# Patient Record
Sex: FEMALE | Race: BLACK OR AFRICAN AMERICAN | NOT HISPANIC OR LATINO | Employment: UNEMPLOYED | URBAN - METROPOLITAN AREA
[De-identification: names, ages, dates, MRNs, and addresses within clinical notes are randomized per-mention and may not be internally consistent; named-entity substitution may affect disease eponyms.]

---

## 2017-01-18 ENCOUNTER — PATIENT MESSAGE (OUTPATIENT)
Dept: OBSTETRICS AND GYNECOLOGY | Facility: CLINIC | Age: 47
End: 2017-01-18

## 2017-01-19 ENCOUNTER — PATIENT MESSAGE (OUTPATIENT)
Dept: OBSTETRICS AND GYNECOLOGY | Facility: CLINIC | Age: 47
End: 2017-01-19

## 2017-01-19 DIAGNOSIS — A60.00 GENITAL HERPES SIMPLEX, UNSPECIFIED SITE: Primary | ICD-10-CM

## 2017-01-19 RX ORDER — BORIC ACID
600 GRANULES (GRAM) MISCELLANEOUS NIGHTLY
Qty: 500 G | Refills: 1 | Status: SHIPPED | OUTPATIENT
Start: 2017-01-19 | End: 2017-02-02

## 2017-01-21 PROBLEM — A60.00 GENITAL HERPES SIMPLEX: Status: ACTIVE | Noted: 2017-01-21

## 2017-01-21 RX ORDER — VALACYCLOVIR HYDROCHLORIDE 500 MG/1
500 TABLET, FILM COATED ORAL 2 TIMES DAILY
Qty: 10 TABLET | Refills: 3 | Status: SHIPPED | OUTPATIENT
Start: 2017-01-21 | End: 2018-02-14

## 2017-04-14 ENCOUNTER — HOSPITAL ENCOUNTER (EMERGENCY)
Facility: OTHER | Age: 47
Discharge: HOME | End: 2017-04-14
Attending: EMERGENCY MEDICINE
Payer: MEDICAID

## 2017-04-14 VITALS
HEIGHT: 68 IN | HEART RATE: 87 BPM | TEMPERATURE: 98 F | OXYGEN SATURATION: 100 % | SYSTOLIC BLOOD PRESSURE: 118 MMHG | WEIGHT: 225 LBS | DIASTOLIC BLOOD PRESSURE: 85 MMHG | RESPIRATION RATE: 18 BRPM | BODY MASS INDEX: 34.1 KG/M2

## 2017-04-14 DIAGNOSIS — N39.0 URINARY TRACT INFECTION WITHOUT HEMATURIA, SITE UNSPECIFIED: Primary | ICD-10-CM

## 2017-04-14 LAB
BILIRUBIN, POC UA: NEGATIVE
BLOOD, POC UA: NEGATIVE
CLARITY, POC UA: CLEAR
COLOR, POC UA: YELLOW
GLUCOSE, POC UA: NEGATIVE
KETONES, POC UA: NEGATIVE
LEUKOCYTE EST, POC UA: ABNORMAL
NITRITE, POC UA: POSITIVE
PH UR STRIP: 7 [PH]
PROTEIN, POC UA: NEGATIVE
SPECIFIC GRAVITY, POC UA: 1.01
UROBILINOGEN, POC UA: 0.2 E.U./DL

## 2017-04-14 PROCEDURE — 63600175 PHARM REV CODE 636 W HCPCS: Performed by: EMERGENCY MEDICINE

## 2017-04-14 PROCEDURE — 81003 URINALYSIS AUTO W/O SCOPE: CPT

## 2017-04-14 PROCEDURE — 87088 URINE BACTERIA CULTURE: CPT

## 2017-04-14 PROCEDURE — 99284 EMERGENCY DEPT VISIT MOD MDM: CPT | Mod: 25

## 2017-04-14 PROCEDURE — 87086 URINE CULTURE/COLONY COUNT: CPT

## 2017-04-14 PROCEDURE — 96372 THER/PROPH/DIAG INJ SC/IM: CPT

## 2017-04-14 PROCEDURE — 87186 SC STD MICRODIL/AGAR DIL: CPT

## 2017-04-14 PROCEDURE — 87077 CULTURE AEROBIC IDENTIFY: CPT

## 2017-04-14 RX ORDER — IBUPROFEN 800 MG/1
800 TABLET ORAL EVERY 6 HOURS PRN
Qty: 20 TABLET | Refills: 0 | Status: SHIPPED | OUTPATIENT
Start: 2017-04-14 | End: 2019-02-11

## 2017-04-14 RX ORDER — NITROFURANTOIN (MACROCRYSTALS) 100 MG/1
100 CAPSULE ORAL EVERY 12 HOURS
Qty: 14 CAPSULE | Refills: 0 | Status: SHIPPED | OUTPATIENT
Start: 2017-04-14 | End: 2017-04-19 | Stop reason: SDUPTHER

## 2017-04-14 RX ORDER — PHENTERMINE HYDROCHLORIDE 37.5 MG/1
37.5 CAPSULE ORAL EVERY MORNING
COMMUNITY
End: 2017-04-19

## 2017-04-14 RX ORDER — CEFTRIAXONE 1 G/1
1 INJECTION, POWDER, FOR SOLUTION INTRAMUSCULAR; INTRAVENOUS
Status: COMPLETED | OUTPATIENT
Start: 2017-04-14 | End: 2017-04-14

## 2017-04-14 RX ORDER — KETOROLAC TROMETHAMINE 30 MG/ML
60 INJECTION, SOLUTION INTRAMUSCULAR; INTRAVENOUS
Status: COMPLETED | OUTPATIENT
Start: 2017-04-14 | End: 2017-04-14

## 2017-04-14 RX ORDER — HYDROCODONE BITARTRATE AND ACETAMINOPHEN 5; 325 MG/1; MG/1
1 TABLET ORAL EVERY 4 HOURS PRN
Qty: 10 TABLET | Refills: 0 | Status: SHIPPED | OUTPATIENT
Start: 2017-04-14 | End: 2017-04-24

## 2017-04-14 RX ADMIN — KETOROLAC TROMETHAMINE 60 MG: 30 INJECTION, SOLUTION INTRAMUSCULAR at 08:04

## 2017-04-14 RX ADMIN — CEFTRIAXONE SODIUM 1 G: 1 INJECTION, POWDER, FOR SOLUTION INTRAMUSCULAR; INTRAVENOUS at 08:04

## 2017-04-14 NOTE — ED NOTES
MD at the bedside for patient assessment and evaluation. Patient reports she has had pain in the right back and right flank x few days with no nausea or vomiting. Patient reports the pain does not radiate anywhere but reports she has pain to the RLQ intermittently with no fever. Patient reports she is most comfortable in a sitting positing and lying flat  Puts pressure on her back. After patient assessment . Patient offered by MD to have a CT for a possible DX of appendic or to wait and watch because she was not running a fever and did not have constant pain. patient denied having a CT scan because she was not running a fever and agreed to return if her symptoms worsened

## 2017-04-14 NOTE — ED NOTES
Urinary Tract Infection: Patient complains of frequency and pain in the right flank She has had symptoms for a few days. Patient also complains of back pain. Patient denies fever and vaginal discharge. Patient does have a history of recurrent UTI.  Patient does not have a history of pyelonephritis.

## 2017-04-14 NOTE — DISCHARGE INSTRUCTIONS
Abdominal Pain, Possible Appendicitis, Repeat Exam (Female)    Based on your visit today, the exact cause of your abdominal (stomach) pain is not certain. You have some of the early symptoms of appendicitis. Because these symptoms can be similar to other stomach problems, however, the diagnosis cannot be made at this time.  Waiting for more time to pass and repeating the exam is the best way to find out whether you have appendicitis. Within the next 12 to 24 hours, the cause of your stomach pain should become clear. During this time, you need to watch for any new symptoms or worsening of your condition. (See below.)  Symptoms  The most common symptom of appendicitis is pain in the abdomen. Since the appendix is in the lower right part of the abdomen, that is the most common place to have pain. Typically, the pain starts near the navel (belly button) and then moves lower and to the right over hours. The pain also tends to worsen over time. It may hurt especially when moving, straining, or coughing.  Other symptoms include:  · Loss of appetite  · Nausea, vomiting  · Low-grade fever  · Constipation or diarrhea  · Not passing gas  Home care  · Rest until your next exam. No lifting, straining, or strenuous activities.  · You may be told not to eat or drink anything before your next exam. Be sure to follow any instructions youre given. If you are allowed to eat:  ¨ Eat a diet low in fiber (called a low-residue diet). Foods allowed include refined breads, white rice, fruit and vegetable juices without pulp, and tender meats. These foods will pass more easily through the colon.  ¨ Avoid whole-grain foods, whole fruits and vegetables, meats, seeds and nuts, fried or fatty foods, dairy, and alcohol and spicy foods.   Follow-up care  Return for your next exam as directed.  When to seek medical advice  Call your healthcare provider or seek treatment right away if:  · You have a fever of 100.4°F (38°C) or higher, or as  directed by your provider.  · Your pain gets worse or moves to the lower right part of your abdomen.  · You have nausea or vomiting that worsens.  · You have diarrhea or constipation that worsens.  · You have blood in your vomit or stool (dark red or black color).  · Your abdomen becomes swollen.  · You become weak or dizzy.  · You think you might be pregnant or have unexpected vaginal bleeding.  Call 911  Call 911 right away if:  · You have trouble breathing.  · You become very confused or sleepy.  · You feel faint or lose consciousness.  · You have an usually fast heart rate.  Date Last Reviewed: 6/24/2015  © 7512-8969 [employer]. [address] [place] [address], [place] [address]. All rights reserved. This information is not intended as a substitute for professional medical care. Always follow your healthcare professional's instructions.

## 2017-04-14 NOTE — ED AVS SNAPSHOT
[facility] EMERGENCY DEPARTMENT  [address] [facility] [address]               Arpita Bradshaw   2017  8:20 AM   ED    Description:  Female : 1970   Department:  [facility] Emergency Department           Your Care was Coordinated By:     Provider Role From To    Samina Pulido MD Attending Provider 17 0839 --      Reason for Visit     Flank Pain           Diagnoses this Visit        Comments    Urinary tract infection without hematuria, site unspecified    -  Primary       ED Disposition     None           To Do List           Follow-up Information     Follow up with [facility] Emergency Department.    Specialty:  Emergency Medicine    Why:  If symptoms worsen    Contact information:    [address] [facility] [place] [address]  [phone]        Follow up with Primary Doctor No In 1 week.       These Medications        Disp Refills Start End    nitrofurantoin (MACRODANTIN) 100 MG capsule 14 capsule 0 2017    Take 1 capsule (100 mg total) by mouth every 12 (twelve) hours. - Oral    Pharmacy: [facility] [address] [place] [address] [facility] Ph #: [phone]       ibuprofen (ADVIL,MOTRIN) 800 MG tablet 20 tablet 0 2017     Take 1 tablet (800 mg total) by mouth every 6 (six) hours as needed for Pain. - Oral    Pharmacy: [facility] [address] [place] [address] [facility] Ph #: [phone]       hydrocodone-acetaminophen 5-325mg (NORCO) 5-325 mg per tablet 10 tablet 0 2017    Take 1 tablet by mouth every 4 (four) hours as needed for Pain. - Oral    Pharmacy: [facility] [address] [place], [place] [address] AT [facility] Ph #: [phone]         [facility] On Call     [facility] -  Assistance  Unless otherwise directed by your provider, please contact [facility], our nurse care line that is available for   assistance.     Registered nurses in the [facility] provide: appointment scheduling, clinical advisement, health education, and other advisory services.  Call: [phone] (toll free)               Medications           Message regarding Medications     Verify the changes and/or additions to your medication regime listed below are the same as discussed with your clinician today.  If any of these changes or additions are incorrect, please notify your healthcare provider.        START taking these NEW medications        Refills    nitrofurantoin (MACRODANTIN) 100 MG capsule 0    Sig: Take 1 capsule (100 mg total) by mouth every 12 (twelve) hours.    Class: Print    Route: Oral    ibuprofen (ADVIL,MOTRIN) 800 MG tablet 0    Sig: Take 1 tablet (800 mg total) by mouth every 6 (six) hours as needed for Pain.    Class: Print    Route: Oral    hydrocodone-acetaminophen 5-325mg (NORCO) 5-325 mg per tablet 0    Sig: Take 1 tablet by mouth every 4 (four) hours as needed for Pain.    Class: Print    Route: Oral      These medications were administered today        Dose Freq    cefTRIAXone injection 1 g 1 g ED 1 Time    Sig: Inject 1 g into the muscle ED 1 Time.    Class: Normal    Route: Intramuscular    ketorolac injection 60 mg 60 mg ED 1 Time    Sig: Inject 60 mg into the muscle ED 1 Time.    Class: Normal    Route: Intramuscular           Verify that the below list of medications is an accurate representation of the medications you are currently taking.  If none reported, the list may be blank. If incorrect, please contact your healthcare provider. Carry this list with you in case of emergency.           Current Medications     phentermine 37.5 MG capsule Take 37.5 mg by mouth every morning.    cefTRIAXone injection 1 g Inject 1 g into the muscle ED 1 Time.    cyclobenzaprine (FLEXERIL) 5 MG tablet TK 1 T PO HS PRF MUSCLE SPASMS    diphenhydrAMINE (BENADRYL) 25 mg capsule Take 25 mg by mouth every 4 (four)  "hours as needed for Itching.    gabapentin (NEURONTIN) 300 MG capsule TK 1 C PO  TID UTD    hydrocodone-acetaminophen 5-325mg (NORCO) 5-325 mg per tablet Take 1 tablet by mouth every 4 (four) hours as needed for Pain.    hydroxychloroquine (PLAQUENIL) 200 mg tablet     ibuprofen (ADVIL,MOTRIN) 800 MG tablet Take 1 tablet (800 mg total) by mouth every 6 (six) hours as needed for Pain.    ketorolac injection 60 mg Inject 60 mg into the muscle ED 1 Time.    methen-m.blue-s.phos-phsal-hyo (URIBEL) 118-10-40.8-36 mg Cap Take 1 tablet by mouth 4 (four) times daily as needed (Pelvic pain).    methen-m.blue-s.phos-phsal-hyo (URIBEL) 118-10-40.8-36 mg Cap Take 1 capsule by mouth every 6 to 8 hours as needed (Dysuria).    nitrofurantoin (MACRODANTIN) 100 MG capsule Take 1 capsule (100 mg total) by mouth every 12 (twelve) hours.    pentosan polysulfate (ELMIRON) 100 mg Cap Take 1 capsule (100 mg total) by mouth 3 (three) times daily.    phenazopyridine (PYRIDIUM) 200 MG tablet TK 1 T PO  TID FOR 2 DAYS    sulfamethoxazole-trimethoprim 800-160mg (BACTRIM DS) 800-160 mg Tab TK 1 T PO  BID    valacyclovir (VALTREX) 500 MG tablet Take 1 tablet (500 mg total) by mouth 2 (two) times daily.           Clinical Reference Information           Your Vitals Were     BP Pulse Temp Resp Height Weight    118/85 (BP Location: Left arm, Patient Position: Sitting) 87 97.8 °F (36.6 °C) (Temporal) 18 5' 8" (1.727 m) 102.1 kg (225 lb)    SpO2 BMI             100% 34.21 kg/m2         Allergies as of 4/14/2017     No Known Allergies      Immunizations Administered on Date of Encounter - 4/14/2017     None      ED Micro, Lab, POCT     Start Ordered       Status Ordering Provider    04/14/17 0840 04/14/17 0840  Urine culture  Once      In process     04/14/17 0829 04/14/17 0828  POCT URINALYSIS W/O SCOPE  Once      Completed     04/14/17 0829 04/14/17 0829  POCT URINALYSIS W/O SCOPE  Once      Final result       ED Imaging Orders     None    "     Discharge Instructions         Abdominal Pain, Possible Appendicitis, Repeat Exam (Female)    Based on your visit today, the exact cause of your abdominal (stomach) pain is not certain. You have some of the early symptoms of appendicitis. Because these symptoms can be similar to other stomach problems, however, the diagnosis cannot be made at this time.  Waiting for more time to pass and repeating the exam is the best way to find out whether you have appendicitis. Within the next 12 to 24 hours, the cause of your stomach pain should become clear. During this time, you need to watch for any new symptoms or worsening of your condition. (See below.)  Symptoms  The most common symptom of appendicitis is pain in the abdomen. Since the appendix is in the lower right part of the abdomen, that is the most common place to have pain. Typically, the pain starts near the navel (belly button) and then moves lower and to the right over hours. The pain also tends to worsen over time. It may hurt especially when moving, straining, or coughing.  Other symptoms include:  · Loss of appetite  · Nausea, vomiting  · Low-grade fever  · Constipation or diarrhea  · Not passing gas  Home care  · Rest until your next exam. No lifting, straining, or strenuous activities.  · You may be told not to eat or drink anything before your next exam. Be sure to follow any instructions youre given. If you are allowed to eat:  ¨ Eat a diet low in fiber (called a low-residue diet). Foods allowed include refined breads, white rice, fruit and vegetable juices without pulp, and tender meats. These foods will pass more easily through the colon.  ¨ Avoid whole-grain foods, whole fruits and vegetables, meats, seeds and nuts, fried or fatty foods, dairy, and alcohol and spicy foods.   Follow-up care  Return for your next exam as directed.  When to seek medical advice  Call your healthcare provider or seek treatment right away if:  · You have a fever of  100.4°F (38°C) or higher, or as directed by your provider.  · Your pain gets worse or moves to the lower right part of your abdomen.  · You have nausea or vomiting that worsens.  · You have diarrhea or constipation that worsens.  · You have blood in your vomit or stool (dark red or black color).  · Your abdomen becomes swollen.  · You become weak or dizzy.  · You think you might be pregnant or have unexpected vaginal bleeding.  Call 911  Call 911 right away if:  · You have trouble breathing.  · You become very confused or sleepy.  · You feel faint or lose consciousness.  · You have an usually fast heart rate.  Date Last Reviewed: 6/24/2015 © 2000-2016 [employer]. [address] [place] [address], [place] [address]. All rights reserved. This information is not intended as a substitute for professional medical care. Always follow your healthcare professional's instructions.           [facility] Emergency Department complies with applicable Federal civil rights laws and does not discriminate on the basis of race, color, national origin, age, disability, or sex.        Language Assistance Services     ATTENTION: Language assistance services are available, free of charge. Please call [phone].      ATENCIÓN: Si habla español, tiene a damico disposición servicios gratuitos de asistencia lingüística. Llame al [phone].     CHÚ Ý: N?u b?n nói Ti?ng Vi?t, có các d?ch v? h? tr? ngôn ng? mi?n phí dành cho b?n. G?i s? [phone].

## 2017-04-14 NOTE — ED PROVIDER NOTES
Encounter Date: 2017       History     Chief Complaint   Patient presents with    Flank Pain     Pt c/o of intermittent R side flank pain radiating to RLQ. Pt denies dysuria, vag discharge, n/v/d.      Review of patient's allergies indicates:  No Known Allergies  The history is provided by the patient.   46 -year-old complains of pain to her right flank area.  Patient has had sharp pain to her right flank area for the last 3 days.  Pain radiates to the right lower quadrant.  The pain is been constant.  It worsens when she moves.  She rates her pain as 8 out of 10.  She's been taken ibuprofen.  She's had a normal appetite.  No fever.  No nausea or vomiting.  Patient has had a kidney infection before and says that this feels similar.  No headache or dizziness.  Past Medical History:   Diagnosis Date    Fibromyalgia     Lupus      Past Surgical History:   Procedure Laterality Date    BREAST REDUCTION       SECTION      HYSTERECTOMY      [facility] for endometriosis     Family History   Problem Relation Age of Onset    Hypertension Mother     Cancer Mother      KIDNEY    Arthritis Mother      Social History   Substance Use Topics    Smoking status: Never Smoker    Smokeless tobacco: None    Alcohol use No     Review of Systems   Constitutional: Negative for appetite change and fever.   Respiratory: Negative for shortness of breath.    Cardiovascular: Negative for chest pain.   Gastrointestinal: Positive for abdominal pain.   Genitourinary: Positive for flank pain. Negative for decreased urine volume and dysuria.   Musculoskeletal: Positive for back pain.   Neurological: Negative for headaches.       Physical Exam   Initial Vitals   BP Pulse Resp Temp SpO2   17 0824 17 0824 17 0824 17 0824 17 0824   118/85 87 18 97.8 °F (36.6 °C) 100 %     Physical Exam    Nursing note and vitals reviewed.  Constitutional: She appears well-developed and well-nourished.   HENT:   Head:  "Normocephalic and atraumatic.   Eyes: Conjunctivae and EOM are normal. Pupils are equal, round, and reactive to light.   Neck: Normal range of motion. Neck supple.   Cardiovascular: Normal rate and regular rhythm.   Pulmonary/Chest: Breath sounds normal. No respiratory distress.   Abdominal: Soft. There is tenderness in the right lower quadrant. There is no rebound and no guarding.   Musculoskeletal: Normal range of motion.        Arms:  Neurological: She is alert and oriented to person, place, and time. She has normal strength.   Skin: Skin is warm and dry.   Psychiatric: She has a normal mood and affect.         ED Course   Procedures  Labs Reviewed   POCT URINALYSIS W/O SCOPE - Abnormal; Notable for the following:        Result Value    Glucose, UA Negative (*)     Bilirubin, UA Negative (*)     Ketones, UA Negative (*)     Blood, UA Negative (*)     Protein, UA Negative (*)     Nitrite, UA Positive (*)     All other components within normal limits   CULTURE, URINE   POCT URINALYSIS W/O SCOPE             Medical Decision Making:   Initial Assessment:   Patient is complaining of right flank pain radiates to her right lower quadrant.  Patient has had a UTI in the past and says this feels similar.  On exam she has very mild tenderness to her right lower quadrant and does have moderate tenderness to her right flank.  She has had a normal appetite.  No fever  ED Management:  Patient does have nitrite positive urine as well as moderate bacteria.  She will be treated with Rocephin and a  urine culture will be sent.  Patient was given specific return precautions since appendicitis has not been ruled out.  I did offer to do blood work and a CT at this time.  However, patient would prefer "watchful waiting" and will return if her symptoms worsen.                   ED Course     Clinical Impression:   The encounter diagnosis was Urinary tract infection without hematuria, site unspecified.          Samina Pulido, " MD  04/14/17 1053

## 2017-04-16 LAB — BACTERIA UR CULT: NORMAL

## 2017-04-19 ENCOUNTER — OFFICE VISIT (OUTPATIENT)
Dept: OBSTETRICS AND GYNECOLOGY | Facility: CLINIC | Age: 47
End: 2017-04-19
Attending: OBSTETRICS & GYNECOLOGY
Payer: MEDICAID

## 2017-04-19 VITALS
DIASTOLIC BLOOD PRESSURE: 72 MMHG | WEIGHT: 228.19 LBS | BODY MASS INDEX: 34.59 KG/M2 | SYSTOLIC BLOOD PRESSURE: 118 MMHG | HEIGHT: 68 IN

## 2017-04-19 DIAGNOSIS — N30.00 ACUTE CYSTITIS WITHOUT HEMATURIA: Primary | ICD-10-CM

## 2017-04-19 DIAGNOSIS — N76.0 VAGINITIS AND VULVOVAGINITIS: ICD-10-CM

## 2017-04-19 LAB
CANDIDA RRNA VAG QL PROBE: NEGATIVE
G VAGINALIS RRNA GENITAL QL PROBE: NEGATIVE
T VAGINALIS RRNA GENITAL QL PROBE: NEGATIVE

## 2017-04-19 PROCEDURE — 99213 OFFICE O/P EST LOW 20 MIN: CPT | Mod: S$PBB,,, | Performed by: OBSTETRICS & GYNECOLOGY

## 2017-04-19 PROCEDURE — 99999 PR PBB SHADOW E&M-EST. PATIENT-LVL III: CPT | Mod: PBBFAC,,, | Performed by: OBSTETRICS & GYNECOLOGY

## 2017-04-19 PROCEDURE — 99213 OFFICE O/P EST LOW 20 MIN: CPT | Mod: PBBFAC | Performed by: OBSTETRICS & GYNECOLOGY

## 2017-04-19 PROCEDURE — 87480 CANDIDA DNA DIR PROBE: CPT

## 2017-04-19 PROCEDURE — 87086 URINE CULTURE/COLONY COUNT: CPT

## 2017-04-19 RX ORDER — NITROFURANTOIN (MACROCRYSTALS) 100 MG/1
100 CAPSULE ORAL EVERY 12 HOURS
Qty: 14 CAPSULE | Refills: 0 | Status: SHIPPED | OUTPATIENT
Start: 2017-04-19 | End: 2017-04-26

## 2017-04-19 RX ORDER — METRONIDAZOLE 7.5 MG/G
1 GEL VAGINAL DAILY
Qty: 70 G | Refills: 0 | Status: SHIPPED | OUTPATIENT
Start: 2017-04-19 | End: 2017-04-24

## 2017-04-19 RX ORDER — PROMETHAZINE HYDROCHLORIDE 25 MG/1
25 TABLET ORAL EVERY 4 HOURS PRN
Qty: 20 TABLET | Refills: 1 | Status: SHIPPED | OUTPATIENT
Start: 2017-04-19 | End: 2018-07-18 | Stop reason: ALTCHOICE

## 2017-04-19 RX ORDER — BORIC ACID
600 GRANULES (GRAM) MISCELLANEOUS NIGHTLY
Qty: 100 G | Refills: 2 | Status: SHIPPED | OUTPATIENT
Start: 2017-04-19 | End: 2017-05-03

## 2017-04-19 NOTE — PROGRESS NOTES
HISTORY OF PRESENT ILLNESS:    Arpita Bradshaw is a 46 y.o. female, , No LMP recorded. Patient has had a hysterectomy.,  presents for ER follow-up.  Patient seen in ER few days ago for right flank and right lower quadrant pelvic pain.  Patient given rocephin im and oral macrobid.  Urine culture +e.coli sensitive to both abx.    Patient feels better but still with some pain and a nausea.  Also complains of vaginal discharge and odor which she thinks is BV.    Past Medical History:   Diagnosis Date    Fibromyalgia     Lupus        Past Surgical History:   Procedure Laterality Date    BREAST REDUCTION       SECTION      HYSTERECTOMY      [facility] for endometriosis       MEDICATIONS AND ALLERGIES:      Current Outpatient Prescriptions:     diphenhydrAMINE (BENADRYL) 25 mg capsule, Take 25 mg by mouth every 4 (four) hours as needed for Itching., Disp: , Rfl:     gabapentin (NEURONTIN) 300 MG capsule, TK 1 C PO  TID UTD, Disp: , Rfl: 1    hydrocodone-acetaminophen 5-325mg (NORCO) 5-325 mg per tablet, Take 1 tablet by mouth every 4 (four) hours as needed for Pain., Disp: 10 tablet, Rfl: 0    ibuprofen (ADVIL,MOTRIN) 800 MG tablet, Take 1 tablet (800 mg total) by mouth every 6 (six) hours as needed for Pain., Disp: 20 tablet, Rfl: 0    methen-m.blue-s.phos-phsal-hyo (URIBEL) 118-10-40.8-36 mg Cap, Take 1 tablet by mouth 4 (four) times daily as needed (Pelvic pain)., Disp: 20 capsule, Rfl: 12    methen-m.blue-s.phos-phsal-hyo (URIBEL) 118-10-40.8-36 mg Cap, Take 1 capsule by mouth every 6 to 8 hours as needed (Dysuria)., Disp: 20 capsule, Rfl: 3    nitrofurantoin (MACRODANTIN) 100 MG capsule, Take 1 capsule (100 mg total) by mouth every 12 (twelve) hours., Disp: 14 capsule, Rfl: 0    boric acid Gran, Place 600 mg vaginally every evening., Disp: 100 g, Rfl: 2    metronidazole (METROGEL VAGINAL) 0.75 % vaginal gel, Place 1 applicator vaginally once daily., Disp: 70 g, Rfl: 0    promethazine  "(PHENERGAN) 25 MG tablet, Take 1 tablet (25 mg total) by mouth every 4 (four) hours as needed for Nausea., Disp: 20 tablet, Rfl: 1    valacyclovir (VALTREX) 500 MG tablet, Take 1 tablet (500 mg total) by mouth 2 (two) times daily., Disp: 10 tablet, Rfl: 3    Review of patient's allergies indicates:  No Known Allergies    Family History   Problem Relation Age of Onset    Hypertension Mother     Cancer Mother      KIDNEY    Arthritis Mother        Social History     Social History    Marital status: Single     Spouse name: N/A    Number of children: N/A    Years of education: N/A     Occupational History    Not on file.     Social History Main Topics    Smoking status: Never Smoker    Smokeless tobacco: Not on file    Alcohol use No    Drug use: No    Sexual activity: Yes     Partners: Male     Birth control/ protection: Surgical     Other Topics Concern    Not on file     Social History Narrative       COMPREHENSIVE GYN HISTORY:  PAP History: Denies abnormal Paps.  Infection History: Denies STDs. Denies PID.  Benign History: Denies uterine fibroids. Denies ovarian cysts. Denies endometriosis. Denies other conditions.  Cancer History: Denies cervical cancer. Denies uterine cancer or hyperplasia. Denies ovarian cancer. Denies vulvar cancer or pre-cancer. Denies vaginal cancer or pre-cancer. Denies breast cancer. Denies colon cancer.    ROS:  GENERAL: No weight changes. No swelling. No fatigue. No fever.  CARDIOVASCULAR: No chest pain. No shortness of breath. No leg cramps.   NEUROLOGICAL: No headaches. No vision changes.  BREASTS: No pain. No lumps. No discharge.  ABDOMEN: No pain. No nausea. No vomiting. No diarrhea. No constipation.  REPRODUCTIVE: No abnormal bleeding.   VULVA: No pain. No lesions. No itching.  VAGINA: see above.  URINARY: see above    /72  Ht 5' 8" (1.727 m)  Wt 103.5 kg (228 lb 2.8 oz)  BMI 34.69 kg/m2    PE:  APPEARANCE: Well nourished, well developed, in no acute " distress.  ABDOMEN: Soft. No tenderness or masses. No hepatosplenomegaly. No hernias.  +Right CVA tenderness  BREASTS, FUNDOSCOPIC, RECTAL DEFERRED  PELVIC: External female genitalia without lesions.  Female hair distribution. Adequate perineal body, Normal urethral meatus. Vagina moist and well rugated without lesions or discharge.  No significant cystocele or rectocele present. Cervix pink without lesions, discharge or tenderness. Uterus is normal size, regular, mobile and nontender. Adnexa without masses or tenderness.  EXTREMITIES: No edema      DIAGNOSIS:  1. Acute cystitis without hematuria  Urine culture   2. Vaginitis and vulvovaginitis  metronidazole (METROGEL VAGINAL) 0.75 % vaginal gel    Vaginosis Screen by DNA Probe    CANCELED: POCT Vaginosis Screen by DNA Probe (Affirm)       COUNSELING:  Patient instructed to finish macrobid rx  Precautions given to follow-up if sx persist or worsen.

## 2017-04-19 NOTE — MR AVS SNAPSHOT
[facility] [address]  [address]  [address]  [facility] [address]  Phone: [phone]  Fax: [phone]                  Arpita Bradshaw   2017 11:00 AM   Office Visit    Description:  Female : 1970   Provider:  Venice Thompson MD   Department:  [facility] [address]           Reason for Visit     Follow-up                To Do List           Future Appointments        Provider Department Dept Phone    2017 11:00 AM Venice Thompson MD [facility] [address] [phone]      Goals (5 Years of Data)     None      [facility]     [facility] Care [facility] -  Assistance  Unless otherwise directed by your provider, please contact [facility], our nurse care line that is available for  assistance.     Registered nurses in the [facility] provide: appointment scheduling, clinical advisement, health education, and other advisory services.  Call: [phone] (toll free)               Medications           Message regarding Medications     Verify the changes and/or additions to your medication regime listed below are the same as discussed with your clinician today.  If any of these changes or additions are incorrect, please notify your healthcare provider.        STOP taking these medications     phentermine 37.5 MG capsule Take 37.5 mg by mouth every morning.    pentosan polysulfate (ELMIRON) 100 mg Cap Take 1 capsule (100 mg total) by mouth 3 (three) times daily.    phenazopyridine (PYRIDIUM) 200 MG tablet TK 1 T PO  TID FOR 2 DAYS    sulfamethoxazole-trimethoprim 800-160mg (BACTRIM DS) 800-160 mg Tab TK 1 T PO  BID    hydroxychloroquine (PLAQUENIL) 200 mg tablet     cyclobenzaprine (FLEXERIL) 5 MG tablet TK 1 T PO HS PRF MUSCLE SPASMS           Verify that the below list of medications is an accurate representation of the medications you are currently taking.  If none reported, the list may be blank. If incorrect, please contact your  "healthcare provider. Carry this list with you in case of emergency.           Current Medications     diphenhydrAMINE (BENADRYL) 25 mg capsule Take 25 mg by mouth every 4 (four) hours as needed for Itching.    gabapentin (NEURONTIN) 300 MG capsule TK 1 C PO  TID UTD    hydrocodone-acetaminophen 5-325mg (NORCO) 5-325 mg per tablet Take 1 tablet by mouth every 4 (four) hours as needed for Pain.    ibuprofen (ADVIL,MOTRIN) 800 MG tablet Take 1 tablet (800 mg total) by mouth every 6 (six) hours as needed for Pain.    methen-m.blue-s.phos-phsal-hyo (URIBEL) 118-10-40.8-36 mg Cap Take 1 tablet by mouth 4 (four) times daily as needed (Pelvic pain).    methen-m.blue-s.phos-phsal-hyo (URIBEL) 118-10-40.8-36 mg Cap Take 1 capsule by mouth every 6 to 8 hours as needed (Dysuria).    nitrofurantoin (MACRODANTIN) 100 MG capsule Take 1 capsule (100 mg total) by mouth every 12 (twelve) hours.    valacyclovir (VALTREX) 500 MG tablet Take 1 tablet (500 mg total) by mouth 2 (two) times daily.           Clinical Reference Information           Your Vitals Were     BP Height Weight BMI       118/72 5' 8" (1.727 m) 103.5 kg (228 lb 2.8 oz) 34.69 kg/m2       Blood Pressure          Most Recent Value    BP  118/72      Allergies as of 4/19/2017     No Known Allergies      Immunizations Administered on Date of Encounter - 4/19/2017     None      Language Assistance Services     ATTENTION: Language assistance services are available, free of charge. Please call [phone].      ATENCIÓN: Si habla laya, tiene a damico disposición servicios gratuitos de asistencia lingüística. Llame al [phone].     [facility] Ý: N?u b?n nói Ti?ng Vi?t, có các d?ch v? h? tr? ngôn ng? mi?n phí dành cho b?n. G?i s? [phone].         [facility] [address] complies with applicable Federal civil rights laws and does not discriminate on the basis of race, color, national origin, age, disability, or sex.        "

## 2017-04-20 LAB — BACTERIA UR CULT: NORMAL

## 2017-09-27 ENCOUNTER — TELEPHONE (OUTPATIENT)
Dept: UROLOGY | Facility: CLINIC | Age: 47
End: 2017-09-27

## 2017-09-27 NOTE — TELEPHONE ENCOUNTER
[employer] interstim urinary implant    Product number: 3058// Serial number: [id number] Implanted on: 10/16/2008  Implanted at: [facility]    Product number: 3093-28 //Serial number: [id number]  Implanted on: 10/16/2008  Implanted at : [facility]

## 2017-10-20 ENCOUNTER — TELEPHONE (OUTPATIENT)
Dept: UROLOGY | Facility: CLINIC | Age: 47
End: 2017-10-20

## 2017-10-20 ENCOUNTER — PATIENT MESSAGE (OUTPATIENT)
Dept: UROLOGY | Facility: CLINIC | Age: 47
End: 2017-10-20

## 2017-12-13 ENCOUNTER — PATIENT MESSAGE (OUTPATIENT)
Dept: UROLOGY | Facility: CLINIC | Age: 47
End: 2017-12-13

## 2018-01-31 ENCOUNTER — OFFICE VISIT (OUTPATIENT)
Dept: UROLOGY | Facility: CLINIC | Age: 48
End: 2018-01-31
Payer: MEDICAID

## 2018-01-31 VITALS — BODY MASS INDEX: 34.08 KG/M2 | WEIGHT: 224.88 LBS | HEIGHT: 68 IN

## 2018-01-31 DIAGNOSIS — N39.41 URGE INCONTINENCE: ICD-10-CM

## 2018-01-31 DIAGNOSIS — N31.9 NEUROGENIC BLADDER: Primary | ICD-10-CM

## 2018-01-31 DIAGNOSIS — N30.10 INTERSTITIAL CYSTITIS: ICD-10-CM

## 2018-01-31 DIAGNOSIS — R35.1 NOCTURIA: ICD-10-CM

## 2018-01-31 DIAGNOSIS — R35.0 URINARY FREQUENCY: ICD-10-CM

## 2018-01-31 PROCEDURE — 99999 PR PBB SHADOW E&M-EST. PATIENT-LVL III: CPT | Mod: PBBFAC,,, | Performed by: UROLOGY

## 2018-01-31 PROCEDURE — 3008F BODY MASS INDEX DOCD: CPT | Mod: ,,, | Performed by: UROLOGY

## 2018-01-31 PROCEDURE — 99213 OFFICE O/P EST LOW 20 MIN: CPT | Mod: PBBFAC,PO | Performed by: UROLOGY

## 2018-01-31 PROCEDURE — 99214 OFFICE O/P EST MOD 30 MIN: CPT | Mod: S$PBB,,, | Performed by: UROLOGY

## 2018-01-31 RX ORDER — PREGABALIN 150 MG/1
CAPSULE ORAL
Refills: 0 | COMMUNITY
Start: 2018-01-24 | End: 2018-02-14

## 2018-01-31 NOTE — PROGRESS NOTES
Subjective:       Patient ID: Arpita Bradshaw is a 47 y.o. female.    Chief Complaint: Follow-up    48 yo AAF with NGB and OAB. Treated with interstim for NGB with benefit of improving symptoms of IC. Not taking Elmirom.      Urinary Frequency    This is a chronic (Urge incontinence and frequency) problem. The current episode started more than 1 year ago. The problem occurs every urination. The problem has been waxing and waning. The pain is at a severity of 0/10. The patient is experiencing no pain. There has been no fever. She is sexually active. There is no history of pyelonephritis. Associated symptoms include frequency and urgency. Pertinent negatives include no behavior changes, chills, discharge, flank pain, hematuria, hesitancy, nausea, possible pregnancy, sweats, vomiting, weight loss, bubble bath use, constipation, rash or withholding. Treatments tried: Interstim. The treatment provided mild relief. Her past medical history is significant for a urological procedure. There is no history of catheterization, diabetes insipidus, diabetes mellitus, genitourinary reflux, hypertension, kidney stones, recurrent UTIs, a single kidney, STD or urinary stasis.     Review of Systems   Constitutional: Negative for activity change, appetite change, chills, fatigue, fever and weight loss.   HENT: Negative for congestion, ear pain, hearing loss, nosebleeds, sinus pressure, sore throat and trouble swallowing.    Eyes: Negative for pain and visual disturbance.   Respiratory: Negative for apnea, cough and shortness of breath.    Cardiovascular: Negative for chest pain and leg swelling.   Gastrointestinal: Negative for abdominal distention, abdominal pain, anal bleeding, blood in stool, constipation, diarrhea, nausea, rectal pain and vomiting.   Endocrine: Negative for cold intolerance, heat intolerance, polydipsia, polyphagia and polyuria.   Genitourinary: Positive for frequency and urgency. Negative for decreased urine  volume, difficulty urinating, dyspareunia, dysuria, enuresis, flank pain, genital sores, hematuria, hesitancy, menstrual problem, pelvic pain, vaginal bleeding, vaginal discharge and vaginal pain.   Musculoskeletal: Negative for arthralgias and back pain.   Skin: Negative for color change, pallor and rash.   Allergic/Immunologic: Negative for environmental allergies, food allergies and immunocompromised state.   Neurological: Negative for dizziness, speech difficulty, weakness and headaches.   Hematological: Negative for adenopathy. Does not bruise/bleed easily.   Psychiatric/Behavioral: Negative.        Objective:      Physical Exam   Nursing note and vitals reviewed.  Constitutional: She is oriented to person, place, and time. She appears well-developed and well-nourished.   HENT:   Head: Normocephalic.   Nose: Nose normal.   Mouth/Throat: Oropharynx is clear and moist.   Eyes: Conjunctivae and EOM are normal. Pupils are equal, round, and reactive to light.   Neck: Normal range of motion. Neck supple.   Cardiovascular: Normal rate, regular rhythm, normal heart sounds and intact distal pulses.    Pulmonary/Chest: Effort normal and breath sounds normal.   Abdominal: Soft. Bowel sounds are normal.   Genitourinary: Vagina normal.   Musculoskeletal: Normal range of motion.   Neurological: She is alert and oriented to person, place, and time. She has normal reflexes.   Skin: Skin is warm and dry.     Psychiatric: She has a normal mood and affect. Her behavior is normal. Judgment and thought content normal.       Assessment:       1. Neurogenic bladder    2. Urge incontinence    3. Interstitial cystitis    4. Nocturia    5. Urinary frequency        Plan:       Patient Instructions   Continue Interstim  Mibetriq trial

## 2018-01-31 NOTE — PATIENT INSTRUCTIONS
Continue Interstim  Mirbetriq trial  F/U 6 weeks  If no improvement consider Botox ( info given to pt)

## 2018-02-01 ENCOUNTER — TELEPHONE (OUTPATIENT)
Dept: UROLOGY | Facility: CLINIC | Age: 48
End: 2018-02-01

## 2018-02-01 NOTE — TELEPHONE ENCOUNTER
----- Message from Tammy Boogie sent at 2/1/2018 11:05 AM CST -----  Contact: self/[phone]  Patient called to state the prescription he prescribed on yesterday is not covered by her insurance so she needs an alternative.    Please call and advise.

## 2018-02-05 ENCOUNTER — TELEPHONE (OUTPATIENT)
Dept: UROLOGY | Facility: CLINIC | Age: 48
End: 2018-02-05

## 2018-02-09 ENCOUNTER — TELEPHONE (OUTPATIENT)
Dept: UROLOGY | Facility: CLINIC | Age: 48
End: 2018-02-09

## 2018-02-09 NOTE — TELEPHONE ENCOUNTER
----- Message from Mine Bailey sent at 2/9/2018 10:55 AM CST -----  Contact: self/ [phone]  Patient called in to schedule a appointment for a new procedure.    Please call and advise.

## 2018-02-14 DIAGNOSIS — N31.9 NEUROGENIC URINARY BLADDER DISORDER: ICD-10-CM

## 2018-02-14 DIAGNOSIS — N31.9 NEUROGENIC BLADDER: Primary | ICD-10-CM

## 2018-02-14 RX ORDER — SODIUM CHLORIDE 9 MG/ML
INJECTION, SOLUTION INTRAVENOUS CONTINUOUS
Status: CANCELLED | OUTPATIENT
Start: 2018-02-14

## 2018-02-15 ENCOUNTER — LAB VISIT (OUTPATIENT)
Dept: LAB | Facility: HOSPITAL | Age: 48
End: 2018-02-15
Attending: UROLOGY
Payer: MEDICAID

## 2018-02-15 ENCOUNTER — TELEPHONE (OUTPATIENT)
Dept: UROLOGY | Facility: CLINIC | Age: 48
End: 2018-02-15

## 2018-02-15 DIAGNOSIS — N39.0 URINARY TRACT INFECTION WITHOUT HEMATURIA, SITE UNSPECIFIED: ICD-10-CM

## 2018-02-15 DIAGNOSIS — N39.0 URINARY TRACT INFECTION WITHOUT HEMATURIA, SITE UNSPECIFIED: Primary | ICD-10-CM

## 2018-02-15 LAB
AMORPH CRY UR QL COMP ASSIST: ABNORMAL
BACTERIA #/AREA URNS AUTO: ABNORMAL /HPF
BILIRUB UR QL STRIP: NEGATIVE
CLARITY UR REFRACT.AUTO: ABNORMAL
COLOR UR AUTO: YELLOW
GLUCOSE UR QL STRIP: NEGATIVE
HGB UR QL STRIP: NEGATIVE
KETONES UR QL STRIP: NEGATIVE
LEUKOCYTE ESTERASE UR QL STRIP: ABNORMAL
MICROSCOPIC COMMENT: ABNORMAL
NITRITE UR QL STRIP: POSITIVE
PH UR STRIP: 5 [PH] (ref 5–8)
PROT UR QL STRIP: NEGATIVE
SP GR UR STRIP: >=1.03 (ref 1–1.03)
SQUAMOUS #/AREA URNS AUTO: 2 /HPF
URN SPEC COLLECT METH UR: ABNORMAL
UROBILINOGEN UR STRIP-ACNC: NEGATIVE EU/DL
WBC #/AREA URNS AUTO: 4 /HPF (ref 0–5)

## 2018-02-15 PROCEDURE — 87077 CULTURE AEROBIC IDENTIFY: CPT

## 2018-02-15 PROCEDURE — 87186 SC STD MICRODIL/AGAR DIL: CPT

## 2018-02-15 PROCEDURE — 81001 URINALYSIS AUTO W/SCOPE: CPT

## 2018-02-15 PROCEDURE — 87086 URINE CULTURE/COLONY COUNT: CPT

## 2018-02-15 PROCEDURE — 87088 URINE BACTERIA CULTURE: CPT

## 2018-02-15 NOTE — TELEPHONE ENCOUNTER
She denise thinks she might have a uti, she is not sure. Will drop off a urine culture today at the lab

## 2018-02-15 NOTE — TELEPHONE ENCOUNTER
----- Message from Tammy Boogie sent at 2/15/2018  9:50 AM CST -----  Contact: self/[phone]  Patient called to speak with the nurse in regard to the procedure she is having on Monday.    Please call and advise.

## 2018-02-19 ENCOUNTER — TELEPHONE (OUTPATIENT)
Dept: UROLOGY | Facility: CLINIC | Age: 48
End: 2018-02-19

## 2018-02-19 PROBLEM — N31.9 NEUROGENIC URINARY BLADDER DISORDER: Status: ACTIVE | Noted: 2018-02-19

## 2018-02-19 LAB — BACTERIA UR CULT: NORMAL

## 2018-02-19 NOTE — TELEPHONE ENCOUNTER
----- Message from Sarah Beth Robertson sent at 2/19/2018  3:35 PM CST -----  Contact: [phone]/self  Patient is requesting a call back regarding pain medication. Please call and advise.

## 2018-02-26 ENCOUNTER — TELEPHONE (OUTPATIENT)
Dept: UROLOGY | Facility: CLINIC | Age: 48
End: 2018-02-26

## 2018-02-26 NOTE — TELEPHONE ENCOUNTER
----- Message from Charlene Chin sent at 2/26/2018  1:15 PM CST -----  Contact: [phone]/ self   Pt its requesting a prescription for a yeast infection . Pt states she has a yeast infection due to the antibiotics she is taking. Please advise

## 2018-03-28 ENCOUNTER — OFFICE VISIT (OUTPATIENT)
Dept: UROLOGY | Facility: CLINIC | Age: 48
End: 2018-03-28
Payer: MEDICAID

## 2018-03-28 VITALS — RESPIRATION RATE: 17 BRPM | HEIGHT: 68 IN | BODY MASS INDEX: 34.4 KG/M2 | WEIGHT: 227 LBS | OXYGEN SATURATION: 98 %

## 2018-03-28 DIAGNOSIS — R35.0 URINARY FREQUENCY: ICD-10-CM

## 2018-03-28 DIAGNOSIS — R35.1 NOCTURIA: ICD-10-CM

## 2018-03-28 DIAGNOSIS — N39.41 URGE INCONTINENCE: ICD-10-CM

## 2018-03-28 DIAGNOSIS — N31.9 NEUROGENIC BLADDER: Primary | ICD-10-CM

## 2018-03-28 PROCEDURE — 99999 PR PBB SHADOW E&M-EST. PATIENT-LVL IV: CPT | Mod: PBBFAC,,, | Performed by: UROLOGY

## 2018-03-28 PROCEDURE — 87086 URINE CULTURE/COLONY COUNT: CPT

## 2018-03-28 PROCEDURE — 99214 OFFICE O/P EST MOD 30 MIN: CPT | Mod: PBBFAC,PO | Performed by: UROLOGY

## 2018-03-28 PROCEDURE — 87088 URINE BACTERIA CULTURE: CPT

## 2018-03-28 PROCEDURE — 87077 CULTURE AEROBIC IDENTIFY: CPT

## 2018-03-28 PROCEDURE — 87186 SC STD MICRODIL/AGAR DIL: CPT

## 2018-03-28 PROCEDURE — 99215 OFFICE O/P EST HI 40 MIN: CPT | Mod: S$PBB,,, | Performed by: UROLOGY

## 2018-03-28 PROCEDURE — 81003 URINALYSIS AUTO W/O SCOPE: CPT

## 2018-03-28 RX ORDER — PREGABALIN 75 MG/1
75 CAPSULE ORAL 2 TIMES DAILY
Refills: 0 | COMMUNITY
Start: 2018-01-23 | End: 2018-07-17

## 2018-03-28 RX ORDER — SODIUM CHLORIDE 9 MG/ML
INJECTION, SOLUTION INTRAVENOUS CONTINUOUS
Status: CANCELLED | OUTPATIENT
Start: 2018-03-28

## 2018-03-28 NOTE — PROGRESS NOTES
Subjective:       Patient ID: Arpita Bradshaw is a 47 y.o. female.    Chief Complaint: Follow-up    Pt is 46 yo AAF with NGB. Interstim not working well and difficult to titrate power. Recent UTI resolved with ABX. And Diflucan. Here to discuss and schedule Botox injection.      Urinary Frequency    This is a chronic (NGB) problem. The current episode started more than 1 year ago. The problem occurs every urination. The problem has been unchanged. The quality of the pain is described as aching. The pain is at a severity of 0/10. The patient is experiencing no pain. There has been no fever. She is sexually active. There is no history of pyelonephritis. Associated symptoms include frequency and urgency. Pertinent negatives include no behavior changes, chills, discharge, flank pain, hematuria, hesitancy, nausea, possible pregnancy, sweats, vomiting, weight loss, bubble bath use, constipation, rash or withholding. Associated symptoms comments: Nocturia. She has tried antibiotics (OAB meds and Interstim) for the symptoms. The treatment provided no relief. Her past medical history is significant for a urological procedure. There is no history of catheterization, diabetes insipidus, diabetes mellitus, genitourinary reflux, hypertension, kidney stones, recurrent UTIs, a single kidney, STD or urinary stasis.     Review of Systems   Constitutional: Negative for activity change, appetite change, chills, fatigue, fever and weight loss.   HENT: Negative for congestion, ear pain, hearing loss, nosebleeds, sinus pressure, sore throat and trouble swallowing.    Eyes: Negative for pain and visual disturbance.   Respiratory: Negative for apnea, cough and shortness of breath.    Cardiovascular: Negative for chest pain and leg swelling.   Gastrointestinal: Negative for abdominal distention, abdominal pain, anal bleeding, blood in stool, constipation, diarrhea, nausea, rectal pain and vomiting.   Endocrine: Negative for cold  intolerance, heat intolerance, polydipsia, polyphagia and polyuria.   Genitourinary: Positive for frequency and urgency. Negative for decreased urine volume, difficulty urinating, dyspareunia, dysuria, enuresis, flank pain, genital sores, hematuria, hesitancy, menstrual problem, pelvic pain, vaginal bleeding, vaginal discharge and vaginal pain.   Musculoskeletal: Negative for arthralgias and back pain.   Skin: Negative for color change, pallor and rash.   Allergic/Immunologic: Negative for environmental allergies, food allergies and immunocompromised state.   Neurological: Negative for dizziness, speech difficulty, weakness and headaches.   Hematological: Negative for adenopathy. Does not bruise/bleed easily.   Psychiatric/Behavioral: Negative.        Objective:      Physical Exam   Nursing note and vitals reviewed.  Constitutional: She is oriented to person, place, and time. She appears well-developed and well-nourished.   HENT:   Head: Normocephalic.   Nose: Nose normal.   Mouth/Throat: Oropharynx is clear and moist.   Eyes: Conjunctivae and EOM are normal. Pupils are equal, round, and reactive to light.   Neck: Normal range of motion. Neck supple.   Cardiovascular: Normal rate, regular rhythm, normal heart sounds and intact distal pulses.    Pulmonary/Chest: Effort normal and breath sounds normal.   Abdominal: Soft. Bowel sounds are normal.   Genitourinary: Vagina normal.   Genitourinary Comments: Bladder NT   Musculoskeletal: Normal range of motion.   Neurological: She is alert and oriented to person, place, and time. She has normal reflexes.   Skin: Skin is warm and dry.     Psychiatric: She has a normal mood and affect. Her behavior is normal. Judgment and thought content normal.       Assessment:       1. Neurogenic bladder    2. Urge incontinence    3. Urinary frequency    4. Nocturia        Plan:       Patient Instructions   Check U/A and Urine C+S  Schedule bladder botox on 4/5/18 at [facility].

## 2018-03-28 NOTE — PATIENT INSTRUCTIONS
Check U/A and Urine C+S  Schedule bladder botox on 4/5/18 at [facility].

## 2018-03-29 ENCOUNTER — LAB VISIT (OUTPATIENT)
Dept: LAB | Facility: HOSPITAL | Age: 48
End: 2018-03-29
Attending: UROLOGY
Payer: MEDICAID

## 2018-03-29 DIAGNOSIS — N31.9 NEUROGENIC BLADDER: ICD-10-CM

## 2018-03-29 LAB

## 2018-03-31 LAB
BACTERIA UR CULT: NORMAL
BACTERIA UR CULT: NORMAL

## 2018-04-02 PROBLEM — N39.0 URINARY TRACT INFECTION: Status: ACTIVE | Noted: 2018-04-02

## 2018-04-05 DIAGNOSIS — N31.9 NEUROGENIC BLADDER: Primary | ICD-10-CM

## 2018-04-05 RX ORDER — LIDOCAINE HYDROCHLORIDE 20 MG/ML
JELLY TOPICAL ONCE
Status: CANCELLED | OUTPATIENT
Start: 2018-04-05 | End: 2018-04-05

## 2018-04-05 RX ORDER — SODIUM CHLORIDE 9 MG/ML
INJECTION, SOLUTION INTRAVENOUS CONTINUOUS
Status: CANCELLED | OUTPATIENT
Start: 2018-04-05

## 2018-04-06 ENCOUNTER — TELEPHONE (OUTPATIENT)
Dept: UROLOGY | Facility: CLINIC | Age: 48
End: 2018-04-06

## 2018-04-06 DIAGNOSIS — N39.0 URINARY TRACT INFECTION WITHOUT HEMATURIA, SITE UNSPECIFIED: Primary | ICD-10-CM

## 2018-04-06 NOTE — TELEPHONE ENCOUNTER
----- Message from Sarah Beth Robertson sent at 4/6/2018  9:59 AM CDT -----  Patient is requesting a call back regarding scheduled surgery. Please advise.

## 2018-07-17 ENCOUNTER — HOSPITAL ENCOUNTER (EMERGENCY)
Facility: HOSPITAL | Age: 48
Discharge: HOME | End: 2018-07-17
Attending: EMERGENCY MEDICINE
Payer: MEDICAID

## 2018-07-17 VITALS
TEMPERATURE: 98 F | RESPIRATION RATE: 18 BRPM | HEIGHT: 68 IN | WEIGHT: 256 LBS | DIASTOLIC BLOOD PRESSURE: 94 MMHG | HEART RATE: 60 BPM | BODY MASS INDEX: 38.8 KG/M2 | SYSTOLIC BLOOD PRESSURE: 173 MMHG | OXYGEN SATURATION: 98 %

## 2018-07-17 DIAGNOSIS — R11.2 NON-INTRACTABLE VOMITING WITH NAUSEA, UNSPECIFIED VOMITING TYPE: ICD-10-CM

## 2018-07-17 DIAGNOSIS — R10.31 RIGHT LOWER QUADRANT ABDOMINAL PAIN: Primary | ICD-10-CM

## 2018-07-17 LAB
ALBUMIN SERPL BCP-MCNC: 4.1 G/DL
ALP SERPL-CCNC: 62 U/L
ALT SERPL W/O P-5'-P-CCNC: 9 U/L
ANION GAP SERPL CALC-SCNC: 9 MMOL/L
AST SERPL-CCNC: 10 U/L
B-HCG UR QL: NEGATIVE
BACTERIA #/AREA URNS HPF: ABNORMAL /HPF
BASOPHILS # BLD AUTO: 0 K/UL
BASOPHILS NFR BLD: 0 %
BILIRUB SERPL-MCNC: 0.3 MG/DL
BILIRUB UR QL STRIP: NEGATIVE
BUN SERPL-MCNC: 16 MG/DL
CALCIUM SERPL-MCNC: 9.6 MG/DL
CHLORIDE SERPL-SCNC: 110 MMOL/L
CLARITY UR: CLEAR
CO2 SERPL-SCNC: 22 MMOL/L
COLOR UR: YELLOW
CREAT SERPL-MCNC: 1 MG/DL
CRP SERPL-MCNC: 2.6 MG/L
CTP QC/QA: YES
DIFFERENTIAL METHOD: ABNORMAL
EOSINOPHIL # BLD AUTO: 0 K/UL
EOSINOPHIL NFR BLD: 0.7 %
ERYTHROCYTE [DISTWIDTH] IN BLOOD BY AUTOMATED COUNT: 12.7 %
ERYTHROCYTE [SEDIMENTATION RATE] IN BLOOD BY WESTERGREN METHOD: 37 MM/HR
EST. GFR  (AFRICAN AMERICAN): >60 ML/MIN/1.73 M^2
EST. GFR  (NON AFRICAN AMERICAN): >60 ML/MIN/1.73 M^2
GLUCOSE SERPL-MCNC: 85 MG/DL
GLUCOSE UR QL STRIP: NEGATIVE
HCT VFR BLD AUTO: 36 %
HGB BLD-MCNC: 11.8 G/DL
HGB UR QL STRIP: NEGATIVE
KETONES UR QL STRIP: NEGATIVE
LEUKOCYTE ESTERASE UR QL STRIP: ABNORMAL
LIPASE SERPL-CCNC: 22 U/L
LYMPHOCYTES # BLD AUTO: 1 K/UL
LYMPHOCYTES NFR BLD: 31.9 %
MCH RBC QN AUTO: 28.7 PG
MCHC RBC AUTO-ENTMCNC: 32.8 G/DL
MCV RBC AUTO: 88 FL
MICROSCOPIC COMMENT: ABNORMAL
MONOCYTES # BLD AUTO: 0.2 K/UL
MONOCYTES NFR BLD: 7.7 %
NEUTROPHILS # BLD AUTO: 1.8 K/UL
NEUTROPHILS NFR BLD: 59.7 %
NITRITE UR QL STRIP: NEGATIVE
NON-SQ EPI CELLS #/AREA URNS HPF: 1 /HPF
PH UR STRIP: 6 [PH] (ref 5–8)
PLATELET # BLD AUTO: 187 K/UL
PMV BLD AUTO: 10.4 FL
POTASSIUM SERPL-SCNC: 3.6 MMOL/L
PROT SERPL-MCNC: 8.5 G/DL
PROT UR QL STRIP: NEGATIVE
RBC # BLD AUTO: 4.11 M/UL
SODIUM SERPL-SCNC: 141 MMOL/L
SP GR UR STRIP: 1.02 (ref 1–1.03)
SQUAMOUS #/AREA URNS HPF: 4 /HPF
URN SPEC COLLECT METH UR: ABNORMAL
UROBILINOGEN UR STRIP-ACNC: NEGATIVE EU/DL
WBC # BLD AUTO: 2.98 K/UL
WBC #/AREA URNS HPF: 2 /HPF (ref 0–5)

## 2018-07-17 PROCEDURE — 25000003 PHARM REV CODE 250: Performed by: NURSE PRACTITIONER

## 2018-07-17 PROCEDURE — 99284 EMERGENCY DEPT VISIT MOD MDM: CPT | Mod: 25

## 2018-07-17 PROCEDURE — 85652 RBC SED RATE AUTOMATED: CPT

## 2018-07-17 PROCEDURE — 80053 COMPREHEN METABOLIC PANEL: CPT

## 2018-07-17 PROCEDURE — 96374 THER/PROPH/DIAG INJ IV PUSH: CPT

## 2018-07-17 PROCEDURE — 81025 URINE PREGNANCY TEST: CPT | Performed by: NURSE PRACTITIONER

## 2018-07-17 PROCEDURE — 85025 COMPLETE CBC W/AUTO DIFF WBC: CPT

## 2018-07-17 PROCEDURE — 63600175 PHARM REV CODE 636 W HCPCS: Performed by: NURSE PRACTITIONER

## 2018-07-17 PROCEDURE — 86140 C-REACTIVE PROTEIN: CPT

## 2018-07-17 PROCEDURE — 25500020 PHARM REV CODE 255: Performed by: EMERGENCY MEDICINE

## 2018-07-17 PROCEDURE — 81000 URINALYSIS NONAUTO W/SCOPE: CPT

## 2018-07-17 PROCEDURE — 83690 ASSAY OF LIPASE: CPT

## 2018-07-17 RX ORDER — KETOROLAC TROMETHAMINE 30 MG/ML
15 INJECTION, SOLUTION INTRAMUSCULAR; INTRAVENOUS
Status: COMPLETED | OUTPATIENT
Start: 2018-07-17 | End: 2018-07-17

## 2018-07-17 RX ORDER — ONDANSETRON 4 MG/1
4 TABLET, ORALLY DISINTEGRATING ORAL EVERY 6 HOURS PRN
Qty: 20 TABLET | Refills: 0 | Status: SHIPPED | OUTPATIENT
Start: 2018-07-17 | End: 2018-07-18 | Stop reason: ALTCHOICE

## 2018-07-17 RX ORDER — TIZANIDINE 4 MG/1
4 TABLET ORAL EVERY 8 HOURS PRN
Qty: 20 TABLET | Refills: 0 | Status: SHIPPED | OUTPATIENT
Start: 2018-07-17 | End: 2019-04-02

## 2018-07-17 RX ADMIN — IOHEXOL 100 ML: 350 INJECTION, SOLUTION INTRAVENOUS at 07:07

## 2018-07-17 RX ADMIN — SODIUM CHLORIDE 1000 ML: 0.9 INJECTION, SOLUTION INTRAVENOUS at 06:07

## 2018-07-17 RX ADMIN — KETOROLAC TROMETHAMINE 15 MG: 30 INJECTION, SOLUTION INTRAMUSCULAR at 06:07

## 2018-07-17 NOTE — ED PROVIDER NOTES
"Encounter Date: 2018  SORT: This is a 48 y.o. female who presents for emergent consideration of RLQ abdominal pain. Patient reports being seen at urgent care and told to come to this ED for possible appendicitis.  Initial exam notable for RLQ TTP. Patient will be moved to a room when one is available. Orders placed.  FABIENNE Rockwell PA-C 2018 1712  SCRIBE #1 NOTE: I, Raine Blank, criss scribing for, and in the presence of,  Jose Carlos Davidson NP. I have scribed the following portions of the note - Other sections scribed: HPI, ROS.       History     Chief Complaint   Patient presents with    Abdominal Pain     States RLQ pain and vomited once yesterday.  Symptoms started yesterday     CC: Abdominal Pain    HPI: 49 y/o female with PMHx of Fibromyalgia; IC (interstitial cystitis); Lupus; STD (sexually transmitted disease); Urinary tract infection; and Vaginal infection presents to the ED c/o acute onset RLQ abdominal pain and emesis (x1 ep yesterday) that both began yesterday. Pt reports pain as severe (9/10) and constant with generalized "aching and stiffness." Pt was seen at  and told to come to this ED for possible appendicitis. Pt had diarrhea "a few days ago." Pt states that the pain feels similar to fibromyalgia and lupus flare up, but she is not able to differentiate between the two. Pt is compliant with all meds. Pt states that she is suppose to see rheumatologist next month. Pt denies current nausea or fever. No other symptoms reported.      The history is provided by the patient. No  was used.     Review of patient's allergies indicates:  No Known Allergies  Past Medical History:   Diagnosis Date    Fibromyalgia     IC (interstitial cystitis)     Lupus     STD (sexually transmitted disease)     Urinary tract infection     Vaginal infection      Past Surgical History:   Procedure Laterality Date    BREAST REDUCTION       SECTION      hemmorroidectomy      " "HYSTERECTOMY  2008    WILLIAM for endometriosis    neurostimulator for bladder       Family History   Problem Relation Age of Onset    Hypertension Mother     Cancer Mother         KIDNEY    Arthritis Mother      Social History   Substance Use Topics    Smoking status: Never Smoker    Smokeless tobacco: Never Used    Alcohol use No     Review of Systems   Constitutional: Negative for chills and fever.   HENT: Negative for congestion, ear pain, rhinorrhea and sore throat.    Eyes: Negative for pain and visual disturbance.   Respiratory: Negative for cough and shortness of breath.    Cardiovascular: Negative for chest pain.   Gastrointestinal: Positive for abdominal pain (RLQ) and vomiting (x1 ep yesterday). Negative for diarrhea and nausea.   Genitourinary: Negative for dysuria.   Musculoskeletal: Negative for back pain and neck pain.        (+) generalized "aching and stiffness"   Skin: Negative for rash.   Neurological: Negative for headaches.       Physical Exam     Initial Vitals [07/17/18 1710]   BP Pulse Resp Temp SpO2   (!) 167/78 75 16 98.2 °F (36.8 °C) 100 %      MAP       --         Physical Exam    Nursing note and vitals reviewed.  Constitutional: She appears well-developed and well-nourished. She is not diaphoretic. No distress.   HENT:   Head: Normocephalic and atraumatic.   Right Ear: External ear normal.   Left Ear: External ear normal.   Nose: Nose normal.   Eyes: Conjunctivae and EOM are normal. Pupils are equal, round, and reactive to light. Right eye exhibits no discharge. Left eye exhibits no discharge. No scleral icterus.   Neck: Normal range of motion. Neck supple. No thyromegaly present. No tracheal deviation present. No JVD present.   Cardiovascular: Normal rate, regular rhythm, normal heart sounds and intact distal pulses. Exam reveals no gallop and no friction rub.    No murmur heard.  Pulmonary/Chest: Breath sounds normal. No stridor. No respiratory distress. She has no wheezes. She has " no rhonchi. She has no rales.   Abdominal: Soft. Bowel sounds are normal. She exhibits no distension and no mass. There is tenderness in the right lower quadrant and periumbilical area. There is no rigidity, no rebound, no guarding, no CVA tenderness, no tenderness at McBurney's point and negative Doran's sign.   Right lower quadrant and periumbilical tenderness to palpation without rigidity or guarding.  Abdomen is soft.  No peritoneal signs.   Musculoskeletal: Normal range of motion. She exhibits no edema or tenderness.   Lymphadenopathy:     She has no cervical adenopathy.   Neurological: She is alert and oriented to person, place, and time. She has normal strength and normal reflexes. She displays normal reflexes. No cranial nerve deficit or sensory deficit.   Skin: Skin is warm and dry. No rash and no abscess noted. No erythema. No pallor.   Psychiatric: She has a normal mood and affect. Her behavior is normal. Judgment and thought content normal.         ED Course   Procedures  Labs Reviewed   CBC W/ AUTO DIFFERENTIAL - Abnormal; Notable for the following:        Result Value    WBC 2.98 (*)     Hemoglobin 11.8 (*)     Hematocrit 36.0 (*)     Mono # 0.2 (*)     All other components within normal limits   COMPREHENSIVE METABOLIC PANEL - Abnormal; Notable for the following:     CO2 22 (*)     Total Protein 8.5 (*)     ALT 9 (*)     All other components within normal limits   URINALYSIS, REFLEX TO URINE CULTURE - Abnormal; Notable for the following:     Leukocytes, UA Trace (*)     All other components within normal limits    Narrative:     Preferred Collection Type->Urine, Clean Catch   SEDIMENTATION RATE - Abnormal; Notable for the following:     Sed Rate 37 (*)     All other components within normal limits   URINALYSIS MICROSCOPIC - Abnormal; Notable for the following:     Non-Squam Epith 1 (*)     All other components within normal limits    Narrative:     Preferred Collection Type->Urine, Clean Catch    LIPASE   C-REACTIVE PROTEIN   POCT URINE PREGNANCY          Imaging Results          CT Abdomen Pelvis With Contrast (Final result)  Result time 07/17/18 20:37:24    Final result by Mariangel Jarrell MD (07/17/18 20:37:24)                 Impression:      1. No acute intra-abdominal abnormalities identified.  2. Hysterectomy.      Electronically signed by: Mariangel Jarrell MD  Date:    07/17/2018  Time:    20:37             Narrative:    EXAMINATION:  CT ABDOMEN PELVIS WITH CONTRAST    CLINICAL HISTORY:  Abdominal pain, unspecified;    TECHNIQUE:  Low dose axial images, sagittal and coronal reformations were obtained from the lung bases to the pubic symphysis following the IV administration of 100 mL of Omnipaque 350 .  Oral contrast was not given.    COMPARISON:  None.    FINDINGS:  The visualized portion of the heart is unremarkable.  The lung bases are clear.    No significant hepatic abnormalities are identified.  There is no intra-or extrahepatic biliary ductal dilatation.  Gallbladder is contracted.  The stomach, pancreas, spleen, and adrenal glands are unremarkable.    Kidneys are functioning.  No evidence of hydronephrosis.  No definite abnormalities seen along the ureteral courses.  Urinary bladder is unremarkable.  Uterus has been removed.    Appendix is visualized and is unremarkable.  The visualized loops of small and large bowel show no evidence of obstruction or inflammation.  No free air or free fluid.    Aorta tapers normally.    No acute osseous abnormality identified. Subcutaneous soft tissue structures are unremarkable.  Sacral/spinal stimulator device is visualized within the subcutaneous tissues of the left gluteal region.                                 Medical Decision Making:   Differential Diagnosis:   Includes appendicitis, diverticulitis, colitis, pancreatitis, cholecystitis, others  Clinical Tests:   Lab Tests: Ordered and Reviewed  Radiological Study: Ordered and Reviewed  ED  Management:  48-year-old female presenting for evaluation of right lower quadrant and periumbilical abdominal pain.  Patient is sent by Urgent Care to rule out appendicitis.  She is afebrile, well-appearing, in no distress. Vitals are stable. Right lower quadrant and periumbilical tenderness to palpation without rigidity or guarding. No peritoneal signs.  Abdomen is soft.  Labs are unremarkable except for ESR which is elevated at 37.  CT of the abdomen pelvis is negative for acute abnormalities.  Doubt emergent pathology given the above.  Uncertain etiology of patient's symptoms. Treated with IV Toradol and a normal saline bolus.  Patient states that she feels better prior to discharge. Prescribed Zofran and Zanaflex at discharge. Advised patient to follow up with her PCP for further evaluation management.  ED return precautions given.  Patient expressed understanding of diagnosis, discharge instructions, return precautions.  Other:   I have discussed this case with another health care provider.       <> Summary of the Discussion: Case discussed with my attending physician who agreed with the assessment and plan.            Scribe Attestation:   Scribe #1: I performed the above scribed service and the documentation accurately describes the services I performed. I attest to the accuracy of the note.    Attending Attestation:           Physician Attestation for Scribe:  Physician Attestation Statement for Scribe #1: I, Jose Carlos Davidson NP, reviewed documentation, as scribed by Raine Blank in my presence, and it is both accurate and complete.                    Clinical Impression:   The primary encounter diagnosis was Right lower quadrant abdominal pain. A diagnosis of Non-intractable vomiting with nausea, unspecified vomiting type was also pertinent to this visit.    This is the attending physician Dr. Lockhart  Patient comes here with 1-2 days of progressively worsening right lower quadrant pain positive nausea and  no diarrhea no fevers or described she did has had a partial hysterectomy in the past    Physical exam shows significant right lower quadrant tenderness    We will get a CT scan and check labs and rule out any intra-abdominal etiologies such as appendicitis  Disposition:   Disposition: Discharged  Condition: Stable                        Jose Carlos Davidson NP  07/17/18 7703

## 2018-07-17 NOTE — ED TRIAGE NOTES
Patient reports abdominal pain that began on yesterday.  Patient also reports vomiting x 1 episode and nausea.  Patient has a hx of lupus and fibromyalgia and reports having a flare-up at this time.

## 2018-07-18 ENCOUNTER — TELEPHONE (OUTPATIENT)
Dept: OBSTETRICS AND GYNECOLOGY | Facility: CLINIC | Age: 48
End: 2018-07-18

## 2018-07-18 ENCOUNTER — OFFICE VISIT (OUTPATIENT)
Dept: UROLOGY | Facility: CLINIC | Age: 48
End: 2018-07-18
Payer: MEDICAID

## 2018-07-18 VITALS
BODY MASS INDEX: 38.8 KG/M2 | HEIGHT: 68 IN | DIASTOLIC BLOOD PRESSURE: 80 MMHG | SYSTOLIC BLOOD PRESSURE: 134 MMHG | WEIGHT: 256 LBS | HEART RATE: 76 BPM

## 2018-07-18 DIAGNOSIS — R35.0 URINARY FREQUENCY: ICD-10-CM

## 2018-07-18 DIAGNOSIS — N39.41 URGE INCONTINENCE: ICD-10-CM

## 2018-07-18 DIAGNOSIS — R35.1 NOCTURIA: ICD-10-CM

## 2018-07-18 DIAGNOSIS — N39.3 SUI (STRESS URINARY INCONTINENCE, FEMALE): ICD-10-CM

## 2018-07-18 DIAGNOSIS — N31.9 NEUROGENIC BLADDER: ICD-10-CM

## 2018-07-18 DIAGNOSIS — R10.31 RLQ ABDOMINAL PAIN: Primary | ICD-10-CM

## 2018-07-18 DIAGNOSIS — R30.0 DYSURIA: ICD-10-CM

## 2018-07-18 DIAGNOSIS — N30.11 INTERSTITIAL CYSTITIS (CHRONIC) WITH HEMATURIA: ICD-10-CM

## 2018-07-18 LAB
BILIRUB SERPL-MCNC: NORMAL MG/DL
BLOOD URINE, POC: NORMAL
COLOR, POC UA: YELLOW
GLUCOSE UR QL STRIP: NORMAL
KETONES UR QL STRIP: NORMAL
LEUKOCYTE ESTERASE URINE, POC: NORMAL
NITRITE, POC UA: NORMAL
PH, POC UA: 5.5
PROTEIN, POC: NORMAL
SPECIFIC GRAVITY, POC UA: 1030
UROBILINOGEN, POC UA: NORMAL

## 2018-07-18 PROCEDURE — 87086 URINE CULTURE/COLONY COUNT: CPT

## 2018-07-18 PROCEDURE — 99213 OFFICE O/P EST LOW 20 MIN: CPT | Mod: S$PBB,,, | Performed by: NURSE PRACTITIONER

## 2018-07-18 PROCEDURE — 99999 PR PBB SHADOW E&M-EST. PATIENT-LVL IV: CPT | Mod: PBBFAC,,, | Performed by: NURSE PRACTITIONER

## 2018-07-18 PROCEDURE — 99214 OFFICE O/P EST MOD 30 MIN: CPT | Mod: PBBFAC,PO | Performed by: NURSE PRACTITIONER

## 2018-07-18 PROCEDURE — 81002 URINALYSIS NONAUTO W/O SCOPE: CPT | Mod: PBBFAC,PO | Performed by: NURSE PRACTITIONER

## 2018-07-18 NOTE — PATIENT INSTRUCTIONS
Urine dipstick  Urine culture  Discuss bladder botox with Dr. Lua.   Follow-up pending urine culture result.

## 2018-07-18 NOTE — DISCHARGE INSTRUCTIONS
Take nausea medication and muscle relaxer as needed only as prescribed.    Follow-up with her primary physician or the one listed on your paperwork for further evaluation management.    Return to the emergency department for any new or worsening symptoms or as needed.

## 2018-07-18 NOTE — PROGRESS NOTES
Subjective:       Patient ID: Arpita Bradshaw is a 48 y.o. female.    Chief Complaint: Abdominal Pain; Dysuria; and Other (urinary incontinence)    Patient is new to me. She is a 47 yo AAF who is here today with c/o RLQ abdominal pain, dysuria, and urinary incontinence. Patient has a history of IC, neurogenic bladder, urge incontinence, nocturia, and recurrent UTI. Patient went to ED on yesterday for RLQ pain. CT was performed at time of visit and no acute findings were noted. She states her pain has greatly improve, but she still has some discomfort. She also currently reports a fibromyalgia flare-up at this time.       Abdominal Pain   This is a new problem. Episode onset: 3 days ago. The onset quality is gradual. The problem occurs constantly. The problem has been gradually improving. The pain is located in the RLQ. The pain is at a severity of 7/10. The pain is moderate. The quality of the pain is aching. The abdominal pain does not radiate. Associated symptoms include dysuria, frequency and myalgias. Pertinent negatives include no belching, constipation, diarrhea, fever, flatus, headaches, hematochezia, hematuria, melena, nausea, vomiting or weight loss. The pain is aggravated by movement (walking). The pain is relieved by nothing. Treatments tried: ibuprofen 800 mg and 1 bactrim tab. The treatment provided no relief. Prior diagnostic workup includes CT scan. Her past medical history is significant for GERD. There is no history of abdominal surgery, colon cancer, Crohn's disease, gallstones, irritable bowel syndrome, pancreatitis, PUD or ulcerative colitis. Patient's medical history includes UTI. Patient's medical history does not include kidney stones.   Other   This is a chronic (Urge incontinence) problem. Episode onset: approximately 11 years ago. The problem occurs intermittently. The problem has been gradually improving. Associated symptoms include abdominal pain, myalgias and urinary symptoms.  "Pertinent negatives include no change in bowel habit, chest pain, chills, congestion, coughing, diaphoresis, fatigue, fever, headaches, nausea, neck pain, numbness, rash, sore throat, swollen glands, vertigo, visual change, vomiting or weakness. The symptoms are aggravated by sneezing and coughing. She has tried nothing for the symptoms.   Patient currently wears a pad for urinary leakage accidents. She has urinary leakage with coughing, laughing, and sneezing at times. She also has leakage with urgency and not being able to make it to the restroom. She does not have to change her pad throughout the day because the leakage is scant. However, she thinks her urinary leakage is a major concern and describes it as being "gross and uncomfortable". She would like an alternative treatment for it because oral medications have not worked in the past.     Review of Systems   Constitutional: Negative for chills, diaphoresis, fatigue, fever and weight loss.   HENT: Negative for congestion and sore throat.    Respiratory: Negative for cough.    Cardiovascular: Negative for chest pain.   Gastrointestinal: Positive for abdominal pain. Negative for change in bowel habit, constipation, diarrhea, flatus, hematochezia, melena, nausea and vomiting.   Genitourinary: Positive for dysuria and frequency. Negative for decreased urine volume, difficulty urinating, flank pain, hematuria, pelvic pain, urgency, vaginal bleeding, vaginal discharge and vaginal pain.        Nocturia x4-5   Musculoskeletal: Positive for myalgias. Negative for neck pain.   Skin: Negative for rash.   Neurological: Negative for vertigo, weakness, numbness and headaches.       Objective:      Physical Exam   Constitutional: She is oriented to person, place, and time. She appears well-developed and well-nourished.   HENT:   Head: Normocephalic and atraumatic.   Eyes: EOM are normal. Pupils are equal, round, and reactive to light.   Neck: Normal range of motion. "   Cardiovascular: Normal rate.    Pulmonary/Chest: Effort normal. No respiratory distress.   Abdominal: Soft. There is tenderness.   Musculoskeletal: Normal range of motion.   Neurological: She is alert and oriented to person, place, and time. Coordination normal.   Skin: Skin is warm and dry.   Psychiatric: She has a normal mood and affect. Her behavior is normal. Judgment and thought content normal.   Nursing note and vitals reviewed.      Assessment:       1. RLQ abdominal pain    2. Dysuria    3. Urge incontinence    4. SHEYLA (stress urinary incontinence, female)    5. Urinary frequency    6. Nocturia    7. Interstitial cystitis (chronic) with hematuria    8. Neurogenic bladder        Plan:       Arpita was seen today for abdominal pain, dysuria and other.    Diagnoses and all orders for this visit:    RLQ abdominal pain  -     POCT URINE DIPSTICK WITHOUT MICROSCOPE  -     Urine culture; Future    Dysuria  -     POCT URINE DIPSTICK WITHOUT MICROSCOPE  -     Urine culture; Future    Urge incontinence    SHEYLA (stress urinary incontinence, female)    Urinary frequency    Nocturia    Interstitial cystitis (chronic) with hematuria    Neurogenic bladder    Other orders  1. Discuss bladder botox with Dr. Lua.   2. Patient declined GI referral at this time for RLQ.     Follow-up pending urine culture result.    Francie Cazares NP

## 2018-07-18 NOTE — TELEPHONE ENCOUNTER
Spoke to patient and she states she is having pain in her right side, she said its a constant pain, she went to the ER and they told her upon test they didn't find any issues,patient was supposed to follow back up with uro, she will make an appointment with urogoly and her primary care.

## 2018-07-19 DIAGNOSIS — N39.41 URGE URINARY INCONTINENCE: ICD-10-CM

## 2018-07-19 DIAGNOSIS — N31.9 NEUROGENIC BLADDER: Primary | ICD-10-CM

## 2018-07-19 RX ORDER — SODIUM CHLORIDE 9 MG/ML
INJECTION, SOLUTION INTRAVENOUS CONTINUOUS
Status: CANCELLED | OUTPATIENT
Start: 2018-07-19

## 2018-07-19 RX ORDER — LIDOCAINE HYDROCHLORIDE 20 MG/ML
JELLY TOPICAL ONCE
Status: CANCELLED | OUTPATIENT
Start: 2018-07-19 | End: 2018-07-19

## 2018-07-20 ENCOUNTER — TELEPHONE (OUTPATIENT)
Dept: UROLOGY | Facility: CLINIC | Age: 48
End: 2018-07-20

## 2018-07-20 LAB
BACTERIA UR CULT: NORMAL
BACTERIA UR CULT: NORMAL

## 2018-07-20 RX ORDER — AMOXICILLIN AND CLAVULANATE POTASSIUM 875; 125 MG/1; MG/1
1 TABLET, FILM COATED ORAL
COMMUNITY
End: 2019-02-11

## 2018-07-20 NOTE — TELEPHONE ENCOUNTER
Spoke with  will have her procedure on Monday. He started her on Augmentin pending her urine culture.She will stay on this medication until her procedure

## 2018-07-27 ENCOUNTER — OFFICE VISIT (OUTPATIENT)
Dept: UROLOGY | Facility: CLINIC | Age: 48
End: 2018-07-27
Payer: MEDICAID

## 2018-07-27 ENCOUNTER — TELEPHONE (OUTPATIENT)
Dept: UROLOGY | Facility: CLINIC | Age: 48
End: 2018-07-27

## 2018-07-27 VITALS — WEIGHT: 224 LBS | HEIGHT: 68 IN | BODY MASS INDEX: 33.95 KG/M2

## 2018-07-27 DIAGNOSIS — R10.2 PELVIC PAIN IN FEMALE: Primary | ICD-10-CM

## 2018-07-27 DIAGNOSIS — R30.0 DYSURIA: ICD-10-CM

## 2018-07-27 DIAGNOSIS — R35.1 NOCTURIA: ICD-10-CM

## 2018-07-27 DIAGNOSIS — R10.31 RLQ ABDOMINAL PAIN: ICD-10-CM

## 2018-07-27 LAB
BILIRUB UR QL STRIP: NEGATIVE
CLARITY UR REFRACT.AUTO: CLEAR
COLOR UR AUTO: YELLOW
GLUCOSE UR QL STRIP: NEGATIVE
HGB UR QL STRIP: NEGATIVE
KETONES UR QL STRIP: NEGATIVE
LEUKOCYTE ESTERASE UR QL STRIP: NEGATIVE
NITRITE UR QL STRIP: NEGATIVE
PH UR STRIP: 5 [PH] (ref 5–8)
PROT UR QL STRIP: NEGATIVE
SP GR UR STRIP: 1.02 (ref 1–1.03)
URN SPEC COLLECT METH UR: NORMAL
UROBILINOGEN UR STRIP-ACNC: NEGATIVE EU/DL

## 2018-07-27 PROCEDURE — 99213 OFFICE O/P EST LOW 20 MIN: CPT | Mod: PBBFAC,PO | Performed by: UROLOGY

## 2018-07-27 PROCEDURE — 81003 URINALYSIS AUTO W/O SCOPE: CPT

## 2018-07-27 PROCEDURE — 99024 POSTOP FOLLOW-UP VISIT: CPT | Mod: ,,, | Performed by: UROLOGY

## 2018-07-27 PROCEDURE — 87086 URINE CULTURE/COLONY COUNT: CPT

## 2018-07-27 PROCEDURE — 96372 THER/PROPH/DIAG INJ SC/IM: CPT | Mod: PBBFAC,PO | Performed by: UROLOGY

## 2018-07-27 PROCEDURE — 99999 PR PBB SHADOW E&M-EST. PATIENT-LVL III: CPT | Mod: PBBFAC,,, | Performed by: UROLOGY

## 2018-07-27 RX ORDER — CIPROFLOXACIN 500 MG/1
500 TABLET ORAL EVERY 12 HOURS
Qty: 20 TABLET | Refills: 0 | Status: SHIPPED | OUTPATIENT
Start: 2018-07-27 | End: 2018-08-06

## 2018-07-27 RX ORDER — CEFTRIAXONE 1 G/1
1 INJECTION, POWDER, FOR SOLUTION INTRAMUSCULAR; INTRAVENOUS
Status: COMPLETED | OUTPATIENT
Start: 2018-07-27 | End: 2018-07-27

## 2018-07-27 RX ORDER — MEPERIDINE HYDROCHLORIDE 50 MG/1
50 TABLET ORAL EVERY 6 HOURS PRN
Qty: 28 TABLET | Refills: 0 | Status: SHIPPED | OUTPATIENT
Start: 2018-07-27 | End: 2019-02-11

## 2018-07-27 RX ADMIN — CEFTRIAXONE SODIUM 1 G: 500 INJECTION, POWDER, FOR SOLUTION INTRAMUSCULAR; INTRAVENOUS at 02:07

## 2018-07-27 NOTE — TELEPHONE ENCOUNTER
Called patient she states that she is in pain when she urinates Dr Lua asked that the patient come in for a visit.

## 2018-07-27 NOTE — PROGRESS NOTES
"Arpita Bradshaw is a 48 y.o. female patient.   1. Pelvic pain in female    2. RLQ abdominal pain    3. Dysuria    4. Nocturia      Past Medical History:   Diagnosis Date    Fibromyalgia     IC (interstitial cystitis)     Lupus     STD (sexually transmitted disease)     Urinary tract infection     Vaginal infection      Past Surgical History Pertinent Negatives:   Procedure Date Noted    CYSTOSCOPY 01/31/2018    LITHOTRIPSY 01/31/2018     Scheduled Meds:   cefTRIAXone  1 g Intramuscular 1 time in Clinic/HOD     Continuous Infusions:  PRN Meds:    Review of patient's allergies indicates:   Allergen Reactions    Bactrim [sulfamethoxazole-trimethoprim] Rash     There are no hospital problems to display for this patient.    Height 5' 8" (1.727 m), weight 101.6 kg (224 lb).    Subjective:   Diet: Adequate intake.  Patient reports no nausea or vomiting.    Activity level: Returning to normal.    Pain control: Partially controlled.      Objective:  Vital signs (most recent): Height 5' 8" (1.727 m), weight 101.6 kg (224 lb).  General appearance: Comfortable, well-appearing, in no acute distress and not in pain.    Lungs:  Normal respiratory rate and normal effort.  Breath sounds normal.    Heart: Normal rate.    Chest: Symmetric chest wall expansion.    Abdomen: Abdomen is soft.    Bowel sounds:  Bowel sounds are normal.    Tenderness: There is no abdominal tenderness tenderness.    Extremities: There is normal range of motion.    Neurological: The patient is alert and oriented to person, place and time.  Normal strength.       Assessment:   Post-op: 4 days.    Condition: In stable condition.     Plan:  Encourage ambulation.  Regular diet.  X-rays as ordered.  Start oral analgesics and start antibiotics.      Patient Instructions   Check Pelvic U/S  U C+S  Rocephin injection  Change Augmentin to Cipro  F/U 3 weeks  Change percocet to Demerol    Harris Lua MD  7/27/2018  "

## 2018-07-27 NOTE — TELEPHONE ENCOUNTER
----- Message from Kyler Martin sent at 7/27/2018 10:30 AM CDT -----  Contact: [phone]/self  Pt requesting to speak with concerning symptoms she's having after surgery (she did not care to elaborate)    Please call and advise

## 2018-07-27 NOTE — PATIENT INSTRUCTIONS
Check Pelvic U/S  U C+S  Rocephin injection  Change Augmentin to Cipro  F/U 3 weeks  Change percocet to Demerol

## 2018-07-29 LAB — BACTERIA UR CULT: NO GROWTH

## 2018-07-31 ENCOUNTER — TELEPHONE (OUTPATIENT)
Dept: UROLOGY | Facility: CLINIC | Age: 48
End: 2018-07-31

## 2018-07-31 RX ORDER — FLUCONAZOLE 200 MG/1
200 TABLET ORAL DAILY
Qty: 5 TABLET | Refills: 0 | Status: SHIPPED | OUTPATIENT
Start: 2018-07-31 | End: 2018-08-05

## 2018-07-31 NOTE — TELEPHONE ENCOUNTER
----- Message from Charlene Chin sent at 7/31/2018 10:29 AM CDT -----  Contact: [phone]/ self   Pt requesting to speak with you in regarding problems she is having after procedure  . Please advise

## 2018-08-02 ENCOUNTER — TELEPHONE (OUTPATIENT)
Dept: UROLOGY | Facility: CLINIC | Age: 48
End: 2018-08-02

## 2018-08-02 NOTE — TELEPHONE ENCOUNTER
I called her insurance for a PA on her [employer] , disp #40 with directions of one every 6 hours as needed. They stated she has no benfits as of 7/31/18. She states it is under review

## 2018-08-14 ENCOUNTER — OFFICE VISIT (OUTPATIENT)
Dept: UROLOGY | Facility: CLINIC | Age: 48
End: 2018-08-14
Payer: MEDICAID

## 2018-08-14 VITALS
HEART RATE: 78 BPM | HEIGHT: 68 IN | WEIGHT: 224 LBS | OXYGEN SATURATION: 98 % | BODY MASS INDEX: 33.95 KG/M2 | SYSTOLIC BLOOD PRESSURE: 133 MMHG | DIASTOLIC BLOOD PRESSURE: 83 MMHG | RESPIRATION RATE: 18 BRPM

## 2018-08-14 DIAGNOSIS — N39.3 SUI (STRESS URINARY INCONTINENCE, FEMALE): ICD-10-CM

## 2018-08-14 DIAGNOSIS — N30.10 INTERSTITIAL CYSTITIS: ICD-10-CM

## 2018-08-14 DIAGNOSIS — Z98.890 POSTOPERATIVE STATE: Primary | ICD-10-CM

## 2018-08-14 DIAGNOSIS — R35.1 NOCTURIA: ICD-10-CM

## 2018-08-14 DIAGNOSIS — K59.00 CONSTIPATION, UNSPECIFIED CONSTIPATION TYPE: ICD-10-CM

## 2018-08-14 DIAGNOSIS — R35.0 URINARY FREQUENCY: ICD-10-CM

## 2018-08-14 DIAGNOSIS — N39.41 URGE INCONTINENCE: ICD-10-CM

## 2018-08-14 PROCEDURE — 99999 PR PBB SHADOW E&M-EST. PATIENT-LVL V: CPT | Mod: PBBFAC,,, | Performed by: NURSE PRACTITIONER

## 2018-08-14 PROCEDURE — 99215 OFFICE O/P EST HI 40 MIN: CPT | Mod: PBBFAC,PO | Performed by: NURSE PRACTITIONER

## 2018-08-14 PROCEDURE — 99213 OFFICE O/P EST LOW 20 MIN: CPT | Mod: S$PBB,,, | Performed by: NURSE PRACTITIONER

## 2018-08-14 PROCEDURE — 87086 URINE CULTURE/COLONY COUNT: CPT

## 2018-08-14 NOTE — PROGRESS NOTES
Subjective:       Patient ID: Arpita Bradshaw is a 48 y.o. female.    Chief Complaint: Post-op Evaluation (3 week post op -- pain)    Patient is here today for a post-operative F/U. She is S/P cystoscopy with bladder botox on 7/23/2018 with Dr. Lua. Patient has a history of neurogenic bladder, urinary frequency, and urge urinary incontinence. Patient reports she stopped taking her antibiotic without Dr. Lua's approval. Denies urinary frequency and urgency. She also denies urinary incontinence. Her only complaint today is right groin pain and suprapubic pressure. Patient has bilateral flank pain, but she also reports a fibromyalgia flare up at this time. She has an appointment with Rheumatology today.      Other   Chronicity: S/P cystoscopy with bladder botox. Episode onset: 7/23/2018. Associated symptoms include abdominal pain (suprapubic pressure), fatigue, headaches, myalgias and weakness. Pertinent negatives include no anorexia, change in bowel habit, chills, fever, nausea, swollen glands, urinary symptoms or vomiting. Associated symptoms comments: Right groin pain. Nothing aggravates the symptoms. She has tried nothing for the symptoms.     Review of Systems   Constitutional: Positive for fatigue. Negative for appetite change, chills and fever.   Gastrointestinal: Positive for abdominal pain (suprapubic pressure) and constipation. Negative for anorexia, change in bowel habit, diarrhea, nausea and vomiting.   Genitourinary: Positive for difficulty urinating (straining) and flank pain (bilateral). Negative for decreased urine volume, dysuria, frequency, hematuria, menstrual problem, pelvic pain, urgency, vaginal bleeding, vaginal discharge and vaginal pain.        Nocturia x1   Musculoskeletal: Positive for back pain (lower back) and myalgias.   Neurological: Positive for weakness and headaches. Negative for dizziness.   Psychiatric/Behavioral: Negative.        Objective:      Physical Exam    Constitutional: She is oriented to person, place, and time. She appears well-developed and well-nourished.   HENT:   Head: Normocephalic and atraumatic.   Eyes: EOM are normal. Pupils are equal, round, and reactive to light.   Neck: Normal range of motion.   Cardiovascular: Normal rate.   Pulmonary/Chest: Effort normal. No respiratory distress.   Abdominal: Soft. There is no tenderness.   Musculoskeletal: Normal range of motion. She exhibits no edema.        Back:    Bilateral CVA tenderness and low back pain.     Neurological: She is alert and oriented to person, place, and time. Coordination normal.   Skin: Skin is warm and dry.   Psychiatric: She has a normal mood and affect. Her behavior is normal. Judgment and thought content normal.   Nursing note and vitals reviewed.      Assessment:       1. Postoperative state    2. Interstitial cystitis    3. Nocturia    4. Urinary frequency    5. Urge incontinence    6. SHEYLA (stress urinary incontinence, female)    7. Constipation, unspecified constipation type        Plan:       Arpita was seen today for post-op evaluation.    Diagnoses and all orders for this visit:    Postoperative state  -     POCT URINE DIPSTICK WITHOUT MICROSCOPE  -     Urine culture; Future    Interstitial cystitis  -     POCT URINE DIPSTICK WITHOUT MICROSCOPE  -     Urine culture; Future    Nocturia    Urinary frequency    Urge incontinence    SHEYLA (stress urinary incontinence, female)    Constipation, unspecified constipation type    Other orders  Schedule pelvic U/S previously ordered by Dr. Lua.   Take OTC magnesium citrate, then Miralax for constipation.     Follow-up pending imaging and lab results.     Francie Cazares NP

## 2018-08-14 NOTE — PATIENT INSTRUCTIONS
Urine dipstick  Urine culture  Schedule pelvic U/S  Take OTC magnesium citrate, then Miralax for constipation.   Follow-up pending imaging and lab results.

## 2018-08-15 LAB
BACTERIA UR CULT: NORMAL
BACTERIA UR CULT: NORMAL

## 2018-08-16 ENCOUNTER — TELEPHONE (OUTPATIENT)
Dept: UROLOGY | Facility: CLINIC | Age: 48
End: 2018-08-16

## 2018-08-16 NOTE — TELEPHONE ENCOUNTER
----- Message from Francie Cazares NP sent at 8/16/2018  8:28 AM CDT -----  Patient does not have a UTI. Please inform patient.

## 2018-08-28 ENCOUNTER — TELEPHONE (OUTPATIENT)
Dept: UROLOGY | Facility: CLINIC | Age: 48
End: 2018-08-28

## 2018-08-28 NOTE — TELEPHONE ENCOUNTER
----- Message from Idalmis Maldonado sent at 8/28/2018  8:58 AM CDT -----  Contact: Self [phone]  Patient would like to speak with you about rescheduling her ultrasound. Please advise

## 2018-09-04 ENCOUNTER — TELEPHONE (OUTPATIENT)
Dept: UROLOGY | Facility: CLINIC | Age: 48
End: 2018-09-04

## 2018-09-04 NOTE — TELEPHONE ENCOUNTER
----- Message from Mary Liu sent at 9/4/2018  9:13 AM CDT -----  Contact: self/[phone]  She has bladder spasms and would like to be seen today.

## 2018-09-05 ENCOUNTER — OFFICE VISIT (OUTPATIENT)
Dept: UROLOGY | Facility: CLINIC | Age: 48
End: 2018-09-05
Payer: MEDICAID

## 2018-09-05 VITALS
DIASTOLIC BLOOD PRESSURE: 82 MMHG | RESPIRATION RATE: 19 BRPM | WEIGHT: 224 LBS | HEIGHT: 68 IN | HEART RATE: 67 BPM | OXYGEN SATURATION: 99 % | BODY MASS INDEX: 33.95 KG/M2 | SYSTOLIC BLOOD PRESSURE: 132 MMHG

## 2018-09-05 DIAGNOSIS — R30.0 DYSURIA: ICD-10-CM

## 2018-09-05 DIAGNOSIS — N39.3 SUI (STRESS URINARY INCONTINENCE, FEMALE): ICD-10-CM

## 2018-09-05 DIAGNOSIS — N30.10 INTERSTITIAL CYSTITIS: ICD-10-CM

## 2018-09-05 DIAGNOSIS — N31.9 NEUROGENIC BLADDER: ICD-10-CM

## 2018-09-05 DIAGNOSIS — R35.0 URINARY FREQUENCY: ICD-10-CM

## 2018-09-05 DIAGNOSIS — R35.1 NOCTURIA: ICD-10-CM

## 2018-09-05 DIAGNOSIS — N30.00 ACUTE CYSTITIS WITHOUT HEMATURIA: Primary | ICD-10-CM

## 2018-09-05 DIAGNOSIS — N39.41 URGE INCONTINENCE: ICD-10-CM

## 2018-09-05 LAB
BILIRUB SERPL-MCNC: NORMAL MG/DL
BLOOD URINE, POC: NORMAL
COLOR, POC UA: YELLOW
GLUCOSE UR QL STRIP: NORMAL
KETONES UR QL STRIP: NORMAL
LEUKOCYTE ESTERASE URINE, POC: NORMAL
NITRITE, POC UA: NORMAL
PH, POC UA: 6
PROTEIN, POC: NORMAL
SPECIFIC GRAVITY, POC UA: 1.01
UROBILINOGEN, POC UA: 0.2

## 2018-09-05 PROCEDURE — 81002 URINALYSIS NONAUTO W/O SCOPE: CPT | Mod: PBBFAC,PO | Performed by: NURSE PRACTITIONER

## 2018-09-05 PROCEDURE — 87186 SC STD MICRODIL/AGAR DIL: CPT

## 2018-09-05 PROCEDURE — 87088 URINE BACTERIA CULTURE: CPT

## 2018-09-05 PROCEDURE — 99999 PR PBB SHADOW E&M-EST. PATIENT-LVL V: CPT | Mod: PBBFAC,,, | Performed by: NURSE PRACTITIONER

## 2018-09-05 PROCEDURE — 99214 OFFICE O/P EST MOD 30 MIN: CPT | Mod: S$PBB,,, | Performed by: NURSE PRACTITIONER

## 2018-09-05 PROCEDURE — 87086 URINE CULTURE/COLONY COUNT: CPT

## 2018-09-05 PROCEDURE — 87077 CULTURE AEROBIC IDENTIFY: CPT

## 2018-09-05 PROCEDURE — 99215 OFFICE O/P EST HI 40 MIN: CPT | Mod: PBBFAC,PO | Performed by: NURSE PRACTITIONER

## 2018-09-05 RX ORDER — NITROFURANTOIN 25; 75 MG/1; MG/1
100 CAPSULE ORAL 2 TIMES DAILY
Qty: 20 CAPSULE | Refills: 0 | Status: SHIPPED | OUTPATIENT
Start: 2018-09-05 | End: 2018-09-15

## 2018-09-05 RX ORDER — PREDNISONE 5 MG/1
TABLET ORAL
Refills: 0 | COMMUNITY
Start: 2018-09-04 | End: 2018-11-02

## 2018-09-05 RX ORDER — LORATADINE 10 MG/1
10 TABLET ORAL DAILY
Refills: 0 | COMMUNITY
Start: 2018-08-23 | End: 2019-02-11

## 2018-09-05 NOTE — PROGRESS NOTES
"Subjective:       Patient ID: Arpita Bradshaw is a 48 y.o. female.    Chief Complaint: Other (bladder spasms / per patient the botox treatment stopped working) and Other (has urine test done yesterday at [facility])    Patient is here today for "bladder spasms". She also reports urinary frequency, nocturia x5-7, stress urinary incontinence and urge incontinence. She reports her urinary incontinence started back approximately 1 week ago. She wear pads for leakage and changes twice daily. Pads are moderately saturated. Patient also reports a concern for recurrent UTIs. According to her urine cultures, she has had 2 positive cultures this year. Patient also has c/o bilateral flank pain, but not sure if it's related to her fibromyalgia. She was started on prednisone 40 mg daily on yesterday by her Rheumatologist. She also c/o right groin/pelvic pain and is scheduled for a U/S of pelvis on 9/13/2018 due to insurance purposes. Patient was recently seen at Urgent Care 1 week ago and was started on Augmentin for a UTI. She has completed course of antibiotic, but symptoms still persist.    Dysuria    This is a recurrent problem. The current episode started in the past 7 days. The problem occurs intermittently. The problem has been gradually worsening. The quality of the pain is described as burning. The pain is at a severity of 5/10. The pain is moderate. There has been no fever. She is not sexually active. There is no history of pyelonephritis. Associated symptoms include flank pain (bilateral), frequency, urgency and constipation. Pertinent negatives include no behavior changes, chills, discharge, hematuria, hesitancy, nausea, possible pregnancy, sweats, vomiting, weight loss, bubble bath use, rash or withholding. She has tried antibiotics for the symptoms. The treatment provided mild relief. Her past medical history is significant for hypertension, recurrent UTIs and a urological procedure. There is no history " of diabetes mellitus or a single kidney.     Review of Systems   Constitutional: Negative for chills, fatigue, fever and weight loss.   Gastrointestinal: Positive for constipation. Negative for abdominal pain, diarrhea, nausea and vomiting.   Genitourinary: Positive for dysuria, flank pain (bilateral), frequency and urgency. Negative for difficulty urinating, hematuria and hesitancy.   Skin: Negative.  Negative for rash.   Neurological: Negative for dizziness and headaches.   Psychiatric/Behavioral: Negative.        Objective:      Physical Exam   Constitutional: She is oriented to person, place, and time. She appears well-developed and well-nourished. No distress.   HENT:   Head: Normocephalic and atraumatic.   Eyes: EOM are normal. Pupils are equal, round, and reactive to light.   Neck: Normal range of motion.   Cardiovascular: Normal rate.   Pulmonary/Chest: Effort normal. No respiratory distress.   Abdominal: Soft. There is no tenderness.   Musculoskeletal: Normal range of motion. She exhibits no edema.   Bilateral CVA tenderness   Neurological: She is alert and oriented to person, place, and time. Coordination normal.   Skin: Skin is warm and dry.   Psychiatric: She has a normal mood and affect. Her behavior is normal. Judgment and thought content normal.   Nursing note and vitals reviewed.      Assessment:       1. Acute cystitis without hematuria    2. Dysuria    3. Urinary frequency    4. Nocturia    5. Urge incontinence    6. SHEYLA (stress urinary incontinence, female)    7. Interstitial cystitis    8. Neurogenic bladder        Plan:       Arpita was seen today for other and other.    Diagnoses and all orders for this visit:    Acute cystitis without hematuria  -     POCT URINE DIPSTICK WITHOUT MICROSCOPE  -     Urine culture  -     nitrofurantoin, macrocrystal-monohydrate, (MACROBID) 100 MG capsule; Take 1 capsule (100 mg total) by mouth 2 (two) times daily. for 10 days    Dysuria  -     POCT URINE DIPSTICK  WITHOUT MICROSCOPE  -     Urine culture    Urinary frequency    Nocturia    Urge incontinence    SHEYLA (stress urinary incontinence, female)    Interstitial cystitis    Neurogenic bladder    Follow-up in 10 days for repeat urine culture.    Francie Cazares, NP

## 2018-09-05 NOTE — PATIENT INSTRUCTIONS
Urine dipstick  Urine culture  Start macrobid twice daily by mouth for UTI  Follow-up in 10 days for repeat urine culture.

## 2018-09-08 LAB — BACTERIA UR CULT: NORMAL

## 2018-09-13 ENCOUNTER — HOSPITAL ENCOUNTER (OUTPATIENT)
Dept: RADIOLOGY | Facility: HOSPITAL | Age: 48
Discharge: HOME | End: 2018-09-13
Attending: UROLOGY
Payer: MEDICAID

## 2018-09-13 DIAGNOSIS — R30.0 DYSURIA: ICD-10-CM

## 2018-09-13 DIAGNOSIS — R35.1 NOCTURIA: ICD-10-CM

## 2018-09-13 DIAGNOSIS — R10.2 PELVIC PAIN IN FEMALE: ICD-10-CM

## 2018-09-13 DIAGNOSIS — R10.31 RLQ ABDOMINAL PAIN: ICD-10-CM

## 2018-09-13 PROCEDURE — 76856 US EXAM PELVIC COMPLETE: CPT | Mod: TC

## 2018-09-13 PROCEDURE — 76856 US EXAM PELVIC COMPLETE: CPT | Mod: 26,,, | Performed by: RADIOLOGY

## 2018-09-13 PROCEDURE — 76830 TRANSVAGINAL US NON-OB: CPT | Mod: 26,,, | Performed by: RADIOLOGY

## 2018-09-19 ENCOUNTER — OFFICE VISIT (OUTPATIENT)
Dept: UROLOGY | Facility: CLINIC | Age: 48
End: 2018-09-19
Payer: MEDICAID

## 2018-09-19 VITALS
HEIGHT: 68 IN | SYSTOLIC BLOOD PRESSURE: 129 MMHG | WEIGHT: 224 LBS | BODY MASS INDEX: 33.95 KG/M2 | RESPIRATION RATE: 18 BRPM | DIASTOLIC BLOOD PRESSURE: 79 MMHG | HEART RATE: 78 BPM | OXYGEN SATURATION: 98 %

## 2018-09-19 DIAGNOSIS — R10.2 SUPRAPUBIC ABDOMINAL PAIN: ICD-10-CM

## 2018-09-19 DIAGNOSIS — R10.9 BILATERAL FLANK PAIN: ICD-10-CM

## 2018-09-19 DIAGNOSIS — R33.9 INCOMPLETE BLADDER EMPTYING: Primary | ICD-10-CM

## 2018-09-19 LAB
BILIRUB SERPL-MCNC: NORMAL MG/DL
BLOOD URINE, POC: NORMAL
COLOR, POC UA: YELLOW
GLUCOSE UR QL STRIP: NORMAL
KETONES UR QL STRIP: NORMAL
LEUKOCYTE ESTERASE URINE, POC: NORMAL
NITRITE, POC UA: NORMAL
PH, POC UA: 7
PROTEIN, POC: NORMAL
SPECIFIC GRAVITY, POC UA: 1.01
UROBILINOGEN, POC UA: 0.2

## 2018-09-19 PROCEDURE — 99215 OFFICE O/P EST HI 40 MIN: CPT | Mod: PBBFAC,PO | Performed by: NURSE PRACTITIONER

## 2018-09-19 PROCEDURE — 99214 OFFICE O/P EST MOD 30 MIN: CPT | Mod: S$PBB,,, | Performed by: NURSE PRACTITIONER

## 2018-09-19 PROCEDURE — 81002 URINALYSIS NONAUTO W/O SCOPE: CPT | Mod: PBBFAC,PO | Performed by: NURSE PRACTITIONER

## 2018-09-19 PROCEDURE — 87086 URINE CULTURE/COLONY COUNT: CPT

## 2018-09-19 PROCEDURE — 99999 PR PBB SHADOW E&M-EST. PATIENT-LVL V: CPT | Mod: PBBFAC,,, | Performed by: NURSE PRACTITIONER

## 2018-09-19 RX ORDER — NAPROXEN 500 MG/1
500 TABLET ORAL 2 TIMES DAILY
Qty: 28 TABLET | Refills: 1 | Status: SHIPPED | OUTPATIENT
Start: 2018-09-19 | End: 2018-10-03

## 2018-09-19 RX ORDER — TAMSULOSIN HYDROCHLORIDE 0.4 MG/1
0.4 CAPSULE ORAL DAILY
Qty: 30 CAPSULE | Refills: 0 | Status: SHIPPED | OUTPATIENT
Start: 2018-09-19 | End: 2018-10-03 | Stop reason: SDUPTHER

## 2018-09-19 RX ORDER — SOLIFENACIN SUCCINATE 5 MG/1
5 TABLET, FILM COATED ORAL DAILY
Qty: 30 TABLET | Refills: 1 | Status: CANCELLED | OUTPATIENT
Start: 2018-09-19 | End: 2018-11-18

## 2018-09-19 NOTE — PROGRESS NOTES
Subjective:       Patient ID: Arpita Bradshaw is a 48 y.o. female.    Chief Complaint: Hematuria and Urinary Tract Infection    Abdominal Pain   This is a chronic problem. The current episode started more than 1 month ago (approximately 3 months). The problem occurs constantly. The pain is located in the suprapubic region and RLQ. The pain is at a severity of 8/10. The quality of the pain is sharp. The abdominal pain radiates to the right flank. Associated symptoms include constipation, frequency, myalgias and nausea. Pertinent negatives include no belching, diarrhea, dysuria, fever, flatus, headaches, hematuria, melena, vomiting or weight loss. The pain is aggravated by certain positions and palpation. Relieved by: heating pad. Prior diagnostic workup includes CT scan and ultrasound. Her past medical history is significant for abdominal surgery (hysterectomy) and GERD. There is no history of gallstones, irritable bowel syndrome or pancreatitis. Patient's medical history includes UTI. Patient's medical history does not include kidney stones.   Flank Pain   This is a chronic problem. Episode onset: approximately 3 months ago. The problem occurs constantly. The problem is unchanged. The pain is present in the costovertebral angle (bilateral). The quality of the pain is described as stabbing. The pain is at a severity of 9/10. The pain is severe. The symptoms are aggravated by position. Associated symptoms include abdominal pain, leg pain (right leg), numbness (right leg), pelvic pain, tingling (right leg) and weakness. Pertinent negatives include no bladder incontinence, bowel incontinence, chest pain, dysuria, fever, headaches, perianal numbness or weight loss. Risk factors include obesity and sedentary lifestyle. She has tried NSAIDs and heat for the symptoms. The treatment provided mild relief.     Review of Systems   Constitutional: Negative for appetite change, chills, fatigue, fever and weight loss.    Cardiovascular: Negative for chest pain, palpitations and leg swelling.   Gastrointestinal: Positive for abdominal pain, constipation and nausea. Negative for bowel incontinence, diarrhea, flatus, melena and vomiting.   Genitourinary: Positive for flank pain, frequency and pelvic pain. Negative for bladder incontinence, decreased urine volume, difficulty urinating, dysuria, hematuria, urgency, vaginal bleeding, vaginal discharge and vaginal pain.        Straining with urination   Musculoskeletal: Positive for myalgias.   Neurological: Positive for tingling (right leg), weakness and numbness (right leg). Negative for dizziness and headaches.   Psychiatric/Behavioral: Negative.        Objective:      Physical Exam   Constitutional: She is oriented to person, place, and time. She appears well-developed and well-nourished.   HENT:   Head: Normocephalic and atraumatic.   Eyes: EOM are normal. Pupils are equal, round, and reactive to light.   Neck: Normal range of motion. Neck supple.   Cardiovascular: Normal rate.   Pulmonary/Chest: Effort normal. No respiratory distress.   Abdominal: Soft. There is tenderness (suprapubic).   Genitourinary:   Genitourinary Comments: PVR = 111 mL   Musculoskeletal: Normal range of motion. She exhibits no edema.   Bilateral CVAT   Lymphadenopathy:     She has no cervical adenopathy.   Neurological: She is alert and oriented to person, place, and time. Coordination normal.   Skin: Skin is warm and dry.   Psychiatric: She has a normal mood and affect. Her behavior is normal. Judgment and thought content normal.   Nursing note and vitals reviewed.      Assessment:       1. Incomplete bladder emptying    2. Suprapubic abdominal pain    3. Bilateral flank pain        Plan:       Arpita was seen today for hematuria and urinary tract infection.    Diagnoses and all orders for this visit:    Incomplete bladder emptying  -     tamsulosin (FLOMAX) 0.4 mg Cap; Take 1 capsule (0.4 mg total) by  mouth once daily.    Suprapubic abdominal pain    Bilateral flank pain  -     Urine culture  -     POCT URINE DIPSTICK WITHOUT MICROSCOPE  -     US Retroperitoneal Complete (Kidney and; Future  -     naproxen (EC NAPROSYN) 500 MG EC tablet; Take 1 tablet (500 mg total) by mouth 2 (two) times daily. for 14 days    Other orders  1. PVR bladder scan    2. Take miralax for constipation    Case discussed with Dr. Lua.     Follow-up in 2 weeks.    Francie Cazares NP

## 2018-09-19 NOTE — PATIENT INSTRUCTIONS
1. PVR bladder scan  2. Urine dipstick  3. Urine culture (repeat post-antibiotic therapy)  4. US retroperitoneal complete; fasting required  5. Start tamsulosin 0.4 mg daily  6. Take naproxen 500 mg twice daily for pain    7. Take miralax for constipation  8. Discuss case with Dr. Lua  9. Follow-up in 2 weeks

## 2018-09-21 ENCOUNTER — TELEPHONE (OUTPATIENT)
Dept: UROLOGY | Facility: CLINIC | Age: 48
End: 2018-09-21

## 2018-09-21 LAB
BACTERIA UR CULT: NORMAL
BACTERIA UR CULT: NORMAL

## 2018-09-24 ENCOUNTER — PATIENT MESSAGE (OUTPATIENT)
Dept: OBSTETRICS AND GYNECOLOGY | Facility: CLINIC | Age: 48
End: 2018-09-24

## 2018-09-24 ENCOUNTER — OFFICE VISIT (OUTPATIENT)
Dept: OBSTETRICS AND GYNECOLOGY | Facility: CLINIC | Age: 48
End: 2018-09-24
Attending: OBSTETRICS & GYNECOLOGY
Payer: MEDICAID

## 2018-09-24 ENCOUNTER — TELEPHONE (OUTPATIENT)
Dept: OBSTETRICS AND GYNECOLOGY | Facility: CLINIC | Age: 48
End: 2018-09-24

## 2018-09-24 ENCOUNTER — HOSPITAL ENCOUNTER (OUTPATIENT)
Dept: RADIOLOGY | Facility: OTHER | Age: 48
Discharge: HOME | End: 2018-09-24
Attending: OBSTETRICS & GYNECOLOGY
Payer: MEDICAID

## 2018-09-24 ENCOUNTER — HOSPITAL ENCOUNTER (OUTPATIENT)
Dept: RADIOLOGY | Facility: OTHER | Age: 48
Discharge: HOME | End: 2018-09-24
Attending: NURSE PRACTITIONER
Payer: MEDICAID

## 2018-09-24 VITALS
BODY MASS INDEX: 35.09 KG/M2 | SYSTOLIC BLOOD PRESSURE: 118 MMHG | DIASTOLIC BLOOD PRESSURE: 72 MMHG | WEIGHT: 231.5 LBS | HEIGHT: 68 IN

## 2018-09-24 DIAGNOSIS — R10.9 BILATERAL FLANK PAIN: ICD-10-CM

## 2018-09-24 DIAGNOSIS — R30.0 DYSURIA: ICD-10-CM

## 2018-09-24 DIAGNOSIS — N83.201 RIGHT OVARIAN CYST: ICD-10-CM

## 2018-09-24 DIAGNOSIS — N83.201 RIGHT OVARIAN CYST: Primary | ICD-10-CM

## 2018-09-24 DIAGNOSIS — A60.00 GENITAL HERPES SIMPLEX, UNSPECIFIED SITE: ICD-10-CM

## 2018-09-24 PROCEDURE — 76830 TRANSVAGINAL US NON-OB: CPT | Mod: TC

## 2018-09-24 PROCEDURE — 99213 OFFICE O/P EST LOW 20 MIN: CPT | Mod: S$PBB,,, | Performed by: OBSTETRICS & GYNECOLOGY

## 2018-09-24 PROCEDURE — 87086 URINE CULTURE/COLONY COUNT: CPT

## 2018-09-24 PROCEDURE — 99213 OFFICE O/P EST LOW 20 MIN: CPT | Mod: PBBFAC,25 | Performed by: OBSTETRICS & GYNECOLOGY

## 2018-09-24 PROCEDURE — 76770 US EXAM ABDO BACK WALL COMP: CPT | Mod: 26,,, | Performed by: RADIOLOGY

## 2018-09-24 PROCEDURE — 76856 US EXAM PELVIC COMPLETE: CPT | Mod: TC

## 2018-09-24 PROCEDURE — 76770 US EXAM ABDO BACK WALL COMP: CPT | Mod: TC

## 2018-09-24 PROCEDURE — 76830 TRANSVAGINAL US NON-OB: CPT | Mod: 26,,, | Performed by: RADIOLOGY

## 2018-09-24 PROCEDURE — 99999 PR PBB SHADOW E&M-EST. PATIENT-LVL III: CPT | Mod: PBBFAC,,, | Performed by: OBSTETRICS & GYNECOLOGY

## 2018-09-24 PROCEDURE — 76856 US EXAM PELVIC COMPLETE: CPT | Mod: 26,,, | Performed by: RADIOLOGY

## 2018-09-24 RX ORDER — VALACYCLOVIR HYDROCHLORIDE 500 MG/1
500 TABLET, FILM COATED ORAL 2 TIMES DAILY
Qty: 10 TABLET | Refills: 3 | Status: SHIPPED | OUTPATIENT
Start: 2018-09-24 | End: 2018-11-02

## 2018-09-24 NOTE — TELEPHONE ENCOUNTER
----- Message from Jo Ann Rubi sent at 9/24/2018 11:01 AM CDT -----  Contact: pt            Name of Who is Calling: pt      What is the request in detail: pt is requesting to speak to nurse regarding instructions after her US. Please call pt      Can the clinic reply by [facility]: no      What Number to Call Back if not in [facility]: [phone]

## 2018-09-24 NOTE — PROGRESS NOTES
HISTORY OF PRESENT ILLNESS:    Arpita Bradshaw is a 48 y.o. female, , No LMP recorded. Patient has had a hysterectomy.,  presents for a problem visit, complaining of right lower quadrant and right low back pain for 4 months.     Pelvic ultrasound 2.8 cm simple right ovarian cyst.    Also complains of HSV outbreak.  Ran out of valtrex.  Some pain when urine touches ulcer.  Just finished abx for UTI - feels like that is better.  Renal u/s today negative.    Past Medical History:   Diagnosis Date    Fibromyalgia     IC (interstitial cystitis)     Lupus     STD (sexually transmitted disease)     Urinary tract infection     Vaginal infection        Past Surgical History:   Procedure Laterality Date    BREAST REDUCTION       SECTION      CYSTOSCOPY N/A 2018    Procedure: CYSTOSCOPY;  Surgeon: Harris Lua MD;  Location: [facility] OR;  Service: Urology;  Laterality: N/A;  200 of botox    CYSTOSCOPY N/A 2018    Performed by Harris Lua MD at [facility] OR    CYSTOSCOPY WITH HYDRODISTENSION N/A 2016    Performed by Harris Lua MD at [facility] OR    hemmorroidectomy      HYSTERECTOMY      [facility] for endometriosis    INJECTION OF BOTULINUM TOXIN TYPE A N/A 2018    Procedure: INJECTION, BOTULINUM TOXIN, TYPE A;  Surgeon: Harris Lua MD;  Location: [facility] OR;  Service: Urology;  Laterality: N/A;    INJECTION, BOTULINUM TOXIN, TYPE A N/A 2018    Performed by Harris Lua MD at [facility] OR    neurostimulator for bladder         MEDICATIONS AND ALLERGIES:      Current Outpatient Medications:     gabapentin (NEURONTIN) 300 MG capsule, TK 1 C PO  TID UTD, Disp: , Rfl: 1    hydroxychloroquine (PLAQUENIL) 200 mg tablet, Take 200 mg by mouth 2 (two) times daily., Disp: , Rfl:     ibuprofen (ADVIL,MOTRIN) 800 MG tablet, Take 1 tablet (800 mg total) by mouth every 6 (six) hours as needed for Pain., Disp: 20 tablet, Rfl: 0    loratadine (CLARITIN) 10 mg tablet, Take 10 mg  by mouth once daily., Disp: , Rfl: 0    naproxen (EC NAPROSYN) 500 MG EC tablet, Take 1 tablet (500 mg total) by mouth 2 (two) times daily. for 14 days, Disp: 28 tablet, Rfl: 1    predniSONE (DELTASONE) 5 MG tablet, , Disp: , Rfl: 0    tamsulosin (FLOMAX) 0.4 mg Cap, Take 1 capsule (0.4 mg total) by mouth once daily., Disp: 30 capsule, Rfl: 0    tiZANidine (ZANAFLEX) 4 MG tablet, Take 1 tablet (4 mg total) by mouth every 8 (eight) hours as needed (Muscle Spasms)., Disp: 20 tablet, Rfl: 0    amoxicillin-clavulanate 875-125mg (AUGMENTIN) 875-125 mg per tablet, Take 1 tablet by mouth every 12 (twelve) hours., Disp: , Rfl:     diphenhydrAMINE (BENADRYL) 25 mg capsule, Take 25 mg by mouth every 4 (four) hours as needed for Itching., Disp: , Rfl:     meperidine (DEMEROL) 50 mg tablet, Take 1 tablet (50 mg total) by mouth every 6 (six) hours as needed for Pain., Disp: 28 tablet, Rfl: 0    valACYclovir (VALTREX) 500 MG tablet, Take 1 tablet (500 mg total) by mouth 2 (two) times daily. for 5 days, Disp: 10 tablet, Rfl: 3    Review of patient's allergies indicates:   Allergen Reactions    Bactrim [sulfamethoxazole-trimethoprim] Rash       Family History   Problem Relation Age of Onset    Hypertension Mother     Cancer Mother         KIDNEY    Arthritis Mother     Kidney disease Neg Hx        Social History     Socioeconomic History    Marital status: Single     Spouse name: Not on file    Number of children: Not on file    Years of education: Not on file    Highest education level: Not on file   Social Needs    Financial resource strain: Not on file    Food insecurity - worry: Not on file    Food insecurity - inability: Not on file    Transportation needs - medical: Not on file    Transportation needs - non-medical: Not on file   Occupational History    Not on file   Tobacco Use    Smoking status: Never Smoker    Smokeless tobacco: Never Used   Substance and Sexual Activity    Alcohol use: No     "Drug use: No    Sexual activity: Not Currently     Partners: Male     Birth control/protection: Surgical   Other Topics Concern    Not on file   Social History Narrative    Not on file       ROS:  GENERAL: No weight changes. No swelling. No fatigue. No fever.  CARDIOVASCULAR: No chest pain. No shortness of breath. No leg cramps.   NEUROLOGICAL: No headaches. No vision changes.  BREASTS: No pain. No lumps. No discharge.  ABDOMEN: No pain. No nausea. No vomiting. No diarrhea. No constipation.  REPRODUCTIVE: No abnormal bleeding.   VULVA:  See above.  VAGINA: No relaxation. No itching. No odor. No discharge. No lesions.  URINARY: No incontinence. No nocturia. No frequency. No dysuria.    /72   Ht 5' 8" (1.727 m)   Wt 105 kg (231 lb 7.7 oz)   BMI 35.20 kg/m²     PE:  APPEARANCE: Well nourished, well developed, in no acute distress.  ABDOMEN: Soft. No tenderness or masses. No hepatosplenomegaly. No hernias.  BREASTS, FUNDOSCOPIC, RECTAL DEFERRED  PELVIC: External female genitalia with single HSV ulcer right perineum - no drainage or surrounding erythema.  Female hair distribution. Adequate perineal body, Normal urethral meatus. Vagina moist and well rugated without lesions or discharge.  No significant cystocele or rectocele present. Cervix pink without lesions, discharge or tenderness. Uterus is normal size, regular, mobile and nontender. Adnexa without masses or tenderness.  EXTREMITIES: No edema      DIAGNOSIS & PLAN  1. Right ovarian cyst  US Pelvis Complete Non OB   2. Dysuria  Urine culture   3. Genital herpes simplex, unspecified site  valACYclovir (VALTREX) 500 MG tablet         "

## 2018-09-25 LAB
BACTERIA UR CULT: NORMAL
BACTERIA UR CULT: NORMAL

## 2018-10-03 ENCOUNTER — OFFICE VISIT (OUTPATIENT)
Dept: UROLOGY | Facility: CLINIC | Age: 48
End: 2018-10-03
Payer: MEDICAID

## 2018-10-03 ENCOUNTER — TELEPHONE (OUTPATIENT)
Dept: UROLOGY | Facility: CLINIC | Age: 48
End: 2018-10-03

## 2018-10-03 VITALS
DIASTOLIC BLOOD PRESSURE: 85 MMHG | BODY MASS INDEX: 35.25 KG/M2 | HEART RATE: 65 BPM | WEIGHT: 232.56 LBS | SYSTOLIC BLOOD PRESSURE: 143 MMHG | HEIGHT: 68 IN

## 2018-10-03 DIAGNOSIS — R10.31 RLQ ABDOMINAL PAIN: ICD-10-CM

## 2018-10-03 DIAGNOSIS — N30.10 INTERSTITIAL CYSTITIS: ICD-10-CM

## 2018-10-03 DIAGNOSIS — N39.3 SUI (STRESS URINARY INCONTINENCE, FEMALE): ICD-10-CM

## 2018-10-03 DIAGNOSIS — R39.16 STRAINING TO VOID: ICD-10-CM

## 2018-10-03 DIAGNOSIS — R39.11 URINARY HESITANCY: ICD-10-CM

## 2018-10-03 DIAGNOSIS — N31.9 NEUROGENIC BLADDER: Primary | ICD-10-CM

## 2018-10-03 DIAGNOSIS — K59.00 CONSTIPATION, UNSPECIFIED CONSTIPATION TYPE: ICD-10-CM

## 2018-10-03 PROBLEM — R33.9 INCOMPLETE BLADDER EMPTYING: Status: ACTIVE | Noted: 2018-10-03

## 2018-10-03 LAB
BILIRUB SERPL-MCNC: NORMAL MG/DL
BLOOD URINE, POC: NORMAL
COLOR, POC UA: YELLOW
GLUCOSE UR QL STRIP: NORMAL
KETONES UR QL STRIP: NORMAL
LEUKOCYTE ESTERASE URINE, POC: NORMAL
NITRITE, POC UA: NORMAL
PH, POC UA: 6
PROTEIN, POC: NORMAL
SPECIFIC GRAVITY, POC UA: 1.02
UROBILINOGEN, POC UA: 0.2

## 2018-10-03 PROCEDURE — 87077 CULTURE AEROBIC IDENTIFY: CPT | Mod: 59

## 2018-10-03 PROCEDURE — 99215 OFFICE O/P EST HI 40 MIN: CPT | Mod: PBBFAC,PO | Performed by: NURSE PRACTITIONER

## 2018-10-03 PROCEDURE — 87186 SC STD MICRODIL/AGAR DIL: CPT

## 2018-10-03 PROCEDURE — 87086 URINE CULTURE/COLONY COUNT: CPT

## 2018-10-03 PROCEDURE — 81002 URINALYSIS NONAUTO W/O SCOPE: CPT | Mod: PBBFAC,PO | Performed by: NURSE PRACTITIONER

## 2018-10-03 PROCEDURE — 99214 OFFICE O/P EST MOD 30 MIN: CPT | Mod: S$PBB,,, | Performed by: NURSE PRACTITIONER

## 2018-10-03 PROCEDURE — 99999 PR PBB SHADOW E&M-EST. PATIENT-LVL V: CPT | Mod: PBBFAC,,, | Performed by: NURSE PRACTITIONER

## 2018-10-03 PROCEDURE — 87088 URINE BACTERIA CULTURE: CPT

## 2018-10-03 RX ORDER — TAMSULOSIN HYDROCHLORIDE 0.4 MG/1
0.4 CAPSULE ORAL DAILY
Qty: 30 CAPSULE | Refills: 1 | Status: SHIPPED | OUTPATIENT
Start: 2018-10-03 | End: 2019-02-11

## 2018-10-03 NOTE — PATIENT INSTRUCTIONS
1. PVR  2. Urine dipstick  3. Urine culture  4. Start elmiron  5. Continue tamsulosin  6. Amb ref Gastroenterology   7. Follow-up in 6 weeks

## 2018-10-03 NOTE — TELEPHONE ENCOUNTER
----- Message from Shanique Boogie sent at 10/3/2018  3:46 PM CDT -----  Contact: [phone]/ self   Patient requesting to speak with you regarding the following Rx being denied by pharmacy.     1. tamsulosin (FLOMAX) 0.4 mg Cap

## 2018-10-03 NOTE — PROGRESS NOTES
Subjective:       Patient ID: Arpita Bradshaw is a 48 y.o. female.    Chief Complaint: Other (2 week f/u)    Patient is here for her 2 weeks follow-up exam. She still report RLQ abdominal pain and constipation. She also reports right urinary hesitancy, straining with urination, and nocturia x4-5. She has c/o right flank and low back pain. She's scheduled to see Neurology next week for low back pain and numbness to right leg and foot. U/S of kidneys and bladder results discussed with patient. She has a history of IC, neurogenic bladder, and urinary stress incontinence. She took Elmiron in the past for her IC, but reports not taking it as directed, so she wasn't sure if the medication was affective or not. She would like to try Elmiron again.       Review of Systems   Constitutional: Negative for appetite change, chills, fatigue and fever.   Gastrointestinal: Positive for abdominal pain (RLQ) and constipation. Negative for blood in stool, diarrhea, nausea and vomiting.   Genitourinary: Positive for flank pain (right). Negative for decreased urine volume, difficulty urinating, dysuria, frequency, hematuria, pelvic pain, urgency, vaginal bleeding, vaginal discharge and vaginal pain.        Hesitancy and straining with urination.  Nocturia x4-5   Musculoskeletal: Positive for back pain (right lower back).   Neurological: Positive for dizziness, numbness (right leg and foot) and headaches. Negative for speech difficulty and weakness.   Psychiatric/Behavioral: Negative.        Objective:      Physical Exam   Constitutional: She is oriented to person, place, and time. She appears well-developed and well-nourished.   HENT:   Head: Normocephalic and atraumatic.   Eyes: EOM are normal. Pupils are equal, round, and reactive to light.   Neck: Normal range of motion.   Cardiovascular: Normal rate.   Pulmonary/Chest: Effort normal. No respiratory distress.   Abdominal: Soft. She exhibits no distension. There is tenderness (RLQ,  suprapubic, and LLQ). There is no guarding.   Genitourinary:   Genitourinary Comments: PVR=0 mL   Musculoskeletal: Normal range of motion. She exhibits no edema.   Right CVAT   Neurological: She is alert and oriented to person, place, and time. Coordination normal.   Skin: Skin is warm and dry.   Psychiatric: She has a normal mood and affect. Her behavior is normal. Judgment and thought content normal.   Nursing note and vitals reviewed.      Assessment:       1. Neurogenic bladder    2. Interstitial cystitis    3. SHEYLA (stress urinary incontinence, female)    4. Straining to void    5. Urinary hesitancy    6. Constipation, unspecified constipation type    7. RLQ abdominal pain        Plan:       Arpita was seen today for other.    Diagnoses and all orders for this visit:    Neurogenic bladder    Interstitial cystitis  -     pentosan polysulfate (ELMIRON) 100 mg Cap; Take 1 capsule (100 mg total) by mouth 3 (three) times daily.    SHEYLA (stress urinary incontinence, female)  -     POCT URINE DIPSTICK WITHOUT MICROSCOPE  -     Urine culture    Incomplete bladder emptying  -     POCT URINE DIPSTICK WITHOUT MICROSCOPE  -     Urine culture  -     tamsulosin (FLOMAX) 0.4 mg Cap; Take 1 capsule (0.4 mg total) by mouth once daily.    Constipation, unspecified constipation type  -     Ambulatory Referral to Gastroenterology    RLQ abdominal pain  -     Ambulatory Referral to Gastroenterology    Other orders  1. PVR  2. Continue tamsulosin  3. Amb ref Gastroenterology     Follow-up in 6 weeks    Francie Cazares NP

## 2018-10-06 LAB
BACTERIA UR CULT: NORMAL
BACTERIA UR CULT: NORMAL

## 2018-10-08 DIAGNOSIS — N39.0 URINARY TRACT INFECTION WITHOUT HEMATURIA, SITE UNSPECIFIED: Primary | ICD-10-CM

## 2018-10-08 RX ORDER — NITROFURANTOIN 25; 75 MG/1; MG/1
100 CAPSULE ORAL 2 TIMES DAILY
Qty: 28 CAPSULE | Refills: 0 | Status: SHIPPED | OUTPATIENT
Start: 2018-10-08 | End: 2018-10-22

## 2018-10-18 ENCOUNTER — OFFICE VISIT (OUTPATIENT)
Dept: GASTROENTEROLOGY | Facility: CLINIC | Age: 48
End: 2018-10-18
Payer: MEDICAID

## 2018-10-18 VITALS
HEIGHT: 68 IN | SYSTOLIC BLOOD PRESSURE: 120 MMHG | DIASTOLIC BLOOD PRESSURE: 82 MMHG | BODY MASS INDEX: 35.16 KG/M2 | WEIGHT: 232 LBS | HEART RATE: 80 BPM

## 2018-10-18 DIAGNOSIS — R10.31 RLQ ABDOMINAL PAIN: ICD-10-CM

## 2018-10-18 DIAGNOSIS — K59.09 OTHER CONSTIPATION: Primary | ICD-10-CM

## 2018-10-18 PROCEDURE — 99204 OFFICE O/P NEW MOD 45 MIN: CPT | Mod: S$PBB,,, | Performed by: INTERNAL MEDICINE

## 2018-10-18 PROCEDURE — 99999 PR PBB SHADOW E&M-EST. PATIENT-LVL III: CPT | Mod: PBBFAC,,, | Performed by: INTERNAL MEDICINE

## 2018-10-18 PROCEDURE — 99213 OFFICE O/P EST LOW 20 MIN: CPT | Mod: PBBFAC,PN | Performed by: INTERNAL MEDICINE

## 2018-10-18 RX ORDER — POLYETHYLENE GLYCOL 3350, SODIUM SULFATE ANHYDROUS, SODIUM BICARBONATE, SODIUM CHLORIDE, POTASSIUM CHLORIDE 236; 22.74; 6.74; 5.86; 2.97 G/4L; G/4L; G/4L; G/4L; G/4L
POWDER, FOR SOLUTION ORAL
Qty: 1 BOTTLE | Refills: 0 | Status: ON HOLD | OUTPATIENT
Start: 2018-10-18 | End: 2018-11-06 | Stop reason: HOSPADM

## 2018-10-18 RX ORDER — POLYETHYLENE GLYCOL 3350 17 G/17G
17 POWDER, FOR SOLUTION ORAL DAILY
Qty: 1 BOTTLE | Refills: 11 | Status: SHIPPED | OUTPATIENT
Start: 2018-10-18 | End: 2019-02-11

## 2018-10-18 NOTE — PATIENT INSTRUCTIONS
GOLYTELY Instructions    You are scheduled for a colonoscopy with Dr. Kamara on Tuesday, November 6 at [facility] located at [address] in [place].  Enter through the South Entrance.  Check-in at Same Day Surgery.      You will receive a call 2-3 days before your colonoscopy to tell you the time to arrive.  If you have not received a call by the day before your procedure, call the Endoscopy Lab at [phone].    To ensure that your test is accurate and complete, you MUST follow these instructions listed below.  If you have any questions, please call our office at [phone].  Plan on being at the hospital for your procedure for 3-4 hours.    1.  Follow a CLEAR LIQUID DIET for the entire day before your scheduled colonoscopy.  This means no solid food the entire day starting when you wake.  You may have as much of the clear liquids as you want throughout the day.   CLEAR LIQUID DIET:   - Avoid Red, Orange, Purple, and/or Blue food coloring   - NO DAIRY   - You can have:  Coffee with sugar (no creamer), tea, water, soda, apple or white grape juice, chicken or beef broth/bouillon (no meat, noodles, or veggies), green/yellow popsicles, green/yellow Jell-O, lemonade.    2.  MIX GOLYTELY/COLYTE/NULYTELY (all names for same product) WITH ONE (1) GALLON OF WATER.  YOU MAY ADD A FLAVOR PACKET OR YELLOW/GREEN POWDER DRINK MIX TO THIS.  PUT IN REFRIGERATOR.  This is easier to drink if this solution is cold, so you can mix the solution one day ahead of time and place in the refrigerator prior to drinking.  You have to drink the solution within 24-36 hours of mixing it.  Do NOT put this solution over ice.  It IS ok to drink with a straw.    3. AT 5 PM THE DAY BEFORE YOUR COLONOSCOPY, DRINK ONE (1) 8 OUNCE GLASS OF MIXTURE EVERY 10 MINUTES UNTIL HALF OF THE GALLON IS CONSUMED.  Keep this mixture cold and in refrigerator as much as you can while drinking it.  Place the remaining half of mixture in  the refrigerator when you finish the first half.    4.  The endoscopy department will call you 2 days before your colonoscopy to tell you the exact time to arrive, AND to tell you the exact time to drink the 2nd portion of your prep (which will be FIVE HOURS BEFORE YOUR ARRIVAL TIME).  At this time given to you, DRINK ONE (1) 8 OUNCE GLASS OF MIXTURE EVERY 10 MINUTES UNTIL THE OTHER HALF IS CONSUMED. Keep the mixture cold while you are drinking it. Once this is complete, you may not have ANYTHING else by mouth!      5.  You must have someone with you to DRIVE YOU HOME since you will be receiving IV sedation for the colonoscopy.    6.  It is ok to take your heart, blood pressure, and seizure medications in the morning of your test with a SIP of water.  Hold other medications until after your procedure.  Do NOT have anything else to eat or drink the morning of your colonoscopy.  It is ok to brush your teeth.    7.  If you are on blood thinners THAT YOU HAVE BEEN INSTRUCTED TO HOLD BY YOUR DOCTOR FOR THIS PROCEDURE, then do NOT take this the morning of your colonoscopy.  Do NOT stop these medications on your own, they must be approved to be held by your doctor.  Your colonoscopy can NOT be done if you are on these medications.  Examples of blood thinners include: Coumadin, Aggrenox, Plavix, Pradaxa, Reapro, Pletal, Xarelto, Ticagrelor, Brilinta, Eliquis, and high dose aspirin (325 mg).  You do not have to stop baby aspirin 81 mg.    8.  IF YOU ARE DIABETIC:  NO INSULIN OR ORAL MEDICATIONS THE MORNING OF THE COLONOSCOPY.  TAKE ONLY HALF THE DOSE OF YOUR INSULIN THE DAY BEFORE THE COLONOSCOPY.  DO NOT TAKE ANY ORAL DIABETIC MEDICATIONS THE DAY BEFORE THE COLONOSCOPY.  IF YOU ARE AN INSULIN DEPENDENT DIABETIC WITH UNSTABLE BLOOD SUGARDS, NOTIFY YOUR PRIMARY CARE PHYSICIAN FOR INSTRUCTIONS.

## 2018-10-18 NOTE — LETTER
October 18, 2018      Francie Cazares, NP  [address]  [address]  [facility] [address]           [facility]  [address], [address]   [facility] [address]  Phone: [phone]  Fax: [phone]          Patient: Arpita Bradshaw   MR Number: [id number]   YOB: 1970   Date of Visit: 10/18/2018       Dear Francie Cazares:    Thank you for referring Arpita Bradshaw to me for evaluation. Attached you will find relevant portions of my assessment and plan of care.    If you have questions, please do not hesitate to call me. I look forward to following Arpita Bradshaw along with you.    Sincerely,    Catrachita Kamara MD    Enclosure  CC:  No Recipients    If you would like to receive this communication electronically, please contact [email or web address] or [phone] to request more information on [employer] Link access.    For providers and/or their staff who would like to refer a patient to [facility], please contact us through our one-stop-shop provider referral line, [facility], at [phone].    If you feel you have received this communication in error or would no longer like to receive these types of communications, please e-mail [email or web address]

## 2018-10-18 NOTE — PROGRESS NOTES
"Subjective:       Patient ID: Arpita Bradshaw is a 48 y.o. female.    Chief Complaint: Abdominal Pain and Constipation    49 yo F complains of right sided abdominal pain and constipation.  She states that she has always had mild constipation having 2-3 BM per week.  Over the past 6 months this has gotten much worse, having 1 BM per week.  If she does not take a laxative, she has small rock-like stools with incomplete evacuation.  She complains of RLQ pain and right flank pain.  Both of these are worse when she has a BM, but then the pain improves.  She has tried Miralax before, but was using prn not daily and did not like the taste or consistency.  She has had a few episodes of mild rectal bleeding associated with hemorrhoids.  She denies FH of colon cancer and she has never had a colonoscopy.      Review of Systems   Constitutional: Negative for appetite change and unexpected weight change.   Eyes: Negative for photophobia and visual disturbance.   Respiratory: Negative for chest tightness, shortness of breath and wheezing.    Cardiovascular: Negative for chest pain, palpitations and leg swelling.   Genitourinary: Negative for dysuria, flank pain and hematuria.   Musculoskeletal: Negative for joint swelling and myalgias.   Skin: Negative for color change and rash.   Neurological: Negative for dizziness and speech difficulty.   Psychiatric/Behavioral: Negative for confusion and hallucinations.       Objective:       /82 (BP Location: Right arm, Patient Position: Sitting)   Pulse 80   Ht 5' 8" (1.727 m)   Wt 105.2 kg (232 lb)   BMI 35.28 kg/m²     Physical Exam   Constitutional: She is oriented to person, place, and time. She appears well-developed and well-nourished.   Eyes: EOM are normal. Pupils are equal, round, and reactive to light.   Neck: Normal range of motion. Neck supple.   Cardiovascular: Normal rate and regular rhythm.   Pulmonary/Chest: Effort normal and breath sounds normal.   Abdominal: " Soft. Bowel sounds are normal. She exhibits no distension and no mass. There is tenderness. There is no rebound and no guarding.   Musculoskeletal: Normal range of motion. She exhibits no edema.   Neurological: She is alert and oriented to person, place, and time.   Psychiatric: She has a normal mood and affect. Her behavior is normal. Judgment and thought content normal.       Lab Results   Component Value Date    WBC 3.09 (L) 07/23/2018    HGB 12.1 07/23/2018    HCT 36.3 (L) 07/23/2018    MCV 88 07/23/2018     07/23/2018     CMP  Sodium   Date Value Ref Range Status   07/23/2018 140 136 - 145 mmol/L Final     Potassium   Date Value Ref Range Status   07/23/2018 4.4 3.5 - 5.1 mmol/L Final     Chloride   Date Value Ref Range Status   07/23/2018 109 95 - 110 mmol/L Final     CO2   Date Value Ref Range Status   07/23/2018 24 23 - 29 mmol/L Final     Glucose   Date Value Ref Range Status   07/23/2018 92 70 - 110 mg/dL Final     BUN, Bld   Date Value Ref Range Status   07/23/2018 15 7 - 17 mg/dL Final     Creatinine   Date Value Ref Range Status   07/23/2018 0.76 0.50 - 1.40 mg/dL Final     Calcium   Date Value Ref Range Status   07/23/2018 9.1 8.7 - 10.5 mg/dL Final     Total Protein   Date Value Ref Range Status   07/17/2018 8.5 (H) 6.0 - 8.4 g/dL Final     Albumin   Date Value Ref Range Status   07/17/2018 4.1 3.5 - 5.2 g/dL Final     Total Bilirubin   Date Value Ref Range Status   07/17/2018 0.3 0.1 - 1.0 mg/dL Final     Comment:     For infants and newborns, interpretation of results should be based  on gestational age, weight and in agreement with clinical  observations.  Premature Infant recommended reference ranges:  Up to 24 hours.............<8.0 mg/dL  Up to 48 hours............<12.0 mg/dL  3-5 days..................<15.0 mg/dL  6-29 days.................<15.0 mg/dL       Alkaline Phosphatase   Date Value Ref Range Status   07/17/2018 62 55 - 135 U/L Final     AST   Date Value Ref Range Status    07/17/2018 10 10 - 40 U/L Final     ALT   Date Value Ref Range Status   07/17/2018 9 (L) 10 - 44 U/L Final     Anion Gap   Date Value Ref Range Status   07/23/2018 7 (L) 8 - 16 mmol/L Final     eGFR if    Date Value Ref Range Status   07/23/2018 >60.0 >60 mL/min/1.73 m^2 Final     eGFR if non    Date Value Ref Range Status   07/23/2018 >60.0 >60 mL/min/1.73 m^2 Final     Comment:     Calculation used to obtain the estimated glomerular filtration  rate (eGFR) is the CKD-EPI equation.          Assessment:       1. Other constipation    2. RLQ abdominal pain        Plan:       Other constipation  -     polyethylene glycol (GLYCOLAX) 17 gram/dose powder; Take 17 g by mouth once daily.  Dispense: 1 Bottle; Refill: 11.  I discussed using other agents, but they have a higher risk of diarrhea, which the pt would like to avoid.  She will mix the Miralax with apple juice and see if she can tolerate it.  -     Case request GI: COLONOSCOPY  -     polyethylene glycol (GOLYTELY,NULYTELY) 236-22.74-6.74 -5.86 gram suspension; Use as directed  Dispense: 1 Bottle; Refill: 0  -     X-Ray Abdomen Flat And Erect; Future; Expected date: 10/18/2018, being done to quantify stool present.    RLQ abdominal pain        -     I am suspicious that the pain is related to the constipation.  If xray does not show large amount of stool and/or if pt does not feel better after a clean out, will consider CT scan.

## 2018-11-06 PROBLEM — K59.00 CONSTIPATION: Status: ACTIVE | Noted: 2018-11-06

## 2019-01-31 ENCOUNTER — TELEPHONE (OUTPATIENT)
Dept: GASTROENTEROLOGY | Facility: CLINIC | Age: 49
End: 2019-01-31

## 2019-01-31 NOTE — TELEPHONE ENCOUNTER
Patient called stating that she was having rectal bleeding with blood clots, she was encouraged that she should go to the ER. Patient verbalize understanding.

## 2019-01-31 NOTE — TELEPHONE ENCOUNTER
----- Message from Alia Branch sent at 1/31/2019  2:12 PM CST -----  Contact: [phone]/self  Patient would like to be seen today for rectal bleeding with blood clots. Please advise.

## 2019-02-01 ENCOUNTER — HOSPITAL ENCOUNTER (EMERGENCY)
Facility: HOSPITAL | Age: 49
Discharge: HOME | End: 2019-02-01
Attending: EMERGENCY MEDICINE
Payer: MEDICAID

## 2019-02-01 VITALS
HEIGHT: 68 IN | HEART RATE: 77 BPM | RESPIRATION RATE: 16 BRPM | WEIGHT: 235 LBS | SYSTOLIC BLOOD PRESSURE: 136 MMHG | TEMPERATURE: 98 F | BODY MASS INDEX: 35.61 KG/M2 | OXYGEN SATURATION: 100 % | DIASTOLIC BLOOD PRESSURE: 77 MMHG

## 2019-02-01 DIAGNOSIS — K64.5 THROMBOSED EXTERNAL HEMORRHOID: Primary | ICD-10-CM

## 2019-02-01 DIAGNOSIS — M46.1 SACROILIITIS: ICD-10-CM

## 2019-02-01 PROCEDURE — 25000003 PHARM REV CODE 250: Performed by: EMERGENCY MEDICINE

## 2019-02-01 PROCEDURE — 99284 EMERGENCY DEPT VISIT MOD MDM: CPT

## 2019-02-01 RX ORDER — CYCLOBENZAPRINE HCL 10 MG
10 TABLET ORAL 3 TIMES DAILY PRN
Qty: 15 TABLET | Refills: 0 | Status: SHIPPED | OUTPATIENT
Start: 2019-02-01 | End: 2019-02-06

## 2019-02-01 RX ORDER — CYCLOBENZAPRINE HCL 10 MG
10 TABLET ORAL 3 TIMES DAILY PRN
Qty: 15 TABLET | Refills: 0 | Status: SHIPPED | OUTPATIENT
Start: 2019-02-01 | End: 2019-02-01 | Stop reason: SDUPTHER

## 2019-02-01 RX ORDER — MELOXICAM 7.5 MG/1
7.5 TABLET ORAL DAILY
Qty: 30 TABLET | Refills: 1 | Status: SHIPPED | OUTPATIENT
Start: 2019-02-01 | End: 2019-04-02

## 2019-02-01 RX ORDER — HYDROCORTISONE ACETATE PRAMOXINE HCL 2.5; 1 G/100G; G/100G
CREAM TOPICAL 3 TIMES DAILY
Qty: 28 G | Refills: 1 | Status: SHIPPED | OUTPATIENT
Start: 2019-02-01 | End: 2019-06-03

## 2019-02-01 RX ADMIN — HYDROCORTISONE ACETATE AND PRAMOXINE HYDROCHLORIDE: 10; 10 CREAM TOPICAL at 02:02

## 2019-02-01 NOTE — DISCHARGE INSTRUCTIONS
It is important that you do the following back for strengthening exercises every day: Assume each position and hold for 8 seconds relax and then repeat in 3-4 times.  #1 the plank      #2 the bird-dog    #3 wall squats.

## 2019-02-01 NOTE — ED PROVIDER NOTES
Encounter Date: 2/1/2019    SCRIBE #1 NOTE: I, Raine Blank, am scribing for, and in the presence of,  Staci Lopez MD. I have scribed the following portions of the note - Other sections scribed: HPI, ROS, PE.       History     Chief Complaint   Patient presents with    Back Pain     Pt reports back pain with hx of constipation. Pt states yesterday she had a BM and passed a bright red blood clot with continued off and on bright red blood.     Rectal Bleeding     Today pt states the bleeding has been constant.     CC: Back Pain; Rectal Bleeding    HPI: This is a 47 y/o female with PMHx of Fibromyalgia, IC, and Lupus who presents to the ED for emergent evaluation of acute onset left sided lower back pain that began x1 week ago. Pt rates pain as severe (8/10). Pt reports that she has a hx of constipation. Pt has been taking Linzess. Pt reports that she had a normal BM yesterday. Pt reports that afterwards, she went to urinate and noticed a bright red blood clot on her pad. Pt reports that the rectal bleeding has been constant today with some associated pain, which prompted her to come to the ED. Pt reports that she has hx of hemorrhoids. She went to internal medicine physician x3 months ago, who did a colonoscopy with no major findings except a minor internal hemorrhoid that she was told not to be concerned about. Pt notes some abdominal cramping and soreness. Pt also c/o cough x1 month hx. She has been taking Tessalon Perles and Zpak from  with no relief. Pt has been compliant with her Plaquenil and Gabapentin. Denies fever, chills, weight loss/gain, leg swelling, or further symptoms.       The history is provided by the patient. No  was used.     Review of patient's allergies indicates:   Allergen Reactions    Bactrim [sulfamethoxazole-trimethoprim] Rash     Past Medical History:   Diagnosis Date    Fibromyalgia     IC (interstitial cystitis)     Lupus     STD (sexually  transmitted disease)     Urinary tract infection     Vaginal infection      Past Surgical History:   Procedure Laterality Date    BREAST REDUCTION       SECTION      COLONOSCOPY N/A 2018    [facility]    CYSTOSCOPY N/A 2018    [facility]    CYSTOSCOPY WITH HYDRODISTENSION N/A 2016    [facility]    hemmorroidectomy      HYSTERECTOMY      [facility] for endometriosis    INJECTION, BOTULINUM TOXIN, TYPE A N/A 2018    [facility]    neurostimulator for bladder       Family History   Problem Relation Age of Onset    Hypertension Mother     Cancer Mother         KIDNEY    Arthritis Mother     Kidney disease Neg Hx      Social History     Tobacco Use    Smoking status: Never Smoker    Smokeless tobacco: Never Used   Substance Use Topics    Alcohol use: No    Drug use: No     Review of Systems   Constitutional: Negative for chills, fever and unexpected weight change.   HENT: Negative for congestion, ear pain, rhinorrhea and sore throat.    Eyes: Negative for pain and visual disturbance.   Respiratory: Positive for cough (x1 month hx). Negative for shortness of breath.    Cardiovascular: Negative for chest pain and leg swelling.   Gastrointestinal: Positive for abdominal pain (cramping), anal bleeding (with associated pain) and constipation (chronic). Negative for diarrhea, nausea and vomiting.   Genitourinary: Negative for dysuria.   Musculoskeletal: Positive for back pain (lower left side). Negative for neck pain.   Skin: Negative for rash.   Neurological: Negative for headaches.   All other systems reviewed and are negative.      Physical Exam     Initial Vitals [19 1349]   BP Pulse Resp Temp SpO2   (!) 167/90 63 18 98.4 °F (36.9 °C) 98 %      MAP       --         Physical Exam    Nursing note and vitals reviewed.  Constitutional: She  appears well-developed and well-nourished.  Non-toxic appearance. She does not appear ill.   HENT:   Head: Normocephalic and atraumatic.   Eyes: EOM are normal.   Neck: Neck supple.   Cardiovascular: Normal rate and regular rhythm.   Pulmonary/Chest: Effort normal and breath sounds normal. No respiratory distress.   Abdominal: Soft. Normal appearance and bowel sounds are normal. She exhibits no distension. There is no tenderness.   Genitourinary: Rectal exam shows external hemorrhoid (Patient has 2. The one that is more caudad is thrombosed.).   Musculoskeletal: Normal range of motion.   Patient has tenderness to the left iliac crest.   Neurological: She is alert.   Skin: Skin is warm and dry.   Psychiatric: She has a normal mood and affect.         ED Course   Procedures  Labs Reviewed - No data to display       Imaging Results    None                     Scribe Attestation:   Scribe #1: I performed the above scribed service and the documentation accurately describes the services I performed. I attest to the accuracy of the note.    Attending Attestation:           Physician Attestation for Scribe:  Physician Attestation Statement for Scribe #1: I, Staci Lopez MD, reviewed documentation, as scribed by Raine Blank in my presence, and it is both accurate and complete.                    Clinical Impression:   The primary encounter diagnosis was Thrombosed external hemorrhoid. A diagnosis of Sacroiliitis was also pertinent to this visit.                              Staci Lopez MD  02/04/19 9214

## 2019-02-05 ENCOUNTER — OFFICE VISIT (OUTPATIENT)
Dept: GASTROENTEROLOGY | Facility: CLINIC | Age: 49
End: 2019-02-05
Payer: MEDICAID

## 2019-02-05 VITALS
BODY MASS INDEX: 34.56 KG/M2 | HEART RATE: 80 BPM | WEIGHT: 228 LBS | HEIGHT: 68 IN | SYSTOLIC BLOOD PRESSURE: 120 MMHG | DIASTOLIC BLOOD PRESSURE: 72 MMHG

## 2019-02-05 DIAGNOSIS — R31.9 URINARY TRACT INFECTION WITH HEMATURIA, SITE UNSPECIFIED: Primary | ICD-10-CM

## 2019-02-05 DIAGNOSIS — K62.5 RECTAL BLEEDING: Primary | ICD-10-CM

## 2019-02-05 DIAGNOSIS — K59.04 CHRONIC IDIOPATHIC CONSTIPATION: ICD-10-CM

## 2019-02-05 DIAGNOSIS — K64.8 OTHER HEMORRHOIDS: ICD-10-CM

## 2019-02-05 DIAGNOSIS — N30.10 INTERSTITIAL CYSTITIS: ICD-10-CM

## 2019-02-05 DIAGNOSIS — N39.0 URINARY TRACT INFECTION WITH HEMATURIA, SITE UNSPECIFIED: Primary | ICD-10-CM

## 2019-02-05 PROBLEM — K59.00 CONSTIPATION: Status: RESOLVED | Noted: 2018-11-06 | Resolved: 2019-02-05

## 2019-02-05 PROCEDURE — 99213 PR OFFICE/OUTPT VISIT, EST, LEVL III, 20-29 MIN: ICD-10-PCS | Mod: S$PBB,,, | Performed by: INTERNAL MEDICINE

## 2019-02-05 PROCEDURE — 99999 PR PBB SHADOW E&M-EST. PATIENT-LVL III: CPT | Mod: PBBFAC,,, | Performed by: INTERNAL MEDICINE

## 2019-02-05 PROCEDURE — 99999 PR PBB SHADOW E&M-EST. PATIENT-LVL III: ICD-10-PCS | Mod: PBBFAC,,, | Performed by: INTERNAL MEDICINE

## 2019-02-05 PROCEDURE — 99213 OFFICE O/P EST LOW 20 MIN: CPT | Mod: S$PBB,,, | Performed by: INTERNAL MEDICINE

## 2019-02-05 PROCEDURE — 99213 OFFICE O/P EST LOW 20 MIN: CPT | Mod: PBBFAC,PO | Performed by: INTERNAL MEDICINE

## 2019-02-05 NOTE — PROGRESS NOTES
"Subjective:       Patient ID: Arpita Bradshaw is a 48 y.o. female.    Chief Complaint: Follow-up    47 yo F here for f/u constipation.  She is taking the Linzess 72 mcg daily and is having 1 BM a day that is soft and without urgency or diarrhea.  Despite this she had sudden onset of rectal bleeding that scared her so she went to the ED.  She was told she had external hemorrhoids and was given a topical agent, but she states that the ED physician told her she might need "banding."  She has not had change in bowel habits with this bleeding and otherwise feels good.  She has h/o external hemorrhoidal surgery in the remote past.  She was also complaining of left flank pain in ED; she has h/o urinary tract issues, has not had UA recently.  She denies dysuria or frequency.      Review of Systems   Constitutional: Negative for appetite change.   Respiratory: Negative for chest tightness, shortness of breath and wheezing.    Cardiovascular: Negative for chest pain, palpitations and leg swelling.       Objective:       /72 (BP Location: Right arm, Patient Position: Sitting)   Pulse 80   Ht 5' 8" (1.727 m)   Wt 103.4 kg (228 lb)   BMI 34.67 kg/m²     Physical Exam   Constitutional: She is oriented to person, place, and time. She appears well-developed and well-nourished.   Abdominal: Soft. Bowel sounds are normal.   Neurological: She is alert and oriented to person, place, and time.       Lab Results   Component Value Date    WBC 3.09 (L) 07/23/2018    HGB 12.1 07/23/2018    HCT 36.3 (L) 07/23/2018    MCV 88 07/23/2018     07/23/2018       Assessment:       1. Rectal bleeding    2. Other hemorrhoids    3. Chronic idiopathic constipation    4. Interstitial cystitis        Plan:       Rectal bleeding; Other hemorrhoids  -     Ambulatory consult to Colorectal Surgery  -     Sitz baths, cont topical agent, "potty wipes", non-aggressive cleansing all discussed    Chronic idiopathic constipation        -     " Cont daily Linzess 72 mcg daily    Interstitial cystitis  -     Urinalysis; Future; Expected date: 02/05/2019

## 2019-02-06 ENCOUNTER — TELEPHONE (OUTPATIENT)
Dept: UROLOGY | Facility: CLINIC | Age: 49
End: 2019-02-06

## 2019-02-06 ENCOUNTER — LAB VISIT (OUTPATIENT)
Dept: LAB | Facility: HOSPITAL | Age: 49
End: 2019-02-06
Attending: UROLOGY
Payer: MEDICAID

## 2019-02-06 ENCOUNTER — PATIENT MESSAGE (OUTPATIENT)
Dept: UROLOGY | Facility: CLINIC | Age: 49
End: 2019-02-06

## 2019-02-06 DIAGNOSIS — R31.9 URINARY TRACT INFECTION WITH HEMATURIA, SITE UNSPECIFIED: ICD-10-CM

## 2019-02-06 DIAGNOSIS — N39.0 URINARY TRACT INFECTION WITH HEMATURIA, SITE UNSPECIFIED: ICD-10-CM

## 2019-02-06 PROCEDURE — 87086 URINE CULTURE/COLONY COUNT: CPT

## 2019-02-06 NOTE — TELEPHONE ENCOUNTER
Patient notified to have a urine culture done at the lab, dr lau only ordered a urinalysis and dr núñez needs a culture.  Patient stated she will go today to the lab

## 2019-02-06 NOTE — TELEPHONE ENCOUNTER
----- Message from Sarah Beth Leavitt sent at 2/6/2019  2:26 PM CST -----  Contact: [phone]/self  Patient is requesting a call back regarding test results. Thanks

## 2019-02-08 ENCOUNTER — TELEPHONE (OUTPATIENT)
Dept: UROLOGY | Facility: CLINIC | Age: 49
End: 2019-02-08

## 2019-02-08 LAB — BACTERIA UR CULT: NORMAL

## 2019-02-08 NOTE — TELEPHONE ENCOUNTER
----- Message from Alia Branch sent at 2/8/2019 10:07 AM CST -----  Contact: [phone]/self  Patient requesting to speak with you regarding her urinalysis results. Please advise.

## 2019-02-11 ENCOUNTER — OFFICE VISIT (OUTPATIENT)
Dept: OBSTETRICS AND GYNECOLOGY | Facility: CLINIC | Age: 49
End: 2019-02-11
Attending: OBSTETRICS & GYNECOLOGY
Payer: MEDICAID

## 2019-02-11 VITALS
BODY MASS INDEX: 35.28 KG/M2 | WEIGHT: 232.81 LBS | DIASTOLIC BLOOD PRESSURE: 84 MMHG | SYSTOLIC BLOOD PRESSURE: 124 MMHG | HEIGHT: 68 IN

## 2019-02-11 DIAGNOSIS — N76.0 VAGINITIS AND VULVOVAGINITIS: Primary | ICD-10-CM

## 2019-02-11 DIAGNOSIS — M54.9 BACK PAIN, UNSPECIFIED BACK LOCATION, UNSPECIFIED BACK PAIN LATERALITY, UNSPECIFIED CHRONICITY: ICD-10-CM

## 2019-02-11 DIAGNOSIS — A60.00 GENITAL HERPES SIMPLEX, UNSPECIFIED SITE: ICD-10-CM

## 2019-02-11 LAB
CANDIDA RRNA VAG QL PROBE: NEGATIVE
G VAGINALIS RRNA GENITAL QL PROBE: POSITIVE
T VAGINALIS RRNA GENITAL QL PROBE: NEGATIVE

## 2019-02-11 PROCEDURE — 99213 PR OFFICE/OUTPT VISIT, EST, LEVL III, 20-29 MIN: ICD-10-PCS | Mod: S$PBB,,, | Performed by: OBSTETRICS & GYNECOLOGY

## 2019-02-11 PROCEDURE — 99213 OFFICE O/P EST LOW 20 MIN: CPT | Mod: PBBFAC | Performed by: OBSTETRICS & GYNECOLOGY

## 2019-02-11 PROCEDURE — 99213 OFFICE O/P EST LOW 20 MIN: CPT | Mod: S$PBB,,, | Performed by: OBSTETRICS & GYNECOLOGY

## 2019-02-11 PROCEDURE — 87077 CULTURE AEROBIC IDENTIFY: CPT

## 2019-02-11 PROCEDURE — 87186 SC STD MICRODIL/AGAR DIL: CPT

## 2019-02-11 PROCEDURE — 87086 URINE CULTURE/COLONY COUNT: CPT

## 2019-02-11 PROCEDURE — 99999 PR PBB SHADOW E&M-EST. PATIENT-LVL III: CPT | Mod: PBBFAC,,, | Performed by: OBSTETRICS & GYNECOLOGY

## 2019-02-11 PROCEDURE — 87088 URINE BACTERIA CULTURE: CPT

## 2019-02-11 PROCEDURE — 99999 PR PBB SHADOW E&M-EST. PATIENT-LVL III: ICD-10-PCS | Mod: PBBFAC,,, | Performed by: OBSTETRICS & GYNECOLOGY

## 2019-02-11 PROCEDURE — 87480 CANDIDA DNA DIR PROBE: CPT

## 2019-02-11 RX ORDER — TRAMADOL HYDROCHLORIDE 50 MG/1
TABLET ORAL
Refills: 0 | COMMUNITY
Start: 2018-11-07 | End: 2019-02-19

## 2019-02-11 RX ORDER — VALACYCLOVIR HYDROCHLORIDE 500 MG/1
500 TABLET, FILM COATED ORAL DAILY
Qty: 90 TABLET | Refills: 2 | Status: SHIPPED | OUTPATIENT
Start: 2019-02-11 | End: 2019-02-19

## 2019-02-11 NOTE — PROGRESS NOTES
HISTORY OF PRESENT ILLNESS:    Arpita Bradshaw is a 48 y.o. female, , No LMP recorded. Patient has had a hysterectomy.,  presents for a problem visit, complaining of vaginal discharge and itching.  Has been on antibiotics - took diflucan about 2 weeks ago.  Slightly better after that.      Also thinks she has a UTI but urine culture negative 19.  States her urine is cloudy and has an odor.  Also has left back pain.     Has history of genital hsv.  Requests daily valtrex.    Past Medical History:   Diagnosis Date    Fibromyalgia     IC (interstitial cystitis)     Lupus     STD (sexually transmitted disease)     Urinary tract infection     Vaginal infection        Past Surgical History:   Procedure Laterality Date    BREAST REDUCTION       SECTION      COLONOSCOPY N/A 2018    [facility]    CYSTOSCOPY N/A 2018    [facility]    CYSTOSCOPY WITH HYDRODISTENSION N/A 2016    [facility]    hemmorroidectomy      HYSTERECTOMY      [facility] for [facility]osis    INJECTION, BOTULINUM TOXIN, TYPE A N/A 2018    [facility]    neurostimulator for bladder         MEDICATIONS AND ALLERGIES:      Current Outpatient Medications:     hydrocortisone-pramoxine (ANALPRAM-HC) 2.5-1 % Crea, Place rectally 3 (three) times daily., Disp: 28 g, Rfl: 1    linaclotide (LINZESS) 72 mcg Cap, Take 1 capsule (72 mcg total) by mouth once daily., Disp: 90 capsule, Rfl: 3    meloxicam (MOBIC) 7.5 MG tablet, Take 1 tablet (7.5 mg total) by mouth once daily., Disp: 30 tablet, Rfl: 1    diphenhydrAMINE (BENADRYL) 25 mg capsule, Take 25 mg by mouth every 4 (four) hours as needed for Itching., Disp: , Rfl:     gabapentin (NEURONTIN) 300 MG capsule, TK 1 C PO  TID UTD, Disp: , Rfl: 1    hydroxychloroquine (PLAQUENIL) 200 mg tablet, Take 200 mg by mouth 2 (two) times  daily., Disp: , Rfl:     tiZANidine (ZANAFLEX) 4 MG tablet, Take 1 tablet (4 mg total) by mouth every 8 (eight) hours as needed (Muscle Spasms)., Disp: 20 tablet, Rfl: 0    traMADol (ULTRAM) 50 mg tablet, TK 1 T PO ONCE Q 6 H PRN FOR SEVERE PAIN, Disp: , Rfl: 0    valACYclovir (VALTREX) 500 MG tablet, Take 1 tablet (500 mg total) by mouth once daily., Disp: 90 tablet, Rfl: 2    Review of patient's allergies indicates:   Allergen Reactions    Bactrim [sulfamethoxazole-trimethoprim] Rash       Family History   Problem Relation Age of Onset    Hypertension Mother     Cancer Mother         KIDNEY    Arthritis Mother     Kidney disease Neg Hx        Social History     Socioeconomic History    Marital status: Single     Spouse name: Not on file    Number of children: Not on file    Years of education: Not on file    Highest education level: Not on file   Social Needs    Financial resource strain: Not on file    Food insecurity - worry: Not on file    Food insecurity - inability: Not on file    Transportation needs - medical: Not on file    Transportation needs - non-medical: Not on file   Occupational History    Not on file   Tobacco Use    Smoking status: Never Smoker    Smokeless tobacco: Never Used   Substance and Sexual Activity    Alcohol use: No    Drug use: No    Sexual activity: Yes     Partners: Male     Birth control/protection: Surgical, Condom   Other Topics Concern    Not on file   Social History Narrative    Not on file       ROS:  GENERAL: No weight changes. No swelling. No fatigue. No fever.  CARDIOVASCULAR: No chest pain. No shortness of breath. No leg cramps.   NEUROLOGICAL: No headaches. No vision changes.  BREASTS: No pain. No lumps. No discharge.  ABDOMEN: No pain. No nausea. No vomiting. No diarrhea. No constipation.  REPRODUCTIVE: No abnormal bleeding.   VULVA: No pain. No lesions. No itching.  VAGINA: No relaxation. No itching. No odor. No discharge. No lesions.  URINARY:  "No incontinence. No nocturia. No frequency. No dysuria.    /84   Ht 5' 8" (1.727 m)   Wt 105.6 kg (232 lb 12.9 oz)   BMI 35.40 kg/m²     PE:  APPEARANCE: Well nourished, well developed, in no acute distress.  ABDOMEN: Soft. No tenderness or masses. No hepatosplenomegaly. No hernias.  BREASTS, FUNDOSCOPIC, RECTAL DEFERRED  PELVIC: External female genitalia without lesions.  Female hair distribution. Adequate perineal body, Normal urethral meatus. Vagina moist and well rugated without lesions or discharge.  No significant cystocele or rectocele present. Cervix pink without lesions, discharge or tenderness. Uterus is normal size, regular, mobile and nontender. Adnexa without masses or tenderness.  EXTREMITIES: No edema      DIAGNOSIS & PLAN  1. Vaginitis and vulvovaginitis  Vaginosis Screen by DNA Probe   2. Back pain, unspecified back location, unspecified back pain laterality, unspecified chronicity  POCT URINE DIPSTICK WITHOUT MICROSCOPE    Urine culture   3. Genital herpes simplex, unspecified site           "

## 2019-02-12 ENCOUNTER — PATIENT MESSAGE (OUTPATIENT)
Dept: OBSTETRICS AND GYNECOLOGY | Facility: CLINIC | Age: 49
End: 2019-02-12

## 2019-02-12 RX ORDER — METRONIDAZOLE 500 MG/1
500 TABLET ORAL 2 TIMES DAILY
Qty: 14 TABLET | Refills: 0 | Status: SHIPPED | OUTPATIENT
Start: 2019-02-12 | End: 2019-02-17

## 2019-02-14 ENCOUNTER — PATIENT MESSAGE (OUTPATIENT)
Dept: OBSTETRICS AND GYNECOLOGY | Facility: CLINIC | Age: 49
End: 2019-02-14

## 2019-02-14 LAB — BACTERIA UR CULT: NORMAL

## 2019-02-14 RX ORDER — CIPROFLOXACIN 500 MG/1
500 TABLET ORAL 2 TIMES DAILY
Qty: 14 TABLET | Refills: 0 | Status: SHIPPED | OUTPATIENT
Start: 2019-02-14 | End: 2019-02-21

## 2019-02-19 ENCOUNTER — OFFICE VISIT (OUTPATIENT)
Dept: SURGERY | Facility: CLINIC | Age: 49
End: 2019-02-19
Payer: MEDICAID

## 2019-02-19 VITALS
SYSTOLIC BLOOD PRESSURE: 145 MMHG | HEIGHT: 68 IN | BODY MASS INDEX: 34.85 KG/M2 | WEIGHT: 229.94 LBS | HEART RATE: 73 BPM | DIASTOLIC BLOOD PRESSURE: 91 MMHG

## 2019-02-19 DIAGNOSIS — K64.8 BLEEDING INTERNAL HEMORRHOIDS: Primary | ICD-10-CM

## 2019-02-19 PROCEDURE — 99213 OFFICE O/P EST LOW 20 MIN: CPT | Mod: PBBFAC,25 | Performed by: NURSE PRACTITIONER

## 2019-02-19 PROCEDURE — 99204 OFFICE O/P NEW MOD 45 MIN: CPT | Mod: S$PBB,25,, | Performed by: NURSE PRACTITIONER

## 2019-02-19 PROCEDURE — 46221 PR HEMORRHOIDECTOMY INTERNAL RUBBER BAND LIGATIONS: ICD-10-PCS | Mod: S$PBB,,, | Performed by: NURSE PRACTITIONER

## 2019-02-19 PROCEDURE — 99204 PR OFFICE/OUTPT VISIT, NEW, LEVL IV, 45-59 MIN: ICD-10-PCS | Mod: S$PBB,25,, | Performed by: NURSE PRACTITIONER

## 2019-02-19 PROCEDURE — 46221 LIGATION OF HEMORRHOID(S): CPT | Mod: PBBFAC | Performed by: NURSE PRACTITIONER

## 2019-02-19 PROCEDURE — 46221 LIGATION OF HEMORRHOID(S): CPT | Mod: S$PBB,,, | Performed by: NURSE PRACTITIONER

## 2019-02-19 PROCEDURE — 99999 PR PBB SHADOW E&M-EST. PATIENT-LVL III: CPT | Mod: PBBFAC,,, | Performed by: NURSE PRACTITIONER

## 2019-02-19 PROCEDURE — 99999 PR PBB SHADOW E&M-EST. PATIENT-LVL III: ICD-10-PCS | Mod: PBBFAC,,, | Performed by: NURSE PRACTITIONER

## 2019-02-19 NOTE — LETTER
February 20, 2019      Catrachita Kamara MD  [address]  [address]  [place] [address]           [facility]  [address]  [facility] [address]  Phone: [phone]          Patient: Arpita Bradshaw   MR Number: [id number]   YOB: 1970   Date of Visit: 2/19/2019       Dear Dr. Catrachita Kamara:    Thank you for referring Arpita Bradhsaw to me for evaluation. Attached you will find relevant portions of my assessment and plan of care.    If you have questions, please do not hesitate to call me. I look forward to following Arpita Bradshaw along with you.    Sincerely,    Denisa Tompkins, JERRY    Enclosure  CC:  No Recipients    If you would like to receive this communication electronically, please contact [email or web address] or [phone] to request more information on [email or web address] Link access.    For providers and/or their staff who would like to refer a patient to [facility], please contact us through our one-stop-shop provider referral line, [email or web address], at [phone].    If you feel you have received this communication in error or would no longer like to receive these types of communications, please e-mail [email or web address]

## 2019-02-19 NOTE — PROGRESS NOTES
"Subjective:       Patient ID: Arpita Bradshaw is a 48 y.o. female.    Chief Complaint: No chief complaint on file.    HPI   48 F who presents to clinic for rectal bleeding with bowel movements. She also feels occassionally prolapse of tissue with bowel movements that can be uncomfortable, resolves spontaneously.     She is taking the Linzess 72 mcg daily and is having 1 BM a day that is soft and without urgency or diarrhea.  Despite this she had sudden onset of rectal bleeding that scared her so she went to the ED.  She was told she had external hemorrhoids and was given a topical agent, but she states that the ED physician told her she might need "banding."  She has not had change in bowel habits with this bleeding and otherwise feels good.  She has h/o external hemorrhoidal surgery in the remote past.       Hemorrhoidectomy several years ago at [facility]  Colonoscopy  11/2018  The perianal and digital rectal examinations were normal.       The colon (entire examined portion) appeared normal.       Internal hemorrhoids were found during retroflexion. The hemorrhoids        were medium-sized and Grade I (internal hemorrhoids that do not        prolapse).    Review of Systems   Constitutional: Negative for fatigue, fever and unexpected weight change.   Respiratory: Negative for shortness of breath.    Cardiovascular: Negative for chest pain.   Gastrointestinal: Positive for anal bleeding and blood in stool. Negative for abdominal distention, abdominal pain, constipation, diarrhea, nausea, rectal pain and vomiting.       Objective:      Physical Exam   Constitutional: She is oriented to person, place, and time. She appears well-developed and well-nourished. No distress.   Eyes: Conjunctivae and EOM are normal.   Pulmonary/Chest: Effort normal. No respiratory distress.   Abdominal: Soft. She exhibits no distension. There is no tenderness.   Genitourinary:   Genitourinary Comments: Normal perianal skin. " eversion of anus revealed no abnormality or fissure, RAMA revealed no masses, blood or stool in vault, normal sphincter tone, anoscopy revealed grade I internal hemorrhoids with no bleeding or stigmata of same, left lateral with stigmata of prolapse      Musculoskeletal: Normal range of motion.   Neurological: She is alert and oriented to person, place, and time.   Skin: Skin is warm and dry.   Psychiatric: She has a normal mood and affect. Her behavior is normal.       Assessment:       1. Bleeding internal hemorrhoids        Plan:         rtc 6 weeks   Hemorrhoid Rubber Band Ligation Procedure Note     Verbal consent obtained for anoscopy and rubber band ligation     Indications: The patient had a history of bleeding and prolapsing internal hemorrhoids.     Pre-operative Diagnosis: Internal hemorrhoids with bleeding and prolapse      Post-operative Diagnosis: Internal hemorrhoids with bleeding and prolapse     Surgeon:  Saida          Findings at anoscopy:   Left lateral internal hemorrhoid: banded   Procedure Details   After discussion and acceptance of all risks, benefits and alternatives digital rectal exam was performed. I then placed a lubricated anoscope into the anal canal. Suction band ligation was then performed of the internal hemorrhoids in the left    Complications:  None; patient tolerated the procedure well.      Disposition: Discharge from office to home      Condition: stable

## 2019-03-11 ENCOUNTER — PATIENT MESSAGE (OUTPATIENT)
Dept: OBSTETRICS AND GYNECOLOGY | Facility: CLINIC | Age: 49
End: 2019-03-11

## 2019-03-12 RX ORDER — METRONIDAZOLE 500 MG/1
500 TABLET ORAL 2 TIMES DAILY
Qty: 14 TABLET | Refills: 0 | Status: SHIPPED | OUTPATIENT
Start: 2019-03-12 | End: 2019-03-17

## 2019-04-02 ENCOUNTER — OFFICE VISIT (OUTPATIENT)
Dept: SURGERY | Facility: CLINIC | Age: 49
End: 2019-04-02
Payer: MEDICAID

## 2019-04-02 VITALS
BODY MASS INDEX: 34.95 KG/M2 | SYSTOLIC BLOOD PRESSURE: 145 MMHG | DIASTOLIC BLOOD PRESSURE: 89 MMHG | HEIGHT: 68 IN | HEART RATE: 62 BPM | WEIGHT: 230.63 LBS

## 2019-04-02 DIAGNOSIS — K64.0 GRADE I HEMORRHOIDS: Primary | ICD-10-CM

## 2019-04-02 PROBLEM — K64.8 OTHER HEMORRHOIDS: Status: RESOLVED | Noted: 2019-02-05 | Resolved: 2019-04-02

## 2019-04-02 PROBLEM — K62.5 RECTAL BLEEDING: Status: RESOLVED | Noted: 2019-02-05 | Resolved: 2019-04-02

## 2019-04-02 PROCEDURE — 99213 PR OFFICE/OUTPT VISIT, EST, LEVL III, 20-29 MIN: ICD-10-PCS | Mod: S$PBB,,, | Performed by: NURSE PRACTITIONER

## 2019-04-02 PROCEDURE — 99213 OFFICE O/P EST LOW 20 MIN: CPT | Mod: S$PBB,,, | Performed by: NURSE PRACTITIONER

## 2019-04-02 PROCEDURE — 99999 PR PBB SHADOW E&M-EST. PATIENT-LVL III: ICD-10-PCS | Mod: PBBFAC,,, | Performed by: NURSE PRACTITIONER

## 2019-04-02 PROCEDURE — 99999 PR PBB SHADOW E&M-EST. PATIENT-LVL III: CPT | Mod: PBBFAC,,, | Performed by: NURSE PRACTITIONER

## 2019-04-02 PROCEDURE — 99213 OFFICE O/P EST LOW 20 MIN: CPT | Mod: PBBFAC | Performed by: NURSE PRACTITIONER

## 2019-04-02 RX ORDER — FOLIC ACID 1 MG/1
TABLET ORAL
Refills: 3 | COMMUNITY
Start: 2019-03-06 | End: 2019-12-02

## 2019-04-02 RX ORDER — HYDROCORTISONE 25 MG/G
CREAM TOPICAL 2 TIMES DAILY
Qty: 20 G | Refills: 0 | Status: SHIPPED | OUTPATIENT
Start: 2019-04-02 | End: 2019-12-02 | Stop reason: ALTCHOICE

## 2019-04-02 RX ORDER — METHOTREXATE 2.5 MG/1
TABLET ORAL
Refills: 3 | COMMUNITY
Start: 2019-03-06 | End: 2019-12-02

## 2019-04-02 NOTE — PROGRESS NOTES
"Subjective:       Patient ID: Arpita Bradshaw is a 48 y.o. female.    Chief Complaint: No chief complaint on file.    HPI   48 F who presents to clinic for rectal bleeding with bowel movements. She also feels occassionally prolapse of tissue with bowel movements that can be uncomfortable, resolves spontaneously.     She is taking the Linzess 72 mcg daily and is having 1 BM a day that is soft and without urgency or diarrhea.  Despite this she had sudden onset of rectal bleeding that scared her so she went to the ED.  She was told she had external hemorrhoids and was given a topical agent, but she states that the ED physician told her she might need "banding."  She has not had change in bowel habits with this bleeding and otherwise feels good.  She has h/o external hemorrhoidal surgery in the remote past.       Hemorrhoidectomy several years ago at [facility]  Colonoscopy  11/2018  The perianal and digital rectal examinations were normal.       The colon (entire examined portion) appeared normal.       Internal hemorrhoids were found during retroflexion. The hemorrhoids        were medium-sized and Grade I (internal hemorrhoids that do not        prolapse).      Interval HX 4/2/2019  S/p RBL. Bleeding resolved. Still having occasional prolapse. Pleased with results.     Review of Systems   Constitutional: Negative for fatigue, fever and unexpected weight change.   Respiratory: Negative for shortness of breath.    Cardiovascular: Negative for chest pain.   Gastrointestinal: Negative for abdominal distention, abdominal pain, anal bleeding, blood in stool, constipation, diarrhea, nausea, rectal pain and vomiting.       Objective:      Physical Exam   Constitutional: She is oriented to person, place, and time. She appears well-developed and well-nourished. No distress.   Eyes: Conjunctivae and EOM are normal.   Pulmonary/Chest: Effort normal. No respiratory distress.   Abdominal: Soft. She exhibits no " distension. There is no tenderness.   Genitourinary:   Genitourinary Comments:      Musculoskeletal: Normal range of motion.   Neurological: She is alert and oriented to person, place, and time.   Skin: Skin is warm and dry.   Psychiatric: She has a normal mood and affect. Her behavior is normal.       Assessment:       1. Grade I hemorrhoids        Plan:       continue high fiber diet and linzess  rtc as needed

## 2019-04-04 ENCOUNTER — PATIENT MESSAGE (OUTPATIENT)
Dept: OBSTETRICS AND GYNECOLOGY | Facility: CLINIC | Age: 49
End: 2019-04-04

## 2019-05-13 ENCOUNTER — TELEPHONE (OUTPATIENT)
Dept: UROLOGY | Facility: CLINIC | Age: 49
End: 2019-05-13

## 2019-05-13 NOTE — TELEPHONE ENCOUNTER
----- Message from Alia Branch sent at 5/13/2019  9:46 AM CDT -----  Contact: [phone]/SELF  Patient requesting to speak with you regarding removing a neuro stimulator. Please advise.

## 2019-05-20 DIAGNOSIS — M79.7 FIBROMYALGIA: Primary | ICD-10-CM

## 2019-05-20 DIAGNOSIS — M54.50 ACUTE LOW BACK PAIN: ICD-10-CM

## 2019-05-22 ENCOUNTER — PATIENT MESSAGE (OUTPATIENT)
Dept: OBSTETRICS AND GYNECOLOGY | Facility: CLINIC | Age: 49
End: 2019-05-22

## 2019-05-31 ENCOUNTER — PATIENT MESSAGE (OUTPATIENT)
Dept: OBSTETRICS AND GYNECOLOGY | Facility: CLINIC | Age: 49
End: 2019-05-31

## 2019-05-31 ENCOUNTER — OFFICE VISIT (OUTPATIENT)
Dept: UROLOGY | Facility: CLINIC | Age: 49
End: 2019-05-31
Payer: MEDICAID

## 2019-05-31 VITALS — HEIGHT: 68 IN | BODY MASS INDEX: 34.86 KG/M2 | WEIGHT: 230 LBS

## 2019-05-31 DIAGNOSIS — N39.41 URGE INCONTINENCE: ICD-10-CM

## 2019-05-31 DIAGNOSIS — N39.0 URINARY TRACT INFECTION WITHOUT HEMATURIA, SITE UNSPECIFIED: Primary | ICD-10-CM

## 2019-05-31 DIAGNOSIS — R35.0 URINARY FREQUENCY: ICD-10-CM

## 2019-05-31 DIAGNOSIS — R30.0 DYSURIA: ICD-10-CM

## 2019-05-31 PROCEDURE — 87186 SC STD MICRODIL/AGAR DIL: CPT

## 2019-05-31 PROCEDURE — 87088 URINE BACTERIA CULTURE: CPT

## 2019-05-31 PROCEDURE — 87077 CULTURE AEROBIC IDENTIFY: CPT

## 2019-05-31 PROCEDURE — 99999 PR PBB SHADOW E&M-EST. PATIENT-LVL III: ICD-10-PCS | Mod: PBBFAC,,, | Performed by: UROLOGY

## 2019-05-31 PROCEDURE — 99215 OFFICE O/P EST HI 40 MIN: CPT | Mod: S$PBB,,, | Performed by: UROLOGY

## 2019-05-31 PROCEDURE — 87086 URINE CULTURE/COLONY COUNT: CPT

## 2019-05-31 PROCEDURE — 99215 PR OFFICE/OUTPT VISIT, EST, LEVL V, 40-54 MIN: ICD-10-PCS | Mod: S$PBB,,, | Performed by: UROLOGY

## 2019-05-31 PROCEDURE — 99213 OFFICE O/P EST LOW 20 MIN: CPT | Mod: PBBFAC,PO | Performed by: UROLOGY

## 2019-05-31 PROCEDURE — 99999 PR PBB SHADOW E&M-EST. PATIENT-LVL III: CPT | Mod: PBBFAC,,, | Performed by: UROLOGY

## 2019-05-31 RX ORDER — FLUCONAZOLE 150 MG/1
TABLET ORAL
Refills: 0 | COMMUNITY
Start: 2019-05-22 | End: 2019-06-03

## 2019-05-31 RX ORDER — SODIUM CHLORIDE 9 MG/ML
INJECTION, SOLUTION INTRAVENOUS CONTINUOUS
Status: CANCELLED | OUTPATIENT
Start: 2019-05-31

## 2019-05-31 RX ORDER — PREDNISONE 5 MG/1
TABLET ORAL
Refills: 0 | COMMUNITY
Start: 2019-05-06 | End: 2019-06-03

## 2019-05-31 RX ORDER — MELOXICAM 7.5 MG/1
TABLET ORAL
Refills: 0 | COMMUNITY
Start: 2019-05-04 | End: 2019-06-03

## 2019-05-31 RX ORDER — LEUCOVORIN CALCIUM 5 MG/1
TABLET ORAL
Refills: 3 | COMMUNITY
Start: 2019-05-15 | End: 2019-06-03

## 2019-05-31 RX ORDER — CYCLOSPORINE 0.5 MG/ML
EMULSION OPHTHALMIC
Refills: 4 | COMMUNITY
Start: 2019-05-09

## 2019-05-31 RX ORDER — CEPHALEXIN 500 MG/1
CAPSULE ORAL
Refills: 0 | COMMUNITY
Start: 2019-05-22 | End: 2019-07-30 | Stop reason: ALTCHOICE

## 2019-05-31 RX ORDER — VALACYCLOVIR HYDROCHLORIDE 500 MG/1
TABLET, FILM COATED ORAL
Refills: 3 | COMMUNITY
Start: 2019-05-01 | End: 2019-06-03

## 2019-05-31 RX ORDER — CEPHALEXIN 500 MG/1
500 CAPSULE ORAL 4 TIMES DAILY
Qty: 40 CAPSULE | Refills: 1 | Status: SHIPPED | OUTPATIENT
Start: 2019-05-31 | End: 2019-06-03 | Stop reason: SDUPTHER

## 2019-05-31 NOTE — PATIENT INSTRUCTIONS
Keflex   4 x daily for 10 days  Schedule cystoscopy and bladder fulguration and retrograde pyelograms 6/6/19  Urine Culture today

## 2019-05-31 NOTE — PROGRESS NOTES
Subjective:       Patient ID: Arpita Bradshaw is a 49 y.o. female.    Chief Complaint: Other (set up surgery)    50 yo AAF with NGB, IC and now recurrent E.Coli UTIs. Here for evaluation, treatment and to discuss removal of Interstim if needed. 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    Urinary Tract Infection    This is a recurrent problem. The current episode started more than 1 year ago. The problem occurs every urination. The problem has been waxing and waning. The quality of the pain is described as burning. The pain is at a severity of 5/10. The pain is moderate. There has been no fever. She is sexually active. There is no history of pyelonephritis. Associated symptoms include frequency, hesitancy and urgency. Pertinent negatives include no behavior changes, chills, discharge, flank pain, hematuria, nausea, possible pregnancy, sweats, vomiting, weight loss, bubble bath use, constipation, rash or withholding. She has tried antibiotics for the symptoms. The treatment provided no relief. Her past medical history is significant for a urological procedure.     Review of Systems   Constitutional: Negative for activity change, appetite change, chills, fatigue, fever and weight loss.   HENT: Negative for congestion, ear pain, hearing loss, nosebleeds, sinus pressure, sore throat and trouble swallowing.    Eyes: Negative for pain and visual disturbance.   Respiratory: Negative for apnea, cough and shortness of breath.    Cardiovascular: Negative for chest pain and leg swelling.   Gastrointestinal: Negative for abdominal distention, abdominal pain, anal bleeding, blood in stool, constipation, diarrhea, nausea, rectal pain and vomiting.   Endocrine: Negative for cold intolerance, heat intolerance, polydipsia, polyphagia and polyuria.   Genitourinary: Positive for frequency, hesitancy and urgency. Negative for decreased urine volume, difficulty urinating, dyspareunia, dysuria, enuresis,  flank pain, genital sores, hematuria, menstrual problem, pelvic pain, vaginal bleeding, vaginal discharge and vaginal pain.   Musculoskeletal: Negative for arthralgias and back pain.   Skin: Negative for color change, pallor and rash.   Allergic/Immunologic: Negative for environmental allergies, food allergies and immunocompromised state.   Neurological: Negative for dizziness, speech difficulty, weakness and headaches.   Hematological: Negative for adenopathy. Does not bruise/bleed easily.   Psychiatric/Behavioral: Negative.        Objective:      Physical Exam   Nursing note and vitals reviewed.  Constitutional: She is oriented to person, place, and time. She appears well-developed and well-nourished.   HENT:   Head: Normocephalic.   Nose: Nose normal.   Mouth/Throat: Oropharynx is clear and moist.   Eyes: Conjunctivae and EOM are normal. Pupils are equal, round, and reactive to light.   Neck: Normal range of motion. Neck supple.   Cardiovascular: Normal rate, regular rhythm, normal heart sounds and intact distal pulses.    Pulmonary/Chest: Effort normal and breath sounds normal.   Abdominal: Soft. Bowel sounds are normal.   Genitourinary: Vagina normal.   Genitourinary Comments: Bladder tender   Musculoskeletal: Normal range of motion.   Neurological: She is alert and oriented to person, place, and time. She has normal reflexes.   Skin: Skin is warm and dry.     Psychiatric: She has a normal mood and affect. Her behavior is normal. Judgment and thought content normal.       Assessment:       1. Urinary tract infection without hematuria, site unspecified    2. Urinary frequency    3. Urge incontinence    4. Dysuria        Plan:       Patient Instructions   Keflex   4 x daily for 10 days  Schedule cystoscopy and bladder fulguration and retrograde pyelograms 6/6/19  Urine Culture today

## 2019-06-03 ENCOUNTER — OFFICE VISIT (OUTPATIENT)
Dept: OBSTETRICS AND GYNECOLOGY | Facility: CLINIC | Age: 49
End: 2019-06-03
Attending: OBSTETRICS & GYNECOLOGY
Payer: MEDICAID

## 2019-06-03 VITALS
SYSTOLIC BLOOD PRESSURE: 112 MMHG | HEIGHT: 68 IN | WEIGHT: 226.63 LBS | BODY MASS INDEX: 34.35 KG/M2 | DIASTOLIC BLOOD PRESSURE: 68 MMHG

## 2019-06-03 DIAGNOSIS — N76.0 VAGINITIS AND VULVOVAGINITIS: ICD-10-CM

## 2019-06-03 DIAGNOSIS — N64.4 BREAST PAIN, LEFT: Primary | ICD-10-CM

## 2019-06-03 PROCEDURE — 99213 PR OFFICE/OUTPT VISIT, EST, LEVL III, 20-29 MIN: ICD-10-PCS | Mod: S$PBB,,, | Performed by: OBSTETRICS & GYNECOLOGY

## 2019-06-03 PROCEDURE — 99999 PR PBB SHADOW E&M-EST. PATIENT-LVL III: ICD-10-PCS | Mod: PBBFAC,,, | Performed by: OBSTETRICS & GYNECOLOGY

## 2019-06-03 PROCEDURE — 99213 OFFICE O/P EST LOW 20 MIN: CPT | Mod: PBBFAC | Performed by: OBSTETRICS & GYNECOLOGY

## 2019-06-03 PROCEDURE — 87510 GARDNER VAG DNA DIR PROBE: CPT

## 2019-06-03 PROCEDURE — 99999 PR PBB SHADOW E&M-EST. PATIENT-LVL III: CPT | Mod: PBBFAC,,, | Performed by: OBSTETRICS & GYNECOLOGY

## 2019-06-03 PROCEDURE — 87480 CANDIDA DNA DIR PROBE: CPT

## 2019-06-03 PROCEDURE — 99213 OFFICE O/P EST LOW 20 MIN: CPT | Mod: S$PBB,,, | Performed by: OBSTETRICS & GYNECOLOGY

## 2019-06-03 NOTE — PROGRESS NOTES
HISTORY OF PRESENT ILLNESS:    Arpita Bradshaw is a 49 y.o. female, , No LMP recorded. Patient has had a hysterectomy.,  presents for a problem visit, complaining of possible vaginal infection.  Currently on keflex for UTI.   Followed by urologist - has cysto scheduled.    Left breast pain x 2 weeks.  Worse in axilla with movement.        Past Medical History:   Diagnosis Date    Fibromyalgia     IC (interstitial cystitis)     Lupus     STD (sexually transmitted disease)     Urinary tract infection     Vaginal infection        Past Surgical History:   Procedure Laterality Date    BREAST REDUCTION       SECTION      COLONOSCOPY N/A 2018    Per[facility] at [facility]    CYSTOSCOPY N/A 2018    Performed by Harris Lua [facility] at [facility]    CYSTOSCOPY WITH HYDRODISTENSION N/A 2016    Performed by [facility] at [facility]    hemmorroidectomy      HYSTERECTOMY      WILLIAM for endometriosis    INJECTION, BOTULINUM TOXIN, TYPE A N/A 2018    [facility] at [facility]    neurostimulator for bladder         MEDICATIONS AND ALLERGIES:      Current Outpatient Medications:     cephALEXin (KEFLEX) 500 MG capsule, TK 1 C PO BID FOR 5 DAYS, Disp: , Rfl: 0    folic acid (FOLVITE) 1 MG tablet, TK 1 T PO QD, Disp: , Rfl: 3    methotrexate 2.5 MG Tab, TK 6 TABLETS PO WEEKLY, Disp: , Rfl: 3    RESTASIS 0.05 % ophthalmic emulsion, INT 1 GTT IN OU BID, Disp: , Rfl: 4    hydrocortisone 2.5 % cream, Apply topically 2 (two) times daily. for 10 days, Disp: 20 g, Rfl: 0    Review of patient's allergies indicates:   Allergen Reactions    Bactrim [sulfamethoxazole-trimethoprim] Rash       Family History   Problem Relation Age of Onset    Hypertension Mother     Cancer Mother         KIDNEY    Arthritis Mother     Kidney disease Neg Hx        Social History     Socioeconomic History    Marital status: Single     Spouse name: Not on file  "   Number of children: Not on file    Years of education: Not on file    Highest education level: Not on file   Occupational History    Not on file   Social Needs    Financial resource strain: Not on file    Food insecurity:     Worry: Not on file     Inability: Not on file    Transportation needs:     Medical: Not on file     Non-medical: Not on file   Tobacco Use    Smoking status: Never Smoker    Smokeless tobacco: Never Used   Substance and Sexual Activity    Alcohol use: No    Drug use: No    Sexual activity: Yes     Partners: Male     Birth control/protection: Surgical, Condom   Lifestyle    Physical activity:     Days per week: Not on file     Minutes per session: Not on file    Stress: Not on file   Relationships    Social connections:     Talks on phone: Not on file     Gets together: Not on file     Attends [employer] service: Not on file     Active member of club or organization: Not on file     Attends meetings of clubs or organizations: Not on file     Relationship status: Not on file   Other Topics Concern    Not on file   Social History Narrative    Not on file       ROS:  GENERAL: No weight changes. No swelling. No fatigue. No fever.  CARDIOVASCULAR: No chest pain. No shortness of breath. No leg cramps.   NEUROLOGICAL: No headaches. No vision changes.  BREASTS: see above  ABDOMEN: No pain. No nausea. No vomiting. No diarrhea. No constipation.  REPRODUCTIVE: No abnormal bleeding.   VULVA: No pain. No lesions. No itching.  VAGINA: No relaxation. No itching. No odor. No discharge. No lesions.  URINARY: No incontinence. No nocturia. No frequency. No dysuria.    /68   Ht 5' 8" (1.727 m)   Wt 102.8 kg (226 lb 10.1 oz)   BMI 34.46 kg/m²     PE:  APPEARANCE: Well nourished, well developed, in no acute distress  BREASTS:  No masses, nipple discharge or skin changes bilaterally.  No axillary LAD  PELVIC: External female genitalia without lesions.  Female hair distribution. Adequate " perineal body, Normal urethral meatus. Vagina moist and well rugated without lesions or discharge.  No significant cystocele or rectocele present. Cervix pink without lesions, discharge or tenderness. Uterus is normal size, regular, mobile and nontender. Adnexa without masses or tenderness.  EXTREMITIES: No edema      DIAGNOSIS & PLAN  1. Breast pain, left  Mammo Digital Diagnostic Bilat with Tai   2. Vaginitis and vulvovaginitis  Vaginosis Screen by DNA Probe

## 2019-06-04 ENCOUNTER — LAB VISIT (OUTPATIENT)
Dept: LAB | Facility: HOSPITAL | Age: 49
End: 2019-06-04
Attending: UROLOGY
Payer: MEDICAID

## 2019-06-04 DIAGNOSIS — N39.0 URINARY TRACT INFECTION WITHOUT HEMATURIA, SITE UNSPECIFIED: ICD-10-CM

## 2019-06-04 LAB
ANION GAP SERPL CALC-SCNC: 5 MMOL/L (ref 8–16)
BACTERIA UR CULT: NORMAL
BACTERIAL VAGINOSIS DNA: NEGATIVE
BASOPHILS # BLD AUTO: 0.01 K/UL (ref 0–0.2)
BASOPHILS NFR BLD: 0.3 % (ref 0–1.9)
BUN SERPL-MCNC: 14 MG/DL (ref 6–20)
CALCIUM SERPL-MCNC: 9.3 MG/DL (ref 8.7–10.5)
CANDIDA GLABRATA DNA: NEGATIVE
CANDIDA KRUSEI DNA: NEGATIVE
CANDIDA RRNA VAG QL PROBE: NEGATIVE
CHLORIDE SERPL-SCNC: 108 MMOL/L (ref 95–110)
CO2 SERPL-SCNC: 25 MMOL/L (ref 23–29)
CREAT SERPL-MCNC: 0.8 MG/DL (ref 0.5–1.4)
DIFFERENTIAL METHOD: ABNORMAL
EOSINOPHIL # BLD AUTO: 0 K/UL (ref 0–0.5)
EOSINOPHIL NFR BLD: 0.3 % (ref 0–8)
ERYTHROCYTE [DISTWIDTH] IN BLOOD BY AUTOMATED COUNT: 13.2 % (ref 11.5–14.5)
EST. GFR  (AFRICAN AMERICAN): >60 ML/MIN/1.73 M^2
EST. GFR  (NON AFRICAN AMERICAN): >60 ML/MIN/1.73 M^2
GLUCOSE SERPL-MCNC: 70 MG/DL (ref 70–110)
HCT VFR BLD AUTO: 36.3 % (ref 37–48.5)
HGB BLD-MCNC: 11.8 G/DL (ref 12–16)
IMM GRANULOCYTES # BLD AUTO: 0.01 K/UL (ref 0–0.04)
IMM GRANULOCYTES NFR BLD AUTO: 0.3 % (ref 0–0.5)
LYMPHOCYTES # BLD AUTO: 1.1 K/UL (ref 1–4.8)
LYMPHOCYTES NFR BLD: 34 % (ref 18–48)
MCH RBC QN AUTO: 29.4 PG (ref 27–31)
MCHC RBC AUTO-ENTMCNC: 32.5 G/DL (ref 32–36)
MCV RBC AUTO: 91 FL (ref 82–98)
MONOCYTES # BLD AUTO: 0.2 K/UL (ref 0.3–1)
MONOCYTES NFR BLD: 7.6 % (ref 4–15)
NEUTROPHILS # BLD AUTO: 1.8 K/UL (ref 1.8–7.7)
NEUTROPHILS NFR BLD: 57.5 % (ref 38–73)
NRBC BLD-RTO: 0 /100 WBC
PLATELET # BLD AUTO: 210 K/UL (ref 150–350)
PMV BLD AUTO: 10.5 FL (ref 9.2–12.9)
POTASSIUM SERPL-SCNC: 3.6 MMOL/L (ref 3.5–5.1)
RBC # BLD AUTO: 4.01 M/UL (ref 4–5.4)
SODIUM SERPL-SCNC: 138 MMOL/L (ref 136–145)
T VAGINALIS RRNA GENITAL QL PROBE: NEGATIVE
WBC # BLD AUTO: 3.15 K/UL (ref 3.9–12.7)

## 2019-06-04 PROCEDURE — 36415 COLL VENOUS BLD VENIPUNCTURE: CPT | Mod: PO

## 2019-06-04 PROCEDURE — 80048 BASIC METABOLIC PNL TOTAL CA: CPT

## 2019-06-04 PROCEDURE — 85025 COMPLETE CBC W/AUTO DIFF WBC: CPT

## 2019-06-05 ENCOUNTER — HOSPITAL ENCOUNTER (OUTPATIENT)
Dept: RADIOLOGY | Facility: HOSPITAL | Age: 49
Discharge: HOME | End: 2019-06-05
Attending: OBSTETRICS & GYNECOLOGY
Payer: MEDICAID

## 2019-06-05 VITALS — WEIGHT: 224 LBS | HEIGHT: 68 IN | BODY MASS INDEX: 33.95 KG/M2

## 2019-06-05 DIAGNOSIS — N64.4 BREAST PAIN, LEFT: ICD-10-CM

## 2019-06-05 PROCEDURE — 76642 ULTRASOUND BREAST LIMITED: CPT | Mod: 26,LT,, | Performed by: RADIOLOGY

## 2019-06-05 PROCEDURE — 76642 US BREAST LEFT LIMITED: ICD-10-PCS | Mod: 26,LT,, | Performed by: RADIOLOGY

## 2019-06-05 PROCEDURE — 77062 MAMMO DIGITAL DIAGNOSTIC BILAT WITH TOMOSYNTHESIS_CAD: ICD-10-PCS | Mod: 26,,, | Performed by: RADIOLOGY

## 2019-06-05 PROCEDURE — 77066 DX MAMMO INCL CAD BI: CPT | Mod: TC

## 2019-06-05 PROCEDURE — 77066 DX MAMMO INCL CAD BI: CPT | Mod: 26,,, | Performed by: RADIOLOGY

## 2019-06-05 PROCEDURE — 76642 ULTRASOUND BREAST LIMITED: CPT | Mod: TC,LT

## 2019-06-05 PROCEDURE — 77066 MAMMO DIGITAL DIAGNOSTIC BILAT WITH TOMOSYNTHESIS_CAD: ICD-10-PCS | Mod: 26,,, | Performed by: RADIOLOGY

## 2019-06-05 PROCEDURE — 77062 BREAST TOMOSYNTHESIS BI: CPT | Mod: 26,,, | Performed by: RADIOLOGY

## 2019-06-06 PROBLEM — N39.0 URINARY TRACT INFECTION WITHOUT HEMATURIA: Status: ACTIVE | Noted: 2019-06-06

## 2019-06-10 ENCOUNTER — TELEPHONE (OUTPATIENT)
Dept: UROLOGY | Facility: CLINIC | Age: 49
End: 2019-06-10

## 2019-06-10 NOTE — TELEPHONE ENCOUNTER
----- Message from Haroon Lovelace sent at 6/10/2019  9:10 AM CDT -----  Contact: [phone]  Type:  Same Day Appointment Request    Caller is requesting a same day appointment.  Caller declined first available appointment listed below.      Name of Caller: Arpita    When is the first available appointment? 11/22/19    Symptoms: Complications    Best Call Back Number: [phone]    Additional Information: pt advised having complications form the outpatient surgery

## 2019-06-11 ENCOUNTER — OFFICE VISIT (OUTPATIENT)
Dept: UROLOGY | Facility: CLINIC | Age: 49
End: 2019-06-11
Payer: MEDICAID

## 2019-06-11 ENCOUNTER — TELEPHONE (OUTPATIENT)
Dept: UROLOGY | Facility: CLINIC | Age: 49
End: 2019-06-11

## 2019-06-11 VITALS — WEIGHT: 233 LBS | HEIGHT: 68 IN | BODY MASS INDEX: 35.31 KG/M2

## 2019-06-11 DIAGNOSIS — R10.9 BILATERAL FLANK PAIN: ICD-10-CM

## 2019-06-11 DIAGNOSIS — R35.1 NOCTURIA: ICD-10-CM

## 2019-06-11 DIAGNOSIS — R33.9 INCOMPLETE BLADDER EMPTYING: ICD-10-CM

## 2019-06-11 DIAGNOSIS — R30.0 DYSURIA: ICD-10-CM

## 2019-06-11 DIAGNOSIS — R35.0 URINARY FREQUENCY: ICD-10-CM

## 2019-06-11 DIAGNOSIS — N31.9 NEUROGENIC URINARY BLADDER DISORDER: ICD-10-CM

## 2019-06-11 DIAGNOSIS — N39.3 SUI (STRESS URINARY INCONTINENCE, FEMALE): ICD-10-CM

## 2019-06-11 DIAGNOSIS — R10.2 PELVIC PAIN IN FEMALE: Primary | ICD-10-CM

## 2019-06-11 DIAGNOSIS — N30.11 INTERSTITIAL CYSTITIS (CHRONIC) WITH HEMATURIA: ICD-10-CM

## 2019-06-11 LAB
BACTERIA #/AREA URNS AUTO: ABNORMAL /HPF
BILIRUB UR QL STRIP: NEGATIVE
CLARITY UR REFRACT.AUTO: ABNORMAL
COLOR UR AUTO: ABNORMAL
GLUCOSE UR QL STRIP: NEGATIVE
HGB UR QL STRIP: ABNORMAL
HYALINE CASTS UR QL AUTO: 0 /LPF
KETONES UR QL STRIP: NEGATIVE
LEUKOCYTE ESTERASE UR QL STRIP: ABNORMAL
MICROSCOPIC COMMENT: ABNORMAL
NITRITE UR QL STRIP: POSITIVE
PH UR STRIP: 5 [PH] (ref 5–8)
PROT UR QL STRIP: ABNORMAL
RBC #/AREA URNS AUTO: >100 /HPF (ref 0–4)
SP GR UR STRIP: 1.02 (ref 1–1.03)
SQUAMOUS #/AREA URNS AUTO: 18 /HPF
URN SPEC COLLECT METH UR: ABNORMAL
WBC #/AREA URNS AUTO: >100 /HPF (ref 0–5)

## 2019-06-11 PROCEDURE — 99999 PR PBB SHADOW E&M-EST. PATIENT-LVL III: CPT | Mod: PBBFAC,,, | Performed by: UROLOGY

## 2019-06-11 PROCEDURE — 99214 PR OFFICE/OUTPT VISIT, EST, LEVL IV, 30-39 MIN: ICD-10-PCS | Mod: S$PBB,,, | Performed by: UROLOGY

## 2019-06-11 PROCEDURE — 81001 URINALYSIS AUTO W/SCOPE: CPT

## 2019-06-11 PROCEDURE — 87086 URINE CULTURE/COLONY COUNT: CPT

## 2019-06-11 PROCEDURE — 99213 OFFICE O/P EST LOW 20 MIN: CPT | Mod: PBBFAC,PO | Performed by: UROLOGY

## 2019-06-11 PROCEDURE — 99999 PR PBB SHADOW E&M-EST. PATIENT-LVL III: ICD-10-PCS | Mod: PBBFAC,,, | Performed by: UROLOGY

## 2019-06-11 PROCEDURE — 99214 OFFICE O/P EST MOD 30 MIN: CPT | Mod: S$PBB,,, | Performed by: UROLOGY

## 2019-06-11 RX ORDER — AMOXICILLIN AND CLAVULANATE POTASSIUM 500; 125 MG/1; MG/1
1 TABLET, FILM COATED ORAL 3 TIMES DAILY
Qty: 42 TABLET | Refills: 0 | Status: SHIPPED | OUTPATIENT
Start: 2019-06-11 | End: 2019-06-25

## 2019-06-11 RX ORDER — FLUCONAZOLE 200 MG/1
200 TABLET ORAL DAILY
Qty: 5 TABLET | Refills: 1 | Status: SHIPPED | OUTPATIENT
Start: 2019-06-11 | End: 2019-07-11

## 2019-06-11 NOTE — PROGRESS NOTES
Subjective:       Patient ID: Arpita Bradshaw is a 49 y.o. female.    Chief Complaint: Urinary Tract Infection    50 yo AAF presents 5 days after cystoscopy and bladder fulguration. With pelvic pain, dysuria, and bilateral flank pain. Treated at home with Cipro with no improvement. Urine appears infected and PVR iis 3oo cc, Here for evaluation and treatment.    Urinary Tract Infection    This is a recurrent problem. The current episode started in the past 7 days. The problem occurs every urination. The problem has been gradually worsening. The quality of the pain is described as aching, burning, stabbing and shooting. The pain is at a severity of 7/10. The pain is moderate. There has been no fever. She is sexually active. There is no history of pyelonephritis. Associated symptoms include frequency (x 6, nocturia x 6 x), urgency and constipation. Pertinent negatives include no behavior changes, chills, discharge, flank pain, hematuria, hesitancy, nausea, possible pregnancy, sweats, vomiting, weight loss, bubble bath use, rash or withholding. She has tried antibiotics for the symptoms. The treatment provided no relief. Her past medical history is significant for recurrent UTIs, urinary stasis and a urological procedure. There is no history of catheterization, diabetes insipidus, diabetes mellitus, genitourinary reflux, hypertension, kidney stones, a single kidney or STD.     Review of Systems   Constitutional: Negative for activity change, appetite change, chills, fatigue, fever and weight loss.   HENT: Negative for congestion, ear pain, hearing loss, nosebleeds, sinus pressure, sore throat and trouble swallowing.    Eyes: Negative for pain and visual disturbance.   Respiratory: Negative for apnea, cough and shortness of breath.    Cardiovascular: Negative for chest pain and leg swelling.   Gastrointestinal: Positive for constipation. Negative for abdominal distention, abdominal pain, anal bleeding, blood in stool,  diarrhea, nausea, rectal pain and vomiting.   Endocrine: Negative for cold intolerance, heat intolerance, polydipsia, polyphagia and polyuria.   Genitourinary: Positive for frequency (x 6, nocturia x 6 x) and urgency. Negative for decreased urine volume, difficulty urinating, dyspareunia, dysuria, enuresis, flank pain, genital sores, hematuria, hesitancy, menstrual problem, pelvic pain, vaginal bleeding, vaginal discharge and vaginal pain.   Musculoskeletal: Negative for arthralgias and back pain.   Skin: Negative for color change, pallor and rash.   Allergic/Immunologic: Negative for environmental allergies, food allergies and immunocompromised state.   Neurological: Negative for dizziness, speech difficulty, weakness and headaches.   Hematological: Negative for adenopathy. Does not bruise/bleed easily.   Psychiatric/Behavioral: Negative.        Objective:      Physical Exam   Nursing note and vitals reviewed.  Constitutional: She is oriented to person, place, and time. She appears well-developed and well-nourished.   HENT:   Head: Normocephalic.   Nose: Nose normal.   Mouth/Throat: Oropharynx is clear and moist.   Eyes: Conjunctivae and EOM are normal. Pupils are equal, round, and reactive to light.   Neck: Normal range of motion. Neck supple.   Cardiovascular: Normal rate, regular rhythm, normal heart sounds and intact distal pulses.    Pulmonary/Chest: Effort normal and breath sounds normal.   Abdominal: Soft. Bowel sounds are normal.   Musculoskeletal: Normal range of motion.   Neurological: She is alert and oriented to person, place, and time. She has normal reflexes.   Skin: Skin is warm and dry.     Psychiatric: She has a normal mood and affect. Her behavior is normal. Judgment and thought content normal.       Assessment:       1. Pelvic pain in female    2. Bilateral flank pain    3. Nocturia    4. Urinary frequency    5. SHEYLA (stress urinary incontinence, female)    6. Neurogenic urinary bladder disorder     7. Incomplete bladder emptying    8. Dysuria    9. Interstitial cystitis (chronic) with hematuria        Plan:       Patient Instructions   Augmentin 3 times daily x 2 weeks  Diflucan 200 mg daily as needed for yeast infection  Urine culture  F/U 3 weeks  Linzess for constipation  Uribel every 6 hours for dysuria.

## 2019-06-11 NOTE — TELEPHONE ENCOUNTER
----- Message from Kyler Martin sent at 6/11/2019  9:07 AM CDT -----  Contact: [phone]/self  Patient requesting to be seen today for complications after surgery.  Surgery date was 6/6/2019.   Please call and advise

## 2019-06-11 NOTE — PATIENT INSTRUCTIONS
Augmentin 3 times daily x 2 weeks  Diflucan 200 mg daily as needed for yeast infection  Urine culture  F/U 3 weeks  Linzess for constipation  Uribel every 6 hours for dysuria.

## 2019-06-12 ENCOUNTER — PATIENT MESSAGE (OUTPATIENT)
Dept: OBSTETRICS AND GYNECOLOGY | Facility: CLINIC | Age: 49
End: 2019-06-12

## 2019-06-12 ENCOUNTER — CLINICAL SUPPORT (OUTPATIENT)
Dept: REHABILITATION | Facility: HOSPITAL | Age: 49
End: 2019-06-12
Payer: MEDICAID

## 2019-06-12 DIAGNOSIS — M79.7 FIBROMYALGIA: ICD-10-CM

## 2019-06-12 DIAGNOSIS — M54.31 SCIATICA OF RIGHT SIDE: ICD-10-CM

## 2019-06-12 DIAGNOSIS — Z74.09 IMPAIRED FUNCTIONAL MOBILITY AND ACTIVITY TOLERANCE: ICD-10-CM

## 2019-06-12 PROCEDURE — 97161 PT EVAL LOW COMPLEX 20 MIN: CPT | Mod: PN

## 2019-06-12 NOTE — PLAN OF CARE
Physical Therapy Initial Evaluation     Name: Arpita Bradshaw  Clinic Number: [id number]    Therapy Diagnosis:   Encounter Diagnoses   Name Primary?    Sciatica of right side     Impaired functional mobility and activity tolerance      Physician: Chadd Langford MD    Physician Orders: PT Eval and Treat   Medical Diagnosis from Referral:   M79.7 (ICD-10-CM) - Fibromyalgia   M54.5 (ICD-10-CM) - Low back pain       Evaluation Date: 2019  Authorization Period Expiration: 2019  Plan of Care Expiration: 2019  Visit # / Visits authorized:     Time In: 1200  Time Out: 1245  Total Billable Time: 45 minutes    Precautions: Standard    Subjective     Date of onset: ~2 months ago  History of current condition - Arpita reports: Sciatic pain beginning a few months ago, beginning her her R buttock radiating down into her foot now.   Pain has not been changing significantly.  Also states she experiences paresthesia down R LE.  Difficulty with all positioning, ADLs, and household chores.  PMH of fibromyalgia and recent bladder procedure performed (2019)  Medical History:   Past Medical History:   Diagnosis Date    Fibromyalgia     IC (interstitial cystitis)     Lupus     STD (sexually transmitted disease)     Urinary tract infection     Vaginal infection        Surgical History:   Arpita Bradshaw  has a past surgical history that includes  section; BREAST REDUCTION; Hysterectomy (); neurostimulator for bladder; hemmorroidectomy; Cystoscopy (N/A, 2018); Injection of botulinum toxin type A (N/A, 2018); Colonoscopy (N/A, 2018); Total Reduction Mammoplasty; Bladder fulguration (N/A, 2019); and Cystoscopy w/ retrogrades (Bilateral, 2019).    Medications:   Arpita has a current medication list which includes the following prescription(s): acetaminophen, amoxicillin-clavulanate 500-125mg, cephalexin,  ciprofloxacin hcl, fluconazole, folic acid, hydrocodone-acetaminophen, hydrocortisone, hydroxychloroquine, linaclotide, kazojk-tiqgk-j.blue-sal-naphos, methotrexate, phenazopyridine, restasis, and tamsulosin.    Allergies:   Review of patient's allergies indicates:   Allergen Reactions    Bactrim [sulfamethoxazole-trimethoprim] Rash        Imaging; XR out of system.  Results unknown at time of evaluation.      Prior Therapy: None   Social History:  lives with their daughter  Occupation: None  Prior Level of Function: No significant limitations  Bowel/Bladder Change: n/a  DME owned/used: None  Current Level of Function: Difficulty with all positioning, ADLs, and household chores.      Pain:  Current 8/10, worst 10/10, best 5/10   Location: right back , lower legs and upper legs  Description: Aching, Burning, Tingling and Numb  Aggravating Factors: Everything   Easing Factors: nothing    Pts goals: Pt would like to be able to walk her dog and perform household chores.      Objective       DERMATOMES:  Light Touch sensation: Impaired:  R 1st digit reported to be decreased compared to L.  All other dermatomes intact and equal bilaterally.      POSTURAL ALIGNMENT: decreased lordosis    PALPATION: Reported to be TTP throughout lumbar soft tissue structures and gluteal muscles.      LOWER EXTREMITY STRENGTH:   Left Right   Quadriceps 4+/5 4+/5   Hamstrings 4+/5 4+/5     Hip FLX 4+/5 4+/5   Hip ABD 4/5 4/5   Ankle DF 4+/5 4+/5   Ankle PF 4+/5 4+/5       LUMBAR SPINE AROM:   Flexion: Dysfunctional Painful, 75% limited   Extension: Dysfunctional Painful, 75% limited   Left Rotation: Dysfunctional Painful, 50% limited   Right Rotation: Dysfunctional Painful, 50% limited     SEGMENTAL MOBILITY: Unable to formally assess d/t patient's pain reports (fibromyalgia)    FLEXIBILITY: Moderate restriction of hamstrings B, R quadriceps.      SPECIAL TESTS:   Left Right   Slump Neg Neg   SLR Neg Positive     FUNCTIONAL TESTS:  Repeated  EXT in prone:  Produce; No Worse    GAIT: Arpita ambulates with antalgia, favoring R LE.      Pt/family was provided educational information, including: role of PT, goals for PT, scheduling - pt verbalized understanding. Discussed insurance limitations with pt.       Functional Limitations Reports       TREATMENT       Home Exercises and Patient Education Provided    Education provided:   - HEP, POC    Written Home Exercises Provided: yes.  Exercises were reviewed and Arpita was able to demonstrate them prior to the end of the session.  Arpita demonstrated good  understanding of the education provided.     See EMR under Patient Instructions for exercises provided 6/12/2019.    Assessment     Arpita is a 49 y.o. female referred to outpatient Physical Therapy with a medical diagnosis of low back pain and fibromyalgia. She presents with the following signs and symptoms: increased low back pain and R LE pain, decreased lumbar ROM, decreased LE Strength, soft tissue dysfunction, postural imbalance,impaired joint mobility, and decreased tolerance to functional activities. Difficulty with all positioning, ADLs, and household chores.        Pt with good motivation to perform physical activity and responds well to cueing.      Pt prognosis is Good.   Pt will benefit from skilled outpatient Physical Therapy to address the deficits stated above and in the chart below, provide pt/family education, and to maximize pt's level of independence.     Plan of care discussed with patient: Yes  Pt's spiritual, cultural and educational needs considered and patient is agreeable to the plan of care and goals as stated below:     Anticipated Barriers for therapy: None    Medical Necessity is demonstrated by the following  History  Co-morbidities and personal factors that may impact the plan of care Co-morbidities:   Fibromyalgia, see EMR    Personal Factors:   no deficits     low   Examination  Body Structures and Functions, activity  limitations and participation restrictions that may impact the plan of care Body Regions:   back  lower extremities    Body Systems:    ROM  strength  balance  gait  motor control    Participation Restrictions:   None    Activity limitations:   Learning and applying knowledge  no deficits    General Tasks and Commands  no deficits    Communication  no deficits    Mobility  lifting and carrying objects  walking  driving (bike, car, motorcycle)    Self care  washing oneself (bathing, drying, washing hands)  caring for body parts (brushing teeth, shaving, grooming)  dressing    Domestic Life  shopping  cooking  doing house work (cleaning house, washing dishes, laundry)    Interactions/Relationships  no deficits    Life Areas  no deficits    Community and Social Life  community life  recreation and leisure         low   Clinical Presentation stable and uncomplicated low   Decision Making/ Complexity Score: low     Pt's spiritual, cultural and educational needs considered and pt agreeable to plan of care and goals as stated below:       Short Term GOALS: 3 weeks. Pt agrees with goals set.  1. Patient demonstrates independence with HEP.   2. Patient demonstrates independence with Postural Awareness.   3. Patient demonstrates independence with body mechanics.   4. Patient will report pain of 5/10 at worst, on 0-10 pain scale, with all activity    Long Term GOALS: 6 weeks. Pt agrees with goals set.  1. Patient will report sleeping through the night without waking d/t LBP.  2. Patient will demonstrate ability to lift 25lbs from floor to waist level without limitations d/t LBP.   3. Patient demonstrates improved overall function per FOTO Lumbar Survey to 33% Limitation or less.   4. Patient will report pain of 3/10 at worst, on 0-10 pain scale, with all activity  5. Patient will report tolerating walking/standing for at least 20 mins       PLAN       Plan of care Certification: 6/12/2019 to 9/12/2019    Outpatient Physical  Therapy 2 times weekly for 6 weeks to include the following interventions: Cervical/Lumbar Traction, Electrical Stimulation TENS, Manual Therapy, Moist Heat/ Ice, Neuromuscular Re-ed, Patient Education, Therapeutic Activites and Therapeutic Exercise.  Pt may be seen by PTA as part of the rehabilitation team.     Devan Grant, PT  6/12/2019    I have seen the patient, reviewed the therapist's plan of care, and I agree with the plan of care.      I certify the need for these services furnished under this plan of treatment and while under my care.     ___________________ ________ Physician/Referring Practitioner            ___________________________ Date of Signature

## 2019-06-13 LAB — BACTERIA UR CULT: NORMAL

## 2019-06-14 ENCOUNTER — TELEPHONE (OUTPATIENT)
Dept: UROLOGY | Facility: CLINIC | Age: 49
End: 2019-06-14

## 2019-06-14 NOTE — TELEPHONE ENCOUNTER
----- Message from Jo Ann Drummond sent at 6/14/2019  9:24 AM CDT -----  Contact: self / [phone]  Patient is requesting a call back regarding, her symptoms are still the same. Please advise

## 2019-06-17 ENCOUNTER — CLINICAL SUPPORT (OUTPATIENT)
Dept: REHABILITATION | Facility: HOSPITAL | Age: 49
End: 2019-06-17
Payer: MEDICAID

## 2019-06-17 DIAGNOSIS — Z74.09 IMPAIRED FUNCTIONAL MOBILITY AND ACTIVITY TOLERANCE: ICD-10-CM

## 2019-06-17 DIAGNOSIS — M54.31 SCIATICA OF RIGHT SIDE: ICD-10-CM

## 2019-06-17 PROCEDURE — 97110 THERAPEUTIC EXERCISES: CPT | Mod: PN

## 2019-06-17 NOTE — PROGRESS NOTES
"  Physical Therapy Daily Treatment Note     Name: Arpita Bradshaw  Clinic Number: [id number]    Therapy Diagnosis:   Encounter Diagnoses   Name Primary?    Sciatica of right side     Impaired functional mobility and activity tolerance      Physician: Chadd Langford MD    Visit Date: 6/17/2019    Physician Orders: PT Eval and Treat   Medical Diagnosis from Referral:   M79.7 (ICD-10-CM) - Fibromyalgia   M54.5 (ICD-10-CM) - Low back pain        Evaluation Date: 6/12/2019  Authorization Period Expiration: 9/12/2019  Plan of Care Expiration: 9/12/2019  Visit # / Visits authorized: 2/ 20    Time In: 0900  Time Out: 0956    Total Billable Time: 38 minutes    Precautions: Standard    Subjective     Pt reports: she has been resting and avoiding lifting or doing too much over the weekend. Her back pain is about the same today. She reported not getting into her exercises too much because of a recent bladder surgery.  She was fairly compliant with home exercise program.  Response to previous treatment: good  Functional change: none to note    Pain: 6/10  Location: bilateral back      Objective     Arpita received therapeutic exercises to develop strength, ROM, flexibility and core stabilization for 56 minutes including:    LTR 2 x 10  Supine sciatic nerve glide 2 x 15  Prone press-up 2 x 10  Seated piriformis stretch 2 x 30"  Standing lumbar extension 2 x10  Pelvic tilts 2 x 10  Supine marching 2 x 20  Supine hip abd YTB 2 x10  SL clamshells w/ YTB 2 x 15  Cat and camel 2 x 10  Seated sciatic nerve glides 2 x 15  Supine hamstring stretch 4 x 20"    Education provided:   - continued compliance with HEP    Written Home Exercises Provided: Patient instructed to cont prior HEP.  Exercises were reviewed and Arpita was able to demonstrate them prior to the end of the session.  Arpita demonstrated good  understanding of the education provided.     See EMR under Patient Instructions for exercises provided " 6/17/2019.    Assessment     Pt responded well to treatment today. She presented with decreased hamstring flexibility in RLE compared to the LLE during hamstring stretching. Pt demonstrated increased discomfort and difficulty with standing lumbar extension but expressed no difficulty with other exercises and fatigued as expected.    Arpita is progressing well towards her goals.   Pt prognosis is Good.     Pt will continue to benefit from skilled outpatient physical therapy to address the deficits listed in the problem list box on initial evaluation, provide pt/family education and to maximize pt's level of independence in the home and community environment.     Pt's spiritual, cultural and educational needs considered and pt agreeable to plan of care and goals.     Anticipated barriers to physical therapy: none    Short Term GOALS: 3 weeks. Pt agrees with goals set.  1. Patient demonstrates independence with HEP. progressing  2. Patient demonstrates independence with Postural Awareness.   3. Patient demonstrates independence with body mechanics.   4. Patient will report pain of 5/10 at worst, on 0-10 pain scale, with all activity     Long Term GOALS: 6 weeks. Pt agrees with goals set.  1. Patient will report sleeping through the night without waking d/t LBP.  2. Patient will demonstrate ability to lift 25lbs from floor to waist level without limitations d/t LBP.   3. Patient demonstrates improved overall function per FOTO Lumbar Survey to 33% Limitation or less.   4. Patient will report pain of 3/10 at worst, on 0-10 pain scale, with all activity  5. Patient will report tolerating walking/standing for at least 20 mins       Plan     Continue to progress lumbar extension exercises and introduce core strengthening.    Recommended Treatment Plan: 2-3 times per week for 12 weeks with treatments to consist of:  Neuromuscular and postural re-education,  training, therapeutic exercise, therapeutic  activities,balance training, gait training, manual therapy, soft tissue mobilization, ROM exercises, Cardiovascular,  Postural stabilization, manual traction, spinal mobilization, moist heat, cryotherapy, electrical stimulation, ultrasound, home exercise education and planning.    Nicole Grant, MERI Grant, PT DPT

## 2019-06-20 ENCOUNTER — CLINICAL SUPPORT (OUTPATIENT)
Dept: REHABILITATION | Facility: HOSPITAL | Age: 49
End: 2019-06-20
Payer: MEDICAID

## 2019-06-20 DIAGNOSIS — Z74.09 IMPAIRED FUNCTIONAL MOBILITY AND ACTIVITY TOLERANCE: ICD-10-CM

## 2019-06-20 DIAGNOSIS — M54.31 SCIATICA OF RIGHT SIDE: ICD-10-CM

## 2019-06-20 PROCEDURE — 97110 THERAPEUTIC EXERCISES: CPT | Mod: PN

## 2019-06-20 NOTE — PROGRESS NOTES
"  Physical Therapy Daily Treatment Note     Name: Arpita [place]  Clinic Number: [id number]    Therapy Diagnosis:   Encounter Diagnoses   Name Primary?    Sciatica of right side     Impaired functional mobility and activity tolerance      Physician: Chadd Langford MD    Visit Date: 6/20/2019    Physician Orders: PT Eval and Treat   Medical Diagnosis from Referral:   M79.7 (ICD-10-CM) - Fibromyalgia   M54.5 (ICD-10-CM) - Low back pain        Evaluation Date: 6/12/2019  Authorization Period Expiration: 9/12/2019  Plan of Care Expiration: 9/12/2019  Visit # / Visits authorized: 3/ 20    Time In:1300  Time Out: 1400  Total Billable Time: 53 minutes    Precautions: Standard    Subjective     Pt reports: No new complaints.  Pain remains along lower R lumbar / gluteal region.  She was fairly compliant with home exercise program.  Response to previous treatment: Only muscular soreness  Functional change: none to note    Pain: 6/10  Location: bilateral back      Objective     Arpita received therapeutic exercises to develop strength, ROM, flexibility and core stabilization for 40 minutes including:    NuStep: 8 mins  Cable Column Oblique Walkout 10lbs 5x B  Squats to 18" box + bosu 10x  Deadlifts with 5lb bar between foam roller for hip hinge cue 15x  Supine Figure 4 Piriformis Stretch 10x 10 secs    LTR 2 x 10  Supine sciatic nerve glide 2 x 15  Prone press-up 2 x 10  Seated piriformis stretch 2 x 30"  Pelvic tilts 2 x 10  Supine marching 2 x 20  SL clamshells w/ YTB 2 x 15  Cat and camel 2 x 10  Supine hamstring stretch 4 x 20"    Manual Treatment:  STM/IASTM to R piriformis, lower R lumbar paraspinals and soft tissue structures 15 mins.      MHP 10 mins to finish session in prone.      Education provided:   - continued compliance with HEP    Written Home Exercises Provided: Patient instructed to cont prior HEP.  Exercises were reviewed and Arpita was able to demonstrate them prior to the end of the session.  " Arpita demonstrated good  understanding of the education provided.     See EMR under Patient Instructions for exercises provided 6/20/2019.    Assessment     Pt responded well to treatment today.  Lumbar stabilization exercises were progressed for functional movements, and the patient demonstrated good capacity for hip hinging during deadlifts.  She also demonstrated an exaggerated pain response to cupping, which decreased over time.      Arpita is progressing well towards her goals.   Pt prognosis is Good.     Pt will continue to benefit from skilled outpatient physical therapy to address the deficits listed in the problem list box on initial evaluation, provide pt/family education and to maximize pt's level of independence in the home and community environment.     Pt's spiritual, cultural and educational needs considered and pt agreeable to plan of care and goals.     Anticipated barriers to physical therapy: none    Short Term GOALS: 3 weeks. Pt agrees with goals set.  1. Patient demonstrates independence with HEP. progressing  2. Patient demonstrates independence with Postural Awareness.   3. Patient demonstrates independence with body mechanics.   4. Patient will report pain of 5/10 at worst, on 0-10 pain scale, with all activity     Long Term GOALS: 6 weeks. Pt agrees with goals set.  1. Patient will report sleeping through the night without waking d/t LBP.  2. Patient will demonstrate ability to lift 25lbs from floor to waist level without limitations d/t LBP.   3. Patient demonstrates improved overall function per FOTO Lumbar Survey to 33% Limitation or less.   4. Patient will report pain of 3/10 at worst, on 0-10 pain scale, with all activity  5. Patient will report tolerating walking/standing for at least 20 mins       Plan     Continue to progress lumbar extension exercises an d introduce core strengthening.    Recommended Treatment Plan: 2-3 times per week for 12 weeks with treatments to consist of:   Neuromuscular and postural re-education,  training, therapeutic exercise, therapeutic activities,balance training, gait training, manual therapy, soft tissue mobilization, ROM exercises, Cardiovascular,  Postural stabilization, manual traction, spinal mobilization, moist heat, cryotherapy, electrical stimulation, ultrasound, home exercise education and planning.    Nicole Grant, SPT  Devan Grant, PT DPT

## 2019-06-24 ENCOUNTER — TELEPHONE (OUTPATIENT)
Dept: UROLOGY | Facility: CLINIC | Age: 49
End: 2019-06-24

## 2019-06-24 ENCOUNTER — OFFICE VISIT (OUTPATIENT)
Dept: UROLOGY | Facility: CLINIC | Age: 49
End: 2019-06-24
Payer: MEDICAID

## 2019-06-24 VITALS
DIASTOLIC BLOOD PRESSURE: 79 MMHG | HEART RATE: 77 BPM | WEIGHT: 225.75 LBS | SYSTOLIC BLOOD PRESSURE: 124 MMHG | HEIGHT: 68 IN | BODY MASS INDEX: 34.21 KG/M2

## 2019-06-24 DIAGNOSIS — R33.9 INCOMPLETE BLADDER EMPTYING: ICD-10-CM

## 2019-06-24 DIAGNOSIS — N30.10 INTERSTITIAL CYSTITIS: ICD-10-CM

## 2019-06-24 DIAGNOSIS — R35.0 URINARY FREQUENCY: ICD-10-CM

## 2019-06-24 DIAGNOSIS — R10.9 BILATERAL FLANK PAIN: Primary | ICD-10-CM

## 2019-06-24 DIAGNOSIS — R30.0 DYSURIA: ICD-10-CM

## 2019-06-24 PROBLEM — R39.14 FEELING OF INCOMPLETE BLADDER EMPTYING: Status: ACTIVE | Noted: 2019-06-24

## 2019-06-24 PROCEDURE — 99999 PR PBB SHADOW E&M-EST. PATIENT-LVL IV: ICD-10-PCS | Mod: PBBFAC,,, | Performed by: NURSE PRACTITIONER

## 2019-06-24 PROCEDURE — 87086 URINE CULTURE/COLONY COUNT: CPT

## 2019-06-24 PROCEDURE — 99999 PR PBB SHADOW E&M-EST. PATIENT-LVL IV: CPT | Mod: PBBFAC,,, | Performed by: NURSE PRACTITIONER

## 2019-06-24 PROCEDURE — 99213 OFFICE O/P EST LOW 20 MIN: CPT | Mod: S$PBB,,, | Performed by: NURSE PRACTITIONER

## 2019-06-24 PROCEDURE — 99213 PR OFFICE/OUTPT VISIT, EST, LEVL III, 20-29 MIN: ICD-10-PCS | Mod: S$PBB,,, | Performed by: NURSE PRACTITIONER

## 2019-06-24 PROCEDURE — 99214 OFFICE O/P EST MOD 30 MIN: CPT | Mod: PBBFAC,PO | Performed by: NURSE PRACTITIONER

## 2019-06-24 RX ORDER — TAMSULOSIN HYDROCHLORIDE 0.4 MG/1
0.8 CAPSULE ORAL DAILY
Qty: 60 CAPSULE | Refills: 1 | Status: SHIPPED | OUTPATIENT
Start: 2019-06-24 | End: 2019-12-02

## 2019-06-24 NOTE — TELEPHONE ENCOUNTER
----- Message from Kyler Martin sent at 6/24/2019  9:29 AM CDT -----  Contact: [phone]/self  Type:  Same Day Appointment Request    Caller is requesting a same day appointment.  Caller declined first available appointment listed below.    Name of Caller:patient Arpita Bradshaw  When is the first available appointment?6/26/2019  Symptoms:UTI   Best Call Back Number:[phone]  Additional Information: none

## 2019-06-24 NOTE — PATIENT INSTRUCTIONS
1. Bladder scan (PVR)  2. Urine cx  3. Continue Urimar-T and Augmentin at this time.   4. U/S retroperitoneal complete    5. Increase tamsulosin 0.4 mg daily to TWICE DAILY for incomplete bladder emptying.    6. Follow-up pending urine cx and U/S results.

## 2019-06-24 NOTE — PROGRESS NOTES
Subjective:       Patient ID: Arpita Bradshaw is a 49 y.o. female.    Chief Complaint: Urinary Tract Infection    Patient is here today for possible UTI. She has c/o dysuria, pelvic pain, bilateral flank pain, urinary frequency, and urgency. Patient is currently taking Augmentin and Urimar-T for her urinary symptoms. She reports it provides mild relief. Patient is S/P bladder fulguration by Dr. Lua on 6/6/2019. She is here today with her daughter (Serena).    Urinary Tract Infection    This is a new problem. Episode onset: approximately 2 weeks. The problem occurs every urination. The problem has been gradually improving. The quality of the pain is described as burning. The pain is at a severity of 7/10. The pain is moderate. There has been no fever. There is a history of pyelonephritis. Associated symptoms include flank pain (left hurts worse than right), frequency and urgency. Pertinent negatives include no chills, hematuria, nausea, vomiting or constipation. She has tried antibiotics (Augmentin) for the symptoms. The treatment provided mild relief. Her past medical history is significant for recurrent UTIs and a urological procedure. There is no history of diabetes mellitus, hypertension, kidney stones or a single kidney.     Review of Systems   Constitutional: Positive for fatigue. Negative for appetite change (decreased), chills and fever.   Gastrointestinal: Positive for abdominal pain (suprapubic). Negative for constipation, diarrhea, nausea and vomiting.   Genitourinary: Positive for dysuria, flank pain (left hurts worse than right), frequency, pelvic pain, urgency and vaginal pain. Negative for decreased urine volume, difficulty urinating, hematuria, vaginal bleeding and vaginal discharge.        Feeling of incomplete bladder emptying   Neurological: Positive for headaches. Negative for dizziness.   Psychiatric/Behavioral: Negative.        Objective:      Physical Exam   Constitutional: She is oriented  to person, place, and time. She appears well-developed and well-nourished.   HENT:   Head: Normocephalic and atraumatic.   Eyes: Pupils are equal, round, and reactive to light. EOM are normal.   Neck: Normal range of motion.   Cardiovascular: Normal rate, regular rhythm, normal heart sounds and intact distal pulses.   Pulmonary/Chest: Effort normal and breath sounds normal. No respiratory distress.   Abdominal: Soft. Bowel sounds are normal. There is no tenderness.   Genitourinary:   Genitourinary Comments: LZM=639 mL   Musculoskeletal: Normal range of motion. She exhibits no edema.   Bilateral CVAT present   Neurological: She is alert and oriented to person, place, and time. Coordination normal.   Skin: Skin is warm and dry.   Psychiatric: She has a normal mood and affect. Her behavior is normal. Judgment and thought content normal.   Nursing note and vitals reviewed.      Assessment:       1. Bilateral flank pain    2. Incomplete bladder emptying    3. Urinary frequency    4. Dysuria    5. Interstitial cystitis        Plan:       Arpita was seen today for urinary tract infection.    Diagnoses and all orders for this visit:    Bilateral flank pain  -     US Retroperitoneal Complete (Kidney and; Future    Incomplete bladder emptying  -     tamsulosin (FLOMAX) 0.4 mg Cap; Take 2 capsules (0.8 mg total) by mouth once daily.    Urinary frequency    Dysuria  -     Urine culture    Interstitial cystitis    Other orders  1. Bladder scan (PVR)  2. Urine cx  3. Continue Urimar-T and Augmentin at this time.   4. U/S retroperitoneal complete    5. Increase tamsulosin 0.4 mg daily to TWICE DAILY for incomplete bladder emptying.      NOTE: If urine cx negative, patient may be experiencing an I.C. Flare-up.     Follow-up pending urine cx and U/S results.     Francie Cazares NP

## 2019-06-25 LAB
BACTERIA UR CULT: NORMAL
BACTERIA UR CULT: NORMAL

## 2019-06-26 ENCOUNTER — TELEPHONE (OUTPATIENT)
Dept: UROLOGY | Facility: CLINIC | Age: 49
End: 2019-06-26

## 2019-06-26 ENCOUNTER — LAB VISIT (OUTPATIENT)
Dept: LAB | Facility: HOSPITAL | Age: 49
End: 2019-06-26
Attending: NURSE PRACTITIONER
Payer: MEDICAID

## 2019-06-26 DIAGNOSIS — R30.0 DYSURIA: ICD-10-CM

## 2019-06-26 DIAGNOSIS — N39.0 URINARY TRACT INFECTION WITHOUT HEMATURIA, SITE UNSPECIFIED: ICD-10-CM

## 2019-06-26 DIAGNOSIS — N39.0 URINARY TRACT INFECTION WITHOUT HEMATURIA, SITE UNSPECIFIED: Primary | ICD-10-CM

## 2019-06-26 DIAGNOSIS — R35.0 URINARY FREQUENCY: Primary | ICD-10-CM

## 2019-06-26 PROCEDURE — 87086 URINE CULTURE/COLONY COUNT: CPT

## 2019-06-26 NOTE — TELEPHONE ENCOUNTER
----- Message from Tammy Boogie sent at 6/26/2019  1:39 PM CDT -----  Contact: Patient  Patient called to speak with Shania to advise she wants to do her test in [place], not [place].    Please call [phone] to discuss immediately.

## 2019-06-26 NOTE — TELEPHONE ENCOUNTER
----- Message from Francie Cazares NP sent at 6/26/2019 12:17 PM CDT -----  Urine cx appears to not be a good sample. Since symptoms are present, please repeat urine cx. Patient can get it done at [facility].

## 2019-06-27 ENCOUNTER — CLINICAL SUPPORT (OUTPATIENT)
Dept: REHABILITATION | Facility: HOSPITAL | Age: 49
End: 2019-06-27
Payer: MEDICAID

## 2019-06-27 ENCOUNTER — TELEPHONE (OUTPATIENT)
Dept: RADIOLOGY | Facility: HOSPITAL | Age: 49
End: 2019-06-27

## 2019-06-27 DIAGNOSIS — M54.31 SCIATICA OF RIGHT SIDE: ICD-10-CM

## 2019-06-27 DIAGNOSIS — Z74.09 IMPAIRED FUNCTIONAL MOBILITY AND ACTIVITY TOLERANCE: ICD-10-CM

## 2019-06-27 LAB — BACTERIA UR CULT: NORMAL

## 2019-06-27 PROCEDURE — 97110 THERAPEUTIC EXERCISES: CPT | Mod: PN

## 2019-06-27 NOTE — PROGRESS NOTES
"  Physical Therapy Daily Treatment Note     Name: Arpita Bradshaw  Clinic Number: [id number]    Therapy Diagnosis:   Encounter Diagnoses   Name Primary?    Sciatica of right side     Impaired functional mobility and activity tolerance      Physician: Chadd Langford MD    Visit Date: 6/27/2019    Physician Orders: PT Eval and Treat   Medical Diagnosis from Referral:   M79.7 (ICD-10-CM) - Fibromyalgia   M54.5 (ICD-10-CM) - Low back pain        Evaluation Date: 6/12/2019  Authorization Period Expiration: 9/12/2019  Plan of Care Expiration: 9/12/2019  Visit # / Visits authorized: 4 / 20    Time In: 1505   Time Out: 1610  Total Billable Time: 55 minutes    Precautions: Standard    Subjective     Pt reports: no improvements in pain since beginning therapy. Patient states that she is able to do her exercises at home, however gets no pain relief. Patient notes that the pain goes into her R ankle/foot. Patient notes improvement in muscle tightness with STM and MFR with the tennis ball.  She was fairly compliant with home exercise program.  Response to previous treatment: Only muscular soreness  Functional change: none to note    Pain: 7/10  Location: bilateral back and R LE    Objective     Arpita received therapeutic exercises to develop strength, ROM, flexibility and core stabilization for 45 minutes including:    Nustep x 8 mins  Supine Figure 4 Piriformis Stretch 10x 10 secs - towel assist  LTR on SB 20x    Cable Column Oblique Walkout 10lbs 5x B  Squats to 18" box + bosu 10x  Deadlifts with 5lb bar between foam roller for hip hinge cue 15x    Supine sciatic nerve glide 2 x 15  Prone press-up 2 x 10  Seated piriformis stretch 2 x 30"  Pelvic tilts 2 x 10  Supine marching 2 x 20  SL clamshells w/ YTB 2 x 15  Cat and camel 2 x 10  Supine hamstring stretch 4 x 20"    Manual Treatment:    - Manual lumbar traction x 10 minutes /c MHP to lumbar spine - performed by PT  - STM/MFR to R glute/piriformis region x 5 " minutes    Moist heat pack to LSP concurrently with supine exercise and manual treatment.    Education provided:   - continued compliance with HEP    Written Home Exercises Provided: Patient instructed to cont prior HEP.  Exercises were reviewed and Arpita was able to demonstrate them prior to the end of the session.  Arpita demonstrated good  understanding of the education provided.     See EMR under Patient Instructions for exercises provided 6/27/2019.    Assessment     Patient presented to clinic with increased low back pain and radicular symptoms into R LE. Improvement in pain and centralization of symptoms were noted after manual lumbar traction. Patient reported pain into her R glute and proximal hamstring. Good tolerance to exercises performed reporting no exacerbation of low back pain. Increased tenderness noted around R low back and into proximal glute during manual care. Good response to moist heat following session.     Arpita is progressing well towards her goals.   Pt prognosis is Good.     Pt will continue to benefit from skilled outpatient physical therapy to address the deficits listed in the problem list box on initial evaluation, provide pt/family education and to maximize pt's level of independence in the home and community environment.     Pt's spiritual, cultural and educational needs considered and pt agreeable to plan of care and goals.     Anticipated barriers to physical therapy: none    Short Term GOALS: 3 weeks. Pt agrees with goals set.  1. Patient demonstrates independence with HEP. progressing  2. Patient demonstrates independence with Postural Awareness.   3. Patient demonstrates independence with body mechanics.   4. Patient will report pain of 5/10 at worst, on 0-10 pain scale, with all activity     Long Term GOALS: 6 weeks. Pt agrees with goals set.  1. Patient will report sleeping through the night without waking d/t LBP.  2. Patient will demonstrate ability to lift 25lbs from  floor to waist level without limitations d/t LBP.   3. Patient demonstrates improved overall function per FOTO Lumbar Survey to 33% Limitation or less.   4. Patient will report pain of 3/10 at worst, on 0-10 pain scale, with all activity  5. Patient will report tolerating walking/standing for at least 20 mins       Plan     Continue to progress lumbar extension exercises an d introduce core strengthening.    Recommended Treatment Plan: 2-3 times per week for 12 weeks with treatments to consist of:  Neuromuscular and postural re-education,  training, therapeutic exercise, therapeutic activities,balance training, gait training, manual therapy, soft tissue mobilization, ROM exercises, Cardiovascular,  Postural stabilization, manual traction, spinal mobilization, moist heat, cryotherapy, electrical stimulation, ultrasound, home exercise education and planning.    Gilda Richardson, PTA  06/27/2019

## 2019-06-28 ENCOUNTER — PATIENT MESSAGE (OUTPATIENT)
Dept: UROLOGY | Facility: CLINIC | Age: 49
End: 2019-06-28

## 2019-06-28 ENCOUNTER — HOSPITAL ENCOUNTER (OUTPATIENT)
Dept: RADIOLOGY | Facility: HOSPITAL | Age: 49
Discharge: HOME | End: 2019-06-28
Attending: NURSE PRACTITIONER
Payer: MEDICAID

## 2019-06-28 ENCOUNTER — TELEPHONE (OUTPATIENT)
Dept: UROLOGY | Facility: CLINIC | Age: 49
End: 2019-06-28

## 2019-06-28 DIAGNOSIS — R10.9 BILATERAL FLANK PAIN: ICD-10-CM

## 2019-06-28 PROCEDURE — 76770 US EXAM ABDO BACK WALL COMP: CPT | Mod: TC

## 2019-06-28 PROCEDURE — 76770 US EXAM ABDO BACK WALL COMP: CPT | Mod: 26,,, | Performed by: RADIOLOGY

## 2019-06-28 PROCEDURE — 76770 US RETROPERITONEAL COMPLETE: ICD-10-PCS | Mod: 26,,, | Performed by: RADIOLOGY

## 2019-06-28 NOTE — TELEPHONE ENCOUNTER
----- Message from Francie Cazares NP sent at 6/28/2019  9:33 AM CDT -----  Please inform patient her urine cx is normal. She does not have a UTI. Keep f/u appt with Dr. Lua on 7/5/19.

## 2019-07-01 ENCOUNTER — TELEPHONE (OUTPATIENT)
Dept: UROLOGY | Facility: CLINIC | Age: 49
End: 2019-07-01

## 2019-07-01 DIAGNOSIS — R31.29 MICROSCOPIC HEMATURIA: ICD-10-CM

## 2019-07-01 DIAGNOSIS — R10.9 BILATERAL FLANK PAIN: Primary | ICD-10-CM

## 2019-07-01 PROBLEM — N28.1 RENAL CYST, RIGHT: Status: ACTIVE | Noted: 2019-07-01

## 2019-07-01 NOTE — TELEPHONE ENCOUNTER
----- Message from Francie Cazares NP sent at 7/1/2019  1:59 PM CDT -----  Please inform patient that her U/S showed a right kidney cyst. Also, there's a questionable non-obstructive right-sided kidney stone. CT renal stone study needed. Please schedule patient.

## 2019-07-03 ENCOUNTER — HOSPITAL ENCOUNTER (OUTPATIENT)
Dept: RADIOLOGY | Facility: HOSPITAL | Age: 49
Discharge: HOME | End: 2019-07-03
Attending: NURSE PRACTITIONER
Payer: MEDICAID

## 2019-07-03 ENCOUNTER — TELEPHONE (OUTPATIENT)
Dept: FAMILY MEDICINE | Facility: CLINIC | Age: 49
End: 2019-07-03

## 2019-07-03 DIAGNOSIS — R10.9 BILATERAL FLANK PAIN: ICD-10-CM

## 2019-07-03 DIAGNOSIS — R31.29 MICROSCOPIC HEMATURIA: ICD-10-CM

## 2019-07-03 PROCEDURE — 74176 CT ABD & PELVIS W/O CONTRAST: CPT | Mod: 26,,, | Performed by: RADIOLOGY

## 2019-07-03 PROCEDURE — 74176 CT RENAL STONE STUDY ABD PELVIS WO: ICD-10-PCS | Mod: 26,,, | Performed by: RADIOLOGY

## 2019-07-03 PROCEDURE — 74176 CT ABD & PELVIS W/O CONTRAST: CPT | Mod: TC

## 2019-07-03 NOTE — TELEPHONE ENCOUNTER
----- Message from Mine Bailey sent at 7/3/2019  3:12 PM CDT -----  Contact: Self/ [phone]  Patient called to let you know she is at the hospital to get a cat scan but was told she doesn't have authorization.    Please call, she is waiting at the hospital.

## 2019-07-16 ENCOUNTER — CLINICAL SUPPORT (OUTPATIENT)
Dept: REHABILITATION | Facility: HOSPITAL | Age: 49
End: 2019-07-16
Payer: MEDICAID

## 2019-07-16 DIAGNOSIS — M54.31 SCIATICA OF RIGHT SIDE: ICD-10-CM

## 2019-07-16 DIAGNOSIS — Z74.09 IMPAIRED FUNCTIONAL MOBILITY AND ACTIVITY TOLERANCE: ICD-10-CM

## 2019-07-16 PROCEDURE — 97110 THERAPEUTIC EXERCISES: CPT | Mod: PN

## 2019-07-16 NOTE — PROGRESS NOTES
"  Physical Therapy Daily Treatment Note     Name: Arpita Bradshaw  Clinic Number: [id number]    Therapy Diagnosis:   Encounter Diagnoses   Name Primary?    Sciatica of right side     Impaired functional mobility and activity tolerance      Physician: Chadd Langford MD    Visit Date: 7/16/2019    Physician Orders: PT Eval and Treat   Medical Diagnosis from Referral:   M79.7 (ICD-10-CM) - Fibromyalgia   M54.5 (ICD-10-CM) - Low back pain        Evaluation Date: 6/12/2019  Authorization Period Expiration: 9/12/2019  Plan of Care Expiration: 9/12/2019  Visit # / Visits authorized: 5 / 20    Time In: 8:00  Time Out: 9:10  Total Billable Time: 30 minutes    Precautions: Standard    Subjective     Pt reports: no improvements in pain since beginning therapy. Patient states that she is able to do her exercises at home, however gets no pain relief. Patient notes that the pain goes into her R ankle/foot. Patient notes improvement in muscle tightness with ice in z-lie today.     .  She was fairly compliant with home exercise program.  Response to previous treatment: Only muscular soreness  Functional change: none to note    Pain: 8/10  Location: bilateral back and R LE    Objective     Arpita received therapeutic exercises to develop strength, ROM, flexibility and core stabilization for 45 minutes including:     Nustep x 5 mins    LTR 20x  PITER with theraball  20x  SKTC 10x  Pelvic tilts 2 x 10  Supine marching 2 x 20  SL clamshells w/ YTB 2 x 15  Seated PPT 10x  Hip/back disassociation 10x   Sit<>stand squat training 10x   Squats to 18" box 5 reps, 3 sets      Not performed today from previous HEP/sessions:   Supine hamstring stretch 4 x 20"  Supine sciatic nerve glide 2 x 15   Supine Figure 4 Piriformis Stretch 10x 10 secs - towel assist  Cat and camel 2 x 10  Cable Column Oblique Walkout 10lbs 5x B  Deadlifts with 5lb bar between foam roller for hip hinge cue 15x    Ice pack in z-lie position 10 minutes    Education " provided:   - continued compliance with HEP    Written Home Exercises Provided: Patient instructed to cont prior HEP.  Exercises were reviewed and Arpita was able to demonstrate them prior to the end of the session.  Arpita demonstrated good  understanding of the education provided.     See EMR under Patient Instructions for exercises provided 7/16/2019.    Assessment     Patient presented to clinic with increased low back pain and radicular symptoms into R LE which reduced from 8/10 to 6/10 VAS rating by end of session. Improvement in pain and centralization of symptoms were noted after repeated flexion with theraball and SKTC. Pt reported peripherilzaiton of symptoms and increased LBP with attempts to perform extension strategies today. Pt has not performed at home, omitted from HEP at this time. Good tolerance to exercises performed reporting mild exacerbation of right glut pain with standing squats, better with v/c to perform PPT and hip/back disassociation. Good response to ice in z-lie following session.     Arpita is progressing well towards her goals.   Pt prognosis is Good.     Pt will continue to benefit from skilled outpatient physical therapy to address the deficits listed in the problem list box on initial evaluation, provide pt/family education and to maximize pt's level of independence in the home and community environment.     Pt's spiritual, cultural and educational needs considered and pt agreeable to plan of care and goals.     Anticipated barriers to physical therapy: none    Short Term GOALS: 3 weeks. Pt agrees with goals set.  1. Patient demonstrates independence with HEP. progressing  2. Patient demonstrates independence with Postural Awareness.   3. Patient demonstrates independence with body mechanics.   4. Patient will report pain of 5/10 at worst, on 0-10 pain scale, with all activity     Long Term GOALS: 6 weeks. Pt agrees with goals set.  1. Patient will report sleeping through the  night without waking d/t LBP.  2. Patient will demonstrate ability to lift 25lbs from floor to waist level without limitations d/t LBP.   3. Patient demonstrates improved overall function per FOTO Lumbar Survey to 33% Limitation or less.   4. Patient will report pain of 3/10 at worst, on 0-10 pain scale, with all activity  5. Patient will report tolerating walking/standing for at least 20 mins       Plan     Continue to progress lumbar extension exercises an d introduce core strengthening.    Recommended Treatment Plan: 2-3 times per week for 12 weeks with treatments to consist of:  Neuromuscular and postural re-education,  training, therapeutic exercise, therapeutic activities,balance training, gait training, manual therapy, soft tissue mobilization, ROM exercises, Cardiovascular,  Postural stabilization, manual traction, spinal mobilization, moist heat, cryotherapy, electrical stimulation, ultrasound, home exercise education and planning.    Giselle Beaver, PT  07/16/2019

## 2019-07-17 NOTE — PROGRESS NOTES
Physical Therapy Daily Treatment Note     Name: Arpita [place]  Clinic Number: [id number]    Therapy Diagnosis:   Encounter Diagnoses   Name Primary?    Sciatica of right side     Impaired functional mobility and activity tolerance      Physician: Chadd Langford MD    Visit Date: 7/18/2019    Physician Orders: PT Eval and Treat   Medical Diagnosis from Referral:   M79.7 (ICD-10-CM) - Fibromyalgia   M54.5 (ICD-10-CM) - Low back pain        Evaluation Date: 6/12/2019  Authorization Period Expiration: 9/12/2019  Plan of Care Expiration: 9/12/2019  Visit # / Visits authorized: 6 / 20    Time In: 1430  Time Out: 1520  Total Billable Time: 30 minutes    Precautions: Standard    Subjective     Pt reports: Feels overall things have been improving, reduced pain.     .  She was fairly compliant with home exercise program.  Response to previous treatment: Only muscular soreness  Functional change: Reduced pain, walking longer distances     Pain: 6/10  Location: bilateral back and R LE    Objective     CMS Impairment/Limitation/Restriction for FOTO Lumbar Spine Survey  Status Limitation G-Code CMS Severity Modifier  Intake 30% 70%  Predicted 50% 50% Goal Status+ CK - At least 40 percent but less than 60 percent  7/18/2019 36% 64% Current Status CL - At least 60 percent but less than 80 percent  D/C Status CL **only report if this is discharge survey    LUMBAR SPINE AROM: -assessed by PT  Flexion: Painful, at R glute WFL   Extension: Dysfunctional Painful at lower lumbar mid-line, 50% limited   Left Rotation: Dysfunctional Painful on L lumbar, 25% limited   Right Rotation: Dysfunctional Painful on R lumbar, 25% limited   Left Side-bending Dysfunctional Painful on R lumbar; 25% limited   Right Side-bending Dysfunctional, not painful; 25% limited       Arpita received therapeutic exercises to develop strength, ROM, flexibility and core stabilization for 50 minutes including:     Nustep x 7 mins    LTR 20x   PITER with  "theraball  20x  SKTC 10x  Pelvic tilts 2 x 10  Supine marching 2 x 20  SL clamshells w/ YTB 2 x 15  Seated PPT 10x  Hip/back disassociation 10x   Sit<>stand squat training 10x   Squats to 18" box 5 reps, 3 sets    +Anti rotations Free motion 7# x 20     Not performed today from previous HEP/sessions:   Supine hamstring stretch 4 x 20"  Supine sciatic nerve glide 2 x 15   Supine Figure 4 Piriformis Stretch 10x 10 secs - towel assist  Cat and camel 2 x 10  Cable Column Oblique Walkout 10lbs 5x B  Deadlifts with 5lb bar between foam roller for hip hinge cue 15x    Ice pack in z-lie position 10 minutes    Education provided:   - continued compliance with HEP    Written Home Exercises Provided: Patient instructed to cont prior HEP.  Exercises were reviewed and Arpita was able to demonstrate them prior to the end of the session.  Arpita demonstrated good  understanding of the education provided.     See EMR under Patient Instructions for exercises provided 7/18/2019.    Assessment     Patient feels they returned to about 50% of normal since starting therapy. Patient tolerated flexion better during today's session. Staff issued theraband for anti rotation exercise to be completed outside of therapy. Continues to improve and benefit from skilled sessions for progression of exercise routine.     Arpita is progressing well towards her goals.   Pt prognosis is Good.     Pt will continue to benefit from skilled outpatient physical therapy to address the deficits listed in the problem list box on initial evaluation, provide pt/family education and to maximize pt's level of independence in the home and community environment.     Pt's spiritual, cultural and educational needs considered and pt agreeable to plan of care and goals.     Anticipated barriers to physical therapy: none    Short Term GOALS: 3 weeks. Pt agrees with goals set.  1. Patient demonstrates independence with HEP. progressing  2. Patient demonstrates " independence with Postural Awareness.   3. Patient demonstrates independence with body mechanics.   4. Patient will report pain of 5/10 at worst, on 0-10 pain scale, with all activity     Long Term GOALS: 6 weeks. Pt agrees with goals set.  1. Patient will report sleeping through the night without waking d/t LBP.  2. Patient will demonstrate ability to lift 25lbs from floor to waist level without limitations d/t LBP.   3. Patient demonstrates improved overall function per FOTO Lumbar Survey to 33% Limitation or less.   4. Patient will report pain of 3/10 at worst, on 0-10 pain scale, with all activity  5. Patient will report tolerating walking/standing for at least 20 mins       Plan     Continue to progress lumbar extension exercises an d introduce core strengthening.    Recommended Treatment Plan: 2-3 times per week for 12 weeks with treatments to consist of:  Neuromuscular and postural re-education,  training, therapeutic exercise, therapeutic activities,balance training, gait training, manual therapy, soft tissue mobilization, ROM exercises, Cardiovascular,  Postural stabilization, manual traction, spinal mobilization, moist heat, cryotherapy, electrical stimulation, ultrasound, home exercise education and planning.    Usama Bonner, PT  Axel Everett, PTA  07/18/2019

## 2019-07-18 ENCOUNTER — CLINICAL SUPPORT (OUTPATIENT)
Dept: REHABILITATION | Facility: HOSPITAL | Age: 49
End: 2019-07-18
Payer: MEDICAID

## 2019-07-18 DIAGNOSIS — Z74.09 IMPAIRED FUNCTIONAL MOBILITY AND ACTIVITY TOLERANCE: ICD-10-CM

## 2019-07-18 DIAGNOSIS — M54.31 SCIATICA OF RIGHT SIDE: ICD-10-CM

## 2019-07-18 PROCEDURE — 97110 THERAPEUTIC EXERCISES: CPT | Mod: PN

## 2019-07-23 ENCOUNTER — CLINICAL SUPPORT (OUTPATIENT)
Dept: REHABILITATION | Facility: HOSPITAL | Age: 49
End: 2019-07-23
Payer: MEDICAID

## 2019-07-23 DIAGNOSIS — M54.31 SCIATICA OF RIGHT SIDE: ICD-10-CM

## 2019-07-23 DIAGNOSIS — Z74.09 IMPAIRED FUNCTIONAL MOBILITY AND ACTIVITY TOLERANCE: ICD-10-CM

## 2019-07-23 PROCEDURE — 97110 THERAPEUTIC EXERCISES: CPT | Mod: PN

## 2019-07-23 NOTE — PROGRESS NOTES
Physical Therapy Daily Treatment Note     Name: Arpita Bradshaw  Clinic Number: [id number]    Therapy Diagnosis:   Encounter Diagnoses   Name Primary?    Sciatica of right side     Impaired functional mobility and activity tolerance      Physician: Chadd Langford MD    Visit Date: 7/23/2019    Physician Orders: PT Eval and Treat   Medical Diagnosis from Referral:   M79.7 (ICD-10-CM) - Fibromyalgia   M54.5 (ICD-10-CM) - Low back pain        Evaluation Date: 6/12/2019  Authorization Period Expiration: 9/12/2019  Plan of Care Expiration: 9/12/2019  Visit # / Visits authorized: 7 / 20    Time In: 8:00  Time Out: 9:00  Total Billable Time: 40 minutes    Precautions: Standard    Subjective     Pt reports: Feels overall things have been improving, reduced pain. She reports feeling pretty sore after last session.     She was fairly compliant with home exercise program.  Response to previous treatment: Only muscular soreness  Functional change: Reduced pain, walking longer distances     Pain: 7/10  Location: bilateral back and R LE    Objective     CMS Impairment/Limitation/Restriction for FOTO Lumbar Spine Survey  Status Limitation G-Code CMS Severity Modifier  Intake 30% 70%  Predicted 50% 50% Goal Status+ CK - At least 40 percent but less than 60 percent  7/18/2019 36% 64% Current Status CL - At least 60 percent but less than 80 percent  D/C Status CL **only report if this is discharge survey    LUMBAR SPINE AROM: -assessed by PT  Flexion: Painful, at R glute WFL   Extension: Dysfunctional Painful at lower lumbar mid-line, 50% limited   Left Rotation: Dysfunctional Painful on L lumbar, 25% limited   Right Rotation: Dysfunctional Painful on R lumbar, 25% limited   Left Side-bending Dysfunctional Painful on R lumbar; 25% limited   Right Side-bending Dysfunctional, not painful; 25% limited       Arpita received therapeutic exercises to develop strength, ROM, flexibility and core stabilization for 50 minutes  "including:     Nustep x 7 mins    LTR 20x   PITER with theraball  20x  SKTC 10x  Pelvic tilts 2 x 10  Supine marching 2 x 20  SL clamshells w/ YTB 2 x 15    Seated PPT 10x  Hip/back disassociation 10x   Sit<>stand squat training 10x   Squats to 18" box 5 reps, 3 sets    Anti rotations Free motion 7# x 20   +Torso rotation on Med X resistance machine 15x   +Med X lumbar extension machine 10x 20 lbs    Not performed today from previous HEP/sessions:   Supine hamstring stretch 4 x 20"  Supine sciatic nerve glide 2 x 15   Supine Figure 4 Piriformis Stretch 10x 10 secs - towel assist  Cat and camel 2 x 10  Cable Column Oblique Walkout 10lbs 5x B  Deadlifts with 5lb bar between foam roller for hip hinge cue 15x    Ice pack in z-lie position 10 minutes    Education provided:   - continued compliance with HEP    Written Home Exercises Provided: Patient instructed to cont prior HEP.  Exercises were reviewed and Arpita was able to demonstrate them prior to the end of the session.  Arpita demonstrated good  understanding of the education provided.     See EMR under Patient Instructions for exercises provided 7/23/2019.    Assessment     Patient feels they are feeling better about 60% since starting PT. She continues to report right glut and hip "pulling" with strength training activity today.  Patient tolerated progression to resistance exercise machines without discomfort today. Attempted lumbar extension resistance exercise with reports of mild soreness. Pt may benefit from continued lumbar strengthen. Recommend testing and continued exercise if pt does not report adverse affect session.  Continues to improve and benefit from skilled sessions for progression of exercise routine.     Arpita is progressing well towards her goals.   Pt prognosis is Good.     Pt will continue to benefit from skilled outpatient physical therapy to address the deficits listed in the problem list box on initial evaluation, provide pt/family " education and to maximize pt's level of independence in the home and community environment.     Pt's spiritual, cultural and educational needs considered and pt agreeable to plan of care and goals.     Anticipated barriers to physical therapy: none    Short Term GOALS: 3 weeks. Pt agrees with goals set.  1. Patient demonstrates independence with HEP. progressing  2. Patient demonstrates independence with Postural Awareness.   3. Patient demonstrates independence with body mechanics.   4. Patient will report pain of 5/10 at worst, on 0-10 pain scale, with all activity     Long Term GOALS: 6 weeks. Pt agrees with goals set.  1. Patient will report sleeping through the night without waking d/t LBP.  2. Patient will demonstrate ability to lift 25lbs from floor to waist level without limitations d/t LBP.   3. Patient demonstrates improved overall function per FOTO Lumbar Survey to 33% Limitation or less.   4. Patient will report pain of 3/10 at worst, on 0-10 pain scale, with all activity  5. Patient will report tolerating walking/standing for at least 20 mins       Plan     Continue to progress lumbar extension exercises an d introduce core strengthening.    Recommended Treatment Plan: 2-3 times per week for 12 weeks with treatments to consist of:  Neuromuscular and postural re-education,  training, therapeutic exercise, therapeutic activities,balance training, gait training, manual therapy, soft tissue mobilization, ROM exercises, Cardiovascular,  Postural stabilization, manual traction, spinal mobilization, moist heat, cryotherapy, electrical stimulation, ultrasound, home exercise education and planning.    Usama Bonner, PT  Giselle Beaver, PT  07/23/2019

## 2019-07-24 NOTE — PROGRESS NOTES
"  Physical Therapy Daily Treatment Note     Name: Arpita Bradshaw  Clinic Number: [id number]    Therapy Diagnosis:   Encounter Diagnoses   Name Primary?    Sciatica of right side     Impaired functional mobility and activity tolerance      Physician: Chadd Langford MD    Visit Date: 7/25/2019    Physician Orders: PT Eval and Treat   Medical Diagnosis from Referral:   M79.7 (ICD-10-CM) - Fibromyalgia   M54.5 (ICD-10-CM) - Low back pain        Evaluation Date: 6/12/2019  Authorization Period Expiration: 9/12/2019  Plan of Care Expiration: 9/12/2019  Visit # / Visits authorized: 8 / 20    Time In: 0830  Time Out: 0930  Total Billable Time: 25 minutes    Precautions: Standard    Subjective     Pt reports: Had some increased pain after last session. Pain is also higher today as well.     She was fairly compliant with home exercise program.  Response to previous treatment: Only muscular soreness  Functional change: Reduced pain, walking longer distances     Pain: 7/10  Location: bilateral back and R LE    Objective       Arpita received therapeutic exercises to develop strength, ROM, flexibility and core stabilization for 60 minutes including:     Nustep x 7 mins    LTR 20x   PITER with theraball  20x  SKTC 10x  Pelvic tilts 2 x 10  Supine marching 2 x 20  SL clamshells w/ YTB 2 x 15  +Sciatic nerve glide x 20   +Active hamstring stretch x 20 ea     Seated PPT 10x  Hip/back disassociation 10x   Sit<>stand squat training 10x   Squats to 18" box 5 reps, 3 sets    Anti rotations Free motion 7# x 20   Torso rotation on Med X resistance machine 15x   Med X lumbar extension machine 10x 20 lbs    Not performed today from previous HEP/sessions:   Supine hamstring stretch 4 x 20"  Supine sciatic nerve glide 2 x 15   Supine Figure 4 Piriformis Stretch 10x 10 secs - towel assist  Cat and camel 2 x 10  Cable Column Oblique Walkout 10lbs 5x B  Deadlifts with 5lb bar between foam roller for hip hinge cue 15x    Ice pack in z-lie " position 10 minutes    Education provided:   - continued compliance with HEP    Written Home Exercises Provided: Patient instructed to cont prior HEP.  Exercises were reviewed and Arpita was able to demonstrate them prior to the end of the session.  Arpita demonstrated good  understanding of the education provided.     See EMR under Patient Instructions for exercises provided 7/25/2019.    Assessment     Patient tolerated today's treatment session well. Staff incorporated sciatic nerve glide during today's session with appropriate response achieved. Continues to progress in therapies but becomes fatigued during exercise routine.     Arpita is progressing well towards her goals.   Pt prognosis is Good.     Pt will continue to benefit from skilled outpatient physical therapy to address the deficits listed in the problem list box on initial evaluation, provide pt/family education and to maximize pt's level of independence in the home and community environment.     Pt's spiritual, cultural and educational needs considered and pt agreeable to plan of care and goals.     Anticipated barriers to physical therapy: none    Short Term GOALS: 3 weeks. Pt agrees with goals set.  1. Patient demonstrates independence with HEP. progressing  2. Patient demonstrates independence with Postural Awareness.   3. Patient demonstrates independence with body mechanics.   4. Patient will report pain of 5/10 at worst, on 0-10 pain scale, with all activity     Long Term GOALS: 6 weeks. Pt agrees with goals set.  1. Patient will report sleeping through the night without waking d/t LBP.  2. Patient will demonstrate ability to lift 25lbs from floor to waist level without limitations d/t LBP.   3. Patient demonstrates improved overall function per FOTO Lumbar Survey to 33% Limitation or less.   4. Patient will report pain of 3/10 at worst, on 0-10 pain scale, with all activity  5. Patient will report tolerating walking/standing for at least 20  mins       Plan     Continue to progress lumbar extension exercises an d introduce core strengthening.    Recommended Treatment Plan: 2-3 times per week for 12 weeks with treatments to consist of:  Neuromuscular and postural re-education,  training, therapeutic exercise, therapeutic activities,balance training, gait training, manual therapy, soft tissue mobilization, ROM exercises, Cardiovascular,  Postural stabilization, manual traction, spinal mobilization, moist heat, cryotherapy, electrical stimulation, ultrasound, home exercise education and planning.    Axel Everett, PTA  07/25/2019

## 2019-07-25 ENCOUNTER — TELEPHONE (OUTPATIENT)
Dept: UROLOGY | Facility: CLINIC | Age: 49
End: 2019-07-25

## 2019-07-25 ENCOUNTER — CLINICAL SUPPORT (OUTPATIENT)
Dept: REHABILITATION | Facility: HOSPITAL | Age: 49
End: 2019-07-25
Payer: MEDICAID

## 2019-07-25 ENCOUNTER — OFFICE VISIT (OUTPATIENT)
Dept: UROLOGY | Facility: CLINIC | Age: 49
End: 2019-07-25
Payer: MEDICAID

## 2019-07-25 VITALS
WEIGHT: 228.63 LBS | OXYGEN SATURATION: 99 % | SYSTOLIC BLOOD PRESSURE: 122 MMHG | BODY MASS INDEX: 34.65 KG/M2 | HEART RATE: 72 BPM | DIASTOLIC BLOOD PRESSURE: 86 MMHG | HEIGHT: 68 IN

## 2019-07-25 DIAGNOSIS — M54.31 SCIATICA OF RIGHT SIDE: ICD-10-CM

## 2019-07-25 DIAGNOSIS — R33.9 INCOMPLETE BLADDER EMPTYING: ICD-10-CM

## 2019-07-25 DIAGNOSIS — Z09 FOLLOW-UP EXAM: ICD-10-CM

## 2019-07-25 DIAGNOSIS — N30.01 ACUTE CYSTITIS WITH HEMATURIA: Primary | ICD-10-CM

## 2019-07-25 DIAGNOSIS — Z74.09 IMPAIRED FUNCTIONAL MOBILITY AND ACTIVITY TOLERANCE: ICD-10-CM

## 2019-07-25 DIAGNOSIS — R30.0 DYSURIA: ICD-10-CM

## 2019-07-25 LAB
BILIRUB SERPL-MCNC: NORMAL MG/DL
BLOOD URINE, POC: NORMAL
COLOR, POC UA: YELLOW
GLUCOSE UR QL STRIP: NORMAL
KETONES UR QL STRIP: NORMAL
LEUKOCYTE ESTERASE URINE, POC: NORMAL
NITRITE, POC UA: NORMAL
PH, POC UA: 6
POC RESIDUAL URINE VOLUME: 108 ML (ref 0–100)
PROTEIN, POC: 100
SPECIFIC GRAVITY, POC UA: 1.01
UROBILINOGEN, POC UA: 0.2

## 2019-07-25 PROCEDURE — 99214 PR OFFICE/OUTPT VISIT, EST, LEVL IV, 30-39 MIN: ICD-10-PCS | Mod: S$PBB,,, | Performed by: NURSE PRACTITIONER

## 2019-07-25 PROCEDURE — 99214 OFFICE O/P EST MOD 30 MIN: CPT | Mod: PBBFAC,PO | Performed by: NURSE PRACTITIONER

## 2019-07-25 PROCEDURE — 99999 PR PBB SHADOW E&M-EST. PATIENT-LVL IV: CPT | Mod: PBBFAC,,, | Performed by: NURSE PRACTITIONER

## 2019-07-25 PROCEDURE — 99999 PR PBB SHADOW E&M-EST. PATIENT-LVL IV: ICD-10-PCS | Mod: PBBFAC,,, | Performed by: NURSE PRACTITIONER

## 2019-07-25 PROCEDURE — 51798 US URINE CAPACITY MEASURE: CPT | Mod: PBBFAC,PO | Performed by: NURSE PRACTITIONER

## 2019-07-25 PROCEDURE — 99214 OFFICE O/P EST MOD 30 MIN: CPT | Mod: S$PBB,,, | Performed by: NURSE PRACTITIONER

## 2019-07-25 PROCEDURE — 87086 URINE CULTURE/COLONY COUNT: CPT

## 2019-07-25 PROCEDURE — 81002 URINALYSIS NONAUTO W/O SCOPE: CPT | Mod: PBBFAC,PO | Performed by: NURSE PRACTITIONER

## 2019-07-25 PROCEDURE — 97110 THERAPEUTIC EXERCISES: CPT | Mod: PN

## 2019-07-25 RX ORDER — CIPROFLOXACIN 500 MG/1
500 TABLET ORAL EVERY 12 HOURS
Qty: 28 TABLET | Refills: 0 | Status: SHIPPED | OUTPATIENT
Start: 2019-07-25 | End: 2019-07-30 | Stop reason: ALTCHOICE

## 2019-07-25 RX ORDER — PHENAZOPYRIDINE HYDROCHLORIDE 200 MG/1
200 TABLET, FILM COATED ORAL 3 TIMES DAILY PRN
Qty: 9 TABLET | Refills: 0 | Status: CANCELLED | OUTPATIENT
Start: 2019-07-25 | End: 2019-07-28

## 2019-07-25 NOTE — TELEPHONE ENCOUNTER
----- Message from Kira Liu sent at 7/25/2019  9:49 AM CDT -----  Contact: [phone]  Pt states shes returning your call.

## 2019-07-25 NOTE — PROGRESS NOTES
Subjective:       Patient ID: Arpita Bradshaw is a 49 y.o. female.    Chief Complaint: Urinary Tract Infection and Urinary Frequency (pt states not emptying her bladder.)    Patient is here today for a follow-up for incomplete bladder emptying with PVR check. Patient also has c/o dysuria, urinary frequency, urgency, and straining to void. Patient tamsulosin was increased to 2 capsules (0.8 mg) daily, but feels like it's not helping with her incomplete bladder emptying.     Urinary Tract Infection    This is a new problem. The current episode started in the past 7 days. The problem occurs every urination. The problem has been gradually worsening. The quality of the pain is described as burning. The pain is at a severity of 10/10. The pain is severe. There has been no fever. She is not sexually active. There is no history of pyelonephritis. Associated symptoms include frequency, hesitancy, urgency and constipation (not a full BM; taking linzess.). Pertinent negatives include no behavior changes, chills, discharge, flank pain, hematuria, nausea, possible pregnancy, sweats, vomiting, weight loss, bubble bath use, rash or withholding. She has tried antibiotics (Augmentin (4 pills she had at home).) for the symptoms. The treatment provided no relief. Her past medical history is significant for recurrent UTIs and a urological procedure. There is no history of diabetes mellitus, hypertension, kidney stones or a single kidney.     Review of Systems   Constitutional: Positive for fatigue. Negative for appetite change, chills, fever and weight loss.   Gastrointestinal: Positive for abdominal pain (suprapubic pressure) and constipation (not a full BM; taking linzess.). Negative for diarrhea, nausea and vomiting.   Genitourinary: Positive for difficulty urinating (straining and hesitancy ), dysuria, frequency, hesitancy, pelvic pain and urgency. Negative for decreased urine volume, flank pain, hematuria, menstrual problem,  vaginal bleeding, vaginal discharge and vaginal pain.   Skin: Negative for rash.   Neurological: Positive for headaches. Negative for dizziness.   Psychiatric/Behavioral: Negative.        Objective:      Physical Exam   Constitutional: She is oriented to person, place, and time. She appears well-developed and well-nourished. No distress.   HENT:   Head: Normocephalic and atraumatic.   Eyes: Pupils are equal, round, and reactive to light. EOM are normal.   Neck: Normal range of motion.   Cardiovascular: Normal rate.   Pulmonary/Chest: Effort normal. No respiratory distress.   Abdominal: Soft. There is tenderness.   Genitourinary:   Genitourinary Comments: PVR #1 = 189 mL  PVR #2 = 109 mL   Musculoskeletal: Normal range of motion. She exhibits no edema.   Neurological: She is alert and oriented to person, place, and time. Coordination normal.   Skin: Skin is warm and dry.   Psychiatric: She has a normal mood and affect. Her behavior is normal. Judgment and thought content normal.   Nursing note and vitals reviewed.      Assessment:       1. Acute cystitis with hematuria    2. Dysuria    3. Incomplete bladder emptying    4. Follow-up exam        Plan:       Arpita was seen today for urinary tract infection and urinary frequency.    Diagnoses and all orders for this visit:    Acute cystitis with hematuria  -     POCT URINE DIPSTICK WITHOUT MICROSCOPE  -     Urine culture  -     ciprofloxacin HCl (CIPRO) 500 MG tablet; Take 1 tablet (500 mg total) by mouth every 12 (twelve) hours. for 14 days    Dysuria    Incomplete bladder emptying  -     POCT URINE DIPSTICK WITHOUT MICROSCOPE  -     Urine culture  -     POCT Bladder Scan    Follow-up exam  -     POCT URINE DIPSTICK WITHOUT MICROSCOPE  -     Urine culture    Other orders  1. Continue to take Uribel for bladder discomfort.   2. Continue tamsulosin 0.8 mg ( 2 capsules) daily.   3. Patient will start eating prunes because it helps her have a better BM. Increase water  intake and eat a high fiber diet to prevent constipation.     Follow-up on Tuesday, 7/30/19 at 3:30 am for PVR.     Francie Cazares NP

## 2019-07-25 NOTE — PATIENT INSTRUCTIONS
Bladder scan (PVR)  Urine dipstick  Urine cx  Start taking Cipro for UTI.   Continue to take Uribel for bladder discomfort.   Continue tamsulosin 0.8 mg ( 2 capsules) daily.   Increase water intake and eat a high fiber diet to prevent constipation.   Follow-up on Tuesday, 7/30/19 at 3:30 pm for PVR.

## 2019-07-26 LAB
BACTERIA UR CULT: NORMAL
BACTERIA UR CULT: NORMAL

## 2019-07-29 ENCOUNTER — TELEPHONE (OUTPATIENT)
Dept: UROLOGY | Facility: CLINIC | Age: 49
End: 2019-07-29

## 2019-07-29 NOTE — TELEPHONE ENCOUNTER
----- Message from Farncie Cazares NP sent at 7/29/2019 10:22 AM CDT -----  Please inform patient her urine cx showed multiple bacteria, but none in predominance. If urinary symptoms still present, please repeat urine cx.

## 2019-07-30 ENCOUNTER — OFFICE VISIT (OUTPATIENT)
Dept: UROLOGY | Facility: CLINIC | Age: 49
End: 2019-07-30
Payer: MEDICAID

## 2019-07-30 ENCOUNTER — CLINICAL SUPPORT (OUTPATIENT)
Dept: REHABILITATION | Facility: HOSPITAL | Age: 49
End: 2019-07-30
Payer: MEDICAID

## 2019-07-30 VITALS
WEIGHT: 228 LBS | DIASTOLIC BLOOD PRESSURE: 80 MMHG | BODY MASS INDEX: 34.56 KG/M2 | OXYGEN SATURATION: 98 % | SYSTOLIC BLOOD PRESSURE: 142 MMHG | HEART RATE: 86 BPM | RESPIRATION RATE: 19 BRPM | HEIGHT: 68 IN

## 2019-07-30 DIAGNOSIS — R33.9 INCOMPLETE BLADDER EMPTYING: ICD-10-CM

## 2019-07-30 DIAGNOSIS — M54.31 SCIATICA OF RIGHT SIDE: ICD-10-CM

## 2019-07-30 DIAGNOSIS — R35.0 URINARY FREQUENCY: ICD-10-CM

## 2019-07-30 DIAGNOSIS — R35.1 NOCTURIA: ICD-10-CM

## 2019-07-30 DIAGNOSIS — N30.10 INTERSTITIAL CYSTITIS: ICD-10-CM

## 2019-07-30 DIAGNOSIS — R30.0 DYSURIA: ICD-10-CM

## 2019-07-30 DIAGNOSIS — R39.16 STRAINING TO VOID: ICD-10-CM

## 2019-07-30 DIAGNOSIS — N30.01 ACUTE CYSTITIS WITH HEMATURIA: Primary | ICD-10-CM

## 2019-07-30 DIAGNOSIS — Z74.09 IMPAIRED FUNCTIONAL MOBILITY AND ACTIVITY TOLERANCE: ICD-10-CM

## 2019-07-30 DIAGNOSIS — R39.15 URINARY URGENCY: ICD-10-CM

## 2019-07-30 LAB
BILIRUB SERPL-MCNC: NORMAL MG/DL
BLOOD URINE, POC: NORMAL
COLOR, POC UA: YELLOW
GLUCOSE UR QL STRIP: NORMAL
KETONES UR QL STRIP: NORMAL
LEUKOCYTE ESTERASE URINE, POC: NORMAL
NITRITE, POC UA: NORMAL
PH, POC UA: 6
POC RESIDUAL URINE VOLUME: 114 ML (ref 0–100)
PROTEIN, POC: NORMAL
SPECIFIC GRAVITY, POC UA: 1.01
UROBILINOGEN, POC UA: 0.2

## 2019-07-30 PROCEDURE — 99214 OFFICE O/P EST MOD 30 MIN: CPT | Mod: S$PBB,,, | Performed by: NURSE PRACTITIONER

## 2019-07-30 PROCEDURE — 87086 URINE CULTURE/COLONY COUNT: CPT

## 2019-07-30 PROCEDURE — 99999 PR PBB SHADOW E&M-EST. PATIENT-LVL V: CPT | Mod: PBBFAC,,, | Performed by: NURSE PRACTITIONER

## 2019-07-30 PROCEDURE — 99214 PR OFFICE/OUTPT VISIT, EST, LEVL IV, 30-39 MIN: ICD-10-PCS | Mod: S$PBB,,, | Performed by: NURSE PRACTITIONER

## 2019-07-30 PROCEDURE — 81002 URINALYSIS NONAUTO W/O SCOPE: CPT | Mod: PBBFAC,PO | Performed by: NURSE PRACTITIONER

## 2019-07-30 PROCEDURE — 87186 SC STD MICRODIL/AGAR DIL: CPT

## 2019-07-30 PROCEDURE — 51798 US URINE CAPACITY MEASURE: CPT | Mod: PBBFAC,PO | Performed by: NURSE PRACTITIONER

## 2019-07-30 PROCEDURE — 97110 THERAPEUTIC EXERCISES: CPT | Mod: PN

## 2019-07-30 PROCEDURE — 99215 OFFICE O/P EST HI 40 MIN: CPT | Mod: PBBFAC,PO | Performed by: NURSE PRACTITIONER

## 2019-07-30 PROCEDURE — 87077 CULTURE AEROBIC IDENTIFY: CPT

## 2019-07-30 PROCEDURE — 99999 PR PBB SHADOW E&M-EST. PATIENT-LVL V: ICD-10-PCS | Mod: PBBFAC,,, | Performed by: NURSE PRACTITIONER

## 2019-07-30 PROCEDURE — 87088 URINE BACTERIA CULTURE: CPT

## 2019-07-30 RX ORDER — FLUCONAZOLE 150 MG/1
150 TABLET ORAL DAILY
Qty: 1 TABLET | Refills: 1 | Status: SHIPPED | OUTPATIENT
Start: 2019-07-30 | End: 2019-07-31

## 2019-07-30 RX ORDER — AMOXICILLIN AND CLAVULANATE POTASSIUM 875; 125 MG/1; MG/1
1 TABLET, FILM COATED ORAL EVERY 12 HOURS
Qty: 20 TABLET | Refills: 0 | Status: SHIPPED | OUTPATIENT
Start: 2019-07-30 | End: 2019-08-02 | Stop reason: ALTCHOICE

## 2019-07-30 NOTE — PATIENT INSTRUCTIONS
1. Bladder scan (PVR).  2. Urine dipstick  3. Urine cx  4. Continue tamsulosin 0.8 mg daily (2 capsules daily) for incomplete bladder emptying.   5. Discontinue Uribel at this time.  6. Start Augmentin antibiotic for UTI. Discontinue Cipro.   7. Patient may need cytoscopy once UTI resolved.   8. Follow-up with Dr. Lua in 1 week.

## 2019-07-30 NOTE — PROGRESS NOTES
"  Physical Therapy Daily Treatment Note     Name: Arpita Bradshaw  Clinic Number: [id number]    Therapy Diagnosis:   Encounter Diagnoses   Name Primary?    Sciatica of right side     Impaired functional mobility and activity tolerance      Physician: Chadd Langford MD    Visit Date: 7/30/2019    Physician Orders: PT Eval and Treat   Medical Diagnosis from Referral:   M79.7 (ICD-10-CM) - Fibromyalgia   M54.5 (ICD-10-CM) - Low back pain        Evaluation Date: 6/12/2019  Authorization Period Expiration: 9/12/2019  Plan of Care Expiration: 9/12/2019  Visit # / Visits authorized: 9 / 20    Time In: 0800  Time Out: 0910  Total Billable Time: 55 minutes    Precautions: Standard    Subjective     Pt reports: Had some soreness after last session. She reports the pain is a little better in low back and about the same in right glut.     She was fairly compliant with home exercise program.  Response to previous treatment: Only muscular soreness  Functional change: Reduced pain, walking longer distances     Pain: 6/10  Location: bilateral back and R LE    Objective       Arpita received therapeutic exercises to develop strength, ROM, flexibility and core stabilization for 60 minutes including:     Nustep x 10 mins    LTR 20x   PITER with theraball  20x  SKTC 10x  Pelvic tilts 2 x 10  Supine marching 2 x 20  SL clamshells w/ YTB 2 x 15  Sciatic nerve glide x 20   Active hamstring stretch x 20 ea   Torso rotation on Med X resistance machine 15x   Med X lumbar extension machine 15x 24 lbs  Med X hip abduction 15 lbs 15x    Left trunk shift repeated, initailly better, mild increase in right low back pain, no change in glut symptoms or extension mechanics     Not performed today from previous HEP/sessions:   Supine Figure 4 Piriformis Stretch 10x 10 secs - towel assist  Hip/back disassociation 10x   Sit<>stand squat training 10x   Squats to 18" box 5 reps, 3 sets    Anti rotations Free motion 7# x 20     Education provided: "   - continued compliance with HEP    Written Home Exercises Provided: Patient instructed to cont prior HEP.  Exercises were reviewed and Arpita was able to demonstrate them prior to the end of the session.  Arpita demonstrated good  understanding of the education provided.     See EMR under Patient Instructions for exercises provided 7/30/2019.    Assessment     Patient tolerated today's treatment session well with no overall change in symptoms. Reassessed tolerance to repeated lumbar motions with pt demonstrating initial reduced symptoms with left side-glide but overall increased right low back symptoms and no change in glut symptoms. Pt reported she was pushing to the point of a the most discomfort instead of initial stretch point and it may have just irritated her right low back. Unable to define if pt has relevant lateral component at this time. Pt was able to tolerate use of Med X lumbar extension resistance machine without increased glut or low back symptoms today but did feel muscle exertion. Plan to test on Med X lumbar extension program next session.     Arpita is progressing well towards her goals.   Pt prognosis is Good.     Pt will continue to benefit from skilled outpatient physical therapy to address the deficits listed in the problem list box on initial evaluation, provide pt/family education and to maximize pt's level of independence in the home and community environment.     Pt's spiritual, cultural and educational needs considered and pt agreeable to plan of care and goals.     Anticipated barriers to physical therapy: none    Short Term GOALS: 3 weeks. Pt agrees with goals set.  1. Patient demonstrates independence with HEP. progressing  2. Patient demonstrates independence with Postural Awareness.   3. Patient demonstrates independence with body mechanics.   4. Patient will report pain of 5/10 at worst, on 0-10 pain scale, with all activity     Long Term GOALS: 6 weeks. Pt agrees with goals  set.  1. Patient will report sleeping through the night without waking d/t LBP.  2. Patient will demonstrate ability to lift 25lbs from floor to waist level without limitations d/t LBP.   3. Patient demonstrates improved overall function per FOTO Lumbar Survey to 33% Limitation or less.   4. Patient will report pain of 3/10 at worst, on 0-10 pain scale, with all activity  5. Patient will report tolerating walking/standing for at least 20 mins       Plan     Continue to progress lumbar extension exercises an d introduce core strengthening.    Recommended Treatment Plan: 2-3 times per week for 12 weeks with treatments to consist of:  Neuromuscular and postural re-education,  training, therapeutic exercise, therapeutic activities,balance training, gait training, manual therapy, soft tissue mobilization, ROM exercises, Cardiovascular,  Postural stabilization, manual traction, spinal mobilization, moist heat, cryotherapy, electrical stimulation, ultrasound, home exercise education and planning.    Giselle Beaver, PT  07/30/2019

## 2019-07-30 NOTE — PROGRESS NOTES
Subjective:       Patient ID: Arpita Bradshaw is a 49 y.o. female.    Chief Complaint: Urinary Frequency; Dysuria; and Bladder Pain    Patient is here today for a follow-up for UTI and incomplete bladder emptying. Patient is currently taking tamsulosin 0.8 mg daily for incomplete bladder emptying and Cipro for UTI. She reports her symptoms are the same with no improvement since starting medications. She has complaints of straining with urination, frequency, urgency, and suprapubic pressure. Patient has a hx of I.C. with bladder fulguration performed on 6/6/2019.     Urinary Tract Infection    This is a recurrent problem. The problem occurs every urination. The problem has been unchanged. The quality of the pain is described as burning. The pain is at a severity of 6/10. The pain is moderate. There has been no fever. She is not sexually active. There is no history of pyelonephritis. Associated symptoms include frequency, hesitancy and urgency. Pertinent negatives include no behavior changes, flank pain, hematuria, nausea, vomiting, bubble bath use or constipation. Her past medical history is significant for recurrent UTIs and a urological procedure. There is no history of diabetes mellitus, hypertension, kidney stones or a single kidney.     Review of Systems   Gastrointestinal: Negative for constipation, nausea and vomiting.   Genitourinary: Positive for difficulty urinating, dysuria, frequency, hesitancy, pelvic pain and urgency. Negative for decreased urine volume, flank pain, hematuria, vaginal bleeding, vaginal discharge and vaginal pain.        Incomplete bladder emptying.  Straining to void.  Nocturia x2.  Cloudy urine.    Musculoskeletal: Positive for back pain (lower ).       Objective:      Physical Exam   Constitutional: She is oriented to person, place, and time. No distress.   Obese   HENT:   Head: Normocephalic and atraumatic.   Eyes: Pupils are equal, round, and reactive to light. EOM are normal.    Neck: Normal range of motion.   Cardiovascular: Normal rate.   Pulmonary/Chest: Effort normal. No respiratory distress.   Abdominal: Soft. There is no tenderness.   Genitourinary:   Genitourinary Comments: PVR = 114 mL   Musculoskeletal: Normal range of motion. She exhibits no edema.   Neurological: She is alert and oriented to person, place, and time. Coordination normal.   Skin: Skin is warm and dry.   Psychiatric: She has a normal mood and affect. Her behavior is normal. Judgment and thought content normal.   Nursing note and vitals reviewed.      Assessment:       1. Acute cystitis with hematuria    2. Dysuria    3. Incomplete bladder emptying    4. Straining to void    5. Urinary frequency    6. Urinary urgency    7. Nocturia    8. Interstitial cystitis        Plan:       Arpita was seen today for urinary frequency.    Diagnoses and all orders for this visit:    Acute cystitis with hematuria  -     fluconazole (DIFLUCAN) 150 MG Tab; Take 1 tablet (150 mg total) by mouth once daily. May repeat dose in 3 days if needed. for 1 day  -     amoxicillin-clavulanate 875-125mg (AUGMENTIN) 875-125 mg per tablet; Take 1 tablet by mouth every 12 (twelve) hours. for 10 days  -     POCT URINE DIPSTICK WITHOUT MICROSCOPE  -     Urine culture    Dysuria    Incomplete bladder emptying  -     POCT Bladder Scan    Straining to void  -     POCT Bladder Scan    Urinary frequency  -     POCT URINE DIPSTICK WITHOUT MICROSCOPE  -     Urine culture    Urinary urgency  -     POCT URINE DIPSTICK WITHOUT MICROSCOPE  -     Urine culture    Nocturia  -     POCT URINE DIPSTICK WITHOUT MICROSCOPE  -     Urine culture    Interstitial cystitis    Other orders  1. Continue tamsulosin 0.8 mg daily (2 capsules daily) for incomplete bladder emptying.   2. Discontinue Uribel (medication has an anti-spasmatic component).     3. Start Augmentin antibiotic for UTI until urine cx is resulted. Discontinue Cipro.  4. Patient needs cytoscopy once UTI  resolved.      Follow-up in 1 week with Dr. Lua.     Francie Cazares NP

## 2019-08-01 LAB — BACTERIA UR CULT: ABNORMAL

## 2019-08-02 ENCOUNTER — TELEPHONE (OUTPATIENT)
Dept: UROLOGY | Facility: CLINIC | Age: 49
End: 2019-08-02

## 2019-08-02 DIAGNOSIS — N30.01 ACUTE CYSTITIS WITH HEMATURIA: Primary | ICD-10-CM

## 2019-08-02 RX ORDER — NITROFURANTOIN 25; 75 MG/1; MG/1
100 CAPSULE ORAL 2 TIMES DAILY
Qty: 20 CAPSULE | Refills: 0 | Status: SHIPPED | OUTPATIENT
Start: 2019-08-02 | End: 2019-08-12

## 2019-08-02 NOTE — TELEPHONE ENCOUNTER
----- Message from Francie Cazares NP sent at 8/2/2019  9:10 AM CDT -----  Patient has a UTI. She is currently taking Augmentin which will only provide intermediate coverage. Her antibiotic needs to be changed to Macrobid for full coverage. Script sent to [facility]. Please inform patient.

## 2019-08-06 ENCOUNTER — OFFICE VISIT (OUTPATIENT)
Dept: UROLOGY | Facility: CLINIC | Age: 49
End: 2019-08-06
Payer: MEDICAID

## 2019-08-06 ENCOUNTER — CLINICAL SUPPORT (OUTPATIENT)
Dept: REHABILITATION | Facility: HOSPITAL | Age: 49
End: 2019-08-06
Payer: MEDICAID

## 2019-08-06 VITALS
TEMPERATURE: 99 F | SYSTOLIC BLOOD PRESSURE: 131 MMHG | DIASTOLIC BLOOD PRESSURE: 80 MMHG | HEART RATE: 67 BPM | WEIGHT: 228 LBS | HEIGHT: 68 IN | BODY MASS INDEX: 34.56 KG/M2

## 2019-08-06 DIAGNOSIS — R10.2 PELVIC PAIN IN FEMALE: ICD-10-CM

## 2019-08-06 DIAGNOSIS — N30.10 INTERSTITIAL CYSTITIS: ICD-10-CM

## 2019-08-06 DIAGNOSIS — N31.9 NEUROGENIC BLADDER: Primary | ICD-10-CM

## 2019-08-06 DIAGNOSIS — N39.0 RECURRENT UTI: ICD-10-CM

## 2019-08-06 DIAGNOSIS — M54.31 SCIATICA OF RIGHT SIDE: ICD-10-CM

## 2019-08-06 DIAGNOSIS — Z74.09 IMPAIRED FUNCTIONAL MOBILITY AND ACTIVITY TOLERANCE: ICD-10-CM

## 2019-08-06 DIAGNOSIS — N30.80 CYSTITIS CYSTICA: ICD-10-CM

## 2019-08-06 LAB — POC RESIDUAL URINE VOLUME: 132 ML (ref 0–100)

## 2019-08-06 PROCEDURE — 51798 US URINE CAPACITY MEASURE: CPT | Mod: PBBFAC,PO | Performed by: UROLOGY

## 2019-08-06 PROCEDURE — 97750 PHYSICAL PERFORMANCE TEST: CPT | Mod: PN

## 2019-08-06 PROCEDURE — 99213 OFFICE O/P EST LOW 20 MIN: CPT | Mod: PBBFAC,PO | Performed by: UROLOGY

## 2019-08-06 PROCEDURE — 87086 URINE CULTURE/COLONY COUNT: CPT

## 2019-08-06 PROCEDURE — 99214 OFFICE O/P EST MOD 30 MIN: CPT | Mod: S$PBB,,, | Performed by: UROLOGY

## 2019-08-06 PROCEDURE — 97110 THERAPEUTIC EXERCISES: CPT | Mod: PN

## 2019-08-06 PROCEDURE — 99214 PR OFFICE/OUTPT VISIT, EST, LEVL IV, 30-39 MIN: ICD-10-PCS | Mod: S$PBB,,, | Performed by: UROLOGY

## 2019-08-06 PROCEDURE — 99999 PR PBB SHADOW E&M-EST. PATIENT-LVL III: ICD-10-PCS | Mod: PBBFAC,,, | Performed by: UROLOGY

## 2019-08-06 PROCEDURE — 99999 PR PBB SHADOW E&M-EST. PATIENT-LVL III: CPT | Mod: PBBFAC,,, | Performed by: UROLOGY

## 2019-08-06 RX ORDER — METHENAMINE HIPPURATE 1000 MG/1
1 TABLET ORAL 2 TIMES DAILY
Qty: 60 TABLET | Refills: 11 | Status: ON HOLD | OUTPATIENT
Start: 2019-08-06 | End: 2020-10-12 | Stop reason: CLARIF

## 2019-08-06 RX ORDER — FLUCONAZOLE 200 MG/1
200 TABLET ORAL DAILY
Qty: 10 TABLET | Refills: 0 | Status: SHIPPED | OUTPATIENT
Start: 2019-08-06 | End: 2019-08-16

## 2019-08-06 RX ORDER — DOXYCYCLINE 100 MG/1
100 CAPSULE ORAL EVERY 12 HOURS
Qty: 28 CAPSULE | Refills: 0 | Status: SHIPPED | OUTPATIENT
Start: 2019-08-06 | End: 2019-08-20

## 2019-08-06 NOTE — PROGRESS NOTES
"  Physical Therapy Daily Treatment Note     Name: Arpita Bradshaw  Clinic Number: [id number]    Therapy Diagnosis:   Encounter Diagnoses   Name Primary?    Sciatica of right side     Impaired functional mobility and activity tolerance      Physician: Chadd Langford MD    Visit Date: 8/6/2019    Physician Orders: PT Eval and Treat   Medical Diagnosis from Referral:   M79.7 (ICD-10-CM) - Fibromyalgia   M54.5 (ICD-10-CM) - Low back pain        Evaluation Date: 6/12/2019  Authorization Period Expiration: 9/12/2019  Plan of Care Expiration: 9/12/2019  Visit # / Visits authorized: 10 / 20    Time In: 0701  Time Out: 08005  Total Billable Time: 55 minutes    Precautions: Standard    Subjective     Pt reports: Had some soreness after last session. She reports she feels really achy all over her body today.     She was fairly compliant with home exercise program about every other day.   Response to previous treatment: Only muscular soreness  Functional change: Reduced pain, walking longer distances     Pain: 8/10  Location: bilateral back and R LE    Objective       Arpita received therapeutic exercises to develop strength, ROM, flexibility and core stabilization for 60 minutes including:     Nustep x 8 mins    LTR 20x   PITER with theraball  20x  SKTC 10x  Pelvic tilts 2 x 10  Supine marching 2 x 20  SL clamshells w/ YTB 2 x 15  Sciatic nerve glide x 20   Active hamstring stretch x 20 ea     Med X lumbar extension Isometric testing 15 minutes     Torso rotation on Med X resistance machine 1 minute 30 seconds  Med X hip abduction 15 lbs 15x    Not performed today from previous HEP/sessions:   Supine Figure 4 Piriformis Stretch 10x 10 secs - towel assist  Hip/back disassociation 10x   Sit<>stand squat training 10x   Squats to 18" box 5 reps, 3 sets    Anti rotations Free motion 7# x 20     Education provided:   - continued compliance with HEP    Written Home Exercises Provided: Patient instructed to cont prior " HEP.  Exercises were reviewed and Arpita was able to demonstrate them prior to the end of the session.  Arpita demonstrated good  understanding of the education provided.     See EMR under Patient Instructions for exercises provided 8/6/2019.    Assessment     Patient tolerated today's treatment session well with no overall change in symptoms. Performed isometric testing on Med X lumbar extension machine with pt demonstrating significantly decreased lumbar strength and ROM compared to norms by 72%. Pt was able to tolerate warm up at 45 ft/lbs but reported some initial pain. Recommend starting weight of 35-40 ft/lbs depending on level of DOMS after testing. Pt encouraged to attempt trial of progressive resistance exercise with Med X machines to improve functional strength and tolerance. Pt reports she has felt better with pool exercise in the past. Pt also educated on availability of aquatic therapy as an alternative option if she does not have improved pain or functional tolerance with this type of outpatient PT within 1 month of consistent exercise.       Arpita is progressing well towards her goals.   Pt prognosis is Good.     Pt will continue to benefit from skilled outpatient physical therapy to address the deficits listed in the problem list box on initial evaluation, provide pt/family education and to maximize pt's level of independence in the home and community environment.     Pt's spiritual, cultural and educational needs considered and pt agreeable to plan of care and goals.     Anticipated barriers to physical therapy: none    Short Term GOALS: 3 weeks. Pt agrees with goals set.  1. Patient demonstrates independence with HEP. progressing  2. Patient demonstrates independence with Postural Awareness.   3. Patient demonstrates independence with body mechanics.   4. Patient will report pain of 5/10 at worst, on 0-10 pain scale, with all activity     Long Term GOALS: 6 weeks. Pt agrees with goals set.  1.  Patient will report sleeping through the night without waking d/t LBP.  2. Patient will demonstrate ability to lift 25lbs from floor to waist level without limitations d/t LBP.   3. Patient demonstrates improved overall function per FOTO Lumbar Survey to 33% Limitation or less.   4. Patient will report pain of 3/10 at worst, on 0-10 pain scale, with all activity  5. Patient will report tolerating walking/standing for at least 20 mins       Plan     Continue to progress  exercises    Recommended Treatment Plan: 2-3 times per week for 12 weeks with treatments to consist of:  Neuromuscular and postural re-education,  training, therapeutic exercise, therapeutic activities,balance training, gait training, manual therapy, soft tissue mobilization, ROM exercises, Cardiovascular,  Postural stabilization, manual traction, spinal mobilization, moist heat, cryotherapy, electrical stimulation, ultrasound, home exercise education and planning.    Giselle Beaver, PT  08/06/2019

## 2019-08-06 NOTE — PROGRESS NOTES
Subjective:       Patient ID: Arpita Bradshaw is a 49 y.o. female.    Chief Complaint: Urinary Incontinence and Other (discuss scheduling a cysto - per yannika)    50 yo AAF with history of recurrent UTI, Dysuria, Stranguria and incomplete bladder emptying. On Macrobid for recent E. Coli UTI and now with diarrhea. Off Urimar-T and bladder spasm became severe. Here for another evaluation and opinion.  PVR is 132. Interstim off x 2 months.    Urinary Tract Infection    This is a recurrent problem. The current episode started more than 1 month ago. The problem occurs every urination. The problem has been waxing and waning. The quality of the pain is described as shooting and aching. The pain is at a severity of 7/10. The pain is moderate. There has been no fever. There is no history of pyelonephritis. Associated symptoms include frequency and urgency. Pertinent negatives include no behavior changes, chills, discharge, flank pain, hematuria, hesitancy, nausea, possible pregnancy, sweats, vomiting, weight loss, bubble bath use, constipation, rash or withholding. She has tried antibiotics and increased fluids for the symptoms. The treatment provided no relief. Her past medical history is significant for recurrent UTIs and a urological procedure. There is no history of catheterization, diabetes insipidus, diabetes mellitus, genitourinary reflux, hypertension, kidney stones, a single kidney, STD or urinary stasis.     Review of Systems   Constitutional: Negative for activity change, appetite change, chills, fatigue, fever and weight loss.   HENT: Negative for congestion, ear pain, hearing loss, nosebleeds, sinus pressure, sore throat and trouble swallowing.    Eyes: Negative for pain and visual disturbance.   Respiratory: Negative for apnea, cough and shortness of breath.    Cardiovascular: Negative for chest pain and leg swelling.   Gastrointestinal: Negative for abdominal distention, abdominal pain, anal bleeding,  blood in stool, constipation, diarrhea, nausea, rectal pain and vomiting.   Endocrine: Negative for cold intolerance, heat intolerance, polydipsia, polyphagia and polyuria.   Genitourinary: Positive for frequency and urgency. Negative for decreased urine volume, difficulty urinating, dyspareunia, dysuria, enuresis, flank pain, genital sores, hematuria, hesitancy, menstrual problem, pelvic pain, vaginal bleeding, vaginal discharge and vaginal pain.   Musculoskeletal: Negative for arthralgias and back pain.   Skin: Negative for color change, pallor and rash.   Allergic/Immunologic: Negative for environmental allergies, food allergies and immunocompromised state.   Neurological: Negative for dizziness, speech difficulty, weakness and headaches.   Hematological: Negative for adenopathy. Does not bruise/bleed easily.   Psychiatric/Behavioral: Negative.        Objective:      Physical Exam   Nursing note and vitals reviewed.  Constitutional: She is oriented to person, place, and time. She appears well-developed and well-nourished.   HENT:   Head: Normocephalic.   Nose: Nose normal.   Mouth/Throat: Oropharynx is clear and moist.   Eyes: Conjunctivae and EOM are normal. Pupils are equal, round, and reactive to light.   Neck: Normal range of motion. Neck supple.   Cardiovascular: Normal rate, regular rhythm, normal heart sounds and intact distal pulses.    Pulmonary/Chest: Effort normal and breath sounds normal.   Abdominal: Soft. Bowel sounds are normal.   Genitourinary: Vagina normal.   Genitourinary Comments: No CVAT or Bladder tenderness.     Musculoskeletal: Normal range of motion.   Neurological: She is alert and oriented to person, place, and time. She has normal reflexes.   Skin: Skin is warm and dry.     Psychiatric: She has a normal mood and affect. Her behavior is normal. Judgment and thought content normal.       Assessment:       1. Neurogenic bladder    2. Recurrent UTI    3. Cystitis cystica    4. Interstitial  cystitis    5. Pelvic pain in female        Plan:       Patient Instructions   Add Doxycycline twice daily  Urex 1 gram twice daily  Finish Macrobid  Resume Urimar-T  Resume Interstim therapy  Diflucan for 3 days now. Repeat after antibiotics are finished and as needed after that.

## 2019-08-06 NOTE — PATIENT INSTRUCTIONS
Add Doxycycline twice daily  Urex 1 gram twice daily  Finish Macrobid  Resume Urimar-T  Resume Interstim therapy  Diflucan for 3 days now. Repeat after antibiotics are finished and as needed after that.

## 2019-08-07 LAB — BACTERIA UR CULT: NORMAL

## 2019-08-09 ENCOUNTER — CLINICAL SUPPORT (OUTPATIENT)
Dept: REHABILITATION | Facility: HOSPITAL | Age: 49
End: 2019-08-09
Payer: MEDICAID

## 2019-08-09 DIAGNOSIS — M54.31 SCIATICA OF RIGHT SIDE: ICD-10-CM

## 2019-08-09 DIAGNOSIS — Z74.09 IMPAIRED FUNCTIONAL MOBILITY AND ACTIVITY TOLERANCE: ICD-10-CM

## 2019-08-09 PROCEDURE — 97110 THERAPEUTIC EXERCISES: CPT | Mod: PN

## 2019-08-09 NOTE — PROGRESS NOTES
"  Physical Therapy Daily Treatment Note     Name: Arpita [place] Augustine  Clinic Number: [id number]    Therapy Diagnosis:   Encounter Diagnoses   Name Primary?    Sciatica of right side     Impaired functional mobility and activity tolerance      Physician: Chadd Langford MD    Visit Date: 8/9/2019    Physician Orders: PT Eval and Treat   Medical Diagnosis from Referral:   M79.7 (ICD-10-CM) - Fibromyalgia   M54.5 (ICD-10-CM) - Low back pain        Evaluation Date: 6/12/2019  Authorization Period Expiration: 9/12/2019  Plan of Care Expiration: 9/12/2019  Visit # / Visits authorized: 11 / 20    Time In:  3:00  Time Out: 4:00     Total Billable Time: 30 minutes    Precautions: Standard    Subjective     Pt reports: Had had no significant soreness after last session. She reports she feels better today than last session.     She was fairly compliant with home exercise program about every other day.   Response to previous treatment: Only muscular soreness  Functional change: Reduced pain, walking longer distances     Pain: 6/10  Location: bilateral back and R LE    Objective       Arpita received therapeutic exercises to develop strength, ROM, flexibility and core stabilization for 60 minutes including:     Nustep x 8 mins    LTR 20x   PITER with theraball  20x  SKTC 10x  Pelvic tilts 2 x 10    Sciatic nerve glide x 20   Active hamstring stretch x 20 ea     Med X lumbar extension dynamic exercise 35ft/lbs 15x 4/40 exertion   Med X resistance exercise as recorded in flowsheet.         Not performed today from previous HEP/sessions:   Supine Figure 4 Piriformis Stretch 10x 10 secs - towel assist  Hip/back disassociation 10x   Sit<>stand squat training 10x   Squats to 18" box 5 reps, 3 sets    Anti rotations Free motion 7# x 20   Supine marching 2 x 20  SL clamshells w/ YTB 2 x 15    Manual Therapy: Prone lumbar STM with biofreeze lotion, lower lumbar PAs grade 2-3 10 minutes   Education provided:   - continued compliance " with HEP    Written Home Exercises Provided: Patient instructed to cont prior HEP.  Exercises were reviewed and Arpita was able to demonstrate them prior to the end of the session.  Arpita demonstrated good  understanding of the education provided.     See EMR under Patient Instructions for exercises provided 8/9/2019.    Assessment     Patient tolerated today's treatment session well with minimally reduced symptoms. Performed dyamic exercise on Med X lumbar extension machine with 35 ft/lbs without increased symptoms  Pt was also able to tolerate Med X resistance exercise without adverse affects today. Performed manual STM and gentle lower lumbar mobilizations with pt reporting mildly reduced pain and improved ROM. Plan to attempt again next session as time permits.     Arpita is progressing well towards her goals.   Pt prognosis is Good.     Pt will continue to benefit from skilled outpatient physical therapy to address the deficits listed in the problem list box on initial evaluation, provide pt/family education and to maximize pt's level of independence in the home and community environment.     Pt's spiritual, cultural and educational needs considered and pt agreeable to plan of care and goals.     Anticipated barriers to physical therapy: none    Short Term GOALS: 3 weeks. Pt agrees with goals set.  1. Patient demonstrates independence with HEP. progressing  2. Patient demonstrates independence with Postural Awareness.   3. Patient demonstrates independence with body mechanics.   4. Patient will report pain of 5/10 at worst, on 0-10 pain scale, with all activity     Long Term GOALS: 6 weeks. Pt agrees with goals set.  1. Patient will report sleeping through the night without waking d/t LBP.  2. Patient will demonstrate ability to lift 25lbs from floor to waist level without limitations d/t LBP.   3. Patient demonstrates improved overall function per FOTO Lumbar Survey to 33% Limitation or less.   4. Patient  will report pain of 3/10 at worst, on 0-10 pain scale, with all activity  5. Patient will report tolerating walking/standing for at least 20 mins       Plan     Continue to progress  exercises    Recommended Treatment Plan: 2-3 times per week for 12 weeks with treatments to consist of:  Neuromuscular and postural re-education,  training, therapeutic exercise, therapeutic activities,balance training, gait training, manual therapy, soft tissue mobilization, ROM exercises, Cardiovascular,  Postural stabilization, manual traction, spinal mobilization, moist heat, cryotherapy, electrical stimulation, ultrasound, home exercise education and planning.    Giselle Beaver, PT  08/11/2019

## 2019-08-13 ENCOUNTER — CLINICAL SUPPORT (OUTPATIENT)
Dept: REHABILITATION | Facility: HOSPITAL | Age: 49
End: 2019-08-13
Payer: MEDICAID

## 2019-08-13 DIAGNOSIS — Z74.09 IMPAIRED FUNCTIONAL MOBILITY AND ACTIVITY TOLERANCE: ICD-10-CM

## 2019-08-13 DIAGNOSIS — M54.31 SCIATICA OF RIGHT SIDE: ICD-10-CM

## 2019-08-13 PROCEDURE — 97110 THERAPEUTIC EXERCISES: CPT | Mod: PN

## 2019-08-13 NOTE — PROGRESS NOTES
"  Physical Therapy Daily Treatment Note     Name: Arpita [place] Augustine  Clinic Number: [id number]    Therapy Diagnosis:   Encounter Diagnoses   Name Primary?    Sciatica of right side     Impaired functional mobility and activity tolerance      Physician: Chadd Langford MD    Visit Date: 8/13/2019    Physician Orders: PT Eval and Treat   Medical Diagnosis from Referral:   M79.7 (ICD-10-CM) - Fibromyalgia   M54.5 (ICD-10-CM) - Low back pain        Evaluation Date: 6/12/2019  Authorization Period Expiration: 9/12/2019  Plan of Care Expiration: 9/12/2019  Visit # / Visits authorized: 12 / 20    Time In:  1:30  Time Out: 2:30     Total Billable Time: 40 minutes    Precautions: Standard    Subjective     Pt reports: moderate lumbar and thigh soreness after last session. She reports she anatoly pretty good the day of the session but has had some soreness over the weekend.      She was fairly compliant with home exercise program every day this weekend.   Response to previous treatment: Only muscular soreness  Functional change: Reduced pain, walking longer distances     Pain: 7/10  Location: bilateral back and R LE    Objective       Arpita received therapeutic exercises to develop strength, ROM, flexibility and core stabilization for 60 minutes including:     Nustep x 8 mins    LTR 20x   PITER with theraball  20x  SKTC 10x  Pelvic tilts 2 x 10    Sciatic nerve glide x 20   Active hamstring stretch x 20 ea     Med X lumbar extension dynamic exercise 35ft/lbs 15x 4/40 exertion   Med X resistance exercise as recorded in flowsheet.         Not performed today from previous HEP/sessions:   Supine Figure 4 Piriformis Stretch 10x 10 secs - towel assist  Hip/back disassociation 10x   Sit<>stand squat training 10x   Squats to 18" box 5 reps, 3 sets    Anti rotations Free motion 7# x 20   Supine marching 2 x 20  SL clamshells w/ YTB 2 x 15    Manual Therapy: Prone lumbar STM with biofreeze lotion, lower lumbar PAs grade 2 10 minutes "   Education provided:   - continued compliance with HEP    Written Home Exercises Provided: Patient instructed to cont prior HEP.  Exercises were reviewed and Arpita was able to demonstrate them prior to the end of the session.  Arpita demonstrated good  understanding of the education provided.     See EMR under Patient Instructions for exercises provided 8/13/2019.    Assessment     Patient tolerated today's treatment session well with minimally reduced symptoms. Performed dyamic exercise on Med X lumbar extension machine with 35 ft/lbs with ROM increased to 30-6 without increased symptoms  Pt was also able to tolerate Med X resistance exercise circuit without adverse affects today. Performed manual STM and gentle lower lumbar mobilizations with pt reporting mildly reduced pain and improved ROM. Plan to attempt again next session as time permits.     Arpita is progressing well towards her goals.   Pt prognosis is Good.     Pt will continue to benefit from skilled outpatient physical therapy to address the deficits listed in the problem list box on initial evaluation, provide pt/family education and to maximize pt's level of independence in the home and community environment.     Pt's spiritual, cultural and educational needs considered and pt agreeable to plan of care and goals.     Anticipated barriers to physical therapy: none    Short Term GOALS: 3 weeks. Pt agrees with goals set.  1. Patient demonstrates independence with HEP. progressing  2. Patient demonstrates independence with Postural Awareness.   3. Patient demonstrates independence with body mechanics.   4. Patient will report pain of 5/10 at worst, on 0-10 pain scale, with all activity     Long Term GOALS: 6 weeks. Pt agrees with goals set.  1. Patient will report sleeping through the night without waking d/t LBP.  2. Patient will demonstrate ability to lift 25lbs from floor to waist level without limitations d/t LBP.   3. Patient demonstrates  improved overall function per FOTO Lumbar Survey to 33% Limitation or less.   4. Patient will report pain of 3/10 at worst, on 0-10 pain scale, with all activity  5. Patient will report tolerating walking/standing for at least 20 mins       Plan     Continue to progress  exercises    Recommended Treatment Plan: 2-3 times per week for 12 weeks with treatments to consist of:  Neuromuscular and postural re-education,  training, therapeutic exercise, therapeutic activities,balance training, gait training, manual therapy, soft tissue mobilization, ROM exercises, Cardiovascular,  Postural stabilization, manual traction, spinal mobilization, moist heat, cryotherapy, electrical stimulation, ultrasound, home exercise education and planning.    Giselle Beaver, PT  08/16/2019

## 2019-08-16 ENCOUNTER — CLINICAL SUPPORT (OUTPATIENT)
Dept: REHABILITATION | Facility: HOSPITAL | Age: 49
End: 2019-08-16
Payer: MEDICAID

## 2019-08-16 DIAGNOSIS — M54.31 SCIATICA OF RIGHT SIDE: ICD-10-CM

## 2019-08-16 DIAGNOSIS — Z74.09 IMPAIRED FUNCTIONAL MOBILITY AND ACTIVITY TOLERANCE: ICD-10-CM

## 2019-08-16 PROCEDURE — 97110 THERAPEUTIC EXERCISES: CPT | Mod: PN

## 2019-08-16 NOTE — PROGRESS NOTES
"  Physical Therapy Daily Treatment Note     Name: Arpita [place]  Clinic Number: [id number]    Therapy Diagnosis:   Encounter Diagnoses   Name Primary?    Sciatica of right side     Impaired functional mobility and activity tolerance      Physician: Chadd Langford MD    Visit Date: 8/16/2019    Physician Orders: PT Eval and Treat   Medical Diagnosis from Referral:   M79.7 (ICD-10-CM) - Fibromyalgia   M54.5 (ICD-10-CM) - Low back pain        Evaluation Date: 6/12/2019  Authorization Period Expiration: 9/12/2019  Plan of Care Expiration: 9/12/2019  Visit # / Visits authorized: 13 / 20    Time In:  2:30  Time Out: 3:40     Total Billable Time: 60 minutes      Precautions: Standard    Subjective     Pt reports: moderate lumbar and thigh soreness after last session but she feels like it was a good work out overall, not irritating at all. She reports she feels like the resistance exercise is helping and she is considering joining a gym so she can continue after DC.      She was fairly compliant with home exercise program every day this weekend.   Response to previous treatment: Only muscular soreness  Functional change: Reduced pain, walking longer distances     Pain: 6/10  Location: bilateral back and R LE    Objective       Arpita received therapeutic exercises to develop strength, ROM, flexibility and core stabilization for 60 minutes including:     Nustep x 8 mins    LTR 10x   PITER with theraball  20x  SKTC 10x  Pelvic tilts 2 x 10    Sciatic nerve glide x 20   Active hamstring stretch x 20 ea     Med X lumbar extension dynamic exercise 35ft/lbs 15x 4/40 exertion   Med X resistance exercise as recorded in flowsheet.         Not performed today from previous HEP/sessions:   Hip/back disassociation 10x   Sit<>stand squat training 10x   Squats to 18" box 5 reps, 3 sets    Anti rotations Free motion 7# x 20   Supine marching 2 x 20  SL clamshells w/ YTB 2 x 15    Manual Therapy: Prone lumbar STM with biofreeze " lotion, lower lumbar PAs grade 2, Vacuum/cupping STM with manual therapy techniques was performed to lumbar region to decrease muscle tightness, increase circulation and promote healing process. The pt's skin was monitored for redness adjusting pressure as needed. The pt was instructed in possible side effects of bruising and/or soreness.   10 minutes   Education provided:   - continued compliance with HEP    Written Home Exercises Provided: Patient instructed to cont prior HEP.  Exercises were reviewed and Arpita was able to demonstrate them prior to the end of the session.  Arpita demonstrated good  understanding of the education provided.     See EMR under Patient Instructions for exercises provided 8/16/2019.    Assessment     Patient tolerated today's treatment session well with minimally reduced symptoms. Performed dyamic exercise on Med X lumbar extension machine with 35 ft/lbs with ROM increased to 30-6 without increased symptoms  Pt reported minimal exertion this session. Plan to reassess ROM and increased weights 5-10% next session. Pt was also able to tolerate Med X resistance exercise circuit without adverse affects today. Performed manual STM/vacuum cupping and gentle lower lumbar mobilizations with pt reporting mildly reduced pain and improved ROM. Plan to attempt again next session as time permits.     Arpita is progressing well towards her goals.   Pt prognosis is Good.     Pt will continue to benefit from skilled outpatient physical therapy to address the deficits listed in the problem list box on initial evaluation, provide pt/family education and to maximize pt's level of independence in the home and community environment.     Pt's spiritual, cultural and educational needs considered and pt agreeable to plan of care and goals.     Anticipated barriers to physical therapy: none    Short Term GOALS: 3 weeks. Pt agrees with goals set.  1. Patient demonstrates independence with HEP. progressing  2.  Patient demonstrates independence with Postural Awareness.   3. Patient demonstrates independence with body mechanics.   4. Patient will report pain of 5/10 at worst, on 0-10 pain scale, with all activity     Long Term GOALS: 6 weeks. Pt agrees with goals set.  1. Patient will report sleeping through the night without waking d/t LBP.  2. Patient will demonstrate ability to lift 25lbs from floor to waist level without limitations d/t LBP.   3. Patient demonstrates improved overall function per FOTO Lumbar Survey to 33% Limitation or less.   4. Patient will report pain of 3/10 at worst, on 0-10 pain scale, with all activity  5. Patient will report tolerating walking/standing for at least 20 mins       Plan     Continue to progress  exercises    Recommended Treatment Plan: 2-3 times per week for 12 weeks with treatments to consist of:  Neuromuscular and postural re-education,  training, therapeutic exercise, therapeutic activities,balance training, gait training, manual therapy, soft tissue mobilization, ROM exercises, Cardiovascular,  Postural stabilization, manual traction, spinal mobilization, moist heat, cryotherapy, electrical stimulation, ultrasound, home exercise education and planning.    Giselle Beaver, PT  08/16/2019

## 2019-08-19 ENCOUNTER — PATIENT MESSAGE (OUTPATIENT)
Dept: UROLOGY | Facility: CLINIC | Age: 49
End: 2019-08-19

## 2019-08-20 ENCOUNTER — CLINICAL SUPPORT (OUTPATIENT)
Dept: REHABILITATION | Facility: HOSPITAL | Age: 49
End: 2019-08-20
Payer: MEDICAID

## 2019-08-20 DIAGNOSIS — M54.31 SCIATICA OF RIGHT SIDE: ICD-10-CM

## 2019-08-20 DIAGNOSIS — Z74.09 IMPAIRED FUNCTIONAL MOBILITY AND ACTIVITY TOLERANCE: ICD-10-CM

## 2019-08-20 PROCEDURE — 97110 THERAPEUTIC EXERCISES: CPT | Mod: PN

## 2019-08-20 NOTE — PROGRESS NOTES
Physical Therapy Daily Treatment Note     Name: Arpita [place]  Clinic Number: [id number]    Therapy Diagnosis:   Encounter Diagnoses   Name Primary?    Sciatica of right side     Impaired functional mobility and activity tolerance      Physician: Chadd Langford MD    Visit Date: 8/20/2019    Physician Orders: PT Eval and Treat   Medical Diagnosis from Referral:   M79.7 (ICD-10-CM) - Fibromyalgia   M54.5 (ICD-10-CM) - Low back pain        Evaluation Date: 6/12/2019  Authorization Period Expiration: 9/12/2019  Plan of Care Expiration: 9/12/2019  Visit # / Visits authorized: 14 / 20 (try upright bike next session)     Time In:  1:38  Time Out: 2:50     Total Billable Time: 70 minutes        Precautions: Standard    Subjective     Pt reports: mild umbar and thigh soreness after last session but she feels like it was a good work out overall, not irritating at all. She reports she feels like the resistance exercise is helping and she is considering joining a gym so she can continue after DC.  She is also considering buying a bike for herself for Xmas.     She was fairly compliant with home exercise program every day this weekend.   Response to previous treatment: Only muscular soreness  Functional change: Reduced pain, walking longer distances     Pain: 5/10  Location: bilateral back and R LE    Objective       Arpita received therapeutic exercises to develop strength, ROM, flexibility and core stabilization for 60 minutes including:     [facility] 8/20/2019   Visit Number 14   VAS Pain Rating 5   Manual Therapy 10   Lumbar Extension Seat Pad -   Femur Restraint -   Top Dead Center -   Counterweight -   Lumbar Flexion -   Lumbar Extension -   Lumbar Peak Torque -   Min Torque -   Test Percent Below Normative Data -   Lumbar Weight 40   Repetitions 22   Rating of Perceived Exertion 3   Ice - Z Lie (in min.) 10       Nustep x 8 mins    LTR 10x   PITER with theraball  20x  SKTC 10x  Pelvic tilts 2 x  "10    Sciatic nerve glide x 20   Active hamstring stretch x 20 ea     Med X lumbar extension dynamic exercise 35ft/lbs 15x 4/40 exertion   Med X resistance exercise as recorded in flowsheet.         Not performed today from previous HEP/sessions:   Hip/back disassociation 10x   Sit<>stand squat training 10x   Squats to 18" box 5 reps, 3 sets    Anti rotations Free motion 7# x 20   Supine marching 2 x 20  SL clamshells w/ YTB 2 x 15     Manual Therapy: Prone lumbar STM with biofreeze lotion, lower lumbar PAs grade 2, Vacuum/cupping STM with manual therapy techniques was performed to lumbar region to decrease muscle tightness, increase circulation and promote healing process. The pt's skin was monitored for redness adjusting pressure as needed. The pt was instructed in possible side effects of bruising and/or soreness.   10 minutes   Education provided:   - continued compliance with HEP    Written Home Exercises Provided: Patient instructed to cont prior HEP.  Exercises were reviewed and Arpita was able to demonstrate them prior to the end of the session.  Arpita demonstrated good  understanding of the education provided.     See EMR under Patient Instructions for exercises provided 8/20/2019.    Assessment     Patient tolerated today's treatment session well with minimally reduced symptoms. Performed dyamic exercise on Med X lumbar extension machine with 40 ft/lbs without increased symptoms  Pt reported minimal exertion this session. Plan to reassess ROM or increased weights 5-10% next session. Pt was also able to tolerate Med X resistance exercise circuit without adverse affects today. Performed manual STM/vacuum cupping and gentle lower lumbar mobilizations with pt reporting mildly reduced pain and improved ROM. Plan to attempt again next session as time permits. Pt plans on joining gym to do more resistance training.     Arpita is progressing well towards her goals.   Pt prognosis is Good.     Pt will continue " to benefit from skilled outpatient physical therapy to address the deficits listed in the problem list box on initial evaluation, provide pt/family education and to maximize pt's level of independence in the home and community environment.     Pt's spiritual, cultural and educational needs considered and pt agreeable to plan of care and goals.     Anticipated barriers to physical therapy: none    Short Term GOALS: 3 weeks. Pt agrees with goals set.  1. Patient demonstrates independence with HEP. progressing  2. Patient demonstrates independence with Postural Awareness.   3. Patient demonstrates independence with body mechanics.   4. Patient will report pain of 5/10 at worst, on 0-10 pain scale, with all activity Met 8/20/19     Long Term GOALS: 6 weeks. Pt agrees with goals set.  1. Patient will report sleeping through the night without waking d/t LBP.  2. Patient will demonstrate ability to lift 25lbs from floor to waist level without limitations d/t LBP.   3. Patient demonstrates improved overall function per FOTO Lumbar Survey to 33% Limitation or less.   4. Patient will report pain of 3/10 at worst, on 0-10 pain scale, with all activity  5. Patient will report tolerating walking/standing for at least 20 mins       Plan     Continue to progress  exercises    Recommended Treatment Plan: 2-3 times per week for 12 weeks with treatments to consist of:  Neuromuscular and postural re-education,  training, therapeutic exercise, therapeutic activities,balance training, gait training, manual therapy, soft tissue mobilization, ROM exercises, Cardiovascular,  Postural stabilization, manual traction, spinal mobilization, moist heat, cryotherapy, electrical stimulation, ultrasound, home exercise education and planning.    Giselle Beaver, PT  08/20/2019

## 2019-09-27 ENCOUNTER — OFFICE VISIT (OUTPATIENT)
Dept: UROLOGY | Facility: CLINIC | Age: 49
End: 2019-09-27
Payer: MEDICAID

## 2019-09-27 VITALS — BODY MASS INDEX: 34.56 KG/M2 | OXYGEN SATURATION: 97 % | RESPIRATION RATE: 17 BRPM | WEIGHT: 228 LBS | HEIGHT: 68 IN

## 2019-09-27 DIAGNOSIS — R10.2 PELVIC PAIN IN FEMALE: ICD-10-CM

## 2019-09-27 DIAGNOSIS — R39.14 FEELING OF INCOMPLETE BLADDER EMPTYING: ICD-10-CM

## 2019-09-27 DIAGNOSIS — R39.16 STRAINING TO VOID: ICD-10-CM

## 2019-09-27 DIAGNOSIS — R35.0 URINARY FREQUENCY: ICD-10-CM

## 2019-09-27 DIAGNOSIS — R31.9 URINARY TRACT INFECTION WITH HEMATURIA, SITE UNSPECIFIED: Primary | ICD-10-CM

## 2019-09-27 DIAGNOSIS — N39.0 URINARY TRACT INFECTION WITH HEMATURIA, SITE UNSPECIFIED: Primary | ICD-10-CM

## 2019-09-27 DIAGNOSIS — R10.31 RLQ ABDOMINAL PAIN: ICD-10-CM

## 2019-09-27 DIAGNOSIS — N31.9 NEUROGENIC URINARY BLADDER DISORDER: ICD-10-CM

## 2019-09-27 LAB — POC RESIDUAL URINE VOLUME: 37 ML (ref 0–100)

## 2019-09-27 PROCEDURE — 99999 PR PBB SHADOW E&M-EST. PATIENT-LVL IV: CPT | Mod: PBBFAC,,, | Performed by: UROLOGY

## 2019-09-27 PROCEDURE — 87077 CULTURE AEROBIC IDENTIFY: CPT

## 2019-09-27 PROCEDURE — 87186 SC STD MICRODIL/AGAR DIL: CPT

## 2019-09-27 PROCEDURE — 99999 PR PBB SHADOW E&M-EST. PATIENT-LVL IV: ICD-10-PCS | Mod: PBBFAC,,, | Performed by: UROLOGY

## 2019-09-27 PROCEDURE — 99214 OFFICE O/P EST MOD 30 MIN: CPT | Mod: S$PBB,,, | Performed by: UROLOGY

## 2019-09-27 PROCEDURE — 99214 PR OFFICE/OUTPT VISIT, EST, LEVL IV, 30-39 MIN: ICD-10-PCS | Mod: S$PBB,,, | Performed by: UROLOGY

## 2019-09-27 PROCEDURE — 99214 OFFICE O/P EST MOD 30 MIN: CPT | Mod: PBBFAC,PO | Performed by: UROLOGY

## 2019-09-27 PROCEDURE — 87086 URINE CULTURE/COLONY COUNT: CPT

## 2019-09-27 PROCEDURE — 51798 US URINE CAPACITY MEASURE: CPT | Mod: PBBFAC,PO | Performed by: UROLOGY

## 2019-09-27 PROCEDURE — 87088 URINE BACTERIA CULTURE: CPT

## 2019-09-27 RX ORDER — METHOTREXATE 25 MG/ML
INJECTION INTRA-ARTERIAL; INTRAMUSCULAR; INTRATHECAL; INTRAVENOUS
COMMUNITY
Start: 2019-08-19 | End: 2020-10-08 | Stop reason: ALTCHOICE

## 2019-09-27 RX ORDER — PEN NEEDLE, DIABETIC 29 G X1/2"
NEEDLE, DISPOSABLE MISCELLANEOUS
Refills: 3 | COMMUNITY
Start: 2019-08-21 | End: 2020-11-02

## 2019-09-27 RX ORDER — SODIUM CHLORIDE 9 MG/ML
INJECTION, SOLUTION INTRAVENOUS CONTINUOUS
Status: CANCELLED | OUTPATIENT
Start: 2019-09-27

## 2019-09-27 NOTE — PROGRESS NOTES
Subjective:       Patient ID: Arpita Bradshaw is a 49 y.o. female.    Chief Complaint: Follow-up    48 yo AAF with Frequency , Urgency, dysuria, RLQ pain and pelvic pain. UTI with micro hematuria on office U/A. Feels the need to strain to void and feels like she has incomplete voiding. PVR in office  ~37 cc.    Urinary Tract Infection    This is a recurrent problem. The current episode started more than 1 year ago. The problem occurs intermittently. The problem has been waxing and waning. The quality of the pain is described as burning and aching. The pain is at a severity of 9/10. The pain is severe (Wakes pt up). There has been no fever. She is not sexually active. There is no history of pyelonephritis. Associated symptoms include frequency, hesitancy and urgency. Pertinent negatives include no behavior changes, chills, discharge, flank pain, hematuria, nausea, possible pregnancy, sweats, vomiting, weight loss, bubble bath use, constipation, rash or withholding. She has tried antibiotics (Bladder fulguration) for the symptoms. The treatment provided mild relief. Her past medical history is significant for recurrent UTIs. There is no history of catheterization, diabetes insipidus, diabetes mellitus, genitourinary reflux, hypertension, kidney stones, a single kidney, STD, urinary stasis or a urological procedure.     Review of Systems   Constitutional: Negative for activity change, appetite change, chills, fatigue, fever and weight loss.   HENT: Negative for congestion, ear pain, hearing loss, nosebleeds, sinus pressure, sore throat and trouble swallowing.    Eyes: Negative for pain and visual disturbance.   Respiratory: Negative for apnea, cough and shortness of breath.    Cardiovascular: Negative for chest pain and leg swelling.   Gastrointestinal: Negative for abdominal distention, abdominal pain, anal bleeding, blood in stool, constipation, diarrhea, nausea, rectal pain and vomiting.   Endocrine: Negative for  cold intolerance, heat intolerance, polydipsia, polyphagia and polyuria.   Genitourinary: Positive for frequency, hesitancy and urgency. Negative for decreased urine volume, difficulty urinating, dyspareunia, dysuria, enuresis, flank pain, genital sores, hematuria, menstrual problem, pelvic pain, vaginal bleeding, vaginal discharge and vaginal pain.   Musculoskeletal: Negative for arthralgias and back pain.   Skin: Negative for color change, pallor and rash.   Allergic/Immunologic: Negative for environmental allergies, food allergies and immunocompromised state.   Neurological: Negative for dizziness, speech difficulty, weakness and headaches.   Hematological: Negative for adenopathy. Does not bruise/bleed easily.   Psychiatric/Behavioral: Negative.        Objective:      Physical Exam   Nursing note and vitals reviewed.  Constitutional: She is oriented to person, place, and time. She appears well-developed and well-nourished.   HENT:   Head: Normocephalic.   Nose: Nose normal.   Mouth/Throat: Oropharynx is clear and moist.   Eyes: Conjunctivae and EOM are normal. Pupils are equal, round, and reactive to light.   Neck: Normal range of motion. Neck supple.   Cardiovascular: Normal rate, regular rhythm, normal heart sounds and intact distal pulses.    Pulmonary/Chest: Effort normal and breath sounds normal.   Abdominal: Soft. Bowel sounds are normal.   Musculoskeletal: Normal range of motion.   Neurological: She is alert and oriented to person, place, and time. She has normal reflexes.   Skin: Skin is warm and dry.     Psychiatric: She has a normal mood and affect. Her behavior is normal. Judgment and thought content normal.       Assessment:       1. Urinary tract infection with hematuria, site unspecified    2. Urinary frequency    3. Straining to void    4. Neurogenic urinary bladder disorder    5. Feeling of incomplete bladder emptying    6. Pelvic pain in female    7. RLQ abdominal pain        Plan:            Patient Instructions   Urine Culture  Resume Flomax 0.4 mg  Schedule cystoscopy with Fulguration 10/16 19 at [facility]

## 2019-09-27 NOTE — PATIENT INSTRUCTIONS
Urine Culture  Resume Flomax 0.4 mg  Schedule cystoscopy with Fulguration 10/16 19 at [facility]

## 2019-09-30 ENCOUNTER — TELEPHONE (OUTPATIENT)
Dept: UROLOGY | Facility: CLINIC | Age: 49
End: 2019-09-30

## 2019-09-30 LAB — BACTERIA UR CULT: ABNORMAL

## 2019-09-30 NOTE — TELEPHONE ENCOUNTER
----- Message from Catrachita Head sent at 9/27/2019  4:51 PM CDT -----  Contact: KEVIN RETANA [[id number]] [phone]    Patient states her prescription was not sent to the [facility] on [address] today.

## 2019-09-30 NOTE — TELEPHONE ENCOUNTER
----- Message from Harris Lua MD sent at 9/30/2019 12:29 PM CDT -----  [facility]

## 2019-10-15 ENCOUNTER — TELEPHONE (OUTPATIENT)
Dept: PEDIATRICS | Facility: CLINIC | Age: 49
End: 2019-10-15

## 2019-10-15 NOTE — TELEPHONE ENCOUNTER
----- Message from Lexi Madden sent at 10/15/2019  9:34 AM CDT -----  Contact: Pt  Pt called to speak to the nurse to cancel/reschedule her 10/16/19 procedure appt and would like a call back this morning at [phone]

## 2019-11-25 ENCOUNTER — TELEPHONE (OUTPATIENT)
Dept: FAMILY MEDICINE | Facility: CLINIC | Age: 49
End: 2019-11-25

## 2019-11-25 NOTE — TELEPHONE ENCOUNTER
Called and spoke with pt in regards of message. Pt informed she she went to her PCP for a UTI and was informed that her bladder is inflamed and she has blood in her urine. Pt states she also completed  a CT Scan as well, and was on medication to for the problem Pt wants to inform provider of changes she has recently had. Pt also wants to know can she be treated before her surgery on 12/04/2019. Please advise.

## 2019-11-25 NOTE — TELEPHONE ENCOUNTER
----- Message from Kyler Martin sent at 11/25/2019  2:12 PM CST -----  Contact: [phone]/self  Patient requesting to speak with you about her upcoming procedure   Please call back to assist at [phone]

## 2019-11-26 DIAGNOSIS — N39.0 URINARY TRACT INFECTION WITHOUT HEMATURIA, SITE UNSPECIFIED: Primary | ICD-10-CM

## 2019-11-29 ENCOUNTER — LAB VISIT (OUTPATIENT)
Dept: LAB | Facility: HOSPITAL | Age: 49
End: 2019-11-29
Attending: UROLOGY
Payer: MEDICAID

## 2019-11-29 DIAGNOSIS — N39.0 URINARY TRACT INFECTION WITHOUT HEMATURIA, SITE UNSPECIFIED: ICD-10-CM

## 2019-11-29 PROCEDURE — 87086 URINE CULTURE/COLONY COUNT: CPT

## 2019-11-30 LAB
BACTERIA UR CULT: NORMAL
BACTERIA UR CULT: NORMAL

## 2019-12-02 ENCOUNTER — TELEPHONE (OUTPATIENT)
Dept: UROLOGY | Facility: CLINIC | Age: 49
End: 2019-12-02

## 2019-12-04 PROBLEM — D49.4 BLADDER TUMOR: Status: ACTIVE | Noted: 2019-12-04

## 2019-12-04 PROBLEM — N39.0 URINARY TRACT INFECTION WITH HEMATURIA: Status: ACTIVE | Noted: 2019-12-04

## 2019-12-04 PROBLEM — R31.9 URINARY TRACT INFECTION WITH HEMATURIA: Status: ACTIVE | Noted: 2019-12-04

## 2019-12-06 ENCOUNTER — HOSPITAL ENCOUNTER (EMERGENCY)
Facility: HOSPITAL | Age: 49
Discharge: HOME | End: 2019-12-06
Attending: EMERGENCY MEDICINE
Payer: MEDICAID

## 2019-12-06 ENCOUNTER — TELEPHONE (OUTPATIENT)
Dept: UROLOGY | Facility: CLINIC | Age: 49
End: 2019-12-06

## 2019-12-06 VITALS
HEART RATE: 72 BPM | BODY MASS INDEX: 36.41 KG/M2 | TEMPERATURE: 98 F | DIASTOLIC BLOOD PRESSURE: 84 MMHG | OXYGEN SATURATION: 98 % | WEIGHT: 232 LBS | RESPIRATION RATE: 16 BRPM | HEIGHT: 67 IN | SYSTOLIC BLOOD PRESSURE: 125 MMHG

## 2019-12-06 DIAGNOSIS — K59.00 CONSTIPATION, UNSPECIFIED CONSTIPATION TYPE: ICD-10-CM

## 2019-12-06 DIAGNOSIS — R33.8 ACUTE URINARY RETENTION: Primary | ICD-10-CM

## 2019-12-06 LAB
ALBUMIN SERPL BCP-MCNC: 4 G/DL (ref 3.5–5.2)
ALP SERPL-CCNC: 69 U/L (ref 55–135)
ALT SERPL W/O P-5'-P-CCNC: 13 U/L (ref 10–44)
ANION GAP SERPL CALC-SCNC: 9 MMOL/L (ref 8–16)
AST SERPL-CCNC: 12 U/L (ref 10–40)
BASOPHILS # BLD AUTO: 0.01 K/UL (ref 0–0.2)
BASOPHILS NFR BLD: 0.2 % (ref 0–1.9)
BILIRUB SERPL-MCNC: 0.4 MG/DL (ref 0.1–1)
BILIRUB UR QL STRIP: NEGATIVE
BUN SERPL-MCNC: 9 MG/DL (ref 6–20)
CALCIUM SERPL-MCNC: 9.1 MG/DL (ref 8.7–10.5)
CHLORIDE SERPL-SCNC: 105 MMOL/L (ref 95–110)
CLARITY UR: CLEAR
CO2 SERPL-SCNC: 26 MMOL/L (ref 23–29)
COLOR UR: ABNORMAL
CREAT SERPL-MCNC: 1.1 MG/DL (ref 0.5–1.4)
DIFFERENTIAL METHOD: ABNORMAL
EOSINOPHIL # BLD AUTO: 0 K/UL (ref 0–0.5)
EOSINOPHIL NFR BLD: 0 % (ref 0–8)
ERYTHROCYTE [DISTWIDTH] IN BLOOD BY AUTOMATED COUNT: 13 % (ref 11.5–14.5)
EST. GFR  (AFRICAN AMERICAN): >60 ML/MIN/1.73 M^2
EST. GFR  (NON AFRICAN AMERICAN): 60 ML/MIN/1.73 M^2
GLUCOSE SERPL-MCNC: 93 MG/DL (ref 70–110)
GLUCOSE UR QL STRIP: NEGATIVE
HCT VFR BLD AUTO: 40.4 % (ref 37–48.5)
HGB BLD-MCNC: 12.9 G/DL (ref 12–16)
HGB UR QL STRIP: ABNORMAL
IMM GRANULOCYTES # BLD AUTO: 0.01 K/UL (ref 0–0.04)
IMM GRANULOCYTES NFR BLD AUTO: 0.2 % (ref 0–0.5)
KETONES UR QL STRIP: NEGATIVE
LEUKOCYTE ESTERASE UR QL STRIP: ABNORMAL
LYMPHOCYTES # BLD AUTO: 1.3 K/UL (ref 1–4.8)
LYMPHOCYTES NFR BLD: 19.1 % (ref 18–48)
MCH RBC QN AUTO: 28.9 PG (ref 27–31)
MCHC RBC AUTO-ENTMCNC: 31.9 G/DL (ref 32–36)
MCV RBC AUTO: 91 FL (ref 82–98)
MICROSCOPIC COMMENT: ABNORMAL
MONOCYTES # BLD AUTO: 0.3 K/UL (ref 0.3–1)
MONOCYTES NFR BLD: 4.3 % (ref 4–15)
NEUTROPHILS # BLD AUTO: 5 K/UL (ref 1.8–7.7)
NEUTROPHILS NFR BLD: 76.2 % (ref 38–73)
NITRITE UR QL STRIP: NEGATIVE
NRBC BLD-RTO: 0 /100 WBC
PH UR STRIP: 7 [PH] (ref 5–8)
PLATELET # BLD AUTO: 209 K/UL (ref 150–350)
PMV BLD AUTO: 10.2 FL (ref 9.2–12.9)
POTASSIUM SERPL-SCNC: 3.5 MMOL/L (ref 3.5–5.1)
PROT SERPL-MCNC: 7.7 G/DL (ref 6–8.4)
PROT UR QL STRIP: NEGATIVE
RBC # BLD AUTO: 4.46 M/UL (ref 4–5.4)
RBC #/AREA URNS HPF: 60 /HPF (ref 0–4)
SODIUM SERPL-SCNC: 140 MMOL/L (ref 136–145)
SP GR UR STRIP: 1 (ref 1–1.03)
SQUAMOUS #/AREA URNS HPF: 3 /HPF
URN SPEC COLLECT METH UR: ABNORMAL
UROBILINOGEN UR STRIP-ACNC: NEGATIVE EU/DL
WBC # BLD AUTO: 6.56 K/UL (ref 3.9–12.7)
WBC #/AREA URNS HPF: 4 /HPF (ref 0–5)

## 2019-12-06 PROCEDURE — 96376 TX/PRO/DX INJ SAME DRUG ADON: CPT

## 2019-12-06 PROCEDURE — 96375 TX/PRO/DX INJ NEW DRUG ADDON: CPT

## 2019-12-06 PROCEDURE — 81000 URINALYSIS NONAUTO W/SCOPE: CPT

## 2019-12-06 PROCEDURE — 63600175 PHARM REV CODE 636 W HCPCS: Performed by: EMERGENCY MEDICINE

## 2019-12-06 PROCEDURE — 85025 COMPLETE CBC W/AUTO DIFF WBC: CPT

## 2019-12-06 PROCEDURE — 80053 COMPREHEN METABOLIC PANEL: CPT

## 2019-12-06 PROCEDURE — 25000003 PHARM REV CODE 250: Performed by: EMERGENCY MEDICINE

## 2019-12-06 PROCEDURE — 51798 US URINE CAPACITY MEASURE: CPT

## 2019-12-06 PROCEDURE — 51702 INSERT TEMP BLADDER CATH: CPT

## 2019-12-06 PROCEDURE — 96374 THER/PROPH/DIAG INJ IV PUSH: CPT

## 2019-12-06 PROCEDURE — 99284 EMERGENCY DEPT VISIT MOD MDM: CPT | Mod: 25

## 2019-12-06 RX ORDER — IBUPROFEN 800 MG/1
TABLET ORAL
Refills: 3 | COMMUNITY
Start: 2019-11-05 | End: 2020-06-08

## 2019-12-06 RX ORDER — ATROPA BELLADONNA AND OPIUM 16.2; 6 MG/1; MG/1
60 SUPPOSITORY RECTAL ONCE
Status: COMPLETED | OUTPATIENT
Start: 2019-12-06 | End: 2019-12-06

## 2019-12-06 RX ORDER — HYDROMORPHONE HYDROCHLORIDE 2 MG/ML
0.5 INJECTION, SOLUTION INTRAMUSCULAR; INTRAVENOUS; SUBCUTANEOUS
Status: COMPLETED | OUTPATIENT
Start: 2019-12-06 | End: 2019-12-06

## 2019-12-06 RX ORDER — FLUTICASONE PROPIONATE 50 MCG
SPRAY, SUSPENSION (ML) NASAL
Refills: 3 | COMMUNITY
Start: 2019-11-08

## 2019-12-06 RX ORDER — ONDANSETRON 2 MG/ML
4 INJECTION INTRAMUSCULAR; INTRAVENOUS
Status: COMPLETED | OUTPATIENT
Start: 2019-12-06 | End: 2019-12-06

## 2019-12-06 RX ORDER — METHOTREXATE 25 MG/ML
INJECTION, SOLUTION INTRA-ARTERIAL; INTRAMUSCULAR; INTRAVENOUS
Refills: 4 | COMMUNITY
Start: 2019-10-21 | End: 2020-10-08 | Stop reason: ALTCHOICE

## 2019-12-06 RX ORDER — HYDROCODONE BITARTRATE AND ACETAMINOPHEN 5; 325 MG/1; MG/1
1 TABLET ORAL EVERY 6 HOURS PRN
Qty: 12 TABLET | Refills: 0 | Status: SHIPPED | OUTPATIENT
Start: 2019-12-06 | End: 2019-12-09

## 2019-12-06 RX ORDER — CYCLOBENZAPRINE HCL 5 MG
TABLET ORAL
Refills: 2 | COMMUNITY
Start: 2019-11-05 | End: 2020-01-08 | Stop reason: ALTCHOICE

## 2019-12-06 RX ADMIN — HYDROMORPHONE HYDROCHLORIDE 0.5 MG: 2 INJECTION, SOLUTION INTRAMUSCULAR; INTRAVENOUS; SUBCUTANEOUS at 12:12

## 2019-12-06 RX ADMIN — ONDANSETRON HYDROCHLORIDE 4 MG: 2 SOLUTION INTRAMUSCULAR; INTRAVENOUS at 12:12

## 2019-12-06 RX ADMIN — HYDROMORPHONE HYDROCHLORIDE 0.5 MG: 2 INJECTION, SOLUTION INTRAMUSCULAR; INTRAVENOUS; SUBCUTANEOUS at 03:12

## 2019-12-06 RX ADMIN — ATROPA BELLADONNA AND OPIUM 60 MG: 16.2; 6 SUPPOSITORY RECTAL at 11:12

## 2019-12-06 NOTE — ED PROVIDER NOTES
Encounter Date: 2019    SCRIBE #1 NOTE: Bert DUBOIS, am scribing for, and in the presence of,  Staci Lopez MD. I have scribed the following portions of the note - Other sections scribed: JUANITA DOE           EM PHYSICIAN NOTE    HPI  This patient presents with a complaint of   Chief Complaint   Patient presents with    Dysuria     c/o pain with urination after catheter being removed on today; bladder sx on this past wednesday       HPI: Time seen by provider: 10:55 AM    This is a 49 y.o. female who presents to the ED for evaluation of dysuria. Patient just underwent cystoscopy with fulguration on 2019 with bladder tumor biopsy. Patient states that since removing her Luciano catheter at 6 AM today, she has experienced two episodes of dysuria and decreased urine, associated with diaphoresis, nausea, and lightheadedness. Patient also reports developing suprapubic abdominal pain which is currently much worse than when she pulled her Luciano catheter. Patient states eating and drinking normally yesterday. Patient denies hematuria.     REVIEW of PMH, SOC History and Family History:  Past Medical History:   Diagnosis Date    Fibromyalgia     IC (interstitial cystitis)     Lupus     STD (sexually transmitted disease)     Urinary tract infection     Vaginal infection      Past Surgical History:   Procedure Laterality Date    BLADDER FULGURATION N/A 2019    Procedure: FULGURATION, BLADDER;  Surgeon: Harris Lua MD;  [facility];  Service: Urology;  Laterality: N/A;    BLADDER FULGURATION N/A 2019    Procedure: FULGURATION, BLADDER;  Surgeon: Harris Lua MD;  [facility];  Service: Urology;  Laterality: N/A;    BREAST REDUCTION       SECTION      COLONOSCOPY N/A 2018    Procedure: COLONOSCOPY;  Surgeon: Catrachita Kamara MD;  [facility];  Service: Endoscopy;  Laterality: N/A;    CYSTOSCOPY N/A 2018    Procedure: CYSTOSCOPY;  Surgeon: Harris  MAG Lua MD;  Location: [facility] OR;  Service: Urology;  Laterality: N/A;  200 of botox    CYSTOSCOPY W/ RETROGRADES Bilateral 6/6/2019    Procedure: CYSTOSCOPY, WITH RETROGRADE PYELOGRAM;  Surgeon: Harris Lua MD;  Location: [facility] OR;  Service: Urology;  Laterality: Bilateral;    CYSTOURETHROSCOPY  12/4/2019    Procedure: CYSTOURETHROSCOPY;  Surgeon: Harris Lua MD;  Location: [facility] OR;  Service: Urology;;    hemmorroidectomy      HYSTERECTOMY  2008    WILLIAM for endometriosis    INJECTION OF BOTULINUM TOXIN TYPE A N/A 7/23/2018    Procedure: INJECTION, BOTULINUM TOXIN, TYPE A;  Surgeon: Harris Lua MD;  Location: [facility] OR;  Service: Urology;  Laterality: N/A;    neurostimulator for bladder      TOTAL REDUCTION MAMMOPLASTY      URETHRAL CATHETERIZATION N/A 12/4/2019    Procedure: CATHETERIZATION, URETHRA;  Surgeon: Harris Lua MD;  Location: [facility];  Service: Urology;  Laterality: N/A;     Social History     Socioeconomic History    Marital status: Single     Spouse name: Not on file    Number of children: Not on file    Years of education: Not on file    Highest education level: Not on file   Occupational History    Not on file   Social Needs    Financial resource strain: Not on file    Food insecurity:     Worry: Not on file     Inability: Not on file    Transportation needs:     Medical: Not on file     Non-medical: Not on file   Tobacco Use    Smoking status: Never Smoker    Smokeless tobacco: Never Used   Substance and Sexual Activity    Alcohol use: No    Drug use: No    Sexual activity: Yes     Partners: Male     Birth control/protection: Surgical, Condom   Lifestyle    Physical activity:     Days per week: Not on file     Minutes per session: Not on file    Stress: Not on file   Relationships    Social connections:     Talks on phone: Not on file     Gets together: Not on file     Attends Nondenominational service: Not on file     Active member of club or organization: Not on  file     Attends meetings of clubs or organizations: Not on file     Relationship status: Not on file   Other Topics Concern    Not on file   Social History Narrative    Not on file     Patient Active Problem List   Diagnosis    Neurogenic bladder    Pelvic pain in female    Dysuria    Interstitial cystitis    Dyspareunia    Low back pain of thoracolumbar region with sciatica    Interstitial cystitis (chronic) with hematuria    Genital herpes simplex    Urge incontinence    Nocturia    Urinary frequency    Neurogenic urinary bladder disorder    Urinary tract infection    SHEYLA (stress urinary incontinence, female)    RLQ abdominal pain    Incomplete bladder emptying    Chronic idiopathic constipation    Urinary tract infection without hematuria    Bilateral flank pain    Sciatica of right side    Impaired functional mobility and activity tolerance    Feeling of incomplete bladder emptying    Microscopic hematuria    Renal cyst, right    Straining to void    Urinary urgency    Acute cystitis with hematuria    Recurrent UTI    Cystitis cystica    Urinary tract infection with hematuria    Bladder tumor     Current Facility-Administered Medications   Medication Dose Route Frequency Provider Last Rate Last Dose    opium-belladonna 16.2-60 mg suppository 60 mg  60 mg Rectal Once Micelle CLIVE Lopez MD         Current Outpatient Medications   Medication Sig Dispense Refill    acetaminophen (TYLENOL) 325 MG tablet Take 1 tablet (325 mg total) by mouth every 6 (six) hours as needed for Pain.      acetaminophen (TYLENOL) 325 MG tablet Take 1 tablet (325 mg total) by mouth every 6 (six) hours as needed for Pain.      BD INSULIN SYRINGE ULTRA-FINE 1 mL 31 gauge x 5/16 Syrg U UTD ONCE WEEKLY  3    doxycycline (VIBRAMYCIN) 100 MG Cap Take 1 capsule (100 mg total) by mouth every 12 (twelve) hours. for 7 days 14 capsule 1    HYDROcodone-acetaminophen (NORCO) 5-325 mg per tablet Take 1 tablet by  mouth every 6 (six) hours as needed for Pain. 28 tablet 0    hydroxychloroquine (PLAQUENIL) 200 mg tablet Take 200 mg by mouth 2 (two) times daily.      hyoscyamine (ANASPAZ,LEVSIN) 0.125 mg Tab Take 1 tablet (125 mcg total) by mouth every 4 (four) hours as needed. 60 tablet 3    leucovorin (WELLCOVORIN) 5 mg Tab Take 5 mg by mouth every 7 days.      linaCLOtide (LINZESS) 72 mcg Cap capsule Take 1 tablet by mouth.       aqhqut-yptlt-k.blue-sal-naphos (URIMAR-T) 120-0.12-10.8 mg Tab Take 1 tablet by mouth every 6 (six) hours as needed (bladder pain). 20 tablet 2    methen-m.blue-s.phos-phsal-hyo (URIBEL) 118-10-40.8-36 mg Cap Take 1 capsule by mouth every 6 to 8 hours as needed (Dysuria). 20 capsule 3    methenamine (HIPREX) 1 gram Tab Take 1 tablet (1 g total) by mouth 2 (two) times daily. 60 tablet 11    methotrexate, PF, 25 mg/mL Soln       opium-belladonna 16.2-60 mg (B&O SUPPRETTES) 16.2-60 MG Supp Place 1 suppository (60 mg total) rectally every 12 (twelve) hours as needed (severe bladder spasm). 14 suppository 0    RESTASIS 0.05 % ophthalmic emulsion INT 1 GTT IN OU BID  4    tamsulosin (FLOMAX) 0.4 mg Cap Take 1 capsule (0.4 mg total) by mouth every evening. 30 capsule 0       Review of patient's allergies indicates:   Allergen Reactions    Bactrim [sulfamethoxazole-trimethoprim] Rash       There is no immunization history on file for this patient.    Family History   Problem Relation Age of Onset    Hypertension Mother     Cancer Mother         KIDNEY    Arthritis Mother     Kidney disease Neg Hx        REVIEW of SYSTEMS  Source: SEE HPI   The nurse's notes and triage vital signs were reviewed.  GENERAL/CONSTITUTIONAL: There is no report of fever, fatigue, weakness, or unexplained weight loss. Patient reports diaphoresis (with urination).  CARDIOVASCULAR: There is no report of chest pain   RESPIRATORY: There is no report of cough or SOB  GASTROINTESTINAL: Patient reports nausea. There is no  "report of vomiting, diarrhea  MUSCULOSKELETAL: There is no report of joint or muscle pain. No muscle weakness or tenderness.  SKIN AND BREASTS: There is no report of easy bruising, skin redness, skin rash.  HEMATOLOGIC/LYMPHATIC: There is no report of anemia, bleeding or clotting defects. There is no report of anticoagulant use.  The remainder of the ROS is negative.  GENITOURINARY: Patient reports dysuria and decreased urine.   NEUROLOGIC: Patient reports lightheadedness (with urination)      PHYSICAL EXAMINATION    ED Triage Vitals [12/06/19 1044]   Enc Vitals Group      /79      Pulse 75      Resp 18      Temp 98.5 °F (36.9 °C)      Temp src Oral      SpO2 99 %      Weight 232 lb      Height 5' 7"      Head Circumference       Peak Flow       Pain Score       Pain Loc       Pain Edu?       Excl. in GC?      Vital signs and Pulse Ox reviewed in clinical context. Abnormalities noted: none:  Heart rate, blood pressure, temperature, respiratory rate and pulse ox are all within normal limits for age  Pt's level of consciousness is alert, and the patient is in mild distress.  Skin: warm, pink and dry, capillary refill is less than 2 seconds.   Mucosa:moist  Head and Neck: WNL  Cardiac exam: RRR  Pulmonary exam: unlabored and clear  Abd Exam: soft  Musculoskeletal: no joint tenderness, deformity or swelling   Neurologic: GCS 15. 5 over 5 strength, normal gait, cranial nerves intact, neck supple     Scribe Attestation:   Scribe #1: I performed the above scribed service and the documentation accurately describes the services I performed. I attest to the accuracy of the note.      Medical decision making: Nursing notes reviewed and incorporated  ED Course as of Dec 06 1522   Fri Dec 06, 2019   1231 No improvement with suppository. Have added dilaudid.    [MH]   1346 UA: 3+ blood; 1+ wbc    [MH]   1347 CBC is normal. CMP is normal.    [MH]   1502 Spoke     [MH]   1519 Spoke with Dr. Lua: leave dee in and he will " check her on Monday    []      ED Course User Index  [] Staci Lopez MD       Impression:  Urinary retention with bladder spasm  Plan:  Luciano, follow-up with Urology, analgesics  Staci Lopez MD, 10:51 AM 12/6/2019    This note was created using Dictation Software.  This program may occasionally misinterpret certain words and phrases.                 I personally performed the services described in this documentation. All medical record entries made by the scribe were at my direction and in my presence.  I have reviewed the chart and agree that the record reflects my personal performance and is accurate and complete.  Staci Lopez MD  12/06/19 1524

## 2019-12-06 NOTE — ED NOTES
50y/o F to ED for anuria after cath removal at 0600. Pt called urology and was instructed to came to the ED. Pt AAOX3.   Patient identifiers verified and correct for Arpitabrisa Bradshaw.    LOC: The patient is awake, alert and aware of environment with an appropriate affect, the patient is oriented x 3 and speaking appropriately.  APPEARANCE: Patient in acute distress, patient is clean and well groomed, patient's clothing is properly fastened.  SKIN: The skin is warm and dry, color consistent with ethnicity, patient has normal skin turgor and moist mucus membranes, skin intact, no breakdown or bruising noted.  MUSCULOSKELETAL: Patient moving all extremities spontaneously, no obvious swelling or deformities noted.  RESPIRATORY: Airway is open and patent, respirations are spontaneous, patient has a normal effort and rate, no accessory muscle use noted, bilateral breath sounds clear.  CARDIAC: Patient has a normal rate and regular rhythm, no periphreal edema noted, capillary refill < 3 seconds.  ABDOMEN: Soft and tender to palpation, no distention noted, normoactive bowel sounds present in all four quadrants.  NEUROLOGIC:  eyes open spontaneously, behavior appropriate to situation, follows commands, facial expression symmetrical, bilateral hand grasp equal and even, purposeful motor response noted.  Safety measures in place. Plan of care discussed.

## 2019-12-06 NOTE — TELEPHONE ENCOUNTER
----- Message from Elpidio Bradshaw sent at 12/6/2019  9:28 AM CST -----  Contact: self [phone]  Patient is having complications after yesterdays procedure. Please call and advise.

## 2019-12-06 NOTE — ED NOTES
Pt call nurse and MD to bedside for SOB and feeling hot. meds to be administered for pain. Cath to be inserted.

## 2019-12-06 NOTE — TELEPHONE ENCOUNTER
She states she took her dee out this morning and she is now having severe pain. She is in the ed at [facility]

## 2019-12-09 ENCOUNTER — CLINICAL SUPPORT (OUTPATIENT)
Dept: UROLOGY | Facility: CLINIC | Age: 49
End: 2019-12-09
Payer: MEDICAID

## 2019-12-09 VITALS
DIASTOLIC BLOOD PRESSURE: 62 MMHG | BODY MASS INDEX: 36.41 KG/M2 | RESPIRATION RATE: 19 BRPM | SYSTOLIC BLOOD PRESSURE: 132 MMHG | HEART RATE: 81 BPM | HEIGHT: 67 IN | TEMPERATURE: 98 F | OXYGEN SATURATION: 99 % | WEIGHT: 232 LBS

## 2019-12-09 PROCEDURE — 99214 OFFICE O/P EST MOD 30 MIN: CPT | Mod: PBBFAC,PO

## 2019-12-09 PROCEDURE — 99999 PR PBB SHADOW E&M-EST. PATIENT-LVL IV: CPT | Mod: PBBFAC,,,

## 2019-12-09 PROCEDURE — 99999 PR PBB SHADOW E&M-EST. PATIENT-LVL IV: ICD-10-PCS | Mod: PBBFAC,,,

## 2019-12-16 ENCOUNTER — TELEPHONE (OUTPATIENT)
Dept: UROLOGY | Facility: CLINIC | Age: 49
End: 2019-12-16

## 2019-12-16 NOTE — TELEPHONE ENCOUNTER
----- Message from Susan Ochoa sent at 12/16/2019  3:59 PM CST -----  Contact: Patient   Patient is requesting a call back in regards to procedure that was recently done. Patient would like to see the doctor urgent.    Please call back at [phone] to discuss today.

## 2019-12-20 ENCOUNTER — OFFICE VISIT (OUTPATIENT)
Dept: OBSTETRICS AND GYNECOLOGY | Facility: CLINIC | Age: 49
End: 2019-12-20
Payer: MEDICAID

## 2019-12-20 ENCOUNTER — TELEPHONE (OUTPATIENT)
Dept: OBSTETRICS AND GYNECOLOGY | Facility: CLINIC | Age: 49
End: 2019-12-20

## 2019-12-20 VITALS
BODY MASS INDEX: 36.4 KG/M2 | WEIGHT: 231.94 LBS | DIASTOLIC BLOOD PRESSURE: 80 MMHG | HEIGHT: 67 IN | SYSTOLIC BLOOD PRESSURE: 108 MMHG

## 2019-12-20 DIAGNOSIS — N76.0 ACUTE VAGINITIS: ICD-10-CM

## 2019-12-20 DIAGNOSIS — A60.00 GENITAL HERPES SIMPLEX, UNSPECIFIED SITE: ICD-10-CM

## 2019-12-20 DIAGNOSIS — N30.01 ACUTE CYSTITIS WITH HEMATURIA: Primary | ICD-10-CM

## 2019-12-20 PROCEDURE — 99213 OFFICE O/P EST LOW 20 MIN: CPT | Mod: PBBFAC | Performed by: NURSE PRACTITIONER

## 2019-12-20 PROCEDURE — 99214 PR OFFICE/OUTPT VISIT, EST, LEVL IV, 30-39 MIN: ICD-10-PCS | Mod: S$PBB,,, | Performed by: NURSE PRACTITIONER

## 2019-12-20 PROCEDURE — 87086 URINE CULTURE/COLONY COUNT: CPT

## 2019-12-20 PROCEDURE — 87661 TRICHOMONAS VAGINALIS AMPLIF: CPT

## 2019-12-20 PROCEDURE — 99214 OFFICE O/P EST MOD 30 MIN: CPT | Mod: S$PBB,,, | Performed by: NURSE PRACTITIONER

## 2019-12-20 PROCEDURE — 99999 PR PBB SHADOW E&M-EST. PATIENT-LVL III: CPT | Mod: PBBFAC,,, | Performed by: NURSE PRACTITIONER

## 2019-12-20 PROCEDURE — 87491 CHLMYD TRACH DNA AMP PROBE: CPT

## 2019-12-20 PROCEDURE — 99999 PR PBB SHADOW E&M-EST. PATIENT-LVL III: ICD-10-PCS | Mod: PBBFAC,,, | Performed by: NURSE PRACTITIONER

## 2019-12-20 RX ORDER — FLUCONAZOLE 150 MG/1
150 TABLET ORAL DAILY
Qty: 1 TABLET | Refills: 0 | Status: SHIPPED | OUTPATIENT
Start: 2019-12-20 | End: 2019-12-21

## 2019-12-20 RX ORDER — NITROFURANTOIN 25; 75 MG/1; MG/1
100 CAPSULE ORAL 2 TIMES DAILY
Qty: 10 CAPSULE | Refills: 0 | Status: SHIPPED | OUTPATIENT
Start: 2019-12-20 | End: 2019-12-25

## 2019-12-20 RX ORDER — TRIAMCINOLONE ACETONIDE 1 MG/G
CREAM TOPICAL 2 TIMES DAILY
Qty: 45 G | Refills: 0 | OUTPATIENT
Start: 2019-12-20 | End: 2020-08-11

## 2019-12-20 RX ORDER — NYSTATIN 100000 U/G
CREAM TOPICAL 2 TIMES DAILY
Qty: 30 G | Refills: 0 | Status: SHIPPED | OUTPATIENT
Start: 2019-12-20 | End: 2020-10-08 | Stop reason: ALTCHOICE

## 2019-12-20 RX ORDER — VALACYCLOVIR HYDROCHLORIDE 500 MG/1
500 TABLET, FILM COATED ORAL DAILY
Qty: 30 TABLET | Refills: 11 | Status: SHIPPED | OUTPATIENT
Start: 2019-12-20 | End: 2020-01-19

## 2019-12-20 NOTE — TELEPHONE ENCOUNTER
----- Message from Gilda Younger sent at 12/20/2019 11:38 AM CST -----  Contact: KEVIN RETANA   Type: Patient Call Back    Who called:KEVIN RETANA     What is the request in detail: Patient is requesting a call back. She states that she would like to know if she is able to be seen today. She thinks that she may have a UTI.   Please contact to further advise.    Can the clinic reply by [facility]? No    Best call back number: [phone]    Additional Information: N/A

## 2019-12-21 NOTE — PROGRESS NOTES
CC: Dysuria    HPI: Pt is a 49 y.o.  female who presents for evaluation of her UTI symptoms.   The patient presents today with dysuria, frequency, and urgency for the past 10 days. The patient denies flank pain, fever, nausea, vomiting, and hematuria. She reports a history of recurrent UTIs, IC, recent cystoscope with biopsy.  Alleviating factors: None    Also reports vaginal itching and irritation for 1 week. Requesting refill of her Valtrex, history of genital herpes.    ROS:  GENERAL: Feeling well overall. Denies fever or chills.   SKIN: Denies rash or lesions.   HEAD: Denies head injury or headache.   NODES: Denies enlarged lymph nodes.   CHEST: Denies chest pain or shortness of breath.   CARDIOVASCULAR: Denies palpitations or left sided chest pain.   ABDOMEN: No abdominal pain, constipation, diarrhea, nausea, vomiting or rectal bleeding.   URINARY: + dysuria, urgency, frequency.  REPRODUCTIVE: See HPI.   BREASTS: Denies pain, lumps, or nipple discharge.   HEMATOLOGIC: No easy bruisability or excessive bleeding.   MUSCULOSKELETAL: Denies joint pain or swelling.   NEUROLOGIC: Denies syncope or weakness.   PSYCHIATRIC: Denies depression, anxiety or mood swings.    PE:   APPEARANCE: Well nourished, well developed, Black or  female in no acute distress.  ABDOMEN: No suprapubic tenderness  MUSCULOSKELETAL: No CVA tenderness  VULVA: normal appearing vulva with no masses, tenderness or lesions   VAGINA: normal appearing vagina with normal color and discharge, no lesions   CERVIX: normal appearing cervix without discharge or lesions   UTERUS: uterus is normal size, shape, consistency and nontender   ADNEXA: normal adnexa in size, nontender and no masses        Diagnosis:  1. Acute cystitis with hematuria    2. Acute vaginitis    3. Genital herpes simplex, unspecified site        Plan:     Orders Placed This Encounter    Vaginosis Screen by DNA Probe    C. trachomatis/N. gonorrhoeae by AMP DNA  [facility]; Cervicovaginal    Urine culture    POCT URINE DIPSTICK WITHOUT MICROSCOPE    valACYclovir (VALTREX) 500 MG tablet    nitrofurantoin, macrocrystal-monohydrate, (MACROBID) 100 MG capsule    triamcinolone acetonide 0.1% (KENALOG) 0.1 % cream    nystatin (MYCOSTATIN) cream    fluconazole (DIFLUCAN) 150 MG Tab     Urine dip: ++WBC, RBC, trace protein  Urine culture  Macrobid    Affirm/GCCT  Diflucan  Nystatin/Triamcinolone    -UTI prevention including   a. perineal hygiene, wipe front to back,  b. drink water prior to intercourse and urinate afterwards and avoid certain positions which could increase likelyhood of UTIs,  c. establish regular bladder habits,   d. avoid vulvovaginal irritants,   e. increase fluids and avoid caffeine and alcohol;  -signs of pylenephritis and to go to the ED if develops fever, chills, n/v, back pain, worsening dyuria, hematuria;   -pelvic rest until symptoms resolve;  -Macrobid use and potential side effects;  -A.C.S. Pap and pelvic exam guidelines (pap every 3 years), recomendations for yearly mammogram;  -to follow up with her PCP for other health maintenance.       Follow-up PRN no resolution of symptoms.        MICHAEL CoulterC

## 2019-12-22 LAB — BACTERIA UR CULT: NORMAL

## 2019-12-23 LAB
C TRACH DNA SPEC QL NAA+PROBE: NOT DETECTED
N GONORRHOEA DNA SPEC QL NAA+PROBE: NOT DETECTED

## 2020-01-08 ENCOUNTER — OFFICE VISIT (OUTPATIENT)
Dept: UROLOGY | Facility: CLINIC | Age: 50
End: 2020-01-08
Payer: MEDICAID

## 2020-01-08 VITALS
RESPIRATION RATE: 18 BRPM | BODY MASS INDEX: 36.26 KG/M2 | DIASTOLIC BLOOD PRESSURE: 62 MMHG | TEMPERATURE: 98 F | HEART RATE: 74 BPM | OXYGEN SATURATION: 98 % | SYSTOLIC BLOOD PRESSURE: 132 MMHG | WEIGHT: 231 LBS | HEIGHT: 67 IN

## 2020-01-08 DIAGNOSIS — N32.89 BLADDER SPASM: Primary | ICD-10-CM

## 2020-01-08 DIAGNOSIS — R35.0 URINARY FREQUENCY: ICD-10-CM

## 2020-01-08 DIAGNOSIS — R10.2 PELVIC PAIN: ICD-10-CM

## 2020-01-08 PROCEDURE — 99215 PR OFFICE/OUTPT VISIT, EST, LEVL V, 40-54 MIN: ICD-10-PCS | Mod: S$PBB,,, | Performed by: UROLOGY

## 2020-01-08 PROCEDURE — 87186 SC STD MICRODIL/AGAR DIL: CPT

## 2020-01-08 PROCEDURE — 99214 OFFICE O/P EST MOD 30 MIN: CPT | Mod: PBBFAC,PO | Performed by: UROLOGY

## 2020-01-08 PROCEDURE — 99999 PR PBB SHADOW E&M-EST. PATIENT-LVL IV: CPT | Mod: PBBFAC,,, | Performed by: UROLOGY

## 2020-01-08 PROCEDURE — 99215 OFFICE O/P EST HI 40 MIN: CPT | Mod: S$PBB,,, | Performed by: UROLOGY

## 2020-01-08 PROCEDURE — 87086 URINE CULTURE/COLONY COUNT: CPT

## 2020-01-08 PROCEDURE — 87077 CULTURE AEROBIC IDENTIFY: CPT

## 2020-01-08 PROCEDURE — 99999 PR PBB SHADOW E&M-EST. PATIENT-LVL IV: ICD-10-PCS | Mod: PBBFAC,,, | Performed by: UROLOGY

## 2020-01-08 PROCEDURE — 87088 URINE BACTERIA CULTURE: CPT

## 2020-01-08 RX ORDER — DULOXETIN HYDROCHLORIDE 30 MG/1
CAPSULE, DELAYED RELEASE ORAL
COMMUNITY
Start: 2020-01-06 | End: 2020-10-08

## 2020-01-08 RX ORDER — OXYCODONE AND ACETAMINOPHEN 10; 325 MG/1; MG/1
1 TABLET ORAL EVERY 6 HOURS PRN
Qty: 28 TABLET | Refills: 0 | Status: SHIPPED | OUTPATIENT
Start: 2020-01-08 | End: 2020-01-15

## 2020-01-08 RX ORDER — FLUCONAZOLE 200 MG/1
200 TABLET ORAL DAILY
Qty: 3 TABLET | Refills: 4 | Status: SHIPPED | OUTPATIENT
Start: 2020-01-08 | End: 2020-01-11

## 2020-01-08 RX ORDER — HYOSCYAMINE SULFATE 0.12 MG/1
0.12 TABLET SUBLINGUAL EVERY 4 HOURS PRN
Qty: 40 TABLET | Refills: 3 | Status: SHIPPED | OUTPATIENT
Start: 2020-01-08 | End: 2021-12-02 | Stop reason: ALTCHOICE

## 2020-01-08 RX ORDER — HYDROCODONE BITARTRATE AND ACETAMINOPHEN 5; 325 MG/1; MG/1
TABLET ORAL
COMMUNITY
Start: 2019-12-20 | End: 2020-01-08 | Stop reason: ALTCHOICE

## 2020-01-08 RX ORDER — CYCLOBENZAPRINE HCL 10 MG
10 TABLET ORAL 3 TIMES DAILY PRN
Qty: 30 TABLET | Refills: 12 | Status: SHIPPED | OUTPATIENT
Start: 2020-01-08 | End: 2020-01-18

## 2020-01-08 RX ORDER — AMPICILLIN 500 MG/1
500 CAPSULE ORAL EVERY 6 HOURS
Qty: 56 CAPSULE | Refills: 0 | Status: SHIPPED | OUTPATIENT
Start: 2020-01-08 | End: 2020-01-22

## 2020-01-08 NOTE — PROGRESS NOTES
Subjective:       Patient ID: Arpita Bradshaw is a 49 y.o. female.    Chief Complaint: Urinary Tract Infection    50 yo AAF recently treated for Acute and chronic polypoid cystitis and is having severe bladder spasm and pelvic pain s/p resection.    Pelvic Pain   The patient's primary symptoms include pelvic pain. The patient's pertinent negatives include no vaginal discharge. This is a new problem. The current episode started more than 1 month ago. The problem occurs constantly. The problem has been waxing and waning. The pain is moderate. The problem affects both sides. She is not pregnant. Associated symptoms include back pain, dysuria and frequency. Pertinent negatives include no abdominal pain, anorexia, chills, constipation, diarrhea, discolored urine, fever, flank pain, headaches, hematuria, joint pain, joint swelling, nausea, painful intercourse, rash, sore throat, urgency or vomiting. The symptoms are aggravated by activity, tactile pressure and urinating. She has tried oral narcotics and heating pads for the symptoms. The treatment provided mild relief. She is sexually active. No, her partner does not have an STD. She uses hysterectomy for contraception. She is postmenopausal. Her past medical history is significant for an abdominal surgery, a  section, a gynecological surgery and herpes simplex.     Review of Systems   Constitutional: Negative for activity change, appetite change, chills, fatigue and fever.   HENT: Negative for congestion, ear pain, hearing loss, nosebleeds, sinus pressure, sore throat and trouble swallowing.    Eyes: Negative for pain and visual disturbance.   Respiratory: Negative for apnea, cough and shortness of breath.    Cardiovascular: Negative for chest pain and leg swelling.   Gastrointestinal: Negative for abdominal distention, abdominal pain, anal bleeding, anorexia, blood in stool, constipation, diarrhea, nausea, rectal pain and vomiting.   Endocrine: Negative for  cold intolerance, heat intolerance, polydipsia, polyphagia and polyuria.   Genitourinary: Positive for dysuria, frequency and pelvic pain. Negative for decreased urine volume, difficulty urinating, dyspareunia, enuresis, flank pain, genital sores, hematuria, menstrual problem, urgency, vaginal bleeding, vaginal discharge and vaginal pain.   Musculoskeletal: Positive for back pain. Negative for arthralgias and joint pain.   Skin: Negative for color change, pallor and rash.   Allergic/Immunologic: Negative for environmental allergies, food allergies and immunocompromised state.   Neurological: Negative for dizziness, speech difficulty, weakness and headaches.   Hematological: Negative for adenopathy. Does not bruise/bleed easily.   Psychiatric/Behavioral: Negative.        Objective:      Physical Exam   Nursing note and vitals reviewed.  Constitutional: She is oriented to person, place, and time. She appears well-developed and well-nourished.   HENT:   Head: Normocephalic.   Nose: Nose normal.   Mouth/Throat: Oropharynx is clear and moist.   Eyes: Conjunctivae and EOM are normal. Pupils are equal, round, and reactive to light.   Neck: Normal range of motion. Neck supple.   Cardiovascular: Normal rate, regular rhythm, normal heart sounds and intact distal pulses.    Pulmonary/Chest: Effort normal and breath sounds normal.   Abdominal: Soft. Bowel sounds are normal.   Genitourinary: Vagina normal.   Genitourinary Comments: No CVAT but bilateral Paraspinous muscle spasm   Musculoskeletal: Normal range of motion.   Neurological: She is alert and oriented to person, place, and time. She has normal reflexes.   Skin: Skin is warm and dry.     Psychiatric: She has a normal mood and affect. Her behavior is normal. Judgment and thought content normal.       Assessment:       1. Bladder spasm    2. Urinary frequency    3. Pelvic pain        Plan:       Patient Instructions   Increase Flexeril to 10 mg  Change Norco 5 mg to  percocet 10 mg  Levsin SL trial  Urimar - T as needed  Ampicillin and Diflucan  Urine Culture  F/U 6 weeks

## 2020-01-08 NOTE — PATIENT INSTRUCTIONS
Increase Flexeril to 10 mg  Change Norco 5 mg to percocet 10 mg  Levsin SL trial  Urimar - T as needed  Ampicillin and Diflucan  Urine Culture  F/U 6 weeks

## 2020-01-11 LAB — BACTERIA UR CULT: ABNORMAL

## 2020-01-13 ENCOUNTER — TELEPHONE (OUTPATIENT)
Dept: UROLOGY | Facility: CLINIC | Age: 50
End: 2020-01-13

## 2020-01-13 NOTE — TELEPHONE ENCOUNTER
----- Message from Harris Lua MD sent at 1/13/2020  8:55 AM CST -----  Urine culture is positive for Klebsiella.  This is sensitive to the Cipro.  Two week prescription of Cipro was called to the pharmacy

## 2020-02-03 ENCOUNTER — TELEPHONE (OUTPATIENT)
Dept: UROLOGY | Facility: CLINIC | Age: 50
End: 2020-02-03

## 2020-02-03 DIAGNOSIS — N32.89 BLADDER SPASM: ICD-10-CM

## 2020-02-03 DIAGNOSIS — R31.9 URINARY TRACT INFECTION WITH HEMATURIA, SITE UNSPECIFIED: ICD-10-CM

## 2020-02-03 DIAGNOSIS — N39.0 URINARY TRACT INFECTION WITH HEMATURIA, SITE UNSPECIFIED: ICD-10-CM

## 2020-02-03 DIAGNOSIS — R35.0 URINARY FREQUENCY: Primary | ICD-10-CM

## 2020-02-03 DIAGNOSIS — R10.2 PELVIC PAIN: ICD-10-CM

## 2020-02-03 NOTE — TELEPHONE ENCOUNTER
Urine orders are in [facility]  Pt aware - she stated she was going to the lab today to give a urine sample

## 2020-02-04 ENCOUNTER — LAB VISIT (OUTPATIENT)
Dept: LAB | Facility: HOSPITAL | Age: 50
End: 2020-02-04
Attending: UROLOGY
Payer: MEDICAID

## 2020-02-04 DIAGNOSIS — R10.2 PELVIC PAIN: ICD-10-CM

## 2020-02-04 DIAGNOSIS — N32.89 BLADDER SPASM: ICD-10-CM

## 2020-02-04 DIAGNOSIS — R31.9 URINARY TRACT INFECTION WITH HEMATURIA, SITE UNSPECIFIED: ICD-10-CM

## 2020-02-04 DIAGNOSIS — R35.0 URINARY FREQUENCY: ICD-10-CM

## 2020-02-04 DIAGNOSIS — N39.0 URINARY TRACT INFECTION WITH HEMATURIA, SITE UNSPECIFIED: ICD-10-CM

## 2020-02-04 LAB
BACTERIA #/AREA URNS AUTO: ABNORMAL /HPF
BILIRUB UR QL STRIP: NEGATIVE
CLARITY UR REFRACT.AUTO: ABNORMAL
COLOR UR AUTO: YELLOW
GLUCOSE UR QL STRIP: NEGATIVE
HGB UR QL STRIP: ABNORMAL
KETONES UR QL STRIP: NEGATIVE
LEUKOCYTE ESTERASE UR QL STRIP: ABNORMAL
MICROSCOPIC COMMENT: ABNORMAL
NITRITE UR QL STRIP: NEGATIVE
PH UR STRIP: 6 [PH] (ref 5–8)
PROT UR QL STRIP: NEGATIVE
RBC #/AREA URNS AUTO: >100 /HPF (ref 0–4)
SP GR UR STRIP: 1.01 (ref 1–1.03)
SQUAMOUS #/AREA URNS AUTO: 1 /HPF
URN SPEC COLLECT METH UR: ABNORMAL
WBC #/AREA URNS AUTO: 33 /HPF (ref 0–5)

## 2020-02-04 PROCEDURE — 87086 URINE CULTURE/COLONY COUNT: CPT

## 2020-02-04 PROCEDURE — 81001 URINALYSIS AUTO W/SCOPE: CPT

## 2020-02-06 LAB
BACTERIA UR CULT: NORMAL
BACTERIA UR CULT: NORMAL

## 2020-02-07 ENCOUNTER — HOSPITAL ENCOUNTER (EMERGENCY)
Facility: HOSPITAL | Age: 50
Discharge: HOME | End: 2020-02-07
Attending: EMERGENCY MEDICINE
Payer: MEDICAID

## 2020-02-07 VITALS
TEMPERATURE: 98 F | SYSTOLIC BLOOD PRESSURE: 142 MMHG | HEART RATE: 78 BPM | HEIGHT: 67 IN | BODY MASS INDEX: 34.06 KG/M2 | DIASTOLIC BLOOD PRESSURE: 78 MMHG | WEIGHT: 217 LBS | RESPIRATION RATE: 18 BRPM | OXYGEN SATURATION: 100 %

## 2020-02-07 DIAGNOSIS — R10.9 RIGHT FLANK PAIN: Primary | ICD-10-CM

## 2020-02-07 DIAGNOSIS — R31.9 HEMATURIA, UNSPECIFIED TYPE: ICD-10-CM

## 2020-02-07 LAB
ALBUMIN SERPL-MCNC: 3.7 G/DL (ref 3.3–5.5)
ALP SERPL-CCNC: 53 U/L (ref 42–141)
BILIRUB SERPL-MCNC: 0.6 MG/DL (ref 0.2–1.6)
BILIRUBIN, POC UA: NEGATIVE
BLOOD, POC UA: ABNORMAL
BUN SERPL-MCNC: 8 MG/DL (ref 7–22)
CALCIUM SERPL-MCNC: 9 MG/DL (ref 8–10.3)
CHLORIDE SERPL-SCNC: 102 MMOL/L (ref 98–108)
CLARITY, POC UA: CLEAR
COLOR, POC UA: YELLOW
CREAT SERPL-MCNC: 0.8 MG/DL (ref 0.6–1.2)
GLUCOSE SERPL-MCNC: 91 MG/DL (ref 73–118)
GLUCOSE, POC UA: NEGATIVE
KETONES, POC UA: NEGATIVE
LEUKOCYTE EST, POC UA: ABNORMAL
NITRITE, POC UA: NEGATIVE
PH UR STRIP: 6 [PH]
POC ALT (SGPT): 11 U/L (ref 10–47)
POC AST (SGOT): 22 U/L (ref 11–38)
POC TCO2: 27 MMOL/L (ref 18–33)
POTASSIUM BLD-SCNC: 3.8 MMOL/L (ref 3.6–5.1)
PROTEIN, POC UA: NEGATIVE
PROTEIN, POC: 7.5 G/DL (ref 6.4–8.1)
SODIUM BLD-SCNC: 142 MMOL/L (ref 128–145)
SPECIFIC GRAVITY, POC UA: 1.02
UROBILINOGEN, POC UA: 0.2 E.U./DL

## 2020-02-07 PROCEDURE — 99284 EMERGENCY DEPT VISIT MOD MDM: CPT | Mod: 25,ER

## 2020-02-07 PROCEDURE — 80053 COMPREHEN METABOLIC PANEL: CPT | Mod: ER

## 2020-02-07 PROCEDURE — 85025 COMPLETE CBC W/AUTO DIFF WBC: CPT | Mod: ER

## 2020-02-07 PROCEDURE — 87086 URINE CULTURE/COLONY COUNT: CPT

## 2020-02-07 PROCEDURE — 81003 URINALYSIS AUTO W/O SCOPE: CPT | Mod: ER

## 2020-02-07 PROCEDURE — 25000003 PHARM REV CODE 250: Mod: ER | Performed by: EMERGENCY MEDICINE

## 2020-02-07 PROCEDURE — 36000 PLACE NEEDLE IN VEIN: CPT | Mod: ER

## 2020-02-07 RX ORDER — HYDROCODONE BITARTRATE AND ACETAMINOPHEN 5; 325 MG/1; MG/1
1 TABLET ORAL EVERY 6 HOURS PRN
Qty: 12 TABLET | Refills: 0 | Status: SHIPPED | OUTPATIENT
Start: 2020-02-07 | End: 2020-02-10

## 2020-02-07 RX ORDER — ACETAMINOPHEN 500 MG
1000 TABLET ORAL
Status: COMPLETED | OUTPATIENT
Start: 2020-02-07 | End: 2020-02-07

## 2020-02-07 RX ORDER — TRAMADOL HYDROCHLORIDE 50 MG/1
50 TABLET ORAL
Status: COMPLETED | OUTPATIENT
Start: 2020-02-07 | End: 2020-02-07

## 2020-02-07 RX ADMIN — TRAMADOL HYDROCHLORIDE 50 MG: 50 TABLET, FILM COATED ORAL at 01:02

## 2020-02-07 RX ADMIN — ACETAMINOPHEN 1000 MG: 500 TABLET ORAL at 01:02

## 2020-02-07 NOTE — DISCHARGE INSTRUCTIONS
As we discussed, it is important that you return to the ER for any new concerns or symptoms, worsening of your existing symptoms, if you do not completely improve, or if you are unable to be seen by your primary care provider.    Some medical conditions are difficult to diagnose and may not be identified during an ER visit. Today, we did not find a medical condition that required inpatient admission, but please remember that medical conditions can change, and we cannot predict how you will be feeling tomorrow or the next day, so if you have any worsening or new symptoms, you should not hesitate to return to the emergency department for reevaluation.     Be sure to follow up with your primary care doctor for a recheck and to review any labs/imaging that were performed today.  If you do not have a primary care doctor, you may contact the one listed on your discharge paperwork or you may also call the [facility] Appointment Desk at [phone] to schedule an appointment with one.       All medications have side effects.  We have done our best to select a medication for you that will treat your health problem and have the least amount of side effects.  If at any time while or after you take this medication you develops new symptoms or have any concerns, it is important you stop taking the medication and call your primary care provider or return to the emergency department for evaluation.    Do not drive or operate heavy machinery for 24 hours if you have received any pain medications, sedatives or mood altering drugs during your ER visit.

## 2020-02-07 NOTE — ED PROVIDER NOTES
Encounter Date: 2020    SCRIBE #1 NOTE: I, Desi Becerra, am scribing for, and in the presence of,  Dr. Lopez . I have scribed the following portions of the note - Other sections scribed: HPI, ROS, PE .       History     Chief Complaint   Patient presents with    Back Pain     RIGHT SIDED FLANK PAIN WITH DYSURIA AND TEA COLORED URINE; PT ALSO STATES SHE CURRENTLY HAS THE SHINGLES ON HER BACK BUT THE FLANK PAIN IS DISTINCTLY DIFFERENT; DENIES FEVER     CC: Back pain  49 y.o female with Lupus, fibromyalgia, IC, and a hx of kidney and bladder problems presents to the ED complaining of right flank pain radiating into her lower abdomen for 1 month. She notes dark colored urine and nuasea yesterday. Recently diagnosed with shingles on the right side of her back and is currently getting treated for it. She states this pain started prior to being diagnosed with singles. She has been taking ibuprofen for pain. Last BM was two days ago. She denies diarrhea, constipation, fever, or vomiting.    The history is provided by the patient. No  was used.     Review of patient's allergies indicates:   Allergen Reactions    Bactrim [sulfamethoxazole-trimethoprim] Rash     Past Medical History:   Diagnosis Date    Disorder of kidney and ureter     Fibromyalgia     IC (interstitial cystitis)     Lupus     STD (sexually transmitted disease)     Urinary tract infection     Vaginal infection      Past Surgical History:   Procedure Laterality Date    BLADDER FULGURATION N/A 2019    Procedure: FULGURATION, BLADDER;  Surgeon: Harris Lua MD;  Location: [facility] OR;  Service: Urology;  Laterality: N/A;    BLADDER FULGURATION N/A 2019    Procedure: FULGURATION, BLADDER;  Surgeon: Harris Lua MD;  Location: [facility] OR;  Service: Urology;  Laterality: N/A;    BREAST REDUCTION       SECTION      COLONOSCOPY N/A 2018    Procedure: COLONOSCOPY;  Surgeon: Catrachita Kamara MD;   Location: [facility] ENDO;  Service: Endoscopy;  Laterality: N/A;    CYSTOSCOPY N/A 7/23/2018    Procedure: CYSTOSCOPY;  Surgeon: Harris Lua MD;  Location: [facility] OR;  Service: Urology;  Laterality: N/A;  200 of botox    CYSTOSCOPY W/ RETROGRADES Bilateral 6/6/2019    Procedure: CYSTOSCOPY, WITH RETROGRADE PYELOGRAM;  Surgeon: Harris Lua MD;  Location: [facility] OR;  Service: Urology;  Laterality: Bilateral;    CYSTOURETHROSCOPY  12/4/2019    Procedure: CYSTOURETHROSCOPY;  Surgeon: Harris Lua MD;  Location: [facility] OR;  Service: Urology;;    hemmorroidectomy      HYSTERECTOMY  2008    WILLIAM for endometriosis    INJECTION OF BOTULINUM TOXIN TYPE A N/A 7/23/2018    Procedure: INJECTION, BOTULINUM TOXIN, TYPE A;  Surgeon: Harris Lua MD;  Location: [facility] OR;  Service: Urology;  Laterality: N/A;    neurostimulator for bladder      TOTAL REDUCTION MAMMOPLASTY      URETHRAL CATHETERIZATION N/A 12/4/2019    Procedure: CATHETERIZATION, URETHRA;  Surgeon: Harris Lua MD;  Location: [facility] OR;  Service: Urology;  Laterality: N/A;     Family History   Problem Relation Age of Onset    Hypertension Mother     Cancer Mother         KIDNEY    Arthritis Mother     Kidney disease Neg Hx      Social History     Tobacco Use    Smoking status: Never Smoker    Smokeless tobacco: Never Used   Substance Use Topics    Alcohol use: No    Drug use: No     Review of Systems   Constitutional: Negative for fever.   Gastrointestinal: Positive for abdominal pain and nausea. Negative for constipation, diarrhea and vomiting.   Musculoskeletal: Positive for back pain.   Skin: Positive for rash.   All other systems reviewed and are negative.      Physical Exam     Initial Vitals [02/07/20 0916]   BP Pulse Resp Temp SpO2   (!) 148/86 81 18 98.3 °F (36.8 °C) 100 %      MAP       --         Physical Exam    Nursing note and vitals reviewed.  Constitutional: She appears well-developed and well-nourished. She is not diaphoretic.  She appears distressed (mild).   HENT:   Head: Normocephalic and atraumatic.   Eyes: Conjunctivae are normal.   Neck: Normal range of motion. Neck supple.   Cardiovascular: Normal rate, regular rhythm, normal heart sounds and intact distal pulses. Exam reveals no gallop and no friction rub.    No murmur heard.  Pulmonary/Chest: Effort normal and breath sounds normal. No respiratory distress. She has no wheezes. She has no rhonchi. She has no rales.   Abdominal: Soft.   Musculoskeletal: Normal range of motion.   Neurological: She is alert and oriented to person, place, and time. GCS score is 15. GCS eye subscore is 4. GCS verbal subscore is 5. GCS motor subscore is 6.   Skin: Skin is warm, dry and intact. Capillary refill takes less than 2 seconds.        Psychiatric: She has a normal mood and affect. Her behavior is normal.         ED Course   Procedures  Labs Reviewed   POCT URINALYSIS W/O SCOPE - Abnormal; Notable for the following components:       Result Value    Blood, UA 3+ (*)     Leukocytes, UA 1+ (*)     All other components within normal limits   CULTURE, URINE   POCT URINALYSIS W/O SCOPE   POCT CBC   POCT CMP   POCT CMP          Imaging Results          CT Renal Stone Study ABD Pelvis WO (Final result)  Result time 02/07/20 11:40:51    Final result by John Yip MD (02/07/20 11:40:51)                 Impression:      No renal or ureteral calculi are identified.    Sacral stimulator device.    Status post hysterectomy.      Electronically signed by: John Yip MD  Date:    02/07/2020  Time:    11:40             Narrative:    EXAMINATION:  CT RENAL STONE STUDY ABD PELVIS WO    CLINICAL HISTORY:  Flank pain, stone disease suspected;Hematuria;    TECHNIQUE:  Low dose axial images, sagittal and coronal reformations were obtained from the lung bases to the pubic symphysis.  Contrast was not administered.    COMPARISON:  07/03/2019.    FINDINGS:  The lung bases are clear.  The bones are intact.  There  is no evidence for acute fracture or bone destruction.  There is a sacral stimulator device present on the left as before.    The liver is normal in size and is homogeneous in density with no focal liver lesions identified.  The gallbladder is present and appears grossly unremarkable.  No biliary dilatation is identified on this noncontrast examination.  The spleen, stomach, and pancreas appear grossly unremarkable.  The adrenal glands are not enlarged.  The kidneys appear normal in overall size and there is no evidence for abnormal renal masses or hydronephrosis.  No renal or ureteral calculi are identified.  The abdominal aorta tapers normally without aneurysmal dilatation.  No para-aortic lymphadenopathy is identified.  The appendix is present and appears unremarkable.  There are no dilated loops of bowel evident.  The uterus is absent.  No abnormal adnexal masses are identified.  No ascites is identified.  There is no evidence for pelvic or inguinal lymphadenopathy.                                 Medical Decision Making:   Clinical Tests:   Lab Tests: Ordered and Reviewed            Scribe Attestation:   Scribe #1: I performed the above scribed service and the documentation accurately describes the services I performed. I attest to the accuracy of the note.            ED Course as of Feb 07 1235   Fri Feb 07, 2020   1149 3+ blood in the urine    [MH]   1149 CMP is normal    [MH]   1150 White count is low at 2.4 otherwise the CBC is normal   POCT CBC [MH]   1150 CT is normal    [MH]   1233 I discussed with the patient at length the findings of my workup.  Her pain continues but is not explained by this workup.  I will prescribe a short course of Norco and have her follow up with her primary team    [MH]      ED Course User Index  [MH] Staci Lopez MD   I attest that I personally performed the services documented by the scribe and acknowledged and confirm the content of the note.   Nurses notes were  reviewed.  Staci Lopez             Clinical Impression:     1. Right flank pain    2. Hematuria, unspecified type                                Staci Lopez MD  02/07/20 7741

## 2020-02-09 LAB — BACTERIA UR CULT: NORMAL

## 2020-02-19 ENCOUNTER — OFFICE VISIT (OUTPATIENT)
Dept: UROLOGY | Facility: CLINIC | Age: 50
End: 2020-02-19
Payer: MEDICAID

## 2020-02-19 VITALS
TEMPERATURE: 98 F | DIASTOLIC BLOOD PRESSURE: 76 MMHG | SYSTOLIC BLOOD PRESSURE: 118 MMHG | HEART RATE: 72 BPM | WEIGHT: 216.94 LBS | BODY MASS INDEX: 34.05 KG/M2 | HEIGHT: 67 IN

## 2020-02-19 DIAGNOSIS — N30.01 ACUTE CYSTITIS WITH HEMATURIA: Primary | ICD-10-CM

## 2020-02-19 DIAGNOSIS — B02.23 POST-HERPETIC POLYNEUROPATHY: ICD-10-CM

## 2020-02-19 DIAGNOSIS — R31.0 GROSS HEMATURIA: ICD-10-CM

## 2020-02-19 PROCEDURE — 87086 URINE CULTURE/COLONY COUNT: CPT

## 2020-02-19 PROCEDURE — 99999 PR PBB SHADOW E&M-EST. PATIENT-LVL IV: ICD-10-PCS | Mod: PBBFAC,,, | Performed by: UROLOGY

## 2020-02-19 PROCEDURE — 99214 PR OFFICE/OUTPT VISIT, EST, LEVL IV, 30-39 MIN: ICD-10-PCS | Mod: S$PBB,,, | Performed by: UROLOGY

## 2020-02-19 PROCEDURE — 99214 OFFICE O/P EST MOD 30 MIN: CPT | Mod: S$PBB,,, | Performed by: UROLOGY

## 2020-02-19 PROCEDURE — 99999 PR PBB SHADOW E&M-EST. PATIENT-LVL IV: CPT | Mod: PBBFAC,,, | Performed by: UROLOGY

## 2020-02-19 PROCEDURE — 99214 OFFICE O/P EST MOD 30 MIN: CPT | Mod: PBBFAC,PO | Performed by: UROLOGY

## 2020-02-19 RX ORDER — TRAMADOL HYDROCHLORIDE 50 MG/1
TABLET ORAL
COMMUNITY
Start: 2020-01-28 | End: 2020-10-08 | Stop reason: ALTCHOICE

## 2020-02-19 RX ORDER — CEPHALEXIN 500 MG/1
500 CAPSULE ORAL 4 TIMES DAILY
Qty: 40 CAPSULE | Refills: 1 | Status: SHIPPED | OUTPATIENT
Start: 2020-02-19 | End: 2020-02-29

## 2020-02-19 RX ORDER — HYDROCODONE BITARTRATE AND ACETAMINOPHEN 5; 325 MG/1; MG/1
TABLET ORAL
COMMUNITY
Start: 2020-02-10 | End: 2020-10-08 | Stop reason: ALTCHOICE

## 2020-02-19 NOTE — PATIENT INSTRUCTIONS
Urine culture  Continue Norco, Aleve the double dose, gabapentin as taking.  Take Cymbalta as prescribed  Capsaicin cream to rash 2-3 times daily as needed  Keflex 500 mg 4 times daily times 10 days.  Will change if urine culture deems necessary.  Follow-up 6 weeks and as needed.

## 2020-02-19 NOTE — PROGRESS NOTES
Subjective:       Patient ID: Aripta Bradshaw is a 49 y.o. female.    Chief Complaint: Follow-up    50 yo AAF with development of shingles approximately 4 weeks ago.  The patient had excruciating pain initially was thought to be paraspinous muscle spasm was treated with muscle relaxers however this progressed to a rash following the nerve around the back to the umbilicus.  Blisters had formed and then have now crusted over.  The patient was treated for approximately 2 weeks with Valtrex 1000 mg.  Patient is still having significant discomfort with post herpetic neuralgia.  She is on gabapentin 600 mg daily and to leave as well as Norco 5/325 with no resolution of symptoms.  The patient is having significant pain.  In the meantime she started with tea-colored urine that appears to be hematuria today prior to her scheduled postoperative visit.  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  Patient requesting help with treatment of her pain and evaluation of hematuria.  Hematuria   This is a recurrent problem. The current episode started today. The problem is unchanged. She describes the hematuria as gross hematuria. The hematuria occurs throughout her entire urinary stream. She reports no clotting in her urine stream. Her pain is at a severity of 5/10 (The no dysuria but pressure feeling with voiding that is uncomfortable.). The pain is moderate. She describes her urine color as tea colored. Irritative symptoms include frequency ( q.4 hours), nocturia (Times 4) and urgency. Associated symptoms include chills and flank pain ( right-sided related to shingles post herpetic neuralgia). Pertinent negatives include no abdominal pain, dysuria ( not urethral burning but pressure.), facial swelling, fever, genital pain, hesitancy, inability to urinate, nausea, vomiting or sore  throat. She is sexually active. There is no history of hypertension,  trauma, kidney stones, recent infection, sickle cell disease, STDs, tobacco use or UTI.     Review of Systems   Constitutional: Positive for chills. Negative for activity change, appetite change, fatigue and fever.   HENT: Negative for congestion, ear pain, facial swelling, hearing loss, nosebleeds, sinus pressure, sore throat and trouble swallowing.    Eyes: Negative for pain and visual disturbance.   Respiratory: Negative for apnea, cough and shortness of breath.    Cardiovascular: Negative for chest pain and leg swelling.   Gastrointestinal: Negative for abdominal distention, abdominal pain, anal bleeding, blood in stool, constipation, diarrhea, nausea, rectal pain and vomiting.   Endocrine: Negative for cold intolerance, heat intolerance, polydipsia, polyphagia and polyuria.   Genitourinary: Positive for flank pain ( right-sided related to shingles post herpetic neuralgia), frequency ( q.4 hours), hematuria ( tea colored), nocturia (Times 4) and urgency. Negative for decreased urine volume, difficulty urinating, dyspareunia, dysuria ( not urethral burning but pressure.), enuresis, genital sores, hesitancy, menstrual problem, pelvic pain, vaginal bleeding, vaginal discharge and vaginal pain.   Musculoskeletal: Negative for arthralgias and back pain.   Skin: Positive for rash (Single type rash following L1 dermatome nerve distribution on the right). Negative for color change and pallor.   Allergic/Immunologic: Negative for environmental allergies, food allergies and immunocompromised state.   Neurological: Negative for dizziness, speech difficulty, weakness and headaches.   Hematological: Negative for adenopathy. Does not bruise/bleed easily.   Psychiatric/Behavioral: Negative.        Objective:      Physical Exam   Nursing note and vitals reviewed.  Constitutional: She is oriented to person, place, and time. She appears well-developed and  well-nourished.   HENT:   Head: Normocephalic.   Nose: Nose normal.   Mouth/Throat: Oropharynx is clear and moist.   Eyes: Conjunctivae and EOM are normal. Pupils are equal, round, and reactive to light.   Neck: Normal range of motion. Neck supple.   Cardiovascular: Normal rate, regular rhythm, normal heart sounds and intact distal pulses.    Pulmonary/Chest: Effort normal and breath sounds normal.   Abdominal: Soft. Bowel sounds are normal.   Musculoskeletal: Normal range of motion.   Neurological: She is alert and oriented to person, place, and time. She has normal reflexes.   Skin: Skin is warm and dry. Rash (Dried papules from herpetic rash. along L1 dermatome on right.) noted. Rash is maculopapular. There is erythema.          Psychiatric: She has a normal mood and affect. Her behavior is normal. Judgment and thought content normal.       Assessment:       1. Acute cystitis with hematuria    2. Post-herpetic polyneuropathy    3. Gross hematuria        Plan:       Patient Instructions   Urine culture  Continue Norco Aleve the double dose, gabapentin as taking.  Take Cymbalta as prescribed  Capsaicin cream to rash 2-3 times daily as needed

## 2020-02-20 LAB — BACTERIA UR CULT: NORMAL

## 2020-04-04 ENCOUNTER — PATIENT MESSAGE (OUTPATIENT)
Dept: OBSTETRICS AND GYNECOLOGY | Facility: CLINIC | Age: 50
End: 2020-04-04

## 2020-04-06 RX ORDER — METRONIDAZOLE 7.5 MG/G
1 GEL VAGINAL DAILY
Qty: 70 G | Refills: 0 | Status: SHIPPED | OUTPATIENT
Start: 2020-04-06 | End: 2020-04-11

## 2020-06-08 ENCOUNTER — HOSPITAL ENCOUNTER (EMERGENCY)
Facility: HOSPITAL | Age: 50
Discharge: HOME | End: 2020-06-08
Attending: EMERGENCY MEDICINE
Payer: MEDICAID

## 2020-06-08 VITALS
TEMPERATURE: 99 F | RESPIRATION RATE: 18 BRPM | WEIGHT: 230 LBS | DIASTOLIC BLOOD PRESSURE: 80 MMHG | BODY MASS INDEX: 34.86 KG/M2 | OXYGEN SATURATION: 98 % | HEART RATE: 63 BPM | SYSTOLIC BLOOD PRESSURE: 130 MMHG | HEIGHT: 68 IN

## 2020-06-08 DIAGNOSIS — R03.0 ELEVATED BLOOD PRESSURE READING: ICD-10-CM

## 2020-06-08 DIAGNOSIS — R07.9 CHEST PAIN: Primary | ICD-10-CM

## 2020-06-08 DIAGNOSIS — R51.9 ACUTE NONINTRACTABLE HEADACHE, UNSPECIFIED HEADACHE TYPE: ICD-10-CM

## 2020-06-08 DIAGNOSIS — R42 DIZZINESS: ICD-10-CM

## 2020-06-08 LAB
ALBUMIN SERPL-MCNC: 3.8 G/DL (ref 3.3–5.5)
ALP SERPL-CCNC: 63 U/L (ref 42–141)
B-HCG UR QL: NEGATIVE
BILIRUB SERPL-MCNC: 0.5 MG/DL (ref 0.2–1.6)
BUN SERPL-MCNC: 9 MG/DL (ref 7–22)
CALCIUM SERPL-MCNC: 9.4 MG/DL (ref 8–10.3)
CHLORIDE SERPL-SCNC: 104 MMOL/L (ref 98–108)
CREAT SERPL-MCNC: 0.7 MG/DL (ref 0.6–1.2)
CTP QC/QA: YES
CTP QC/QA: YES
GLUCOSE SERPL-MCNC: 88 MG/DL (ref 73–118)
POC ALT (SGPT): 7 U/L (ref 10–47)
POC AST (SGOT): 20 U/L (ref 11–38)
POC CARDIAC TROPONIN I: 0 NG/ML
POC PTINR: 1 (ref 0.9–1.2)
POC PTWBT: 11.7 SEC (ref 9.7–14.3)
POC TCO2: 27 MMOL/L (ref 18–33)
POTASSIUM BLD-SCNC: 3.6 MMOL/L (ref 3.6–5.1)
PROTEIN, POC: 8.1 G/DL (ref 6.4–8.1)
SAMPLE: NORMAL
SAMPLE: NORMAL
SARS-COV-2 RDRP RESP QL NAA+PROBE: NEGATIVE
SODIUM BLD-SCNC: 139 MMOL/L (ref 128–145)

## 2020-06-08 PROCEDURE — 63600175 PHARM REV CODE 636 W HCPCS: Mod: ER | Performed by: NURSE PRACTITIONER

## 2020-06-08 PROCEDURE — 85025 COMPLETE CBC W/AUTO DIFF WBC: CPT | Mod: ER

## 2020-06-08 PROCEDURE — 81025 URINE PREGNANCY TEST: CPT | Mod: ER | Performed by: EMERGENCY MEDICINE

## 2020-06-08 PROCEDURE — 93005 ELECTROCARDIOGRAM TRACING: CPT | Mod: ER

## 2020-06-08 PROCEDURE — 25000003 PHARM REV CODE 250: Mod: ER | Performed by: EMERGENCY MEDICINE

## 2020-06-08 PROCEDURE — 93010 ELECTROCARDIOGRAM REPORT: CPT | Mod: ,,, | Performed by: INTERNAL MEDICINE

## 2020-06-08 PROCEDURE — U0002 COVID-19 LAB TEST NON-CDC: HCPCS | Mod: ER | Performed by: EMERGENCY MEDICINE

## 2020-06-08 PROCEDURE — 85610 PROTHROMBIN TIME: CPT | Mod: ER

## 2020-06-08 PROCEDURE — 99285 EMERGENCY DEPT VISIT HI MDM: CPT | Mod: 25,ER

## 2020-06-08 PROCEDURE — 96374 THER/PROPH/DIAG INJ IV PUSH: CPT | Mod: ER

## 2020-06-08 PROCEDURE — 84484 ASSAY OF TROPONIN QUANT: CPT | Mod: ER

## 2020-06-08 PROCEDURE — 93010 EKG 12-LEAD: ICD-10-PCS | Mod: ,,, | Performed by: INTERNAL MEDICINE

## 2020-06-08 PROCEDURE — 80053 COMPREHEN METABOLIC PANEL: CPT | Mod: ER

## 2020-06-08 RX ORDER — MECLIZINE HYDROCHLORIDE 25 MG/1
25 TABLET ORAL
Status: COMPLETED | OUTPATIENT
Start: 2020-06-08 | End: 2020-06-08

## 2020-06-08 RX ORDER — DEXTROMETHORPHAN HYDROBROMIDE, GUAIFENESIN 5; 100 MG/5ML; MG/5ML
650 LIQUID ORAL EVERY 8 HOURS
Qty: 20 TABLET | Refills: 0 | Status: SHIPPED | OUTPATIENT
Start: 2020-06-08 | End: 2020-10-08

## 2020-06-08 RX ORDER — KETOROLAC TROMETHAMINE 30 MG/ML
15 INJECTION, SOLUTION INTRAMUSCULAR; INTRAVENOUS
Status: COMPLETED | OUTPATIENT
Start: 2020-06-08 | End: 2020-06-08

## 2020-06-08 RX ORDER — ASPIRIN 325 MG
325 TABLET ORAL
Status: COMPLETED | OUTPATIENT
Start: 2020-06-08 | End: 2020-06-08

## 2020-06-08 RX ORDER — IBUPROFEN 600 MG/1
600 TABLET ORAL EVERY 6 HOURS PRN
Qty: 20 TABLET | Refills: 0 | Status: SHIPPED | OUTPATIENT
Start: 2020-06-08 | End: 2020-10-08

## 2020-06-08 RX ORDER — MECLIZINE HYDROCHLORIDE 25 MG/1
25 TABLET ORAL 3 TIMES DAILY PRN
Qty: 20 TABLET | Refills: 0 | Status: SHIPPED | OUTPATIENT
Start: 2020-06-08 | End: 2023-04-29

## 2020-06-08 RX ADMIN — ASPIRIN 325 MG ORAL TABLET 325 MG: 325 PILL ORAL at 03:06

## 2020-06-08 RX ADMIN — KETOROLAC TROMETHAMINE 15 MG: 30 INJECTION, SOLUTION INTRAMUSCULAR at 05:06

## 2020-06-08 RX ADMIN — MECLIZINE HYDROCHLORIDE 25 MG: 25 TABLET ORAL at 04:06

## 2020-06-08 NOTE — PROVIDER PROGRESS NOTES - EMERGENCY DEPT.
Emergency Department TeleTRIAGE Encounter Note      CHIEF COMPLAINT    Chief Complaint   Patient presents with    Chest Pain     Pt had telehealth appointment with PCP and was sent to ER for evaluation of CP, radiating to right arm, neck pain and headache x 3 days        VITAL SIGNS   Initial Vitals [06/08/20 1246]   BP Pulse Resp Temp SpO2   (!) 130/95 85 18 98.2 °F (36.8 °C) 99 %      MAP       --            ALLERGIES    Review of patient's allergies indicates:   Allergen Reactions    Bactrim [sulfamethoxazole-trimethoprim] Rash       PROVIDER TRIAGE NOTE  The patient presents with a 3 day history of chest pain.  She describes it as a 9/10 severity pressure-like sensation that goes across her chest.  She denies any associated symptoms.  She denies previous episodes.  She did states that the pain has been constant.      ORDERS  Labs Reviewed   POCT CBC   POCT URINE PREGNANCY   POCT COVID-19 RAPID SCREENING   POCT CMP   POCT PROTIME-INR   POCT TROPONIN       ED Orders (720h ago, onward)    Start Ordered     Status Ordering Provider    06/08/20 1345 06/08/20 1339  aspirin tablet 325 mg  ED 1 Time      Ordered ANASTASIA SCHAFFER    06/08/20 1340 06/08/20 1339  POCT urine pregnancy  Once      Ordered ANASTASIA SCHAFFER    06/08/20 1340 06/08/20 1339  POCT COVID-19 Rapid Screening  Once      Ordered ANASTASIA SCHAFFER    06/08/20 1339 06/08/20 1339  Vital signs  Every 15 min      Ordered ANASTASIA SCHAFFER    06/08/20 1339 06/08/20 1339  Cardiac Monitoring - Adult  Continuous     Comments:  Notify Physician If:    Ordered ANASTASIA SCHAFFER    06/08/20 1339 06/08/20 1339  Pulse Oximetry Continuous  Continuous      Ordered ANASTASIA SCHAFFER    06/08/20 1339 06/08/20 1339  Diet NPO  Diet effective now      Ordered ANASTASIA SCHAFFER    06/08/20 1339 06/08/20 1339  Saline lock IV  Once      Ordered ANASTASIA SCHAFFER    06/08/20 1339 06/08/20 1339  EKG 12-lead  Once     Comments:  Do not perform if previously done during this visit/ triage.     Ordered ANASTASIA SCHAFFER.    06/08/20 1339 06/08/20 1339  POCT CBC  Once      Ordered KARIME, ANASTASIA DICKSON.    06/08/20 1339 06/08/20 1339  POCT CMP  Once      Ordered ANASTASIA SCHAFFER.    06/08/20 1339 06/08/20 1339  POCT Protime-INR  Once      Ordered ANASTASIA SCHAFFER.    06/08/20 1339 06/08/20 1339  POCT Troponin  Once      Ordered ANASTASIA SCHAFFER.    06/08/20 1339 06/08/20 1339  X-Ray Chest PA And Lateral  1 time imaging      Ordered ANASTASIA SCHAFFER.    06/08/20 1248 06/08/20 1248  EKG 12-lead  Once      In process MARA RAHMAN            Virtual Visit Note: The provider triage portion of this emergency department evaluation and documentation was performed via [employer], a HIPAA-compliant telemedicine application, in concert with a tele-presenter in the room. A face to face patient evaluation with one of my colleagues will occur once the patient is placed in an emergency department room.      DISCLAIMER: This note was prepared with [employer] voice recognition transcription software. Garbled syntax, mangled pronouns, and other bizarre constructions may be attributed to that software system.

## 2020-06-08 NOTE — ED PROVIDER NOTES
Encounter Date: 2020    SCRIBE #1 NOTE: I, Larry Mancuso, am scribing for, and in the presence of,  ZACK Nathan. I have scribed the following portions of the note - Other sections scribed: HPI, ROS, PE.       History     Chief Complaint   Patient presents with    Chest Pain     Pt had telehealth appointment with PCP and was sent to ER for evaluation of CP, radiating to right arm, neck pain and headache x 3 days      50 year old female with 9/10 chest pain that radiates to her right arm onset 3 days.  She also complaints of headaches, neck pain, and elevated blood pressure. States she had a tele-med visit with her PCP and she was instructed to visit the ED for examination. Patient denies fever, fatigue, shortness of breath, abdominal pain, nausea, vomiting, diarrhea, dysuria, hematuria, rash, numbness, weakness, tingling, or any additional complaints.      The history is provided by the patient. No  was used.     Review of patient's allergies indicates:   Allergen Reactions    Bactrim [sulfamethoxazole-trimethoprim] Rash     Past Medical History:   Diagnosis Date    Disorder of kidney and ureter     Fibromyalgia     IC (interstitial cystitis)     Lupus     STD (sexually transmitted disease)     Urinary tract infection     Vaginal infection      Past Surgical History:   Procedure Laterality Date    BLADDER FULGURATION N/A 2019    Procedure: FULGURATION, BLADDER;  Surgeon: Harris Lua MD;  Location: [facility] OR;  Service: Urology;  Laterality: N/A;    BLADDER FULGURATION N/A 2019    Procedure: FULGURATION, BLADDER;  Surgeon: Harris Lua MD;  Location: [facility] OR;  Service: Urology;  Laterality: N/A;    BREAST REDUCTION       SECTION      COLONOSCOPY N/A 2018    Procedure: COLONOSCOPY;  Surgeon: Catrachita Kamara MD;  Location: [facility] ENDO;  Service: Endoscopy;  Laterality: N/A;    CYSTOSCOPY N/A 2018    Procedure: CYSTOSCOPY;  Surgeon: Harris HATHAWAY  MD Stoney;  Location: [facility] OR;  Service: Urology;  Laterality: N/A;  200 of botox    CYSTOSCOPY W/ RETROGRADES Bilateral 6/6/2019    Procedure: CYSTOSCOPY, WITH RETROGRADE PYELOGRAM;  Surgeon: Harris Lua MD;  Location: [facility] OR;  Service: Urology;  Laterality: Bilateral;    CYSTOURETHROSCOPY  12/4/2019    Procedure: CYSTOURETHROSCOPY;  Surgeon: Harris Lua MD;  Location: [facility] OR;  Service: Urology;;    hemmorroidectomy      HYSTERECTOMY  2008    WILLIAM for endometriosis    INJECTION OF BOTULINUM TOXIN TYPE A N/A 7/23/2018    Procedure: INJECTION, BOTULINUM TOXIN, TYPE A;  Surgeon: Harris Lua MD;  Location: [facility] OR;  Service: Urology;  Laterality: N/A;    neurostimulator for bladder      TOTAL REDUCTION MAMMOPLASTY      URETHRAL CATHETERIZATION N/A 12/4/2019    Procedure: CATHETERIZATION, URETHRA;  Surgeon: Harris Lua MD;  Location: [facility] OR;  Service: Urology;  Laterality: N/A;     Family History   Problem Relation Age of Onset    Hypertension Mother     Cancer Mother         KIDNEY    Arthritis Mother     Kidney disease Neg Hx      Social History     Tobacco Use    Smoking status: Never Smoker    Smokeless tobacco: Never Used   Substance Use Topics    Alcohol use: No    Drug use: No     Review of Systems   Constitutional: Negative for chills, fatigue and fever.   HENT: Negative for congestion, ear pain, rhinorrhea, sore throat and trouble swallowing.    Eyes: Negative for pain, discharge and redness.   Respiratory: Negative for cough and shortness of breath.    Cardiovascular: Positive for chest pain.   Gastrointestinal: Negative for abdominal pain, diarrhea, nausea and vomiting.   Genitourinary: Negative for decreased urine volume, dysuria and hematuria.   Musculoskeletal: Positive for arthralgias and neck pain. Negative for back pain and neck stiffness.   Skin: Negative for rash.   Neurological: Positive for headaches. Negative for dizziness, weakness, light-headedness and  numbness.   Psychiatric/Behavioral: Negative for confusion.       Physical Exam     Initial Vitals [06/08/20 1246]   BP Pulse Resp Temp SpO2   (!) 130/95 85 18 98.2 °F (36.8 °C) 99 %      MAP       --         Physical Exam    Nursing note and vitals reviewed.  Constitutional: Vital signs are normal. She appears well-developed and well-nourished.  Non-toxic appearance. She does not appear ill.   HENT:   Head: Normocephalic and atraumatic.   Right Ear: Tympanic membrane and external ear normal.   Left Ear: Tympanic membrane and external ear normal.   Nose: Nose normal.   Mouth/Throat: Uvula is midline, oropharynx is clear and moist and mucous membranes are normal.   Eyes: Conjunctivae are normal.   Neck: Normal range of motion. Neck supple.   Cardiovascular: Normal rate, regular rhythm and normal heart sounds. Exam reveals no gallop and no friction rub.    No murmur heard.  Pulmonary/Chest: Effort normal and breath sounds normal. No respiratory distress. She has no wheezes. She has no rhonchi. She has no rales. She exhibits no tenderness.   Abdominal: Soft. Bowel sounds are normal. There is no tenderness. There is no rebound, no guarding and no CVA tenderness.   Musculoskeletal: Normal range of motion.   Neurological: She is alert and oriented to person, place, and time. She has normal strength. No cranial nerve deficit or sensory deficit. Gait normal. GCS eye subscore is 4. GCS verbal subscore is 5. GCS motor subscore is 6.   Skin: Skin is warm, dry and intact. No rash noted.   Psychiatric: She has a normal mood and affect. Her speech is normal and behavior is normal. Judgment and thought content normal.         ED Course   Procedures  Labs Reviewed   POCT CMP - Abnormal; Notable for the following components:       Result Value    ALT (SGPT), POC 7 (*)     All other components within normal limits   TROPONIN ISTAT   POCT CBC   POCT URINE PREGNANCY   POCT COVID-19 RAPID SCREENING   POCT CMP   POCT PROTIME-INR   POCT  TROPONIN   ISTAT PROCEDURE         EKG Readings: (Independently Interpreted)   Initial Reading: No STEMI. Rhythm: Normal Sinus Rhythm. Heart Rate: 84. Axis: Normal. Other Impression: Abnormal EKG, nonspecific T-wave abnormality, .  No prior EKG for comparison   No prior EKG for comparison     ECG Results          EKG 12-lead (Final result)  Result time 06/08/20 14:17:10    Final result by Interface, Lab In [facility] (06/08/20 14:17:10)                 Narrative:    Test Reason : R07.9,    Vent. Rate : 084 BPM     Atrial Rate : 084 BPM     P-R Int : 176 ms          QRS Dur : 088 ms      QT Int : 374 ms       P-R-T Axes : 058 067 -25 degrees     QTc Int : 441 ms    Normal sinus rhythm  Nonspecific T wave abnormality  Abnormal ECG  No previous ECGs available  Confirmed by Tl Gao MD ([id number]) on 6/8/2020 2:16:59 PM    Referred By:             Confirmed By:Tl Gao MD                            Imaging Results          CT Head Without Contrast (Final result)  Result time 06/08/20 17:00:18    Final result by Harris Gonzalez MD (06/08/20 17:00:18)                 Impression:      No acute intracranial abnormality identified.      Electronically signed by: Harris Gonzalez MD  Date:    06/08/2020  Time:    17:00             Narrative:    EXAMINATION:  CT HEAD WITHOUT CONTRAST    CLINICAL HISTORY:  Dizziness;Headache, acute, norm neuro exam;    TECHNIQUE:  Low dose axial CT images obtained throughout the head without intravenous contrast. Sagittal and coronal reconstructions were performed.    COMPARISON:  None.    FINDINGS:  Intracranial compartment:    Ventricles and sulci are normal in size for age without evidence of hydrocephalus. No extra-axial blood or fluid collections.    The brain parenchyma appears normal. No parenchymal mass, hemorrhage, edema or major vascular distribution infarct.    Skull/extracranial contents (limited evaluation): No fracture. Mastoid air cells and paranasal sinuses are essentially  clear.  Imaged portions of the orbits are within normal limits.                               X-Ray Chest PA And Lateral (Final result)  Result time 06/08/20 15:39:40    Final result by Timothy Gonzalez MD (06/08/20 15:39:40)                 Impression:      No acute abnormality.      Electronically signed by: Timothy Gonzalez MD  Date:    06/08/2020  Time:    15:39             Narrative:    EXAMINATION:  XR CHEST PA AND LATERAL    CLINICAL HISTORY:  Chest Pain;    TECHNIQUE:  PA and lateral views of the chest were performed.    COMPARISON:  None    FINDINGS:  The lungs are clear, with normal appearance of pulmonary vasculature and no pleural effusion or pneumothorax.    The cardiac silhouette is normal in size. The hilar and mediastinal contours are unremarkable.    Bones are intact.                                 Medical Decision Making:   History:   Old Medical Records: I decided to obtain old medical records.  Independently Interpreted Test(s):   I have ordered and independently interpreted X-rays - see prior notes.  I have ordered and independently interpreted EKG Reading(s) - see prior notes  Clinical Tests:   Lab Tests: Ordered and Reviewed  Radiological Study: Ordered and Reviewed  Medical Tests: Ordered and Reviewed    Additional MDM:   PERC Rule:   Age is greater than or equal to 50 = 0.0  Heart Rate is greater than or equal to 100 = 0.0  SaO2 on room air < 95% = 0.0  Unilateral leg swelling = 0.0  Hemoptysis = 0.0  Recent surgery or trauma = 0.0  Prior PE or DVT =  0.0  Hormone use = 0.00  PERC Score = 0    Well's Criteria Score:  -Clinical symptoms of DVT (leg swelling, pain with palpation) = 0.0  -Other diagnosis less likely than pulmonary embolism =            0.0  -Heart Rate >100 =   0.0  -Immobilization (= or > than 3 days) or surgery in the previous 4 weeks = 0.0  -Previous DVT/PE = 0.0  -Hemoptysis =          0.0  -Malignancy =           0.0  Well's Probability Score =    0      Heart Score:     History:          Slightly suspicious.  ECG:             Normal  Age:               45-65 years  Risk factors: 1-2 risk factors  Troponin:       Less than or equal to normal limit  Final Score: 2        APC / Resident Notes:   This is an evaluation of a 50 y.o. female that presents to the Emergency Department for chest pain. Physical Exam shows a non-toxic, afebrile, and well appearing female. Normal physical exam with soft non-tender abdomen no guarding or rebound, normal bowel sounds, normal neuro exam    Vital Signs Are Reassuring. If available, previous records reviewed.     RESULTS: labs unremarkable, UA negative, CXR negative, EKG no STEMI, CT head negative.  CXR negative for infiltrates, pneumothorax, cardiomegaly, pleural effusion or widening of the mediastinum. Patient is PERC Negative. No evidence of hypoxia.  EKG interpreted by myself and , with no STEMI. Well's Criteria negative, Heart Score 2    My overall impression is Chest pain, HA, Dizziness, elevated BP.  I considered but doubt pulmonary embolus, acute myocardial infarction, Boerhaeve syndrome, cardiac tamponade, thoracic artery dissection, acute coronary syndrome, pneumothorax, pneumonia, CHF, cardiac arrhythmia, or any other emergent cardiac, pulmonary or aortic pathology.     ED Course: labs, UA, CT, CXR, Meclizine, Toradol. D/C Meds: tylenol, Ibuprofen, Meclizine. D/C Information: f/u, medications. The diagnosis, treatment plan, instructions for follow-up and reevaluation with PCP as well as ED return precautions were discussed and understanding was verbalized. All questions or concerns have been addressed. This case was discussed with and the patient has been examined by Dr. Kapadia who is in agreement with my assessment and plan.          Scribe Attestation:   Scribe #1: I performed the above scribed service and the documentation accurately describes the services I performed. I attest to the accuracy of the note.      I have  reviewed the notes, assessments, and/or procedures performed by NP/PA, I concur with her/his documentation of Arpita Bradshaw.  Attending:   Physician Attestation Statement: I have reviewed this case with my non-physician provider.   Physician Attestation Statement: I have provided a face to face evaluation of this patient at the request of my non-physician provider.  I agree with the HPI, review of systems, and physical exam, as documented.  The patient's condition warranted physician involvement.  Other Attend Additions:   Physical Exam: Awake alert oriented ×4 speaking clearly in full sentences.    Regular rate and rhythm no murmur gallop or rub.   Clear to auscultation bilateral without wheeze crackles or Rales   Abdomen soft, nontender, nondistended. Bowel sounds ×4.   Pulses palpable ×4 no clubbing cyanosis or edema.   Skin no rash, no wound.     Medical Decision Making:     I reviewed radiology and Lab Data.  The treatment regimen was reviewed by me.  Patient reports feeling better after treatment in the emergency room. Patient presents with chest pain for greater than 24 hours.  Troponin is negative.  No STEMI on EKG and no acute issues on chest x-ray. Chest pain unlikely to be cardiac in origin.  I recommend follow up with Cardiology in 1 day for further evaluation of chest pain. Discussed need to return to the ED if chest pain worsens or does not resolve.  I agree with outpatient management as documented.  Discussed labs, and imaging results. Answered questions and discussed discharge plan.   Patient is to return to the Emergency department if symptoms worsen or do not resolve.                 I, Dr. Yajaira Kapadia, personally performed the services described in this documentation. This document was produced by a scribe under my direction and in my presence. All medical record entries made by the scribe were at my direction and in my presence.  I have reviewed the chart and agree that the record  reflects my personal performance and is accurate and complete. Yajaira Kapadia DO.     06/08/2020 4:17 PM                        Clinical Impression:     1. Chest pain    2. Acute nonintractable headache, unspecified headache type    3. Dizziness    4. Elevated blood pressure reading            Disposition:   Disposition: Discharged  Condition: Stable                        Neyda Arora, [facility]  06/08/20 3212

## 2020-08-11 ENCOUNTER — HOSPITAL ENCOUNTER (EMERGENCY)
Facility: HOSPITAL | Age: 50
Discharge: HOME | End: 2020-08-11
Attending: EMERGENCY MEDICINE
Payer: MEDICAID

## 2020-08-11 VITALS
WEIGHT: 222 LBS | SYSTOLIC BLOOD PRESSURE: 142 MMHG | HEIGHT: 67 IN | BODY MASS INDEX: 34.84 KG/M2 | HEART RATE: 71 BPM | DIASTOLIC BLOOD PRESSURE: 75 MMHG | OXYGEN SATURATION: 100 % | TEMPERATURE: 99 F | RESPIRATION RATE: 20 BRPM

## 2020-08-11 DIAGNOSIS — R82.998 LEUKOCYTES IN URINE: ICD-10-CM

## 2020-08-11 DIAGNOSIS — R21 RASH AND NONSPECIFIC SKIN ERUPTION: Primary | ICD-10-CM

## 2020-08-11 LAB
ALBUMIN SERPL-MCNC: 3.7 G/DL (ref 3.3–5.5)
ALP SERPL-CCNC: 56 U/L (ref 42–141)
BILIRUB SERPL-MCNC: 0.4 MG/DL (ref 0.2–1.6)
BILIRUBIN, POC UA: NEGATIVE
BLOOD, POC UA: ABNORMAL
BUN SERPL-MCNC: 16 MG/DL (ref 7–22)
CALCIUM SERPL-MCNC: 9.3 MG/DL (ref 8–10.3)
CHLORIDE SERPL-SCNC: 104 MMOL/L (ref 98–108)
CLARITY, POC UA: ABNORMAL
COLOR, POC UA: YELLOW
CREAT SERPL-MCNC: 1.1 MG/DL (ref 0.6–1.2)
GLUCOSE SERPL-MCNC: 93 MG/DL (ref 73–118)
GLUCOSE, POC UA: NEGATIVE
KETONES, POC UA: NEGATIVE
LEUKOCYTE EST, POC UA: ABNORMAL
NITRITE, POC UA: NEGATIVE
PH UR STRIP: 6 [PH]
POC ALT (SGPT): 16 U/L (ref 10–47)
POC AST (SGOT): 21 U/L (ref 11–38)
POC TCO2: 26 MMOL/L (ref 18–33)
POTASSIUM BLD-SCNC: 4 MMOL/L (ref 3.6–5.1)
PROTEIN, POC UA: NEGATIVE
PROTEIN, POC: 7.1 G/DL (ref 6.4–8.1)
SODIUM BLD-SCNC: 141 MMOL/L (ref 128–145)
SPECIFIC GRAVITY, POC UA: >=1.03
UROBILINOGEN, POC UA: 0.2 E.U./DL

## 2020-08-11 PROCEDURE — 80053 COMPREHEN METABOLIC PANEL: CPT | Mod: ER

## 2020-08-11 PROCEDURE — 63600175 PHARM REV CODE 636 W HCPCS: Mod: ER | Performed by: PHYSICIAN ASSISTANT

## 2020-08-11 PROCEDURE — 85025 COMPLETE CBC W/AUTO DIFF WBC: CPT | Mod: ER

## 2020-08-11 PROCEDURE — 36000 PLACE NEEDLE IN VEIN: CPT | Mod: ER

## 2020-08-11 PROCEDURE — 99284 EMERGENCY DEPT VISIT MOD MDM: CPT | Mod: ER

## 2020-08-11 PROCEDURE — 81003 URINALYSIS AUTO W/O SCOPE: CPT | Mod: ER

## 2020-08-11 RX ORDER — NITROFURANTOIN 25; 75 MG/1; MG/1
100 CAPSULE ORAL 2 TIMES DAILY
Qty: 10 CAPSULE | Refills: 0 | Status: SHIPPED | OUTPATIENT
Start: 2020-08-11 | End: 2020-08-16

## 2020-08-11 RX ORDER — TRIAMCINOLONE ACETONIDE 1 MG/G
OINTMENT TOPICAL 2 TIMES DAILY
Qty: 30 G | Refills: 0 | Status: SHIPPED | OUTPATIENT
Start: 2020-08-11 | End: 2020-08-11 | Stop reason: CLARIF

## 2020-08-11 RX ORDER — PREDNISONE 20 MG/1
60 TABLET ORAL
Status: COMPLETED | OUTPATIENT
Start: 2020-08-11 | End: 2020-08-11

## 2020-08-11 RX ORDER — PREDNISONE 20 MG/1
60 TABLET ORAL DAILY
Qty: 15 TABLET | Refills: 0 | Status: SHIPPED | OUTPATIENT
Start: 2020-08-11 | End: 2020-08-16

## 2020-08-11 RX ADMIN — PREDNISONE 60 MG: 20 TABLET ORAL at 09:08

## 2020-08-12 ENCOUNTER — OFFICE VISIT (OUTPATIENT)
Dept: OBSTETRICS AND GYNECOLOGY | Facility: CLINIC | Age: 50
End: 2020-08-12
Attending: OBSTETRICS & GYNECOLOGY
Payer: MEDICAID

## 2020-08-12 VITALS
WEIGHT: 213.19 LBS | BODY MASS INDEX: 33.46 KG/M2 | DIASTOLIC BLOOD PRESSURE: 90 MMHG | HEIGHT: 67 IN | SYSTOLIC BLOOD PRESSURE: 142 MMHG

## 2020-08-12 DIAGNOSIS — R10.2 PELVIC PAIN: ICD-10-CM

## 2020-08-12 DIAGNOSIS — N76.0 VAGINITIS AND VULVOVAGINITIS: Primary | ICD-10-CM

## 2020-08-12 PROCEDURE — 87480 CANDIDA DNA DIR PROBE: CPT

## 2020-08-12 PROCEDURE — 87510 GARDNER VAG DNA DIR PROBE: CPT

## 2020-08-12 PROCEDURE — 99999 PR PBB SHADOW E&M-EST. PATIENT-LVL III: ICD-10-PCS | Mod: PBBFAC,,, | Performed by: OBSTETRICS & GYNECOLOGY

## 2020-08-12 PROCEDURE — 99213 OFFICE O/P EST LOW 20 MIN: CPT | Mod: S$PBB,,, | Performed by: OBSTETRICS & GYNECOLOGY

## 2020-08-12 PROCEDURE — 99999 PR PBB SHADOW E&M-EST. PATIENT-LVL III: CPT | Mod: PBBFAC,,, | Performed by: OBSTETRICS & GYNECOLOGY

## 2020-08-12 PROCEDURE — 87491 CHLMYD TRACH DNA AMP PROBE: CPT

## 2020-08-12 PROCEDURE — 99213 OFFICE O/P EST LOW 20 MIN: CPT | Mod: PBBFAC | Performed by: OBSTETRICS & GYNECOLOGY

## 2020-08-12 PROCEDURE — 99213 PR OFFICE/OUTPT VISIT, EST, LEVL III, 20-29 MIN: ICD-10-PCS | Mod: S$PBB,,, | Performed by: OBSTETRICS & GYNECOLOGY

## 2020-08-12 RX ORDER — PENTOSAN POLYSULFATE SODIUM 100 MG/1
CAPSULE, GELATIN COATED ORAL
COMMUNITY
Start: 2020-07-02 | End: 2022-01-12 | Stop reason: SDUPTHER

## 2020-08-12 RX ORDER — VALACYCLOVIR HYDROCHLORIDE 500 MG/1
500 TABLET, FILM COATED ORAL 2 TIMES DAILY
Qty: 30 TABLET | Refills: 3 | Status: SHIPPED | OUTPATIENT
Start: 2020-08-12 | End: 2021-10-01 | Stop reason: SDUPTHER

## 2020-08-12 RX ORDER — AMITRIPTYLINE HYDROCHLORIDE 25 MG/1
25 TABLET, FILM COATED ORAL NIGHTLY PRN
COMMUNITY
Start: 2020-05-25 | End: 2021-12-02

## 2020-08-12 RX ORDER — CYCLOBENZAPRINE HCL 5 MG
5 TABLET ORAL DAILY PRN
COMMUNITY
Start: 2020-03-10

## 2020-08-12 RX ORDER — VALACYCLOVIR HYDROCHLORIDE 500 MG/1
TABLET, FILM COATED ORAL
COMMUNITY
Start: 2019-12-20 | End: 2020-08-12 | Stop reason: SDUPTHER

## 2020-08-12 NOTE — ED NOTES
Patient is resting comfortably.  Provider at bedside discussing POC with verbalized understanding.

## 2020-08-12 NOTE — ED PROVIDER NOTES
Encounter Date: 2020    SCRIBE #1 NOTE: I, Shanique Moreno, am scribing for, and in the presence of,  PRADEEP Garza. I have scribed the following portions of the note - Other sections scribed: HPI, ROS, PE.       History     Chief Complaint   Patient presents with    Rash     PT REPORTS RASH TO BILAT ARMS AND LEGS FOR 4 DAYS WITHOUT RELIEF FROM BENADRYL, REPORTS VERY ITCHY     Arpita Bradshaw is a 50 y.o. female who presents to the ED complaining of an itchy rash to the bilateral legs and arms onset 5 days. Patient reports rash now radiates to both feet, both hands, and back. She also complains of vaginal pain. She has a history of lupus and takes her medication as needed. Patient states she has not had any past similar rashes. Denies fever, nausea, vomiting, throat swelling, or abdominal pain. Attempted treatment with Benadryl Spray with no relief. She has not taken any new medications. She has not tried any new skin products or new razors. She states she started a new cabbage and tomato diet last week and thinks it may be the cause of her rash. Patient still has her gallbladder.    The history is provided by the patient. No  was used.     Review of patient's allergies indicates:   Allergen Reactions    Bactrim [sulfamethoxazole-trimethoprim] Rash     Past Medical History:   Diagnosis Date    Disorder of kidney and ureter     Fibromyalgia     IC (interstitial cystitis)     Lupus     STD (sexually transmitted disease)     Urinary tract infection     Vaginal infection      Past Surgical History:   Procedure Laterality Date    BLADDER FULGURATION N/A 2019    Procedure: FULGURATION, BLADDER;  Surgeon: Harris Lua MD;  Location: [facility] OR;  Service: Urology;  Laterality: N/A;    BLADDER FULGURATION N/A 2019    Procedure: FULGURATION, BLADDER;  Surgeon: Harris Lua MD;  Location: [facility] OR;  Service: Urology;  Laterality: N/A;    BREAST REDUCTION        SECTION      COLONOSCOPY N/A 11/6/2018    Procedure: COLONOSCOPY;  Surgeon: Catrachita Kamara MD;  Location: [facility] ENDO;  Service: Endoscopy;  Laterality: N/A;    CYSTOSCOPY N/A 7/23/2018    Procedure: CYSTOSCOPY;  Surgeon: Harris Lua MD;  Location: [facility] OR;  Service: Urology;  Laterality: N/A;  200 of botox    CYSTOSCOPY W/ RETROGRADES Bilateral 6/6/2019    Procedure: CYSTOSCOPY, WITH RETROGRADE PYELOGRAM;  Surgeon: Harris Lua MD;  Location: [facility] OR;  Service: Urology;  Laterality: Bilateral;    CYSTOURETHROSCOPY  12/4/2019    Procedure: CYSTOURETHROSCOPY;  Surgeon: Harris Lua MD;  Location: [facility] OR;  Service: Urology;;    hemmorroidectomy      HYSTERECTOMY  2008    WILLIAM for endometriosis    INJECTION OF BOTULINUM TOXIN TYPE A N/A 7/23/2018    Procedure: INJECTION, BOTULINUM TOXIN, TYPE A;  Surgeon: Harris Lua MD;  Location: [facility] OR;  Service: Urology;  Laterality: N/A;    neurostimulator for bladder      TOTAL REDUCTION MAMMOPLASTY      URETHRAL CATHETERIZATION N/A 12/4/2019    Procedure: CATHETERIZATION, URETHRA;  Surgeon: Harris Lua MD;  Location: [facility] OR;  Service: Urology;  Laterality: N/A;     Family History   Problem Relation Age of Onset    Hypertension Mother     Cancer Mother         KIDNEY    Arthritis Mother     Kidney disease Neg Hx      Social History     Tobacco Use    Smoking status: Never Smoker    Smokeless tobacco: Never Used   Substance Use Topics    Alcohol use: No    Drug use: No     Review of Systems   Constitutional: Negative for chills and fever.   HENT: Negative for sore throat and trouble swallowing.    Respiratory: Negative for shortness of breath.    Cardiovascular: Negative for chest pain.   Gastrointestinal: Negative for abdominal pain, nausea and vomiting.   Genitourinary: Positive for vaginal pain.   Musculoskeletal: Negative for back pain.   Skin: Positive for rash (generalized).   All other systems reviewed and are  negative.      Physical Exam     Initial Vitals [08/11/20 2007]   BP Pulse Resp Temp SpO2   (!) 162/90 79 18 98 °F (36.7 °C) 99 %      MAP       --         Physical Exam    Nursing note and vitals reviewed.  Constitutional: She appears well-developed.   HENT:   Head: Normocephalic.   Right Ear: External ear normal.   Left Ear: External ear normal.   Nose: Nose normal.   Mouth/Throat: Oropharynx is clear and moist.   No ulcerations to mucosa.  No malar rash.   Eyes: Conjunctivae and EOM are normal. Pupils are equal, round, and reactive to light.   Neck: Normal range of motion. Neck supple.   Cardiovascular: Normal rate, regular rhythm, S1 normal, S2 normal and normal heart sounds. Exam reveals no gallop and no friction rub.    No murmur heard.  Pulmonary/Chest: Breath sounds normal. No respiratory distress. She has no wheezes. She has no rhonchi. She has no rales.   Abdominal: Soft. There is no abdominal tenderness.   Musculoskeletal: Normal range of motion.      Comments: Scattered nontender flat punctate areas of faint redness predominately to the lower extremities distal to the knees  but also involves bilateral forearms.   Neurological: She is alert and oriented to person, place, and time.   Skin: Skin is warm and dry. Capillary refill takes less than 2 seconds.   Psychiatric: She has a normal mood and affect. Her behavior is normal.         ED Course   Procedures  Labs Reviewed   POCT URINALYSIS W/O SCOPE - Abnormal; Notable for the following components:       Result Value    Spec Grav UA >=1.030 (*)     Blood, UA 2+ (*)     Leukocytes, UA 2+ (*)     All other components within normal limits   POCT CBC   POCT CMP   POCT CMP              Imaging Results    None          Medical Decision Making:   History:   Old Medical Records: I decided to obtain old medical records.  Clinical Tests:   Lab Tests: Ordered and Reviewed  ED Management:  I am unsure of the etiology of this patient's symptoms, however, I do not  believe they are of emergent/life threatening etiology at this time.  No platelet derangement today.  Seems less consistent with hepatobiliary related pruritis.  No discoid rash or mucosal involvement.  No anaphylaxis or angioedema.  Does not appear cellulitic.  Not consistent with syphilis, herpes, or scabies.  Found to have leukocytes in urine. Noted to have IC. States urinary symptoms feel like they may require abx today; Macrobid issued. Will treat with steroids and advised follow-up with PCP.  Strict return precautions discussed with patient who is agreeable to the plan.  Other:   I have discussed this case with another health care provider.       <> Summary of the Discussion: Discussed with attending            Scribe Attestation:   Scribe #1: I performed the above scribed service and the documentation accurately describes the services I performed. I attest to the accuracy of the note.    Scribe attestation: I, Juan Carlos Schumacher, personally performed the services described in this documentation. All medical record entries made by the scribe were at my direction and in my presence.  I have reviewed the chart and agree that the record reflects my personal performance and is accurate and complete                       Clinical Impression:     1. Rash and nonspecific skin eruption    2. History of lupus    3. Leukocytes in urine                ED Disposition Condition    Discharge Stable        ED Prescriptions     Medication Sig Dispense Start Date End Date Auth. Provider    triamcinolone acetonide 0.1% (KENALOG) 0.1 % ointment  (Status: Discontinued) Apply topically 2 (two) times daily. for 7 days 30 g 8/11/2020 8/11/2020 Homer Bauman MD    nitrofurantoin, macrocrystal-monohydrate, (MACROBID) 100 MG capsule Take 1 capsule (100 mg total) by mouth 2 (two) times daily. for 5 days 10 capsule 8/11/2020 8/16/2020 Juan Carlos Schumacher PA-C    predniSONE (DELTASONE) 20 MG tablet Take 3 tablets (60 mg total) by mouth once  daily. for 5 days 15 tablet 8/11/2020 8/16/2020 Juan Carlos Schumacher PA-C        Follow-up Information     Follow up With Specialties Details Why Contact Info    Kirk Servin III, MD Internal Medicine Call in 1 day to schedule an appointment, for re-evaluation of today's complaint, and ongoing care [address]  [address]  [place] [address]  [phone]      [facility] DERMATOLOGY Dermatology Call in 1 day to schedule an appointment, for re-evaluation of today's complaint [address] [facility] [address]  [phone]    [facility] Emergency Department Emergency Medicine Go to  If symptoms worsen [address] [facility] [address] [phone]                                     Juan Carlos Schumacher PA-C  08/11/20 0169

## 2020-08-12 NOTE — PROGRESS NOTES
HISTORY OF PRESENT ILLNESS:    Arpita Bradshaw is a 50 y.o. female, , No LMP recorded. Patient has had a hysterectomy.,  presents for pelvic pain.    Seen in ER last night for rash on lower extremities and UTI symptoms - symptoms started after doing a cabbage diet.  Started macrobid today.  C/o lower pelvic pain and vaginal discomfort - feels inflammed.      Last hsv outbreak about 5 months.  Has rx for daily valtrex but really only takes it for outbreaks..    Past Medical History:   Diagnosis Date    Disorder of kidney and ureter     Fibromyalgia     IC (interstitial cystitis)     Lupus     STD (sexually transmitted disease)     Urinary tract infection     Vaginal infection        Past Surgical History:   Procedure Laterality Date    BLADDER FULGURATION N/A 2019    Procedure: FULGURATION, BLADDER;  Surgeon: Harris Lua MD;  Location: [facility] OR;  Service: Urology;  Laterality: N/A;    BLADDER FULGURATION N/A 2019    Procedure: FULGURATION, BLADDER;  Surgeon: Harris Lua MD;  Location: [facility] OR;  Service: Urology;  Laterality: N/A;    BREAST REDUCTION       SECTION      COLONOSCOPY N/A 2018    Procedure: COLONOSCOPY;  Surgeon: Catrachita Kamara MD;  Location: [facility];  Service: Endoscopy;  Laterality: N/A;    CYSTOSCOPY N/A 2018    Procedure: CYSTOSCOPY;  Surgeon: Harris Lua MD;  Location: [facility] OR;  Service: Urology;  Laterality: N/A;  200 of botox    CYSTOSCOPY W/ RETROGRADES Bilateral 2019    Procedure: CYSTOSCOPY, WITH RETROGRADE PYELOGRAM;  Surgeon: Harris Lua MD;  Location: [facility] OR;  Service: Urology;  Laterality: Bilateral;    CYSTOURETHROSCOPY  2019    Procedure: CYSTOURETHROSCOPY;  Surgeon: Harris Lua MD;  Location: [facility] OR;  Service: Urology;;    hemmorroidectomy      HYSTERECTOMY      WILLIAM for endometriosis    INJECTION OF BOTULINUM TOXIN TYPE A N/A 2018    Procedure: INJECTION, BOTULINUM TOXIN, TYPE A;   Surgeon: Harris Lua MD;  Location: [facility] OR;  Service: Urology;  Laterality: N/A;    neurostimulator for bladder      TOTAL REDUCTION MAMMOPLASTY      URETHRAL CATHETERIZATION N/A 12/4/2019    Procedure: CATHETERIZATION, URETHRA;  Surgeon: Harris Lua MD;  Location: [facility] OR;  Service: Urology;  Laterality: N/A;       MEDICATIONS AND ALLERGIES:      Current Outpatient Medications:     acetaminophen (TYLENOL) 650 MG TbSR, Take 1 tablet (650 mg total) by mouth every 8 (eight) hours., Disp: 20 tablet, Rfl: 0    amitriptyline (ELAVIL) 25 MG tablet, , Disp: , Rfl:     amLODIPine (NORVASC) 10 MG tablet, , Disp: , Rfl:     cyclobenzaprine (FLEXERIL) 5 MG tablet, Take 5 mg by mouth., Disp: , Rfl:     ELMIRON 100 mg Cap, TK 1 C PO TID B MEALS, Disp: , Rfl:     fluticasone propionate (FLONASE) 50 mcg/actuation nasal spray, SHAKE LQ AND U 2 SPRAYS IEN QD, Disp: , Rfl: 3    hydroxychloroquine (PLAQUENIL) 200 mg tablet, Take 200 mg by mouth 2 (two) times daily., Disp: , Rfl:     hyoscyamine (LEVSIN/SL) 0.125 mg Subl, Place 1 tablet (0.125 mg total) under the tongue every 4 (four) hours as needed., Disp: 40 tablet, Rfl: 3    ibuprofen (ADVIL,MOTRIN) 600 MG tablet, Take 1 tablet (600 mg total) by mouth every 6 (six) hours as needed., Disp: 20 tablet, Rfl: 0    leucovorin (WELLCOVORIN) 5 mg Tab, Take 5 mg by mouth every 7 days., Disp: , Rfl:     linaCLOtide (LINZESS) 72 mcg Cap capsule, Take 1 tablet by mouth. , Disp: , Rfl:     methen-m.blue-s.phos-phsal-hyo (URIBEL) 118-10-40.8-36 mg Cap, Take 1 capsule by mouth 4 (four) times daily as needed (Bladder pain)., Disp: 30 capsule, Rfl: 6    nitrofurantoin, macrocrystal-monohydrate, (MACROBID) 100 MG capsule, Take 1 capsule (100 mg total) by mouth 2 (two) times daily. for 5 days, Disp: 10 capsule, Rfl: 0    predniSONE (DELTASONE) 20 MG tablet, Take 3 tablets (60 mg total) by mouth once daily. for 5 days, Disp: 15 tablet, Rfl: 0    RESTASIS 0.05 % ophthalmic  emulsion, INT 1 GTT IN OU BID, Disp: , Rfl: 4    tamsulosin (FLOMAX) 0.4 mg Cap, Take 1 capsule (0.4 mg total) by mouth every evening., Disp: 30 capsule, Rfl: 0    valACYclovir (VALTREX) 500 MG tablet, Take 1 tablet (500 mg total) by mouth 2 (two) times daily. for 5 days, Disp: 30 tablet, Rfl: 3    BD INSULIN SYRINGE ULTRA-FINE 1 mL 31 gauge x 5/16 Syrg, U UTD ONCE WEEKLY, Disp: , Rfl: 3    DULoxetine (CYMBALTA) 30 MG capsule, , Disp: , Rfl:     HYDROcodone-acetaminophen (NORCO) 5-325 mg per tablet, , Disp: , Rfl:     meclizine (ANTIVERT) 25 mg tablet, Take 1 tablet (25 mg total) by mouth 3 (three) times daily as needed. (Patient not taking: Reported on 8/12/2020), Disp: 20 tablet, Rfl: 0    methenamine (HIPREX) 1 gram Tab, Take 1 tablet (1 g total) by mouth 2 (two) times daily. (Patient not taking: Reported on 8/12/2020), Disp: 60 tablet, Rfl: 11    methotrexate 25 mg/mL injection, , Disp: , Rfl: 4    methotrexate, PF, 25 mg/mL Soln, , Disp: , Rfl:     nystatin (MYCOSTATIN) cream, Apply topically 2 (two) times daily. for 7 days, Disp: 30 g, Rfl: 0    traMADol (ULTRAM) 50 mg tablet, TK 1 T PO Q 8 H PRN, Disp: , Rfl:   No current facility-administered medications for this visit.     Review of patient's allergies indicates:   Allergen Reactions    Bactrim [sulfamethoxazole-trimethoprim] Rash       Family History   Problem Relation Age of Onset    Hypertension Mother     Cancer Mother         KIDNEY    Arthritis Mother     Kidney disease Neg Hx        Social History     Socioeconomic History    Marital status: Single     Spouse name: Not on file    Number of children: Not on file    Years of education: Not on file    Highest education level: Not on file   Occupational History    Not on file   Social Needs    Financial resource strain: Not on file    Food insecurity     Worry: Not on file     Inability: Not on file    Transportation needs     Medical: Not on file     Non-medical: Not on file  "  Tobacco Use    Smoking status: Never Smoker    Smokeless tobacco: Never Used   Substance and Sexual Activity    Alcohol use: No    Drug use: No    Sexual activity: Not Currently     Partners: Male     Birth control/protection: None   Lifestyle    Physical activity     Days per week: Not on file     Minutes per session: Not on file    Stress: Not on file   Relationships    Social connections     Talks on phone: Not on file     Gets together: Not on file     Attends Faith service: Not on file     Active member of club or organization: Not on file     Attends meetings of clubs or organizations: Not on file     Relationship status: Not on file   Other Topics Concern    Not on file   Social History Narrative    Not on file       ROS:  GENERAL: No weight changes. No swelling. No fatigue. No fever.  CARDIOVASCULAR: No chest pain. No shortness of breath. No leg cramps.   NEUROLOGICAL: No headaches. No vision changes.  BREASTS: No pain. No lumps. No discharge.  ABDOMEN: No pain. No nausea. No vomiting. No diarrhea. No constipation.  REPRODUCTIVE: No abnormal bleeding.   VULVA: No pain. No lesions. No itching.  VAGINA: No relaxation. No itching. No odor. No discharge. No lesions.  URINARY: No incontinence. No nocturia. No frequency. No dysuria.    BP (!) 142/90   Ht 5' 7" (1.702 m)   Wt 96.7 kg (213 lb 3 oz)   BMI 33.39 kg/m²     PE:  APPEARANCE: Well nourished, well developed, in no acute distress.  ABDOMEN: Soft. No tenderness or masses. No hepatosplenomegaly. No hernias.  BREASTS, FUNDOSCOPIC, RECTAL DEFERRED  PELVIC: External female genitalia without lesions.  Female hair distribution. Adequate perineal body, Normal urethral meatus. Vagina moist and well rugated without lesions or discharge.  No significant cystocele or rectocele present. Cervix pink without lesions, discharge or tenderness. Uterus is normal size, regular, mobile and nontender. Adnexa without masses or tenderness.  EXTREMITIES: No " edema      DIAGNOSIS & PLAN  1. Vaginitis and vulvovaginitis  C. trachomatis/N. gonorrhoeae by AMP DNA    Vaginosis Screen by DNA Probe   2. Pelvic pain  C. trachomatis/N. gonorrhoeae by AMP DNA    Vaginosis Screen by DNA Probe       COUNSELING:  Finish abx for UTI - check urine culture if no improvement after finishes  Discussed daily valtrex

## 2020-08-31 LAB
C TRACH DNA SPEC QL NAA+PROBE: NOT DETECTED
N GONORRHOEA DNA SPEC QL NAA+PROBE: NOT DETECTED

## 2020-09-29 ENCOUNTER — TELEPHONE (OUTPATIENT)
Dept: UROLOGY | Facility: CLINIC | Age: 50
End: 2020-09-29

## 2020-09-29 NOTE — TELEPHONE ENCOUNTER
----- Message from Afshan Payton sent at 9/29/2020 11:36 AM CDT -----  Contact: patient  Name Of Caller:  Arpita     Provider Name: Harris Lua    Does patient feel the need to be seen today?  no    Relationship to the Pt?:  Patient     Contact Preference?: [phone]    What is the nature of the call?: Patient called and would like to speak to your office regarding if she need a referral to see you again or if your office can just schedule to see you     Patient also needs an appointment as soon as possible     Patient wants to speak to your office directly

## 2020-09-29 NOTE — TELEPHONE ENCOUNTER
Called patient she would like an appointment for bladder pain states she needs surgery again. Appointment made

## 2020-10-07 ENCOUNTER — OFFICE VISIT (OUTPATIENT)
Dept: UROLOGY | Facility: CLINIC | Age: 50
End: 2020-10-07
Payer: MEDICAID

## 2020-10-07 VITALS
WEIGHT: 213 LBS | RESPIRATION RATE: 18 BRPM | SYSTOLIC BLOOD PRESSURE: 140 MMHG | TEMPERATURE: 98 F | HEIGHT: 67 IN | DIASTOLIC BLOOD PRESSURE: 87 MMHG | OXYGEN SATURATION: 98 % | BODY MASS INDEX: 33.43 KG/M2 | HEART RATE: 91 BPM

## 2020-10-07 DIAGNOSIS — R30.0 DYSURIA: ICD-10-CM

## 2020-10-07 DIAGNOSIS — Z01.818 PREOP TESTING: Primary | ICD-10-CM

## 2020-10-07 DIAGNOSIS — R35.1 NOCTURIA: ICD-10-CM

## 2020-10-07 DIAGNOSIS — N30.10 INTERSTITIAL CYSTITIS: ICD-10-CM

## 2020-10-07 DIAGNOSIS — R10.2 PELVIC PAIN IN FEMALE: ICD-10-CM

## 2020-10-07 DIAGNOSIS — R39.15 URINARY URGENCY: Primary | ICD-10-CM

## 2020-10-07 DIAGNOSIS — R35.0 URINARY FREQUENCY: ICD-10-CM

## 2020-10-07 PROCEDURE — 99214 PR OFFICE/OUTPT VISIT, EST, LEVL IV, 30-39 MIN: ICD-10-PCS | Mod: S$PBB,,, | Performed by: UROLOGY

## 2020-10-07 PROCEDURE — 99215 OFFICE O/P EST HI 40 MIN: CPT | Mod: PBBFAC,PO | Performed by: UROLOGY

## 2020-10-07 PROCEDURE — 99999 PR PBB SHADOW E&M-EST. PATIENT-LVL V: ICD-10-PCS | Mod: PBBFAC,,, | Performed by: UROLOGY

## 2020-10-07 PROCEDURE — 99214 OFFICE O/P EST MOD 30 MIN: CPT | Mod: S$PBB,,, | Performed by: UROLOGY

## 2020-10-07 PROCEDURE — 99999 PR PBB SHADOW E&M-EST. PATIENT-LVL V: CPT | Mod: PBBFAC,,, | Performed by: UROLOGY

## 2020-10-07 RX ORDER — OXYBUTYNIN CHLORIDE 10 MG/1
10 TABLET, EXTENDED RELEASE ORAL
COMMUNITY
Start: 2020-09-10 | End: 2021-02-25 | Stop reason: ALTCHOICE

## 2020-10-07 RX ORDER — SODIUM CHLORIDE 9 MG/ML
INJECTION, SOLUTION INTRAVENOUS CONTINUOUS
Status: CANCELLED | OUTPATIENT
Start: 2020-10-07

## 2020-10-07 RX ORDER — TRIAMCINOLONE ACETONIDE 1 MG/G
OINTMENT TOPICAL
COMMUNITY
Start: 2020-08-11 | End: 2020-10-08 | Stop reason: ALTCHOICE

## 2020-10-07 NOTE — PROGRESS NOTES
Subjective:       Patient ID: Arpita Bradshaw is a 50 y.o. female.    Chief Complaint: Urinary Tract Infection    49 yo AAF with IC Flare. Pelvic pain, and dysuria. Worse when bladder is full. Here for evaluation and treatment.    Pelvic Pain  The patient's primary symptoms include pelvic pain. The patient's pertinent negatives include no vaginal discharge. This is a chronic problem. The current episode started more than 1 year ago. The problem has been gradually worsening. The pain is moderate. The problem affects both sides. She is not pregnant. Associated symptoms include back pain, dysuria, frequency and urgency. Pertinent negatives include no abdominal pain, chills, constipation, diarrhea, fever, flank pain, headaches, hematuria, nausea, rash, sore throat or vomiting. The symptoms are aggravated by eating, urinating and tactile pressure. The treatment provided significant relief. She is sexually active. No, her partner does not have an STD. She uses nothing for contraception. There is no history of an abdominal surgery, a  section, an ectopic pregnancy, endometriosis, a gynecological surgery, herpes simplex, menorrhagia, metrorrhagia, miscarriage, ovarian cysts, perineal abscess, PID, an STD, a terminated pregnancy or vaginosis.     Review of Systems   Constitutional: Negative for activity change, appetite change, chills, fatigue and fever.   HENT: Negative for congestion, ear pain, hearing loss, nosebleeds, sinus pressure, sore throat and trouble swallowing.    Eyes: Negative for pain and visual disturbance.   Respiratory: Negative for apnea, cough and shortness of breath.    Cardiovascular: Negative for chest pain and leg swelling.   Gastrointestinal: Negative for abdominal distention, abdominal pain, anal bleeding, blood in stool, constipation, diarrhea, nausea, rectal pain and vomiting.   Endocrine: Negative for cold intolerance, heat intolerance, polydipsia, polyphagia and polyuria.    Genitourinary: Positive for dysuria, frequency, pelvic pain and urgency. Negative for decreased urine volume, difficulty urinating, dyspareunia, enuresis, flank pain, genital sores, hematuria, menorrhagia, menstrual problem, vaginal bleeding, vaginal discharge and vaginal pain.   Musculoskeletal: Positive for back pain. Negative for arthralgias.   Skin: Negative for color change, pallor and rash.   Allergic/Immunologic: Negative for environmental allergies, food allergies and immunocompromised state.   Neurological: Negative for dizziness, speech difficulty, weakness and headaches.   Hematological: Negative for adenopathy. Does not bruise/bleed easily.   Psychiatric/Behavioral: Negative.        Objective:      Physical Exam   Nursing note and vitals reviewed.  Constitutional: She is oriented to person, place, and time. She appears well-developed.  Non-toxic appearance. She does not appear ill. No distress.   HENT:   Head: Normocephalic.   Right Ear: External ear normal.   Left Ear: External ear normal.   Nose: Nose normal.   Mouth/Throat: No oropharyngeal exudate or posterior oropharyngeal erythema.   Eyes: Conjunctivae are normal. Pupils are equal, round, and reactive to light. Right eye exhibits no discharge. Left eye exhibits no discharge. No scleral icterus.   Neck: Normal range of motion. Neck supple.   Cardiovascular: Normal rate, regular rhythm and normal heart sounds.    Pulmonary/Chest: Effort normal and breath sounds normal.   Abdominal: Soft. Normal appearance and bowel sounds are normal. She exhibits no distension and no mass. There is abdominal tenderness (Supr pubic area). There is no rebound and no guarding. No hernia.   Genitourinary:    Vulva and rectum normal.     Musculoskeletal: Normal range of motion. No swelling, tenderness, deformity or signs of injury.      Right lower leg: No edema.      Left lower leg: No edema.   Neurological: She is alert and oriented to person, place, and time. She has  normal reflexes. She displays no weakness and normal reflexes. No cranial nerve deficit or sensory deficit. Coordination and gait normal.   Skin: Skin is warm and dry. Capillary refill takes less than 2 seconds. No bruising, no lesion and no rash noted. She is not diaphoretic. No erythema. No jaundice or pallor.     Psychiatric: Her behavior is normal. Judgment and thought content normal.       Assessment:       1. Urinary urgency    2. Urinary frequency    3. Nocturia    4. Interstitial cystitis    5. Dysuria    6. Pelvic pain in female        Plan:            Patient Instructions   Plan Cystoscopy and bladder hydrodistention on 10/12/20  F/U 4 weeks

## 2020-10-09 ENCOUNTER — TELEPHONE (OUTPATIENT)
Dept: UROLOGY | Facility: CLINIC | Age: 50
End: 2020-10-09

## 2020-10-09 ENCOUNTER — LAB VISIT (OUTPATIENT)
Dept: FAMILY MEDICINE | Facility: CLINIC | Age: 50
End: 2020-10-09
Payer: MEDICAID

## 2020-10-09 DIAGNOSIS — Z01.818 PREOP TESTING: ICD-10-CM

## 2020-10-09 LAB — SARS-COV-2 RNA RESP QL NAA+PROBE: NOT DETECTED

## 2020-10-09 PROCEDURE — U0003 INFECTIOUS AGENT DETECTION BY NUCLEIC ACID (DNA OR RNA); SEVERE ACUTE RESPIRATORY SYNDROME CORONAVIRUS 2 (SARS-COV-2) (CORONAVIRUS DISEASE [COVID-19]), AMPLIFIED PROBE TECHNIQUE, MAKING USE OF HIGH THROUGHPUT TECHNOLOGIES AS DESCRIBED BY CMS-2020-01-R: HCPCS

## 2020-10-09 RX ORDER — CIPROFLOXACIN 500 MG/1
500 TABLET ORAL 2 TIMES DAILY
Qty: 10 TABLET | Refills: 0 | Status: ON HOLD | OUTPATIENT
Start: 2020-10-09 | End: 2020-10-12 | Stop reason: HOSPADM

## 2020-10-09 NOTE — TELEPHONE ENCOUNTER
----- Message from Harris Lua MD sent at 10/9/2020  3:08 PM CDT -----  Contact: [phone]  Waiting on sensitivity. I can send a RX but it may change tomorrow when I get the sensitivity. Will send Cipro. Please let her know. Thanks, Dr. WRIGHT  ----- Message -----  From: Jael Fonseca MA  Sent: 10/9/2020   3:05 PM CDT  To: Harris Lua MD    Patient called about seeing her urine culture  results on my ochser and she states she needs meds for her uti    She wanted to speak to you regarding the results and if it will effect mondays procedure  ----- Message -----  From: Adalgisa Escudero  Sent: 10/9/2020   2:34 PM CDT  To: Stoney HERNANDEZ Staff    Pt KEVIN RETANA calling regarding  leaving a msg for the office concerning  the pt receiving the results.  Pt stated that she don't want o delay the Procedure for Monday      Please call and advise

## 2020-10-11 RX ORDER — NITROFURANTOIN 25; 75 MG/1; MG/1
100 CAPSULE ORAL 2 TIMES DAILY
Qty: 28 CAPSULE | Refills: 1 | Status: SHIPPED | OUTPATIENT
Start: 2020-10-11 | End: 2020-10-25

## 2020-10-11 RX ORDER — AMPICILLIN 500 MG/1
500 CAPSULE ORAL EVERY 6 HOURS
Qty: 56 CAPSULE | Refills: 0 | Status: ON HOLD | OUTPATIENT
Start: 2020-10-11 | End: 2020-10-12 | Stop reason: HOSPADM

## 2020-11-02 ENCOUNTER — OFFICE VISIT (OUTPATIENT)
Dept: UROLOGY | Facility: CLINIC | Age: 50
End: 2020-11-02
Payer: MEDICAID

## 2020-11-02 VITALS
HEART RATE: 88 BPM | SYSTOLIC BLOOD PRESSURE: 146 MMHG | HEIGHT: 67 IN | WEIGHT: 227 LBS | OXYGEN SATURATION: 98 % | DIASTOLIC BLOOD PRESSURE: 74 MMHG | BODY MASS INDEX: 35.63 KG/M2 | TEMPERATURE: 97 F | RESPIRATION RATE: 19 BRPM

## 2020-11-02 DIAGNOSIS — R35.0 URINARY FREQUENCY: ICD-10-CM

## 2020-11-02 DIAGNOSIS — R39.15 URINARY URGENCY: ICD-10-CM

## 2020-11-02 DIAGNOSIS — R30.0 DYSURIA: ICD-10-CM

## 2020-11-02 DIAGNOSIS — R35.1 NOCTURIA: ICD-10-CM

## 2020-11-02 DIAGNOSIS — Z98.890 POST-OPERATIVE STATE: Primary | ICD-10-CM

## 2020-11-02 DIAGNOSIS — R10.2 PELVIC PAIN IN FEMALE: ICD-10-CM

## 2020-11-02 DIAGNOSIS — N30.10 INTERSTITIAL CYSTITIS: ICD-10-CM

## 2020-11-02 PROCEDURE — 87086 URINE CULTURE/COLONY COUNT: CPT

## 2020-11-02 PROCEDURE — 99024 POSTOP FOLLOW-UP VISIT: CPT | Mod: ,,, | Performed by: NURSE PRACTITIONER

## 2020-11-02 PROCEDURE — 99215 OFFICE O/P EST HI 40 MIN: CPT | Mod: PBBFAC,PO | Performed by: NURSE PRACTITIONER

## 2020-11-02 PROCEDURE — 99024 PR POST-OP FOLLOW-UP VISIT: ICD-10-PCS | Mod: ,,, | Performed by: NURSE PRACTITIONER

## 2020-11-02 PROCEDURE — 99999 PR PBB SHADOW E&M-EST. PATIENT-LVL V: CPT | Mod: PBBFAC,,, | Performed by: NURSE PRACTITIONER

## 2020-11-02 PROCEDURE — 81002 URINALYSIS NONAUTO W/O SCOPE: CPT | Mod: PBBFAC,PO | Performed by: NURSE PRACTITIONER

## 2020-11-02 PROCEDURE — 99999 PR PBB SHADOW E&M-EST. PATIENT-LVL V: ICD-10-PCS | Mod: PBBFAC,,, | Performed by: NURSE PRACTITIONER

## 2020-11-02 NOTE — PROGRESS NOTES
Subjective:       Patient ID: Arpita Bradshaw is a 50 y.o. female.    Chief Complaint: Post-op Evaluation    Patient is here today for her post-op evaluation. She is S/P cysto with bladder hydrodistension by Dr. Lua on 10/12/2020. Patient still reports some mild suprapubic discomfort when her bladder is full and she has to urinate. Otherwise, he urinary symptoms have greatly improved.     Other  Chronicity: S/P cysto with bladder hydrodistension. Episode onset: 10/12/2020. The problem occurs daily. The problem has been gradually improving. Associated symptoms include fatigue and urinary symptoms. Pertinent negatives include no abdominal pain, anorexia, arthralgias, change in bowel habit, chills, diaphoresis, fever, headaches, nausea, sore throat, swollen glands, vomiting or weakness. Nothing aggravates the symptoms. Treatments tried: cysto with bladder hydrodistension. The treatment provided significant relief.     Review of Systems   Constitutional: Positive for fatigue. Negative for chills, diaphoresis, fever and unexpected weight change.   HENT: Negative for sore throat.    Gastrointestinal: Negative for abdominal pain, anorexia, change in bowel habit, constipation, diarrhea, nausea, vomiting and change in bowel habit.   Genitourinary: Positive for nocturia (x3) and pelvic pain (improved). Negative for decreased urine volume, difficulty urinating, dysuria, flank pain, frequency, hematuria, urgency, vaginal bleeding, vaginal discharge and vaginal pain.   Musculoskeletal: Negative for arthralgias.   Neurological: Negative for dizziness, weakness and headaches.         Objective:      Physical Exam  Vitals signs and nursing note reviewed.   Constitutional:       General: She is not in acute distress.     Appearance: She is well-developed. She is obese. She is not ill-appearing.   HENT:      Head: Normocephalic and atraumatic.   Eyes:      Pupils: Pupils are equal, round, and reactive to light.   Neck:       Musculoskeletal: Normal range of motion.   Cardiovascular:      Rate and Rhythm: Normal rate.   Pulmonary:      Effort: Pulmonary effort is normal. No respiratory distress.   Abdominal:      Palpations: Abdomen is soft.      Tenderness: There is no abdominal tenderness.   Musculoskeletal: Normal range of motion.   Skin:     General: Skin is warm and dry.   Neurological:      Mental Status: She is alert and oriented to person, place, and time.      Coordination: Coordination normal.   Psychiatric:         Mood and Affect: Mood normal.         Behavior: Behavior normal.         Thought Content: Thought content normal.         Judgment: Judgment normal.         Assessment:       1. Post-operative state    2. Interstitial cystitis    3. Pelvic pain in female    4. Dysuria    5. Urinary frequency    6. Urinary urgency    7. Nocturia        Plan:      Arpita was seen today for post-op evaluation.    Diagnoses and all orders for this visit:    Post-operative state  -     POCT URINE DIPSTICK WITHOUT MICROSCOPE  -     Urine culture    Interstitial cystitis  -     POCT URINE DIPSTICK WITHOUT MICROSCOPE  -     Urine culture    Pelvic pain in female  -     POCT URINE DIPSTICK WITHOUT MICROSCOPE  -     Urine culture    Dysuria  -     POCT URINE DIPSTICK WITHOUT MICROSCOPE  -     Urine culture    Urinary frequency  -     POCT URINE DIPSTICK WITHOUT MICROSCOPE  -     Urine culture    Urinary urgency  -     POCT URINE DIPSTICK WITHOUT MICROSCOPE  -     Urine culture    Nocturia  -     POCT URINE DIPSTICK WITHOUT MICROSCOPE  -     Urine culture    Follow-up in 3 months with Dr. Lua.     Francie Cazares NP

## 2020-11-03 LAB
BACTERIA UR CULT: NORMAL
BACTERIA UR CULT: NORMAL
BILIRUB SERPL-MCNC: NORMAL MG/DL
BLOOD URINE, POC: NORMAL
CLARITY, POC UA: NORMAL
COLOR, POC UA: YELLOW
GLUCOSE UR QL STRIP: NORMAL
KETONES UR QL STRIP: NORMAL
LEUKOCYTE ESTERASE URINE, POC: NORMAL
NITRITE, POC UA: NORMAL
PH, POC UA: 6
PROTEIN, POC: 30
SPECIFIC GRAVITY, POC UA: 1.03
UROBILINOGEN, POC UA: 0.2

## 2020-11-04 ENCOUNTER — TELEPHONE (OUTPATIENT)
Dept: UROLOGY | Facility: CLINIC | Age: 50
End: 2020-11-04

## 2020-11-04 NOTE — TELEPHONE ENCOUNTER
Spoke to pt on phone. Informed her of her results. Pt asked if she should refill antibiotics and continue treatment. Please advise

## 2020-11-04 NOTE — TELEPHONE ENCOUNTER
----- Message from Francie Cazares NP sent at 11/4/2020 12:50 PM CST -----  Urine cx showed some bacteria but none in predominance to diagnose a UTI.

## 2021-01-07 DIAGNOSIS — M54.50 ACUTE LEFT-SIDED LOW BACK PAIN: Primary | ICD-10-CM

## 2021-01-19 ENCOUNTER — PATIENT MESSAGE (OUTPATIENT)
Dept: UROLOGY | Facility: CLINIC | Age: 51
End: 2021-01-19

## 2021-01-19 ENCOUNTER — LAB VISIT (OUTPATIENT)
Dept: LAB | Facility: HOSPITAL | Age: 51
End: 2021-01-19
Attending: NURSE PRACTITIONER
Payer: MEDICAID

## 2021-01-19 DIAGNOSIS — R35.0 URINARY FREQUENCY: ICD-10-CM

## 2021-01-19 DIAGNOSIS — R10.9 BILATERAL FLANK PAIN: ICD-10-CM

## 2021-01-19 DIAGNOSIS — N30.10 INTERSTITIAL CYSTITIS: Primary | ICD-10-CM

## 2021-01-19 DIAGNOSIS — R10.9 BILATERAL FLANK PAIN: Primary | ICD-10-CM

## 2021-01-19 DIAGNOSIS — R31.0 GROSS HEMATURIA: ICD-10-CM

## 2021-01-19 DIAGNOSIS — R30.0 DYSURIA: ICD-10-CM

## 2021-01-19 PROCEDURE — 87086 URINE CULTURE/COLONY COUNT: CPT

## 2021-01-19 PROCEDURE — 81001 URINALYSIS AUTO W/SCOPE: CPT

## 2021-01-19 PROCEDURE — 87088 URINE BACTERIA CULTURE: CPT

## 2021-01-19 PROCEDURE — 87077 CULTURE AEROBIC IDENTIFY: CPT

## 2021-01-19 PROCEDURE — 87186 SC STD MICRODIL/AGAR DIL: CPT

## 2021-01-19 RX ORDER — IBUPROFEN 800 MG/1
800 TABLET ORAL 3 TIMES DAILY PRN
Qty: 21 TABLET | Refills: 0 | Status: SHIPPED | OUTPATIENT
Start: 2021-01-19 | End: 2021-01-26

## 2021-01-20 LAB
BACTERIA #/AREA URNS AUTO: ABNORMAL /HPF
BILIRUB UR QL STRIP: NEGATIVE
CLARITY UR REFRACT.AUTO: ABNORMAL
COLOR UR AUTO: YELLOW
GLUCOSE UR QL STRIP: NEGATIVE
HGB UR QL STRIP: ABNORMAL
HYALINE CASTS UR QL AUTO: 1 /LPF
KETONES UR QL STRIP: NEGATIVE
LEUKOCYTE ESTERASE UR QL STRIP: ABNORMAL
MICROSCOPIC COMMENT: ABNORMAL
NITRITE UR QL STRIP: NEGATIVE
PH UR STRIP: 5 [PH] (ref 5–8)
PROT UR QL STRIP: ABNORMAL
RBC #/AREA URNS AUTO: 95 /HPF (ref 0–4)
SP GR UR STRIP: 1.02 (ref 1–1.03)
SQUAMOUS #/AREA URNS AUTO: 13 /HPF
URN SPEC COLLECT METH UR: ABNORMAL
WBC #/AREA URNS AUTO: >100 /HPF (ref 0–5)

## 2021-01-22 ENCOUNTER — PATIENT MESSAGE (OUTPATIENT)
Dept: UROLOGY | Facility: CLINIC | Age: 51
End: 2021-01-22

## 2021-01-22 DIAGNOSIS — N30.01 ACUTE CYSTITIS WITH HEMATURIA: Primary | ICD-10-CM

## 2021-01-22 LAB — BACTERIA UR CULT: ABNORMAL

## 2021-01-22 RX ORDER — PHENAZOPYRIDINE HYDROCHLORIDE 200 MG/1
200 TABLET, FILM COATED ORAL 3 TIMES DAILY PRN
Qty: 9 TABLET | Refills: 0 | Status: SHIPPED | OUTPATIENT
Start: 2021-01-22 | End: 2021-01-25

## 2021-01-22 RX ORDER — FLUCONAZOLE 150 MG/1
150 TABLET ORAL DAILY
Qty: 1 TABLET | Refills: 1 | Status: SHIPPED | OUTPATIENT
Start: 2021-01-22 | End: 2021-01-23

## 2021-01-22 RX ORDER — CIPROFLOXACIN 500 MG/1
500 TABLET ORAL EVERY 12 HOURS
Qty: 20 TABLET | Refills: 0 | Status: SHIPPED | OUTPATIENT
Start: 2021-01-22 | End: 2021-02-01

## 2021-01-26 ENCOUNTER — OFFICE VISIT (OUTPATIENT)
Dept: UROLOGY | Facility: CLINIC | Age: 51
End: 2021-01-26
Payer: MEDICAID

## 2021-01-26 VITALS
HEART RATE: 80 BPM | SYSTOLIC BLOOD PRESSURE: 122 MMHG | BODY MASS INDEX: 35.64 KG/M2 | OXYGEN SATURATION: 100 % | DIASTOLIC BLOOD PRESSURE: 90 MMHG | RESPIRATION RATE: 18 BRPM | HEIGHT: 67 IN | TEMPERATURE: 98 F | WEIGHT: 227.06 LBS

## 2021-01-26 DIAGNOSIS — N30.01 ACUTE CYSTITIS WITH HEMATURIA: Primary | ICD-10-CM

## 2021-01-26 DIAGNOSIS — K59.04 CHRONIC IDIOPATHIC CONSTIPATION: ICD-10-CM

## 2021-01-26 DIAGNOSIS — N39.0 RECURRENT UTI: ICD-10-CM

## 2021-01-26 DIAGNOSIS — R39.15 URINARY URGENCY: ICD-10-CM

## 2021-01-26 DIAGNOSIS — R30.0 DYSURIA: ICD-10-CM

## 2021-01-26 DIAGNOSIS — R10.2 PELVIC PAIN IN FEMALE: ICD-10-CM

## 2021-01-26 DIAGNOSIS — R35.0 URINARY FREQUENCY: ICD-10-CM

## 2021-01-26 DIAGNOSIS — R35.1 NOCTURIA: ICD-10-CM

## 2021-01-26 PROCEDURE — 87088 URINE BACTERIA CULTURE: CPT

## 2021-01-26 PROCEDURE — 99215 OFFICE O/P EST HI 40 MIN: CPT | Mod: S$PBB,,, | Performed by: UROLOGY

## 2021-01-26 PROCEDURE — 99999 PR PBB SHADOW E&M-EST. PATIENT-LVL IV: CPT | Mod: PBBFAC,,, | Performed by: UROLOGY

## 2021-01-26 PROCEDURE — 87186 SC STD MICRODIL/AGAR DIL: CPT

## 2021-01-26 PROCEDURE — 87077 CULTURE AEROBIC IDENTIFY: CPT

## 2021-01-26 PROCEDURE — 99214 OFFICE O/P EST MOD 30 MIN: CPT | Mod: PBBFAC,PO | Performed by: UROLOGY

## 2021-01-26 PROCEDURE — 99999 PR PBB SHADOW E&M-EST. PATIENT-LVL IV: ICD-10-PCS | Mod: PBBFAC,,, | Performed by: UROLOGY

## 2021-01-26 PROCEDURE — 87086 URINE CULTURE/COLONY COUNT: CPT

## 2021-01-26 PROCEDURE — 99215 PR OFFICE/OUTPT VISIT, EST, LEVL V, 40-54 MIN: ICD-10-PCS | Mod: S$PBB,,, | Performed by: UROLOGY

## 2021-01-26 RX ORDER — NITROFURANTOIN 25; 75 MG/1; MG/1
CAPSULE ORAL
Qty: 30 CAPSULE | Refills: 11 | Status: SHIPPED | OUTPATIENT
Start: 2021-01-26 | End: 2021-03-25

## 2021-01-30 LAB — BACTERIA UR CULT: ABNORMAL

## 2021-02-01 RX ORDER — AMOXICILLIN AND CLAVULANATE POTASSIUM 500; 125 MG/1; MG/1
1 TABLET, FILM COATED ORAL 3 TIMES DAILY
Qty: 42 TABLET | Refills: 0 | Status: SHIPPED | OUTPATIENT
Start: 2021-02-01 | End: 2021-02-15

## 2021-02-10 ENCOUNTER — PATIENT MESSAGE (OUTPATIENT)
Dept: UROLOGY | Facility: CLINIC | Age: 51
End: 2021-02-10

## 2021-02-10 ENCOUNTER — HOSPITAL ENCOUNTER (OUTPATIENT)
Dept: RADIOLOGY | Facility: HOSPITAL | Age: 51
Discharge: HOME | End: 2021-02-10
Attending: UROLOGY
Payer: MEDICAID

## 2021-02-10 DIAGNOSIS — N30.01 ACUTE CYSTITIS WITH HEMATURIA: ICD-10-CM

## 2021-02-10 DIAGNOSIS — R10.9 ABDOMINAL PAIN, UNSPECIFIED ABDOMINAL LOCATION: ICD-10-CM

## 2021-02-10 DIAGNOSIS — R35.1 NOCTURIA: ICD-10-CM

## 2021-02-10 DIAGNOSIS — N30.01 ACUTE CYSTITIS WITH HEMATURIA: Primary | ICD-10-CM

## 2021-02-10 DIAGNOSIS — R10.9 BILATERAL FLANK PAIN: ICD-10-CM

## 2021-02-10 PROCEDURE — 74018 RADEX ABDOMEN 1 VIEW: CPT | Mod: 26,,, | Performed by: RADIOLOGY

## 2021-02-10 PROCEDURE — 74176 CT ABD & PELVIS W/O CONTRAST: CPT | Mod: TC

## 2021-02-10 PROCEDURE — 74018 XR ABDOMEN AP 1 VIEW: ICD-10-PCS | Mod: 26,,, | Performed by: RADIOLOGY

## 2021-02-10 PROCEDURE — 74176 CT ABD & PELVIS W/O CONTRAST: CPT | Mod: 26,,, | Performed by: RADIOLOGY

## 2021-02-10 PROCEDURE — 74176 CT RENAL STONE STUDY ABD PELVIS WO: ICD-10-PCS | Mod: 26,,, | Performed by: RADIOLOGY

## 2021-02-10 PROCEDURE — 74018 RADEX ABDOMEN 1 VIEW: CPT | Mod: TC,FY

## 2021-02-18 ENCOUNTER — PATIENT MESSAGE (OUTPATIENT)
Dept: UROLOGY | Facility: CLINIC | Age: 51
End: 2021-02-18

## 2021-02-23 ENCOUNTER — OFFICE VISIT (OUTPATIENT)
Dept: UROLOGY | Facility: CLINIC | Age: 51
End: 2021-02-23
Payer: MEDICAID

## 2021-02-23 VITALS
OXYGEN SATURATION: 98 % | RESPIRATION RATE: 18 BRPM | HEIGHT: 67 IN | BODY MASS INDEX: 35.29 KG/M2 | TEMPERATURE: 98 F | SYSTOLIC BLOOD PRESSURE: 134 MMHG | HEART RATE: 88 BPM | WEIGHT: 224.88 LBS | DIASTOLIC BLOOD PRESSURE: 84 MMHG

## 2021-02-23 DIAGNOSIS — Z01.818 PREOP TESTING: Primary | ICD-10-CM

## 2021-02-23 DIAGNOSIS — R30.0 DYSURIA: ICD-10-CM

## 2021-02-23 DIAGNOSIS — R10.2 PELVIC PAIN IN FEMALE: ICD-10-CM

## 2021-02-23 DIAGNOSIS — N30.01 ACUTE CYSTITIS WITH HEMATURIA: ICD-10-CM

## 2021-02-23 DIAGNOSIS — N30.11 INTERSTITIAL CYSTITIS (CHRONIC) WITH HEMATURIA: Primary | ICD-10-CM

## 2021-02-23 LAB
BACTERIA #/AREA URNS AUTO: ABNORMAL /HPF
BILIRUB UR QL STRIP: NEGATIVE
CLARITY UR REFRACT.AUTO: ABNORMAL
COLOR UR AUTO: YELLOW
GLUCOSE UR QL STRIP: NEGATIVE
HGB UR QL STRIP: ABNORMAL
KETONES UR QL STRIP: NEGATIVE
LEUKOCYTE ESTERASE UR QL STRIP: ABNORMAL
MICROSCOPIC COMMENT: ABNORMAL
NITRITE UR QL STRIP: NEGATIVE
PH UR STRIP: 7 [PH] (ref 5–8)
PROT UR QL STRIP: NEGATIVE
RBC #/AREA URNS AUTO: 39 /HPF (ref 0–4)
SP GR UR STRIP: 1.01 (ref 1–1.03)
SQUAMOUS #/AREA URNS AUTO: 3 /HPF
URN SPEC COLLECT METH UR: ABNORMAL
WBC #/AREA URNS AUTO: 28 /HPF (ref 0–5)

## 2021-02-23 PROCEDURE — 99999 PR PBB SHADOW E&M-EST. PATIENT-LVL V: CPT | Mod: PBBFAC,,, | Performed by: UROLOGY

## 2021-02-23 PROCEDURE — 87077 CULTURE AEROBIC IDENTIFY: CPT

## 2021-02-23 PROCEDURE — 87088 URINE BACTERIA CULTURE: CPT

## 2021-02-23 PROCEDURE — 99214 PR OFFICE/OUTPT VISIT, EST, LEVL IV, 30-39 MIN: ICD-10-PCS | Mod: S$PBB,,, | Performed by: UROLOGY

## 2021-02-23 PROCEDURE — 99999 PR PBB SHADOW E&M-EST. PATIENT-LVL V: ICD-10-PCS | Mod: PBBFAC,,, | Performed by: UROLOGY

## 2021-02-23 PROCEDURE — 99214 OFFICE O/P EST MOD 30 MIN: CPT | Mod: S$PBB,,, | Performed by: UROLOGY

## 2021-02-23 PROCEDURE — 99215 OFFICE O/P EST HI 40 MIN: CPT | Mod: PBBFAC,PO | Performed by: UROLOGY

## 2021-02-23 PROCEDURE — 87086 URINE CULTURE/COLONY COUNT: CPT

## 2021-02-23 PROCEDURE — 81001 URINALYSIS AUTO W/SCOPE: CPT

## 2021-02-23 PROCEDURE — 87186 SC STD MICRODIL/AGAR DIL: CPT

## 2021-02-23 RX ORDER — NITROFURANTOIN 25; 75 MG/1; MG/1
100 CAPSULE ORAL 2 TIMES DAILY
Qty: 28 CAPSULE | Refills: 1 | Status: SHIPPED | OUTPATIENT
Start: 2021-02-23 | End: 2021-03-09

## 2021-02-23 RX ORDER — CIPROFLOXACIN 2 MG/ML
400 INJECTION, SOLUTION INTRAVENOUS
Status: CANCELLED | OUTPATIENT
Start: 2021-02-23

## 2021-02-23 RX ORDER — SODIUM CHLORIDE 9 MG/ML
INJECTION, SOLUTION INTRAVENOUS CONTINUOUS
Status: CANCELLED | OUTPATIENT
Start: 2021-02-23

## 2021-02-26 ENCOUNTER — LAB VISIT (OUTPATIENT)
Dept: FAMILY MEDICINE | Facility: CLINIC | Age: 51
End: 2021-02-26
Payer: MEDICAID

## 2021-02-26 DIAGNOSIS — Z01.818 PREOP TESTING: ICD-10-CM

## 2021-02-26 LAB — SARS-COV-2 RNA RESP QL NAA+PROBE: NOT DETECTED

## 2021-02-26 PROCEDURE — U0003 INFECTIOUS AGENT DETECTION BY NUCLEIC ACID (DNA OR RNA); SEVERE ACUTE RESPIRATORY SYNDROME CORONAVIRUS 2 (SARS-COV-2) (CORONAVIRUS DISEASE [COVID-19]), AMPLIFIED PROBE TECHNIQUE, MAKING USE OF HIGH THROUGHPUT TECHNOLOGIES AS DESCRIBED BY CMS-2020-01-R: HCPCS

## 2021-02-26 PROCEDURE — U0005 INFEC AGEN DETEC AMPLI PROBE: HCPCS

## 2021-02-27 LAB — BACTERIA UR CULT: ABNORMAL

## 2021-03-11 ENCOUNTER — PATIENT MESSAGE (OUTPATIENT)
Dept: UROLOGY | Facility: CLINIC | Age: 51
End: 2021-03-11

## 2021-03-17 ENCOUNTER — PATIENT MESSAGE (OUTPATIENT)
Dept: UROLOGY | Facility: CLINIC | Age: 51
End: 2021-03-17

## 2021-03-18 ENCOUNTER — LAB VISIT (OUTPATIENT)
Dept: LAB | Facility: HOSPITAL | Age: 51
End: 2021-03-18
Attending: UROLOGY
Payer: MEDICAID

## 2021-03-18 DIAGNOSIS — R30.0 DYSURIA: ICD-10-CM

## 2021-03-18 LAB
BACTERIA #/AREA URNS AUTO: ABNORMAL /HPF
BILIRUB UR QL STRIP: NEGATIVE
CAOX CRY UR QL COMP ASSIST: ABNORMAL
CLARITY UR REFRACT.AUTO: ABNORMAL
COLOR UR AUTO: YELLOW
GLUCOSE UR QL STRIP: NEGATIVE
HGB UR QL STRIP: ABNORMAL
HYALINE CASTS UR QL AUTO: 0 /LPF
KETONES UR QL STRIP: ABNORMAL
LEUKOCYTE ESTERASE UR QL STRIP: ABNORMAL
MICROSCOPIC COMMENT: ABNORMAL
NITRITE UR QL STRIP: NEGATIVE
PH UR STRIP: 5 [PH] (ref 5–8)
PROT UR QL STRIP: ABNORMAL
RBC #/AREA URNS AUTO: >100 /HPF (ref 0–4)
SP GR UR STRIP: 1.02 (ref 1–1.03)
SQUAMOUS #/AREA URNS AUTO: 17 /HPF
URN SPEC COLLECT METH UR: ABNORMAL
WBC #/AREA URNS AUTO: 38 /HPF (ref 0–5)

## 2021-03-18 PROCEDURE — 81001 URINALYSIS AUTO W/SCOPE: CPT | Performed by: UROLOGY

## 2021-03-25 ENCOUNTER — OFFICE VISIT (OUTPATIENT)
Dept: UROLOGY | Facility: CLINIC | Age: 51
End: 2021-03-25
Payer: MEDICAID

## 2021-03-25 VITALS
WEIGHT: 227 LBS | TEMPERATURE: 98 F | HEIGHT: 68 IN | BODY MASS INDEX: 34.4 KG/M2 | OXYGEN SATURATION: 97 % | DIASTOLIC BLOOD PRESSURE: 67 MMHG | HEART RATE: 56 BPM | SYSTOLIC BLOOD PRESSURE: 126 MMHG

## 2021-03-25 DIAGNOSIS — R35.1 NOCTURIA: ICD-10-CM

## 2021-03-25 DIAGNOSIS — N30.10 INTERSTITIAL CYSTITIS: ICD-10-CM

## 2021-03-25 DIAGNOSIS — Z98.890 POST-OPERATIVE STATE: Primary | ICD-10-CM

## 2021-03-25 DIAGNOSIS — N31.9 NEUROGENIC BLADDER: ICD-10-CM

## 2021-03-25 DIAGNOSIS — N30.80 CYSTITIS CYSTICA: ICD-10-CM

## 2021-03-25 DIAGNOSIS — R35.0 URINARY FREQUENCY: ICD-10-CM

## 2021-03-25 DIAGNOSIS — R30.0 DYSURIA: ICD-10-CM

## 2021-03-25 DIAGNOSIS — N32.89 BLADDER SPASM: ICD-10-CM

## 2021-03-25 DIAGNOSIS — R39.15 URINARY URGENCY: ICD-10-CM

## 2021-03-25 DIAGNOSIS — R10.2 PELVIC PAIN IN FEMALE: ICD-10-CM

## 2021-03-25 LAB
BILIRUB SERPL-MCNC: NORMAL MG/DL
BLOOD URINE, POC: NORMAL
CLARITY, POC UA: NORMAL
COLOR, POC UA: YELLOW
GLUCOSE UR QL STRIP: NORMAL
KETONES UR QL STRIP: NORMAL
LEUKOCYTE ESTERASE URINE, POC: NORMAL
NITRITE, POC UA: NORMAL
PH, POC UA: 5.5
POC RESIDUAL URINE VOLUME: 24 ML (ref 0–100)
PROTEIN, POC: NORMAL
SPECIFIC GRAVITY, POC UA: 1.03
UROBILINOGEN, POC UA: 0.2

## 2021-03-25 PROCEDURE — 99215 OFFICE O/P EST HI 40 MIN: CPT | Mod: PBBFAC,PO,25 | Performed by: NURSE PRACTITIONER

## 2021-03-25 PROCEDURE — 99999 PR PBB SHADOW E&M-EST. PATIENT-LVL V: ICD-10-PCS | Mod: PBBFAC,,, | Performed by: NURSE PRACTITIONER

## 2021-03-25 PROCEDURE — 99999 PR PBB SHADOW E&M-EST. PATIENT-LVL V: CPT | Mod: PBBFAC,,, | Performed by: NURSE PRACTITIONER

## 2021-03-25 PROCEDURE — 99024 PR POST-OP FOLLOW-UP VISIT: ICD-10-PCS | Mod: ,,, | Performed by: NURSE PRACTITIONER

## 2021-03-25 PROCEDURE — 51798 US URINE CAPACITY MEASURE: CPT | Mod: PBBFAC,PO | Performed by: NURSE PRACTITIONER

## 2021-03-25 PROCEDURE — 81002 URINALYSIS NONAUTO W/O SCOPE: CPT | Mod: PBBFAC,PO | Performed by: NURSE PRACTITIONER

## 2021-03-25 PROCEDURE — 87086 URINE CULTURE/COLONY COUNT: CPT | Performed by: NURSE PRACTITIONER

## 2021-03-25 PROCEDURE — 99024 POSTOP FOLLOW-UP VISIT: CPT | Mod: ,,, | Performed by: NURSE PRACTITIONER

## 2021-03-25 RX ORDER — PREDNISONE 20 MG/1
TABLET ORAL
COMMUNITY
Start: 2021-03-24 | End: 2021-11-16 | Stop reason: SDUPTHER

## 2021-03-25 RX ORDER — CEPHALEXIN 500 MG/1
1 CAPSULE ORAL
COMMUNITY
Start: 2021-03-24 | End: 2021-05-19

## 2021-03-25 RX ORDER — METHENAMINE, SODIUM PHOSPHATE, MONOBASIC, MONOHYDRATE, PHENYL SALICYLATE, METHYLENE BLUE, AND HYOSCYAMINE SULFATE 118; 40.8; 36; 10; .12 MG/1; MG/1; MG/1; MG/1; MG/1
1 CAPSULE ORAL EVERY 6 HOURS PRN
Qty: 30 CAPSULE | Refills: 0 | Status: SHIPPED | OUTPATIENT
Start: 2021-03-25 | End: 2021-05-19 | Stop reason: SDUPTHER

## 2021-03-25 RX ORDER — TRAMADOL HYDROCHLORIDE 50 MG/1
TABLET ORAL
COMMUNITY
Start: 2021-03-24 | End: 2022-01-12

## 2021-03-26 LAB — BACTERIA UR CULT: NO GROWTH

## 2021-04-08 ENCOUNTER — TELEPHONE (OUTPATIENT)
Dept: UROLOGY | Facility: CLINIC | Age: 51
End: 2021-04-08

## 2021-04-12 ENCOUNTER — CLINICAL SUPPORT (OUTPATIENT)
Dept: UROLOGY | Facility: CLINIC | Age: 51
End: 2021-04-12
Payer: MEDICAID

## 2021-04-13 DIAGNOSIS — Z01.818 PREOP TESTING: Primary | ICD-10-CM

## 2021-04-13 DIAGNOSIS — N31.9 NEUROGENIC BLADDER: ICD-10-CM

## 2021-04-13 DIAGNOSIS — Z98.890 POST-OPERATIVE STATE: ICD-10-CM

## 2021-04-13 DIAGNOSIS — N30.80 CYSTITIS CYSTICA: ICD-10-CM

## 2021-04-13 DIAGNOSIS — R35.0 URINARY FREQUENCY: ICD-10-CM

## 2021-04-13 RX ORDER — SODIUM CHLORIDE 9 MG/ML
INJECTION, SOLUTION INTRAVENOUS CONTINUOUS
Status: CANCELLED | OUTPATIENT
Start: 2021-04-13

## 2021-04-25 ENCOUNTER — TELEPHONE (OUTPATIENT)
Dept: URGENT CARE | Facility: CLINIC | Age: 51
End: 2021-04-25

## 2021-04-25 ENCOUNTER — LAB VISIT (OUTPATIENT)
Dept: URGENT CARE | Facility: CLINIC | Age: 51
End: 2021-04-25
Payer: MEDICAID

## 2021-04-25 VITALS — TEMPERATURE: 98 F

## 2021-04-25 DIAGNOSIS — Z01.818 PREOP TESTING: ICD-10-CM

## 2021-04-25 PROCEDURE — U0003 INFECTIOUS AGENT DETECTION BY NUCLEIC ACID (DNA OR RNA); SEVERE ACUTE RESPIRATORY SYNDROME CORONAVIRUS 2 (SARS-COV-2) (CORONAVIRUS DISEASE [COVID-19]), AMPLIFIED PROBE TECHNIQUE, MAKING USE OF HIGH THROUGHPUT TECHNOLOGIES AS DESCRIBED BY CMS-2020-01-R: HCPCS | Performed by: NURSE PRACTITIONER

## 2021-04-25 PROCEDURE — U0005 INFEC AGEN DETEC AMPLI PROBE: HCPCS | Performed by: NURSE PRACTITIONER

## 2021-04-27 LAB — SARS-COV-2 RNA RESP QL NAA+PROBE: NOT DETECTED

## 2021-05-19 ENCOUNTER — OFFICE VISIT (OUTPATIENT)
Dept: UROLOGY | Facility: CLINIC | Age: 51
End: 2021-05-19
Payer: MEDICAID

## 2021-05-19 VITALS
SYSTOLIC BLOOD PRESSURE: 128 MMHG | RESPIRATION RATE: 18 BRPM | HEIGHT: 68 IN | WEIGHT: 232 LBS | BODY MASS INDEX: 35.16 KG/M2 | TEMPERATURE: 97 F | OXYGEN SATURATION: 99 % | DIASTOLIC BLOOD PRESSURE: 78 MMHG | HEART RATE: 68 BPM

## 2021-05-19 DIAGNOSIS — N30.80 CYSTITIS CYSTICA: ICD-10-CM

## 2021-05-19 DIAGNOSIS — Z87.440 HISTORY OF RECURRENT UTIS: ICD-10-CM

## 2021-05-19 DIAGNOSIS — R31.29 MICROSCOPIC HEMATURIA: ICD-10-CM

## 2021-05-19 DIAGNOSIS — N31.9 NEUROGENIC BLADDER: ICD-10-CM

## 2021-05-19 DIAGNOSIS — R30.0 DYSURIA: ICD-10-CM

## 2021-05-19 DIAGNOSIS — Z98.890 POST-OPERATIVE STATE: Primary | ICD-10-CM

## 2021-05-19 DIAGNOSIS — N30.10 INTERSTITIAL CYSTITIS: ICD-10-CM

## 2021-05-19 LAB
BILIRUB SERPL-MCNC: NORMAL MG/DL
BLOOD URINE, POC: NORMAL
CLARITY, POC UA: NORMAL
COLOR, POC UA: NORMAL
GLUCOSE UR QL STRIP: NORMAL
KETONES UR QL STRIP: NORMAL
LEUKOCYTE ESTERASE URINE, POC: NORMAL
NITRITE, POC UA: NORMAL
PH, POC UA: 5
PROTEIN, POC: NORMAL
SPECIFIC GRAVITY, POC UA: 1.02
UROBILINOGEN, POC UA: 0.2

## 2021-05-19 PROCEDURE — 99024 POSTOP FOLLOW-UP VISIT: CPT | Mod: ,,, | Performed by: NURSE PRACTITIONER

## 2021-05-19 PROCEDURE — 99215 OFFICE O/P EST HI 40 MIN: CPT | Mod: PBBFAC,PO | Performed by: NURSE PRACTITIONER

## 2021-05-19 PROCEDURE — 99999 PR PBB SHADOW E&M-EST. PATIENT-LVL V: CPT | Mod: PBBFAC,,, | Performed by: NURSE PRACTITIONER

## 2021-05-19 PROCEDURE — 87086 URINE CULTURE/COLONY COUNT: CPT | Performed by: NURSE PRACTITIONER

## 2021-05-19 PROCEDURE — 99999 PR PBB SHADOW E&M-EST. PATIENT-LVL V: ICD-10-PCS | Mod: PBBFAC,,, | Performed by: NURSE PRACTITIONER

## 2021-05-19 PROCEDURE — 99024 PR POST-OP FOLLOW-UP VISIT: ICD-10-PCS | Mod: ,,, | Performed by: NURSE PRACTITIONER

## 2021-05-19 RX ORDER — AMOXICILLIN AND CLAVULANATE POTASSIUM 500; 125 MG/1; MG/1
1 TABLET, FILM COATED ORAL 3 TIMES DAILY
Qty: 30 TABLET | Refills: 0 | Status: SHIPPED | OUTPATIENT
Start: 2021-05-19 | End: 2021-05-29

## 2021-05-19 RX ORDER — METHENAMINE, SODIUM PHOSPHATE, MONOBASIC, MONOHYDRATE, PHENYL SALICYLATE, METHYLENE BLUE, AND HYOSCYAMINE SULFATE 118; 40.8; 36; 10; .12 MG/1; MG/1; MG/1; MG/1; MG/1
1 CAPSULE ORAL EVERY 6 HOURS PRN
Qty: 30 CAPSULE | Refills: 2 | Status: SHIPPED | OUTPATIENT
Start: 2021-05-19 | End: 2021-12-02 | Stop reason: ALTCHOICE

## 2021-05-21 ENCOUNTER — TELEPHONE (OUTPATIENT)
Dept: UROLOGY | Facility: CLINIC | Age: 51
End: 2021-05-21

## 2021-05-21 LAB
BACTERIA UR CULT: NORMAL
BACTERIA UR CULT: NORMAL

## 2021-07-07 ENCOUNTER — OFFICE VISIT (OUTPATIENT)
Dept: UROLOGY | Facility: CLINIC | Age: 51
End: 2021-07-07
Payer: MEDICAID

## 2021-07-07 VITALS
TEMPERATURE: 98 F | BODY MASS INDEX: 35.16 KG/M2 | OXYGEN SATURATION: 98 % | HEART RATE: 71 BPM | HEIGHT: 68 IN | SYSTOLIC BLOOD PRESSURE: 126 MMHG | WEIGHT: 232 LBS | DIASTOLIC BLOOD PRESSURE: 63 MMHG

## 2021-07-07 DIAGNOSIS — N39.3 SUI (STRESS URINARY INCONTINENCE, FEMALE): ICD-10-CM

## 2021-07-07 DIAGNOSIS — R31.29 MICROSCOPIC HEMATURIA: ICD-10-CM

## 2021-07-07 DIAGNOSIS — R10.2 PELVIC PAIN IN FEMALE: Primary | ICD-10-CM

## 2021-07-07 DIAGNOSIS — N30.10 INTERSTITIAL CYSTITIS: ICD-10-CM

## 2021-07-07 DIAGNOSIS — R35.1 NOCTURIA: ICD-10-CM

## 2021-07-07 DIAGNOSIS — R35.0 URINARY FREQUENCY: ICD-10-CM

## 2021-07-07 DIAGNOSIS — N39.41 URGE INCONTINENCE: ICD-10-CM

## 2021-07-07 DIAGNOSIS — N31.9 NEUROGENIC BLADDER: ICD-10-CM

## 2021-07-07 LAB
BILIRUB SERPL-MCNC: NORMAL MG/DL
BLOOD URINE, POC: NORMAL
CLARITY, POC UA: NORMAL
COLOR, POC UA: YELLOW
GLUCOSE UR QL STRIP: NORMAL
KETONES UR QL STRIP: NORMAL
LEUKOCYTE ESTERASE URINE, POC: NORMAL
NITRITE, POC UA: NORMAL
PH, POC UA: 6.5
PROTEIN, POC: NORMAL
SPECIFIC GRAVITY, POC UA: 1.01
UROBILINOGEN, POC UA: 0.2

## 2021-07-07 PROCEDURE — 87086 URINE CULTURE/COLONY COUNT: CPT | Performed by: NURSE PRACTITIONER

## 2021-07-07 PROCEDURE — 99024 POSTOP FOLLOW-UP VISIT: CPT | Mod: S$PBB,,, | Performed by: NURSE PRACTITIONER

## 2021-07-07 PROCEDURE — 81002 URINALYSIS NONAUTO W/O SCOPE: CPT | Mod: PBBFAC,PO | Performed by: NURSE PRACTITIONER

## 2021-07-07 PROCEDURE — 99999 PR PBB SHADOW E&M-EST. PATIENT-LVL V: CPT | Mod: PBBFAC,,, | Performed by: NURSE PRACTITIONER

## 2021-07-07 PROCEDURE — 99999 PR PBB SHADOW E&M-EST. PATIENT-LVL V: ICD-10-PCS | Mod: PBBFAC,,, | Performed by: NURSE PRACTITIONER

## 2021-07-07 PROCEDURE — 99024 PR POST-OP FOLLOW-UP VISIT: ICD-10-PCS | Mod: S$PBB,,, | Performed by: NURSE PRACTITIONER

## 2021-07-07 PROCEDURE — 99215 OFFICE O/P EST HI 40 MIN: CPT | Mod: PBBFAC,PO | Performed by: NURSE PRACTITIONER

## 2021-07-07 RX ORDER — ERGOCALCIFEROL 1.25 MG/1
50000 CAPSULE ORAL
COMMUNITY
Start: 2021-03-31 | End: 2022-05-30

## 2021-07-07 RX ORDER — LEFLUNOMIDE 20 MG/1
20 TABLET ORAL DAILY
COMMUNITY
Start: 2021-04-09 | End: 2021-11-16

## 2021-07-07 RX ORDER — CHOLECALCIFEROL (VITAMIN D3) 25 MCG
1 TABLET ORAL DAILY
COMMUNITY
Start: 2021-01-05 | End: 2021-11-16 | Stop reason: SDUPTHER

## 2021-07-07 RX ORDER — DULOXETIN HYDROCHLORIDE 30 MG/1
30 CAPSULE, DELAYED RELEASE ORAL DAILY
COMMUNITY
Start: 2021-06-12 | End: 2021-11-16

## 2021-07-08 LAB — BACTERIA UR CULT: NO GROWTH

## 2021-07-12 ENCOUNTER — PATIENT MESSAGE (OUTPATIENT)
Dept: UROLOGY | Facility: CLINIC | Age: 51
End: 2021-07-12

## 2021-07-12 DIAGNOSIS — R31.0 GROSS HEMATURIA: Primary | ICD-10-CM

## 2021-07-12 DIAGNOSIS — R10.9 RIGHT FLANK PAIN: ICD-10-CM

## 2021-07-12 RX ORDER — KETOROLAC TROMETHAMINE 10 MG/1
10 TABLET, FILM COATED ORAL EVERY 6 HOURS PRN
Qty: 20 TABLET | Refills: 0 | Status: SHIPPED | OUTPATIENT
Start: 2021-07-12 | End: 2021-07-17

## 2021-07-14 ENCOUNTER — HOSPITAL ENCOUNTER (OUTPATIENT)
Dept: RADIOLOGY | Facility: HOSPITAL | Age: 51
Discharge: HOME | End: 2021-07-14
Attending: NURSE PRACTITIONER
Payer: MEDICAID

## 2021-07-14 DIAGNOSIS — R31.0 GROSS HEMATURIA: ICD-10-CM

## 2021-07-14 DIAGNOSIS — R10.9 RIGHT FLANK PAIN: ICD-10-CM

## 2021-07-14 PROCEDURE — 74178 CT ABD&PLV WO CNTR FLWD CNTR: CPT | Mod: TC

## 2021-07-14 PROCEDURE — 25500020 PHARM REV CODE 255: Performed by: NURSE PRACTITIONER

## 2021-07-14 PROCEDURE — 74178 CT UROGRAM ABD PELVIS W WO: ICD-10-PCS | Mod: 26,,, | Performed by: RADIOLOGY

## 2021-07-14 PROCEDURE — 74178 CT ABD&PLV WO CNTR FLWD CNTR: CPT | Mod: 26,,, | Performed by: RADIOLOGY

## 2021-07-14 RX ADMIN — IOHEXOL 125 ML: 350 INJECTION, SOLUTION INTRAVENOUS at 04:07

## 2021-07-16 ENCOUNTER — TELEPHONE (OUTPATIENT)
Dept: UROLOGY | Facility: CLINIC | Age: 51
End: 2021-07-16

## 2021-08-10 ENCOUNTER — PROCEDURE VISIT (OUTPATIENT)
Dept: UROLOGY | Facility: CLINIC | Age: 51
End: 2021-08-10
Payer: MEDICAID

## 2021-08-10 VITALS
BODY MASS INDEX: 34.8 KG/M2 | TEMPERATURE: 98 F | DIASTOLIC BLOOD PRESSURE: 82 MMHG | WEIGHT: 229.63 LBS | HEIGHT: 68 IN | HEART RATE: 60 BPM | OXYGEN SATURATION: 98 % | SYSTOLIC BLOOD PRESSURE: 118 MMHG

## 2021-08-10 DIAGNOSIS — N30.10 INTERSTITIAL CYSTITIS: ICD-10-CM

## 2021-08-10 DIAGNOSIS — R39.89 BLADDER PAIN: ICD-10-CM

## 2021-08-10 DIAGNOSIS — N39.0 RECURRENT UTI: Primary | ICD-10-CM

## 2021-08-10 DIAGNOSIS — R30.0 DYSURIA: ICD-10-CM

## 2021-08-10 LAB
BILIRUB UR QL STRIP: NEGATIVE
CLARITY UR REFRACT.AUTO: ABNORMAL
COLOR UR AUTO: YELLOW
GLUCOSE UR QL STRIP: NEGATIVE
HGB UR QL STRIP: ABNORMAL
KETONES UR QL STRIP: NEGATIVE
LEUKOCYTE ESTERASE UR QL STRIP: ABNORMAL
MICROSCOPIC COMMENT: ABNORMAL
NITRITE UR QL STRIP: NEGATIVE
PH UR STRIP: 5 [PH] (ref 5–8)
PROT UR QL STRIP: NEGATIVE
RBC #/AREA URNS AUTO: 13 /HPF (ref 0–4)
SP GR UR STRIP: 1.02 (ref 1–1.03)
SQUAMOUS #/AREA URNS AUTO: 3 /HPF
URN SPEC COLLECT METH UR: ABNORMAL
WBC #/AREA URNS AUTO: 65 /HPF (ref 0–5)

## 2021-08-10 PROCEDURE — 87086 URINE CULTURE/COLONY COUNT: CPT | Performed by: UROLOGY

## 2021-08-10 PROCEDURE — 87077 CULTURE AEROBIC IDENTIFY: CPT | Performed by: UROLOGY

## 2021-08-10 PROCEDURE — 52000 CYSTOURETHROSCOPY: CPT | Mod: PBBFAC,PO | Performed by: UROLOGY

## 2021-08-10 PROCEDURE — 81001 URINALYSIS AUTO W/SCOPE: CPT | Performed by: UROLOGY

## 2021-08-10 PROCEDURE — 87088 URINE BACTERIA CULTURE: CPT | Performed by: UROLOGY

## 2021-08-10 PROCEDURE — 52000 CYSTOSCOPY: ICD-10-PCS | Mod: S$PBB,,, | Performed by: UROLOGY

## 2021-08-10 PROCEDURE — 87186 SC STD MICRODIL/AGAR DIL: CPT | Performed by: UROLOGY

## 2021-08-10 RX ORDER — FLUCONAZOLE 200 MG/1
200 TABLET ORAL DAILY
Qty: 5 TABLET | Refills: 1 | Status: SHIPPED | OUTPATIENT
Start: 2021-08-10 | End: 2021-09-09

## 2021-08-10 RX ORDER — AMOXICILLIN AND CLAVULANATE POTASSIUM 500; 125 MG/1; MG/1
1 TABLET, FILM COATED ORAL 3 TIMES DAILY
Qty: 42 TABLET | Refills: 0 | Status: SHIPPED | OUTPATIENT
Start: 2021-08-10 | End: 2021-08-24

## 2021-08-13 ENCOUNTER — TELEPHONE (OUTPATIENT)
Dept: UROLOGY | Facility: CLINIC | Age: 51
End: 2021-08-13

## 2021-08-13 LAB — BACTERIA UR CULT: ABNORMAL

## 2021-08-13 RX ORDER — AMPICILLIN 500 MG/1
500 CAPSULE ORAL EVERY 6 HOURS
Qty: 40 CAPSULE | Refills: 1 | Status: SHIPPED | OUTPATIENT
Start: 2021-08-13 | End: 2021-08-23

## 2021-09-24 ENCOUNTER — TELEPHONE (OUTPATIENT)
Dept: UROLOGY | Facility: CLINIC | Age: 51
End: 2021-09-24

## 2021-09-24 DIAGNOSIS — N39.0 RECURRENT UTI: Primary | ICD-10-CM

## 2021-10-04 RX ORDER — VALACYCLOVIR HYDROCHLORIDE 500 MG/1
500 TABLET, FILM COATED ORAL 2 TIMES DAILY
Qty: 30 TABLET | Refills: 3 | Status: SHIPPED | OUTPATIENT
Start: 2021-10-04 | End: 2021-11-16

## 2021-11-04 ENCOUNTER — TELEPHONE (OUTPATIENT)
Dept: OBSTETRICS AND GYNECOLOGY | Facility: CLINIC | Age: 51
End: 2021-11-04
Payer: MEDICAID

## 2021-11-05 ENCOUNTER — PATIENT MESSAGE (OUTPATIENT)
Dept: UROLOGY | Facility: CLINIC | Age: 51
End: 2021-11-05
Payer: MEDICAID

## 2021-11-09 ENCOUNTER — TELEPHONE (OUTPATIENT)
Dept: UROLOGY | Facility: CLINIC | Age: 51
End: 2021-11-09
Payer: MEDICAID

## 2021-11-09 DIAGNOSIS — R30.0 DYSURIA: Primary | ICD-10-CM

## 2021-11-10 ENCOUNTER — LAB VISIT (OUTPATIENT)
Dept: LAB | Facility: HOSPITAL | Age: 51
End: 2021-11-10
Attending: GENERAL PRACTICE
Payer: MEDICAID

## 2021-11-10 DIAGNOSIS — R30.0 DYSURIA: ICD-10-CM

## 2021-11-10 LAB
BACTERIA #/AREA URNS AUTO: ABNORMAL /HPF
BILIRUB UR QL STRIP: NEGATIVE
CLARITY UR REFRACT.AUTO: CLEAR
COLOR UR AUTO: YELLOW
GLUCOSE UR QL STRIP: NEGATIVE
HGB UR QL STRIP: ABNORMAL
KETONES UR QL STRIP: NEGATIVE
LEUKOCYTE ESTERASE UR QL STRIP: ABNORMAL
MICROSCOPIC COMMENT: ABNORMAL
NITRITE UR QL STRIP: NEGATIVE
PH UR STRIP: 6 [PH] (ref 5–8)
PROT UR QL STRIP: NEGATIVE
RBC #/AREA URNS AUTO: 27 /HPF (ref 0–4)
SP GR UR STRIP: 1.02 (ref 1–1.03)
SQUAMOUS #/AREA URNS AUTO: 3 /HPF
URN SPEC COLLECT METH UR: ABNORMAL
WBC #/AREA URNS AUTO: 26 /HPF (ref 0–5)

## 2021-11-10 PROCEDURE — 81001 URINALYSIS AUTO W/SCOPE: CPT | Performed by: NURSE PRACTITIONER

## 2021-11-10 PROCEDURE — 87086 URINE CULTURE/COLONY COUNT: CPT | Performed by: NURSE PRACTITIONER

## 2021-11-11 LAB — BACTERIA UR CULT: NO GROWTH

## 2021-11-16 ENCOUNTER — OFFICE VISIT (OUTPATIENT)
Dept: OBSTETRICS AND GYNECOLOGY | Facility: CLINIC | Age: 51
End: 2021-11-16
Attending: OBSTETRICS & GYNECOLOGY
Payer: MEDICAID

## 2021-11-16 ENCOUNTER — LAB VISIT (OUTPATIENT)
Dept: LAB | Facility: OTHER | Age: 51
End: 2021-11-16
Attending: OBSTETRICS & GYNECOLOGY
Payer: MEDICAID

## 2021-11-16 VITALS
SYSTOLIC BLOOD PRESSURE: 126 MMHG | DIASTOLIC BLOOD PRESSURE: 82 MMHG | WEIGHT: 224.44 LBS | BODY MASS INDEX: 34.01 KG/M2 | HEIGHT: 68 IN

## 2021-11-16 DIAGNOSIS — N76.0 VAGINITIS AND VULVOVAGINITIS: ICD-10-CM

## 2021-11-16 DIAGNOSIS — Z20.2 POSSIBLE EXPOSURE TO STD: ICD-10-CM

## 2021-11-16 DIAGNOSIS — Z01.419 WELL WOMAN EXAM: Primary | ICD-10-CM

## 2021-11-16 DIAGNOSIS — N95.1 MENOPAUSAL SYMPTOMS: ICD-10-CM

## 2021-11-16 PROCEDURE — 86695 HERPES SIMPLEX TYPE 1 TEST: CPT | Performed by: OBSTETRICS & GYNECOLOGY

## 2021-11-16 PROCEDURE — 86592 SYPHILIS TEST NON-TREP QUAL: CPT | Performed by: OBSTETRICS & GYNECOLOGY

## 2021-11-16 PROCEDURE — 99396 PR PREVENTIVE VISIT,EST,40-64: ICD-10-PCS | Mod: S$PBB,,, | Performed by: OBSTETRICS & GYNECOLOGY

## 2021-11-16 PROCEDURE — 87389 HIV-1 AG W/HIV-1&-2 AB AG IA: CPT | Performed by: OBSTETRICS & GYNECOLOGY

## 2021-11-16 PROCEDURE — 80074 ACUTE HEPATITIS PANEL: CPT | Performed by: OBSTETRICS & GYNECOLOGY

## 2021-11-16 PROCEDURE — 99999 PR PBB SHADOW E&M-EST. PATIENT-LVL III: CPT | Mod: PBBFAC,,, | Performed by: OBSTETRICS & GYNECOLOGY

## 2021-11-16 PROCEDURE — 99396 PREV VISIT EST AGE 40-64: CPT | Mod: S$PBB,,, | Performed by: OBSTETRICS & GYNECOLOGY

## 2021-11-16 PROCEDURE — 87481 CANDIDA DNA AMP PROBE: CPT | Mod: 59 | Performed by: OBSTETRICS & GYNECOLOGY

## 2021-11-16 PROCEDURE — 36415 COLL VENOUS BLD VENIPUNCTURE: CPT | Performed by: OBSTETRICS & GYNECOLOGY

## 2021-11-16 PROCEDURE — 99213 OFFICE O/P EST LOW 20 MIN: CPT | Mod: PBBFAC | Performed by: OBSTETRICS & GYNECOLOGY

## 2021-11-16 PROCEDURE — 99999 PR PBB SHADOW E&M-EST. PATIENT-LVL III: ICD-10-PCS | Mod: PBBFAC,,, | Performed by: OBSTETRICS & GYNECOLOGY

## 2021-11-16 RX ORDER — CHOLECALCIFEROL (VITAMIN D3) 25 MCG
1000 TABLET ORAL
COMMUNITY
Start: 2021-11-04 | End: 2022-05-30

## 2021-11-16 RX ORDER — AMOXICILLIN 500 MG/1
CAPSULE ORAL
COMMUNITY
Start: 2021-07-20 | End: 2021-11-16

## 2021-11-16 RX ORDER — VALACYCLOVIR HYDROCHLORIDE 1 G/1
1000 TABLET, FILM COATED ORAL DAILY
Qty: 90 TABLET | Refills: 3 | Status: SHIPPED | OUTPATIENT
Start: 2021-11-16 | End: 2022-11-24

## 2021-11-16 RX ORDER — AMLODIPINE BESYLATE 10 MG/1
10 TABLET ORAL DAILY
COMMUNITY
Start: 2021-08-25 | End: 2023-10-13 | Stop reason: CLARIF

## 2021-11-16 RX ORDER — PREDNISONE 5 MG/1
TABLET ORAL
COMMUNITY
Start: 2021-11-08 | End: 2022-01-12

## 2021-11-16 RX ORDER — FAMOTIDINE 20 MG/1
20 TABLET, FILM COATED ORAL 2 TIMES DAILY PRN
COMMUNITY
Start: 2021-09-14

## 2021-11-16 RX ORDER — CETIRIZINE HYDROCHLORIDE 10 MG/1
10 TABLET ORAL DAILY
COMMUNITY
Start: 2021-10-28

## 2021-11-16 RX ORDER — ESTRADIOL 0.03 MG/D
1 PATCH TRANSDERMAL
Qty: 12 PATCH | Refills: 3 | Status: SHIPPED | OUTPATIENT
Start: 2021-11-16 | End: 2022-09-06

## 2021-11-16 RX ORDER — HYDROXYCHLOROQUINE SULFATE 200 MG/1
1 TABLET, FILM COATED ORAL 2 TIMES DAILY
COMMUNITY
Start: 2021-09-28

## 2021-11-17 LAB
HAV IGM SERPL QL IA: NEGATIVE
HBV CORE IGM SERPL QL IA: NEGATIVE
HBV SURFACE AG SERPL QL IA: NEGATIVE
HCV AB SERPL QL IA: NEGATIVE
HIV 1+2 AB+HIV1 P24 AG SERPL QL IA: NEGATIVE
RPR SER QL: NORMAL

## 2021-11-18 ENCOUNTER — PATIENT MESSAGE (OUTPATIENT)
Dept: UROLOGY | Facility: CLINIC | Age: 51
End: 2021-11-18
Payer: MEDICAID

## 2021-11-19 ENCOUNTER — PATIENT MESSAGE (OUTPATIENT)
Dept: OBSTETRICS AND GYNECOLOGY | Facility: CLINIC | Age: 51
End: 2021-11-19
Payer: MEDICAID

## 2021-11-19 ENCOUNTER — PATIENT MESSAGE (OUTPATIENT)
Dept: UROLOGY | Facility: CLINIC | Age: 51
End: 2021-11-19
Payer: MEDICAID

## 2021-11-22 LAB
BACTERIAL VAGINOSIS DNA: NEGATIVE
CANDIDA GLABRATA DNA: NEGATIVE
CANDIDA KRUSEI DNA: NEGATIVE
CANDIDA RRNA VAG QL PROBE: NEGATIVE
HSV1 IGG SERPL QL IA: NEGATIVE
HSV2 IGG SERPL QL IA: POSITIVE
T VAGINALIS RRNA GENITAL QL PROBE: NEGATIVE

## 2021-12-02 ENCOUNTER — OFFICE VISIT (OUTPATIENT)
Dept: UROLOGY | Facility: CLINIC | Age: 51
End: 2021-12-02
Payer: MEDICAID

## 2021-12-02 VITALS
HEART RATE: 88 BPM | TEMPERATURE: 98 F | WEIGHT: 234.63 LBS | OXYGEN SATURATION: 97 % | HEIGHT: 68 IN | DIASTOLIC BLOOD PRESSURE: 80 MMHG | BODY MASS INDEX: 35.56 KG/M2 | SYSTOLIC BLOOD PRESSURE: 126 MMHG

## 2021-12-02 DIAGNOSIS — R39.89 BLADDER PAIN: ICD-10-CM

## 2021-12-02 DIAGNOSIS — N30.11 INTERSTITIAL CYSTITIS (CHRONIC) WITH HEMATURIA: Primary | ICD-10-CM

## 2021-12-02 DIAGNOSIS — R30.0 DYSURIA: ICD-10-CM

## 2021-12-02 DIAGNOSIS — R39.15 URINARY URGENCY: ICD-10-CM

## 2021-12-02 DIAGNOSIS — N30.10 INTERSTITIAL CYSTITIS: ICD-10-CM

## 2021-12-02 DIAGNOSIS — N31.9 NEUROGENIC URINARY BLADDER DISORDER: ICD-10-CM

## 2021-12-02 DIAGNOSIS — Z01.818 PRE-OP EXAM: ICD-10-CM

## 2021-12-02 DIAGNOSIS — R35.0 URINARY FREQUENCY: ICD-10-CM

## 2021-12-02 DIAGNOSIS — Z01.818 PREOP TESTING: Primary | ICD-10-CM

## 2021-12-02 DIAGNOSIS — R10.2 PELVIC PAIN IN FEMALE: ICD-10-CM

## 2021-12-02 LAB
BACTERIA #/AREA URNS AUTO: ABNORMAL /HPF
BILIRUB UR QL STRIP: NEGATIVE
CLARITY UR REFRACT.AUTO: ABNORMAL
COLOR UR AUTO: YELLOW
GLUCOSE UR QL STRIP: NEGATIVE
HGB UR QL STRIP: NEGATIVE
KETONES UR QL STRIP: NEGATIVE
LEUKOCYTE ESTERASE UR QL STRIP: ABNORMAL
MICROSCOPIC COMMENT: ABNORMAL
NITRITE UR QL STRIP: NEGATIVE
PH UR STRIP: 6 [PH] (ref 5–8)
PROT UR QL STRIP: NEGATIVE
RBC #/AREA URNS AUTO: 36 /HPF (ref 0–4)
SP GR UR STRIP: 1.01 (ref 1–1.03)
SQUAMOUS #/AREA URNS AUTO: 2 /HPF
URN SPEC COLLECT METH UR: ABNORMAL
WBC #/AREA URNS AUTO: 49 /HPF (ref 0–5)

## 2021-12-02 PROCEDURE — 87086 URINE CULTURE/COLONY COUNT: CPT | Performed by: NURSE PRACTITIONER

## 2021-12-02 PROCEDURE — 99999 PR PBB SHADOW E&M-EST. PATIENT-LVL V: ICD-10-PCS | Mod: PBBFAC,,, | Performed by: NURSE PRACTITIONER

## 2021-12-02 PROCEDURE — 99214 OFFICE O/P EST MOD 30 MIN: CPT | Mod: S$PBB,,, | Performed by: NURSE PRACTITIONER

## 2021-12-02 PROCEDURE — 99214 PR OFFICE/OUTPT VISIT, EST, LEVL IV, 30-39 MIN: ICD-10-PCS | Mod: S$PBB,,, | Performed by: NURSE PRACTITIONER

## 2021-12-02 PROCEDURE — 99999 PR PBB SHADOW E&M-EST. PATIENT-LVL V: CPT | Mod: PBBFAC,,, | Performed by: NURSE PRACTITIONER

## 2021-12-02 PROCEDURE — 99215 OFFICE O/P EST HI 40 MIN: CPT | Mod: PBBFAC,PO | Performed by: NURSE PRACTITIONER

## 2021-12-02 PROCEDURE — 81001 URINALYSIS AUTO W/SCOPE: CPT | Performed by: NURSE PRACTITIONER

## 2021-12-02 RX ORDER — CIPROFLOXACIN 2 MG/ML
400 INJECTION, SOLUTION INTRAVENOUS
Status: CANCELLED | OUTPATIENT
Start: 2021-12-02

## 2021-12-02 RX ORDER — FLAVOXATE HYDROCHLORIDE 100 MG/1
100 TABLET ORAL 3 TIMES DAILY PRN
Qty: 90 TABLET | Refills: 2 | Status: SHIPPED | OUTPATIENT
Start: 2021-12-02 | End: 2022-04-26 | Stop reason: ALTCHOICE

## 2021-12-02 RX ORDER — SODIUM CHLORIDE 9 MG/ML
INJECTION, SOLUTION INTRAVENOUS CONTINUOUS
Status: CANCELLED | OUTPATIENT
Start: 2021-12-02

## 2021-12-04 LAB — BACTERIA UR CULT: NO GROWTH

## 2021-12-18 ENCOUNTER — PATIENT MESSAGE (OUTPATIENT)
Dept: UROLOGY | Facility: CLINIC | Age: 51
End: 2021-12-18
Payer: MEDICAID

## 2022-01-12 ENCOUNTER — OFFICE VISIT (OUTPATIENT)
Dept: UROLOGY | Facility: CLINIC | Age: 52
End: 2022-01-12
Payer: MEDICAID

## 2022-01-12 VITALS
HEIGHT: 68 IN | WEIGHT: 235.88 LBS | BODY MASS INDEX: 35.75 KG/M2 | TEMPERATURE: 98 F | SYSTOLIC BLOOD PRESSURE: 124 MMHG | HEART RATE: 70 BPM | DIASTOLIC BLOOD PRESSURE: 72 MMHG | OXYGEN SATURATION: 99 %

## 2022-01-12 DIAGNOSIS — N30.10 INTERSTITIAL CYSTITIS: ICD-10-CM

## 2022-01-12 DIAGNOSIS — N39.3 SUI (STRESS URINARY INCONTINENCE, FEMALE): ICD-10-CM

## 2022-01-12 DIAGNOSIS — R39.15 URINARY URGENCY: Primary | ICD-10-CM

## 2022-01-12 DIAGNOSIS — R10.2 PELVIC PAIN IN FEMALE: ICD-10-CM

## 2022-01-12 DIAGNOSIS — N39.41 URGE INCONTINENCE: ICD-10-CM

## 2022-01-12 DIAGNOSIS — R35.0 URINARY FREQUENCY: ICD-10-CM

## 2022-01-12 PROCEDURE — 1160F PR REVIEW ALL MEDS BY PRESCRIBER/CLIN PHARMACIST DOCUMENTED: ICD-10-PCS | Mod: CPTII,,, | Performed by: UROLOGY

## 2022-01-12 PROCEDURE — 1159F PR MEDICATION LIST DOCUMENTED IN MEDICAL RECORD: ICD-10-PCS | Mod: CPTII,,, | Performed by: UROLOGY

## 2022-01-12 PROCEDURE — 1159F MED LIST DOCD IN RCRD: CPT | Mod: CPTII,,, | Performed by: UROLOGY

## 2022-01-12 PROCEDURE — 99214 PR OFFICE/OUTPT VISIT, EST, LEVL IV, 30-39 MIN: ICD-10-PCS | Mod: S$PBB,,, | Performed by: UROLOGY

## 2022-01-12 PROCEDURE — 1160F RVW MEDS BY RX/DR IN RCRD: CPT | Mod: CPTII,,, | Performed by: UROLOGY

## 2022-01-12 PROCEDURE — 3008F PR BODY MASS INDEX (BMI) DOCUMENTED: ICD-10-PCS | Mod: CPTII,,, | Performed by: UROLOGY

## 2022-01-12 PROCEDURE — 99214 OFFICE O/P EST MOD 30 MIN: CPT | Mod: S$PBB,,, | Performed by: UROLOGY

## 2022-01-12 PROCEDURE — 99999 PR PBB SHADOW E&M-EST. PATIENT-LVL IV: CPT | Mod: PBBFAC,,, | Performed by: UROLOGY

## 2022-01-12 PROCEDURE — 99214 OFFICE O/P EST MOD 30 MIN: CPT | Mod: PBBFAC,PO | Performed by: UROLOGY

## 2022-01-12 PROCEDURE — 3078F DIAST BP <80 MM HG: CPT | Mod: CPTII,,, | Performed by: UROLOGY

## 2022-01-12 PROCEDURE — 3078F PR MOST RECENT DIASTOLIC BLOOD PRESSURE < 80 MM HG: ICD-10-PCS | Mod: CPTII,,, | Performed by: UROLOGY

## 2022-01-12 PROCEDURE — 3074F SYST BP LT 130 MM HG: CPT | Mod: CPTII,,, | Performed by: UROLOGY

## 2022-01-12 PROCEDURE — 99999 PR PBB SHADOW E&M-EST. PATIENT-LVL IV: ICD-10-PCS | Mod: PBBFAC,,, | Performed by: UROLOGY

## 2022-01-12 PROCEDURE — 3008F BODY MASS INDEX DOCD: CPT | Mod: CPTII,,, | Performed by: UROLOGY

## 2022-01-12 PROCEDURE — 3074F PR MOST RECENT SYSTOLIC BLOOD PRESSURE < 130 MM HG: ICD-10-PCS | Mod: CPTII,,, | Performed by: UROLOGY

## 2022-01-12 RX ORDER — PENTOSAN POLYSULFATE SODIUM 100 MG/1
100 CAPSULE, GELATIN COATED ORAL 3 TIMES DAILY
Qty: 90 CAPSULE | Refills: 11 | Status: SHIPPED | OUTPATIENT
Start: 2022-01-12 | End: 2022-05-14

## 2022-01-12 NOTE — PROGRESS NOTES
Subjective:       Patient ID: Arpita Bradshaw is a 51 y.o. female.    Chief Complaint: Post op - cysto w/ uds    Mrs. Bradshaw is a 52 yo female with a history of interstitial cystitis.  The patient was having chronic continuous pain prior to bladder hydrodistention.  She had hydrodistention on 12/15 21. She presently is 28 days postop and complains of some pressure with voiding causing pain but only with voiding.  Dyspareunia has improved somewhat since hydrodistention.  Patient does have some pain with intercourse but it does not last.  Patient is unsure of InterStim is helping much.  She can palpate the generator unit more easily with the new unit but does not report any pain or complication of the unit.    Urinary Frequency   This is a chronic (Nocturia x5 in frequency x3) problem. The current episode started more than 1 year ago. The problem occurs every urination. The problem has been unchanged. The quality of the pain is described as aching. The pain is at a severity of 6/10. The pain is moderate. There has been no fever. She is sexually active. There is no history of pyelonephritis. Associated symptoms include behavior changes, frequency and urgency. Pertinent negatives include no chills, discharge, flank pain, hematuria, hesitancy, nausea, possible pregnancy, sweats, vomiting, weight loss, bubble bath use, constipation, rash or withholding. She has tried nothing for the symptoms. The treatment provided significant relief. Her past medical history is significant for a urological procedure. There is no history of catheterization, diabetes insipidus, diabetes mellitus, genitourinary reflux, hypertension, kidney stones, recurrent UTIs, a single kidney, STD or urinary stasis.     Review of Systems   Constitutional: Negative for activity change, appetite change, chills, fatigue, fever and weight loss.   HENT: Negative for congestion, ear pain, hearing loss, nosebleeds, sinus pressure, sore throat and trouble  swallowing.    Eyes: Negative for pain and visual disturbance.   Respiratory: Negative for apnea, cough and shortness of breath.    Cardiovascular: Negative for chest pain and leg swelling.   Gastrointestinal: Negative for abdominal distention, abdominal pain, anal bleeding, blood in stool, constipation, diarrhea, nausea, rectal pain and vomiting.   Endocrine: Negative for cold intolerance, heat intolerance, polydipsia, polyphagia and polyuria.   Genitourinary: Positive for frequency and urgency. Negative for decreased urine volume, difficulty urinating, dyspareunia, dysuria, enuresis, flank pain, genital sores, hematuria, hesitancy, menstrual problem, pelvic pain, vaginal bleeding, vaginal discharge and vaginal pain.   Musculoskeletal: Negative for arthralgias and back pain.   Skin: Negative for color change, pallor and rash.   Allergic/Immunologic: Negative for environmental allergies, food allergies and immunocompromised state.   Neurological: Negative for dizziness, speech difficulty, weakness and headaches.   Hematological: Negative for adenopathy. Does not bruise/bleed easily.   Psychiatric/Behavioral: Negative.        Objective:      Physical Exam  Vitals and nursing note reviewed.   Constitutional:       General: She is not in acute distress.     Appearance: Normal appearance. She is well-developed and well-nourished. She is obese. She is not ill-appearing, toxic-appearing or diaphoretic.   HENT:      Head: Normocephalic.      Right Ear: External ear normal. There is no impacted cerumen.      Left Ear: External ear normal. There is no impacted cerumen.      Nose: No congestion or rhinorrhea.      Mouth/Throat:      Mouth: Oropharynx is clear and moist. Mucous membranes are moist.      Pharynx: Oropharynx is clear. No oropharyngeal exudate or posterior oropharyngeal erythema.   Eyes:      General: No scleral icterus.        Right eye: No discharge.         Left eye: No discharge.      Extraocular Movements:  Extraocular movements intact and EOM normal.      Conjunctiva/sclera: Conjunctivae normal.      Pupils: Pupils are equal, round, and reactive to light.   Cardiovascular:      Rate and Rhythm: Normal rate and regular rhythm.      Pulses: Normal pulses and intact distal pulses.      Heart sounds: Normal heart sounds.   Pulmonary:      Effort: Pulmonary effort is normal.      Breath sounds: Normal breath sounds.   Abdominal:      General: Abdomen is flat. Bowel sounds are normal.      Palpations: Abdomen is soft.      Tenderness: There is no right CVA tenderness or left CVA tenderness.   Genitourinary:     General: Normal vulva.      Rectum: Normal.   Musculoskeletal:         General: Normal range of motion.      Cervical back: Normal range of motion and neck supple.   Skin:     General: Skin is warm and dry.      Findings: No lesion or rash.   Neurological:      Mental Status: She is alert and oriented to person, place, and time.      Deep Tendon Reflexes: Reflexes are normal and symmetric.   Psychiatric:         Mood and Affect: Mood and affect and mood normal.         Behavior: Behavior normal.         Thought Content: Thought content normal.         Judgment: Judgment normal.         Assessment:       1. Urinary urgency    2. Urinary frequency    3. Urge incontinence    4. SHEYLA (stress urinary incontinence, female)    5. Pelvic pain in female    6. Interstitial cystitis        Plan:       Patient Instructions   Continue InterStim and adjust as needed  Continual low acid diet and avoid dietary irritants  Continue with increase water intake  Follow-up in 6 months and schedule bladder hydrodistention or sooner if necessary.

## 2022-01-12 NOTE — PATIENT INSTRUCTIONS
Continue InterStim and adjust as needed  Continual low acid diet and avoid dietary irritants  Continue with increase water intake  Follow-up in 6 months and schedule bladder hydrodistention or sooner if necessary.

## 2022-02-14 ENCOUNTER — TELEPHONE (OUTPATIENT)
Dept: UROLOGY | Facility: CLINIC | Age: 52
End: 2022-02-14
Payer: MEDICAID

## 2022-02-14 ENCOUNTER — PATIENT MESSAGE (OUTPATIENT)
Dept: UROLOGY | Facility: CLINIC | Age: 52
End: 2022-02-14
Payer: MEDICAID

## 2022-02-14 DIAGNOSIS — R39.15 URINARY URGENCY: Primary | ICD-10-CM

## 2022-02-15 ENCOUNTER — LAB VISIT (OUTPATIENT)
Dept: LAB | Facility: HOSPITAL | Age: 52
End: 2022-02-15
Attending: UROLOGY
Payer: MEDICAID

## 2022-02-15 DIAGNOSIS — R39.15 URINARY URGENCY: ICD-10-CM

## 2022-02-15 LAB
BACTERIA #/AREA URNS AUTO: ABNORMAL /HPF
BILIRUB UR QL STRIP: NEGATIVE
CLARITY UR REFRACT.AUTO: ABNORMAL
COLOR UR AUTO: YELLOW
GLUCOSE UR QL STRIP: NEGATIVE
HGB UR QL STRIP: NEGATIVE
KETONES UR QL STRIP: NEGATIVE
LEUKOCYTE ESTERASE UR QL STRIP: NEGATIVE
MICROSCOPIC COMMENT: ABNORMAL
NITRITE UR QL STRIP: NEGATIVE
PH UR STRIP: 5 [PH] (ref 5–8)
PROT UR QL STRIP: NEGATIVE
RBC #/AREA URNS AUTO: 38 /HPF (ref 0–4)
SP GR UR STRIP: 1.02 (ref 1–1.03)
SQUAMOUS #/AREA URNS AUTO: 7 /HPF
URN SPEC COLLECT METH UR: ABNORMAL
WBC #/AREA URNS AUTO: 10 /HPF (ref 0–5)

## 2022-02-15 PROCEDURE — 81001 URINALYSIS AUTO W/SCOPE: CPT | Performed by: UROLOGY

## 2022-02-15 PROCEDURE — 87086 URINE CULTURE/COLONY COUNT: CPT | Performed by: UROLOGY

## 2022-02-16 LAB — BACTERIA UR CULT: NO GROWTH

## 2022-02-17 ENCOUNTER — PATIENT MESSAGE (OUTPATIENT)
Dept: UROLOGY | Facility: CLINIC | Age: 52
End: 2022-02-17
Payer: MEDICAID

## 2022-02-23 ENCOUNTER — PATIENT MESSAGE (OUTPATIENT)
Dept: UROLOGY | Facility: CLINIC | Age: 52
End: 2022-02-23
Payer: MEDICAID

## 2022-03-14 NOTE — TELEPHONE ENCOUNTER
----- Message from Alia Branch sent at 2/3/2020  3:01 PM CST -----  Contact: [phone]/self  Type:  Needs Medical Advice    Who Called: pt  Advice Regarding: black urine  Would the patient rather a call back or a response via [facility]? call  Best Call Back Number: [phone]  Additional Information: requesting lab orders for a facility in [place]       March 14, 2022     Patient: Linwood Harrell   YOB: 2015   Date of Visit: 3/14/2022       To Whom it May Concern:    Linwood Harrell is under my professional care  She was seen in my office on 3/14/2022  Please give Akua her Albuterol inhaler during school, these are the instructions: Inhale 2 puffs every 4 (four) hours as needed for wheezing  If you have any questions or concerns, please don't hesitate to call           Sincerely,          Donald Delgado MD Clothing

## 2022-04-22 ENCOUNTER — PATIENT MESSAGE (OUTPATIENT)
Dept: UROLOGY | Facility: CLINIC | Age: 52
End: 2022-04-22
Payer: MEDICAID

## 2022-04-26 ENCOUNTER — OFFICE VISIT (OUTPATIENT)
Dept: UROLOGY | Facility: CLINIC | Age: 52
End: 2022-04-26
Payer: MEDICAID

## 2022-04-26 VITALS
TEMPERATURE: 98 F | SYSTOLIC BLOOD PRESSURE: 120 MMHG | OXYGEN SATURATION: 100 % | WEIGHT: 231.81 LBS | DIASTOLIC BLOOD PRESSURE: 76 MMHG | HEART RATE: 65 BPM | BODY MASS INDEX: 35.13 KG/M2 | HEIGHT: 68 IN

## 2022-04-26 DIAGNOSIS — R31.0 GROSS HEMATURIA: Primary | ICD-10-CM

## 2022-04-26 DIAGNOSIS — R39.89 BLADDER PAIN: ICD-10-CM

## 2022-04-26 DIAGNOSIS — R11.0 NAUSEA: ICD-10-CM

## 2022-04-26 DIAGNOSIS — R10.9 RIGHT FLANK PAIN: ICD-10-CM

## 2022-04-26 DIAGNOSIS — R10.2 PELVIC PAIN IN FEMALE: ICD-10-CM

## 2022-04-26 DIAGNOSIS — N30.11 INTERSTITIAL CYSTITIS (CHRONIC) WITH HEMATURIA: ICD-10-CM

## 2022-04-26 DIAGNOSIS — R30.0 DYSURIA: ICD-10-CM

## 2022-04-26 LAB
BACTERIA #/AREA URNS AUTO: ABNORMAL /HPF
BILIRUB UR QL STRIP: NEGATIVE
CAOX CRY UR QL COMP ASSIST: ABNORMAL
CLARITY UR REFRACT.AUTO: ABNORMAL
COLOR UR AUTO: YELLOW
GLUCOSE UR QL STRIP: NEGATIVE
HGB UR QL STRIP: ABNORMAL
HYALINE CASTS UR QL AUTO: 0 /LPF
KETONES UR QL STRIP: NEGATIVE
LEUKOCYTE ESTERASE UR QL STRIP: ABNORMAL
MICROSCOPIC COMMENT: ABNORMAL
NITRITE UR QL STRIP: NEGATIVE
PH UR STRIP: 5 [PH] (ref 5–8)
PROT UR QL STRIP: ABNORMAL
RBC #/AREA URNS AUTO: >100 /HPF (ref 0–4)
SP GR UR STRIP: 1.02 (ref 1–1.03)
SQUAMOUS #/AREA URNS AUTO: 4 /HPF
URN SPEC COLLECT METH UR: ABNORMAL
WBC #/AREA URNS AUTO: >100 /HPF (ref 0–5)

## 2022-04-26 PROCEDURE — 99214 OFFICE O/P EST MOD 30 MIN: CPT | Mod: S$PBB,,, | Performed by: NURSE PRACTITIONER

## 2022-04-26 PROCEDURE — 3074F SYST BP LT 130 MM HG: CPT | Mod: CPTII,,, | Performed by: NURSE PRACTITIONER

## 2022-04-26 PROCEDURE — 3008F BODY MASS INDEX DOCD: CPT | Mod: CPTII,,, | Performed by: NURSE PRACTITIONER

## 2022-04-26 PROCEDURE — 87088 URINE BACTERIA CULTURE: CPT | Performed by: NURSE PRACTITIONER

## 2022-04-26 PROCEDURE — 99214 PR OFFICE/OUTPT VISIT, EST, LEVL IV, 30-39 MIN: ICD-10-PCS | Mod: S$PBB,,, | Performed by: NURSE PRACTITIONER

## 2022-04-26 PROCEDURE — 3074F PR MOST RECENT SYSTOLIC BLOOD PRESSURE < 130 MM HG: ICD-10-PCS | Mod: CPTII,,, | Performed by: NURSE PRACTITIONER

## 2022-04-26 PROCEDURE — 1159F PR MEDICATION LIST DOCUMENTED IN MEDICAL RECORD: ICD-10-PCS | Mod: CPTII,,, | Performed by: NURSE PRACTITIONER

## 2022-04-26 PROCEDURE — 99215 OFFICE O/P EST HI 40 MIN: CPT | Mod: PBBFAC,PO | Performed by: NURSE PRACTITIONER

## 2022-04-26 PROCEDURE — 1159F MED LIST DOCD IN RCRD: CPT | Mod: CPTII,,, | Performed by: NURSE PRACTITIONER

## 2022-04-26 PROCEDURE — 1160F PR REVIEW ALL MEDS BY PRESCRIBER/CLIN PHARMACIST DOCUMENTED: ICD-10-PCS | Mod: CPTII,,, | Performed by: NURSE PRACTITIONER

## 2022-04-26 PROCEDURE — 3008F PR BODY MASS INDEX (BMI) DOCUMENTED: ICD-10-PCS | Mod: CPTII,,, | Performed by: NURSE PRACTITIONER

## 2022-04-26 PROCEDURE — 99999 PR PBB SHADOW E&M-EST. PATIENT-LVL V: CPT | Mod: PBBFAC,,, | Performed by: NURSE PRACTITIONER

## 2022-04-26 PROCEDURE — 1160F RVW MEDS BY RX/DR IN RCRD: CPT | Mod: CPTII,,, | Performed by: NURSE PRACTITIONER

## 2022-04-26 PROCEDURE — 87077 CULTURE AEROBIC IDENTIFY: CPT | Performed by: NURSE PRACTITIONER

## 2022-04-26 PROCEDURE — 81001 URINALYSIS AUTO W/SCOPE: CPT | Performed by: NURSE PRACTITIONER

## 2022-04-26 PROCEDURE — 3078F DIAST BP <80 MM HG: CPT | Mod: CPTII,,, | Performed by: NURSE PRACTITIONER

## 2022-04-26 PROCEDURE — 87186 SC STD MICRODIL/AGAR DIL: CPT | Performed by: NURSE PRACTITIONER

## 2022-04-26 PROCEDURE — 99999 PR PBB SHADOW E&M-EST. PATIENT-LVL V: ICD-10-PCS | Mod: PBBFAC,,, | Performed by: NURSE PRACTITIONER

## 2022-04-26 PROCEDURE — 87086 URINE CULTURE/COLONY COUNT: CPT | Performed by: NURSE PRACTITIONER

## 2022-04-26 PROCEDURE — 3078F PR MOST RECENT DIASTOLIC BLOOD PRESSURE < 80 MM HG: ICD-10-PCS | Mod: CPTII,,, | Performed by: NURSE PRACTITIONER

## 2022-04-26 RX ORDER — HYOSCYAMINE SULFATE 0.12 MG/1
0.12 TABLET SUBLINGUAL EVERY 4 HOURS PRN
Qty: 42 TABLET | Refills: 1 | Status: SHIPPED | OUTPATIENT
Start: 2022-04-26 | End: 2022-07-11

## 2022-04-26 RX ORDER — ONDANSETRON 4 MG/1
4 TABLET, ORALLY DISINTEGRATING ORAL EVERY 8 HOURS PRN
Qty: 21 TABLET | Refills: 0 | Status: SHIPPED | OUTPATIENT
Start: 2022-04-26 | End: 2022-05-03

## 2022-04-26 RX ORDER — CIPROFLOXACIN 500 MG/1
500 TABLET ORAL EVERY 12 HOURS
Qty: 20 TABLET | Refills: 0 | Status: SHIPPED | OUTPATIENT
Start: 2022-04-26 | End: 2022-05-06

## 2022-04-26 RX ORDER — PHENAZOPYRIDINE HYDROCHLORIDE 200 MG/1
200 TABLET, FILM COATED ORAL 3 TIMES DAILY PRN
Qty: 15 TABLET | Refills: 1 | Status: CANCELLED | OUTPATIENT
Start: 2022-04-26 | End: 2022-05-01

## 2022-04-26 NOTE — PROGRESS NOTES
Subjective:       Patient ID: Arpita Bradshaw is a 51 y.o. female.    Chief Complaint: Abdominal Pain (Patient pass a small blood clot this morning pain is a 10 out of 10 )    Patient is here today with c/o passing a blood clot during urination. She also has c/o suprapubic pain, right flank pain, urinary frequency, and urgency. Patient has a hx of IC. Currently taking     Abdominal Pain  This is a new problem. Episode onset: 2 weeks ago. The problem occurs daily. The problem has been gradually worsening. The pain is located in the suprapubic region. The pain is at a severity of 10/10. The pain is severe. The quality of the pain is cramping and sharp. The abdominal pain does not radiate. Associated symptoms include constipation, dysuria, frequency, hematuria (blood clot) and nausea. Pertinent negatives include no anorexia, arthralgias, belching, diarrhea, fever, flatus, headaches, hematochezia, melena, myalgias, vomiting or weight loss. Nothing aggravates the pain. The pain is relieved by nothing. She has tried oral narcotic analgesics (tramadol) for the symptoms. The treatment provided moderate relief. There is no history of abdominal surgery or gallstones. Patient's medical history includes UTI.     Review of Systems   Constitutional: Negative for appetite change, chills, fatigue, fever and weight loss.   Gastrointestinal: Positive for constipation and nausea. Negative for abdominal pain, anorexia, change in bowel habit, diarrhea, flatus, hematochezia, melena, vomiting and change in bowel habit.   Genitourinary: Positive for difficulty urinating, dysuria, flank pain (right), frequency, hematuria (blood clot), nocturia (x5-6), pelvic pain (suprapubic) and urgency. Negative for decreased urine volume, vaginal bleeding, vaginal discharge and vaginal pain.   Musculoskeletal: Negative for arthralgias and myalgias.   Neurological: Positive for dizziness. Negative for weakness and headaches.   Psychiatric/Behavioral:  Negative.          Objective:      Physical Exam  Vitals and nursing note reviewed.   Constitutional:       General: She is not in acute distress.     Appearance: She is well-developed. She is obese. She is not ill-appearing.   HENT:      Head: Normocephalic and atraumatic.   Eyes:      Pupils: Pupils are equal, round, and reactive to light.   Cardiovascular:      Rate and Rhythm: Normal rate.   Pulmonary:      Effort: Pulmonary effort is normal. No respiratory distress.   Abdominal:      Palpations: Abdomen is soft.      Tenderness: There is no abdominal tenderness.   Musculoskeletal:         General: Normal range of motion.      Cervical back: Normal range of motion.   Skin:     General: Skin is warm and dry.   Neurological:      Mental Status: She is alert and oriented to person, place, and time.      Coordination: Coordination normal.   Psychiatric:         Mood and Affect: Mood normal.         Behavior: Behavior normal.         Thought Content: Thought content normal.         Judgment: Judgment normal.         Assessment:       Problem List Items Addressed This Visit        Renal/    Dysuria    Relevant Medications    hyoscyamine (LEVSIN/SL) 0.125 mg Subl    Other Relevant Orders    Urine culture    Urinalysis (Completed)    Interstitial cystitis (chronic) with hematuria    Relevant Orders    Urine culture    Urinalysis (Completed)    Gross hematuria - Primary    Relevant Medications    ciprofloxacin HCl (CIPRO) 500 MG tablet    Other Relevant Orders    CT Urogram Abd Pelvis W WO (Completed)    Urine culture    Urinalysis (Completed)    POCT CREATININE    Bladder pain    Relevant Medications    ciprofloxacin HCl (CIPRO) 500 MG tablet    hyoscyamine (LEVSIN/SL) 0.125 mg Subl    Other Relevant Orders    CT Urogram Abd Pelvis W WO (Completed)    Urine culture    Urinalysis (Completed)    POCT CREATININE       GI    Pelvic pain in female      Other Visit Diagnoses     Right flank pain        Relevant Orders    CT  Urogram Abd Pelvis W WO (Completed)    Urine culture    Urinalysis (Completed)    POCT CREATININE    Nausea        Relevant Medications    ondansetron (ZOFRAN-ODT) 4 MG TbDL          Plan:       Arpita was seen today for abdominal pain.    Diagnoses and all orders for this visit:    Gross hematuria  -     CT Urogram Abd Pelvis W WO; Future  -     Urine culture  -     Urinalysis  -     ciprofloxacin HCl (CIPRO) 500 MG tablet; Take 1 tablet (500 mg total) by mouth every 12 (twelve) hours. for 10 days  -     POCT CREATININE    Pelvic pain in female    Interstitial cystitis (chronic) with hematuria  -     Urine culture  -     Urinalysis    Bladder pain  -     CT Urogram Abd Pelvis W WO; Future  -     Urine culture  -     Urinalysis  -     ciprofloxacin HCl (CIPRO) 500 MG tablet; Take 1 tablet (500 mg total) by mouth every 12 (twelve) hours. for 10 days  -     hyoscyamine (LEVSIN/SL) 0.125 mg Subl; Place 1 tablet (0.125 mg total) under the tongue every 4 (four) hours as needed (bladder spasms).  -     POCT CREATININE    Right flank pain  -     CT Urogram Abd Pelvis W WO; Future  -     Urine culture  -     Urinalysis  -     POCT CREATININE    Dysuria  -     Urine culture  -     Urinalysis  -     hyoscyamine (LEVSIN/SL) 0.125 mg Subl; Place 1 tablet (0.125 mg total) under the tongue every 4 (four) hours as needed (bladder spasms).    Nausea  -     ondansetron (ZOFRAN-ODT) 4 MG TbDL; Take 1 tablet (4 mg total) by mouth every 8 (eight) hours as needed (nausea).    NOTE: Patient may need cysto with bladder hydrodistention in the future.     Follow-up pending urine cx and CT results.     Francie Cazares NP

## 2022-04-29 ENCOUNTER — PATIENT MESSAGE (OUTPATIENT)
Dept: UROLOGY | Facility: CLINIC | Age: 52
End: 2022-04-29
Payer: MEDICAID

## 2022-04-29 DIAGNOSIS — N30.01 ACUTE CYSTITIS WITH HEMATURIA: Primary | ICD-10-CM

## 2022-04-29 LAB — BACTERIA UR CULT: ABNORMAL

## 2022-04-29 RX ORDER — AMPICILLIN 500 MG/1
500 CAPSULE ORAL EVERY 6 HOURS
Qty: 40 CAPSULE | Refills: 0 | Status: SHIPPED | OUTPATIENT
Start: 2022-04-29 | End: 2022-05-09

## 2022-04-29 NOTE — PROGRESS NOTES
Diagnoses and all orders for this visit:    Acute cystitis with hematuria  -     ampicillin (PRINCIPEN) 500 MG capsule; Take 1 capsule (500 mg total) by mouth every 6 (six) hours. for 10 days    Follow-up in 2 weeks.     Francie Cazares NP

## 2022-05-12 ENCOUNTER — OFFICE VISIT (OUTPATIENT)
Dept: UROLOGY | Facility: CLINIC | Age: 52
End: 2022-05-12
Payer: MEDICAID

## 2022-05-12 VITALS
HEART RATE: 76 BPM | WEIGHT: 229.63 LBS | DIASTOLIC BLOOD PRESSURE: 70 MMHG | SYSTOLIC BLOOD PRESSURE: 118 MMHG | HEIGHT: 68 IN | BODY MASS INDEX: 34.8 KG/M2 | OXYGEN SATURATION: 97 %

## 2022-05-12 DIAGNOSIS — R10.2 PELVIC PAIN IN FEMALE: ICD-10-CM

## 2022-05-12 DIAGNOSIS — Z01.818 PRE-OP TESTING: ICD-10-CM

## 2022-05-12 DIAGNOSIS — R93.2 ABNORMAL CT OF LIVER: ICD-10-CM

## 2022-05-12 DIAGNOSIS — K76.9 LIVER LESION: ICD-10-CM

## 2022-05-12 DIAGNOSIS — N30.11 INTERSTITIAL CYSTITIS (CHRONIC) WITH HEMATURIA: Primary | ICD-10-CM

## 2022-05-12 DIAGNOSIS — N32.89 BLADDER SPASM: ICD-10-CM

## 2022-05-12 DIAGNOSIS — R30.0 DYSURIA: ICD-10-CM

## 2022-05-12 LAB
BACTERIA #/AREA URNS AUTO: ABNORMAL /HPF
BILIRUB UR QL STRIP: NEGATIVE
CLARITY UR REFRACT.AUTO: ABNORMAL
COLOR UR AUTO: YELLOW
GLUCOSE UR QL STRIP: NEGATIVE
HGB UR QL STRIP: ABNORMAL
HYALINE CASTS UR QL AUTO: 0 /LPF
KETONES UR QL STRIP: NEGATIVE
LEUKOCYTE ESTERASE UR QL STRIP: ABNORMAL
MICROSCOPIC COMMENT: ABNORMAL
NITRITE UR QL STRIP: NEGATIVE
PH UR STRIP: 5 [PH] (ref 5–8)
PROT UR QL STRIP: ABNORMAL
RBC #/AREA URNS AUTO: >100 /HPF (ref 0–4)
SP GR UR STRIP: 1.02 (ref 1–1.03)
SQUAMOUS #/AREA URNS AUTO: 7 /HPF
URN SPEC COLLECT METH UR: ABNORMAL
WBC #/AREA URNS AUTO: 48 /HPF (ref 0–5)

## 2022-05-12 PROCEDURE — 1159F MED LIST DOCD IN RCRD: CPT | Mod: CPTII,,, | Performed by: NURSE PRACTITIONER

## 2022-05-12 PROCEDURE — 99215 OFFICE O/P EST HI 40 MIN: CPT | Mod: PBBFAC,PO | Performed by: NURSE PRACTITIONER

## 2022-05-12 PROCEDURE — 3008F BODY MASS INDEX DOCD: CPT | Mod: CPTII,,, | Performed by: NURSE PRACTITIONER

## 2022-05-12 PROCEDURE — 3008F PR BODY MASS INDEX (BMI) DOCUMENTED: ICD-10-PCS | Mod: CPTII,,, | Performed by: NURSE PRACTITIONER

## 2022-05-12 PROCEDURE — 1160F RVW MEDS BY RX/DR IN RCRD: CPT | Mod: CPTII,,, | Performed by: NURSE PRACTITIONER

## 2022-05-12 PROCEDURE — 1160F PR REVIEW ALL MEDS BY PRESCRIBER/CLIN PHARMACIST DOCUMENTED: ICD-10-PCS | Mod: CPTII,,, | Performed by: NURSE PRACTITIONER

## 2022-05-12 PROCEDURE — 81001 URINALYSIS AUTO W/SCOPE: CPT | Performed by: NURSE PRACTITIONER

## 2022-05-12 PROCEDURE — 99999 PR PBB SHADOW E&M-EST. PATIENT-LVL V: CPT | Mod: PBBFAC,,, | Performed by: NURSE PRACTITIONER

## 2022-05-12 PROCEDURE — 3078F PR MOST RECENT DIASTOLIC BLOOD PRESSURE < 80 MM HG: ICD-10-PCS | Mod: CPTII,,, | Performed by: NURSE PRACTITIONER

## 2022-05-12 PROCEDURE — 99214 OFFICE O/P EST MOD 30 MIN: CPT | Mod: S$PBB,,, | Performed by: NURSE PRACTITIONER

## 2022-05-12 PROCEDURE — 99214 PR OFFICE/OUTPT VISIT, EST, LEVL IV, 30-39 MIN: ICD-10-PCS | Mod: S$PBB,,, | Performed by: NURSE PRACTITIONER

## 2022-05-12 PROCEDURE — 99999 PR PBB SHADOW E&M-EST. PATIENT-LVL V: ICD-10-PCS | Mod: PBBFAC,,, | Performed by: NURSE PRACTITIONER

## 2022-05-12 PROCEDURE — 3074F SYST BP LT 130 MM HG: CPT | Mod: CPTII,,, | Performed by: NURSE PRACTITIONER

## 2022-05-12 PROCEDURE — 3074F PR MOST RECENT SYSTOLIC BLOOD PRESSURE < 130 MM HG: ICD-10-PCS | Mod: CPTII,,, | Performed by: NURSE PRACTITIONER

## 2022-05-12 PROCEDURE — 87086 URINE CULTURE/COLONY COUNT: CPT | Performed by: NURSE PRACTITIONER

## 2022-05-12 PROCEDURE — 1159F PR MEDICATION LIST DOCUMENTED IN MEDICAL RECORD: ICD-10-PCS | Mod: CPTII,,, | Performed by: NURSE PRACTITIONER

## 2022-05-12 PROCEDURE — 3078F DIAST BP <80 MM HG: CPT | Mod: CPTII,,, | Performed by: NURSE PRACTITIONER

## 2022-05-12 RX ORDER — HYDROXYZINE HYDROCHLORIDE 25 MG/1
25 TABLET, FILM COATED ORAL 3 TIMES DAILY
Qty: 42 TABLET | Refills: 0 | Status: SHIPPED | OUTPATIENT
Start: 2022-05-12 | End: 2022-05-26

## 2022-05-12 RX ORDER — LEFLUNOMIDE 20 MG/1
20 TABLET ORAL DAILY
COMMUNITY
Start: 2022-03-14 | End: 2022-07-18 | Stop reason: ALTCHOICE

## 2022-05-12 RX ORDER — NITROFURANTOIN 25; 75 MG/1; MG/1
100 CAPSULE ORAL 2 TIMES DAILY
COMMUNITY
Start: 2022-02-19 | End: 2022-05-12 | Stop reason: ALTCHOICE

## 2022-05-12 RX ORDER — GABAPENTIN 300 MG/1
300 CAPSULE ORAL NIGHTLY PRN
COMMUNITY
Start: 2022-03-10

## 2022-05-12 RX ORDER — TRAMADOL HYDROCHLORIDE 50 MG/1
50 TABLET ORAL DAILY PRN
Status: ON HOLD | COMMUNITY
Start: 2022-04-13 | End: 2023-10-18 | Stop reason: HOSPADM

## 2022-05-12 NOTE — PROGRESS NOTES
Subjective:       Patient ID: Arpita Bradshaw is a 52 y.o. female.    Chief Complaint: Urinary Tract Infection (Follow-up)    Patient is here today for a follow-up for UTI. She reports improvement with her urinary frequency and urgency, but she still reports bladder pain, spasms, pressure, and dysuria. Patient would like to move forward with scheduling surgery at this time for management of her IC flare-up. Ct results also discussed with patient to day in-clinic.    Urinary Tract Infection   This is a new problem. Episode onset: 2 weeks ago. The problem has been gradually improving. The quality of the pain is described as burning. The pain is at a severity of 4/10. The pain is mild. There has been no fever. She is not sexually active. There is no history of pyelonephritis. Pertinent negatives include no behavior changes, chills, discharge, flank pain, frequency, hematuria, hesitancy, nausea, possible pregnancy, sweats, urgency, vomiting, weight loss, bubble bath use, constipation, rash or withholding. She has tried antibiotics for the symptoms. The treatment provided moderate relief. Her past medical history is significant for recurrent UTIs and a urological procedure. There is no history of diabetes mellitus, hypertension, kidney stones or a single kidney.     Review of Systems   Constitutional: Negative for appetite change, chills, fatigue, fever and weight loss.   Gastrointestinal: Negative for abdominal pain, change in bowel habit, constipation, diarrhea, nausea, vomiting and change in bowel habit.   Genitourinary: Positive for dysuria, nocturia and pelvic pain. Negative for decreased urine volume, difficulty urinating, flank pain, frequency, hematuria, hesitancy, urgency, vaginal bleeding, vaginal discharge and vaginal pain.   Integumentary:  Negative for rash.   Neurological: Negative for dizziness, weakness and headaches.   Psychiatric/Behavioral: Negative.          Objective:      Physical Exam  Vitals  and nursing note reviewed.   Constitutional:       General: She is not in acute distress.     Appearance: She is well-developed. She is obese. She is not ill-appearing.   HENT:      Head: Normocephalic and atraumatic.   Eyes:      Pupils: Pupils are equal, round, and reactive to light.   Cardiovascular:      Rate and Rhythm: Normal rate.   Pulmonary:      Effort: Pulmonary effort is normal. No respiratory distress.   Abdominal:      Palpations: Abdomen is soft.      Tenderness: There is no abdominal tenderness.   Musculoskeletal:         General: Normal range of motion.      Cervical back: Normal range of motion.   Skin:     General: Skin is warm and dry.   Neurological:      Mental Status: She is alert and oriented to person, place, and time.      Coordination: Coordination normal.   Psychiatric:         Mood and Affect: Mood normal.         Behavior: Behavior normal.         Thought Content: Thought content normal.         Judgment: Judgment normal.         Assessment:       Problem List Items Addressed This Visit        Renal/    Dysuria    Relevant Orders    CBC Auto Differential    Basic Metabolic Panel    Urine culture    Urinalysis    Interstitial cystitis (chronic) with hematuria - Primary    Relevant Medications    hydrOXYzine HCL (ATARAX) 25 MG tablet    Other Relevant Orders    CBC Auto Differential    Basic Metabolic Panel    Urine culture    Urinalysis       GI    Pelvic pain in female    Relevant Orders    CBC Auto Differential    Basic Metabolic Panel    Urine culture    Urinalysis      Other Visit Diagnoses     Bladder spasm        Relevant Orders    CBC Auto Differential    Basic Metabolic Panel    Urine culture    Urinalysis    Pre-op testing        Relevant Orders    CBC Auto Differential    Basic Metabolic Panel    Urine culture    Urinalysis    Liver lesion        Relevant Orders    MRI Abdomen W WO Contrast    Abnormal CT of liver        Relevant Orders    MRI Abdomen W WO Contrast           Plan:       Arpita was seen today for gross hematuria - follow up .    Diagnoses and all orders for this visit:    Interstitial cystitis (chronic) with hematuria  -     hydrOXYzine HCL (ATARAX) 25 MG tablet; Take 1 tablet (25 mg total) by mouth 3 (three) times daily. for 14 days  -     CBC Auto Differential; Future  -     Basic Metabolic Panel; Future  -     Urine culture  -     Urinalysis    Pelvic pain in female  -     CBC Auto Differential; Future  -     Basic Metabolic Panel; Future  -     Urine culture  -     Urinalysis    Bladder spasm  -     CBC Auto Differential; Future  -     Basic Metabolic Panel; Future  -     Urine culture  -     Urinalysis    Dysuria  -     CBC Auto Differential; Future  -     Basic Metabolic Panel; Future  -     Urine culture  -     Urinalysis    Pre-op testing  -     CBC Auto Differential; Future  -     Basic Metabolic Panel; Future  -     Urine culture  -     Urinalysis    Liver lesion  -     MRI Abdomen W WO Contrast; Future    Abnormal CT of liver  -     MRI Abdomen W WO Contrast; Future    Other orders  1. Schedule patient for cysto with bladder hydrodistenion  2. Pre-op labs needed. Patient up-to-date with Covid-19 vaccine.  3. Continue to avoid blood thinners use to reduce risk of bleeding during sx.  4. Start trial of hydroxyzine for IC.    Follow-up post-op.    Francie Cazares NP

## 2022-05-12 NOTE — PATIENT INSTRUCTIONS
Schedule patient for cysto with bladder hydrodistenion  Pre-op labs needed. Patient up-to-date with Covid-19 vaccine.  Continue to avoid blood thinners use to reduce risk of bleeding during sx.  Start trial of hydroxyzine for IC.  Follow-up post-op.

## 2022-05-13 ENCOUNTER — TELEPHONE (OUTPATIENT)
Dept: UROLOGY | Facility: CLINIC | Age: 52
End: 2022-05-13
Payer: MEDICAID

## 2022-05-13 DIAGNOSIS — R10.2 PELVIC PAIN IN FEMALE: ICD-10-CM

## 2022-05-13 DIAGNOSIS — N32.89 BLADDER SPASM: ICD-10-CM

## 2022-05-13 DIAGNOSIS — R30.0 DYSURIA: ICD-10-CM

## 2022-05-13 DIAGNOSIS — N30.10 INTERSTITIAL CYSTITIS: ICD-10-CM

## 2022-05-13 DIAGNOSIS — N30.11 INTERSTITIAL CYSTITIS (CHRONIC) WITH HEMATURIA: Primary | ICD-10-CM

## 2022-05-13 LAB
BACTERIA UR CULT: NORMAL
BACTERIA UR CULT: NORMAL

## 2022-05-13 RX ORDER — LIDOCAINE HYDROCHLORIDE 20 MG/ML
JELLY TOPICAL ONCE
Status: CANCELLED | OUTPATIENT
Start: 2022-05-13 | End: 2022-05-13

## 2022-05-13 RX ORDER — CIPROFLOXACIN 2 MG/ML
400 INJECTION, SOLUTION INTRAVENOUS
Status: CANCELLED | OUTPATIENT
Start: 2022-05-13

## 2022-05-13 RX ORDER — SODIUM CHLORIDE 9 MG/ML
INJECTION, SOLUTION INTRAVENOUS CONTINUOUS
Status: CANCELLED | OUTPATIENT
Start: 2022-05-13

## 2022-05-13 NOTE — TELEPHONE ENCOUNTER
Informed pt of MRI compatibility instructions. She verbalized understanding and will call rep if she needs assistance.

## 2022-05-13 NOTE — TELEPHONE ENCOUNTER
----- Message from Francie Cazares NP sent at 5/12/2022  4:26 PM CDT -----  Regarding: RE: MRI  Please inform patient of these instructions. Thanks.   ----- Message -----  From: Eduarda Ca LPN  Sent: 5/6/2022   2:59 PM CDT  To: Francie Cazares NP  Subject: MRI                                              The device is MRI compatible, the pt must have the remote, antenna, and implant fully charged prior also  the pt must turn the remote on MRI mode before the test.  ----- Message -----  From: Francie Cazares NP  Sent: 5/5/2022  12:47 PM CDT  To: Eduarda Ca LPN    Can we call Boni Langford and find out if Ms. Palm's Interstim is MRI compatible please.

## 2022-05-16 ENCOUNTER — PATIENT MESSAGE (OUTPATIENT)
Dept: UROLOGY | Facility: CLINIC | Age: 52
End: 2022-05-16
Payer: MEDICAID

## 2022-05-31 ENCOUNTER — TELEPHONE (OUTPATIENT)
Dept: UROLOGY | Facility: CLINIC | Age: 52
End: 2022-05-31
Payer: MEDICAID

## 2022-05-31 ENCOUNTER — PATIENT MESSAGE (OUTPATIENT)
Dept: UROLOGY | Facility: CLINIC | Age: 52
End: 2022-05-31
Payer: MEDICAID

## 2022-05-31 ENCOUNTER — LAB VISIT (OUTPATIENT)
Dept: LAB | Facility: HOSPITAL | Age: 52
End: 2022-05-31
Attending: UROLOGY
Payer: MEDICAID

## 2022-05-31 DIAGNOSIS — N30.11 INTERSTITIAL CYSTITIS (CHRONIC) WITH HEMATURIA: ICD-10-CM

## 2022-05-31 DIAGNOSIS — N30.11 INTERSTITIAL CYSTITIS (CHRONIC) WITH HEMATURIA: Primary | ICD-10-CM

## 2022-05-31 LAB
BACTERIA #/AREA URNS AUTO: ABNORMAL /HPF
BILIRUB UR QL STRIP: NEGATIVE
CLARITY UR REFRACT.AUTO: ABNORMAL
COLOR UR AUTO: YELLOW
GLUCOSE UR QL STRIP: NEGATIVE
HGB UR QL STRIP: ABNORMAL
HYALINE CASTS UR QL AUTO: 2 /LPF
KETONES UR QL STRIP: NEGATIVE
LEUKOCYTE ESTERASE UR QL STRIP: ABNORMAL
MICROSCOPIC COMMENT: ABNORMAL
NITRITE UR QL STRIP: NEGATIVE
PH UR STRIP: 5 [PH] (ref 5–8)
PROT UR QL STRIP: NEGATIVE
RBC #/AREA URNS AUTO: 96 /HPF (ref 0–4)
SP GR UR STRIP: 1.02 (ref 1–1.03)
SQUAMOUS #/AREA URNS AUTO: 8 /HPF
URN SPEC COLLECT METH UR: ABNORMAL
WBC #/AREA URNS AUTO: 39 /HPF (ref 0–5)

## 2022-05-31 PROCEDURE — 81001 URINALYSIS AUTO W/SCOPE: CPT | Performed by: UROLOGY

## 2022-05-31 PROCEDURE — 87086 URINE CULTURE/COLONY COUNT: CPT | Performed by: UROLOGY

## 2022-06-01 LAB
BACTERIA UR CULT: NORMAL
BACTERIA UR CULT: NORMAL

## 2022-06-01 NOTE — H&P
Subjective:       Patient ID: Arpita Bradshaw is a 52 y.o. female.     Chief Complaint: Urinary Tract Infection (Follow-up)     Patient is here today for a follow-up for UTI. She reports improvement with her urinary frequency and urgency, but she still reports bladder pain, spasms, pressure, and dysuria. Patient would like to move forward with scheduling surgery at this time for management of her IC flare-up. Ct results also discussed with patient to day in-clinic.     Urinary Tract Infection   This is a new problem. Episode onset: 2 weeks ago. The problem has been gradually improving. The quality of the pain is described as burning. The pain is at a severity of 4/10. The pain is mild. There has been no fever. She is not sexually active. There is no history of pyelonephritis. Pertinent negatives include no behavior changes, chills, discharge, flank pain, frequency, hematuria, hesitancy, nausea, possible pregnancy, sweats, urgency, vomiting, weight loss, bubble bath use, constipation, rash or withholding. She has tried antibiotics for the symptoms. The treatment provided moderate relief. Her past medical history is significant for recurrent UTIs and a urological procedure. There is no history of diabetes mellitus, hypertension, kidney stones or a single kidney.      Review of Systems   Constitutional: Negative for appetite change, chills, fatigue, fever and weight loss.   Gastrointestinal: Negative for abdominal pain, change in bowel habit, constipation, diarrhea, nausea, vomiting and change in bowel habit.   Genitourinary: Positive for dysuria, nocturia and pelvic pain. Negative for decreased urine volume, difficulty urinating, flank pain, frequency, hematuria, hesitancy, urgency, vaginal bleeding, vaginal discharge and vaginal pain.   Integumentary:  Negative for rash.   Neurological: Negative for dizziness, weakness and headaches.   Psychiatric/Behavioral: Negative.           Objective:   Physical Exam  Vitals  and nursing note reviewed.   Constitutional:       General: She is not in acute distress.     Appearance: She is well-developed. She is obese. She is not ill-appearing.   HENT:      Head: Normocephalic and atraumatic.   Eyes:      Pupils: Pupils are equal, round, and reactive to light.   Cardiovascular:      Rate and Rhythm: Normal rate.   Pulmonary:      Effort: Pulmonary effort is normal. No respiratory distress.   Abdominal:      Palpations: Abdomen is soft.      Tenderness: There is no abdominal tenderness.   Musculoskeletal:         General: Normal range of motion.      Cervical back: Normal range of motion.   Skin:     General: Skin is warm and dry.   Neurological:      Mental Status: She is alert and oriented to person, place, and time.      Coordination: Coordination normal.   Psychiatric:         Mood and Affect: Mood normal.         Behavior: Behavior normal.         Thought Content: Thought content normal.         Judgment: Judgment normal.          Assessment:            Problem List Items Addressed This Visit                 Renal/      Dysuria      Relevant Orders      CBC Auto Differential      Basic Metabolic Panel      Urine culture      Urinalysis      Interstitial cystitis (chronic) with hematuria - Primary      Relevant Medications      hydrOXYzine HCL (ATARAX) 25 MG tablet      Other Relevant Orders      CBC Auto Differential      Basic Metabolic Panel      Urine culture      Urinalysis            GI      Pelvic pain in female      Relevant Orders      CBC Auto Differential      Basic Metabolic Panel      Urine culture      Urinalysis                Other Visit Diagnoses      Bladder spasm         Relevant Orders     CBC Auto Differential     Basic Metabolic Panel     Urine culture     Urinalysis     Pre-op testing         Relevant Orders     CBC Auto Differential     Basic Metabolic Panel     Urine culture     Urinalysis     Liver lesion         Relevant Orders     MRI Abdomen W WO Contrast      Abnormal CT of liver         Relevant Orders     MRI Abdomen W WO Contrast           Plan:       Arpita was seen today for gross hematuria - follow up .     Diagnoses and all orders for this visit:     Interstitial cystitis (chronic) with hematuria  -     hydrOXYzine HCL (ATARAX) 25 MG tablet; Take 1 tablet (25 mg total) by mouth 3 (three) times daily. for 14 days  -     CBC Auto Differential; Future  -     Basic Metabolic Panel; Future  -     Urine culture  -     Urinalysis     Pelvic pain in female  -     CBC Auto Differential; Future  -     Basic Metabolic Panel; Future  -     Urine culture  -     Urinalysis     Bladder spasm  -     CBC Auto Differential; Future  -     Basic Metabolic Panel; Future  -     Urine culture  -     Urinalysis     Dysuria  -     CBC Auto Differential; Future  -     Basic Metabolic Panel; Future  -     Urine culture  -     Urinalysis     Pre-op testing  -     CBC Auto Differential; Future  -     Basic Metabolic Panel; Future  -     Urine culture  -     Urinalysis     Liver lesion  -     MRI Abdomen W WO Contrast; Future     Abnormal CT of liver  -     MRI Abdomen W WO Contrast; Future     Other orders  1. Schedule patient for cysto with bladder hydrodistenion  2. Pre-op labs needed. Patient up-to-date with Covid-19 vaccine.  3. Continue to avoid blood thinners use to reduce risk of bleeding during sx.  4. Start trial of hydroxyzine for IC.     Follow-up post-op.

## 2022-06-02 ENCOUNTER — PATIENT MESSAGE (OUTPATIENT)
Dept: UROLOGY | Facility: CLINIC | Age: 52
End: 2022-06-02
Payer: MEDICAID

## 2022-07-04 ENCOUNTER — HOSPITAL ENCOUNTER (EMERGENCY)
Facility: HOSPITAL | Age: 52
Discharge: HOME | End: 2022-07-04
Attending: EMERGENCY MEDICINE
Payer: MEDICAID

## 2022-07-04 VITALS
OXYGEN SATURATION: 100 % | WEIGHT: 225 LBS | RESPIRATION RATE: 18 BRPM | DIASTOLIC BLOOD PRESSURE: 75 MMHG | BODY MASS INDEX: 34.1 KG/M2 | HEART RATE: 61 BPM | TEMPERATURE: 98 F | SYSTOLIC BLOOD PRESSURE: 158 MMHG | HEIGHT: 68 IN

## 2022-07-04 DIAGNOSIS — U07.1 COVID-19 VIRUS INFECTION: Primary | ICD-10-CM

## 2022-07-04 PROCEDURE — 99282 EMERGENCY DEPT VISIT SF MDM: CPT | Mod: ER

## 2022-07-04 NOTE — ED PROVIDER NOTES
Encounter Date: 2022    SCRIBE #1 NOTE: I, Amy Shore, am scribing for, and in the presence of,  Nyasia Negrete MD. I have scribed the following portions of the note - Other sections scribed: HPI; ROS; PE.       History     Chief Complaint   Patient presents with    covid     Pt has a positive covid test     Arpita Bradshaw is a 52 y.o. female with Hx of HTN, Lupus, and Fibromyalgia who presents to the ED for chief complaint of fever, body aches, headache, and sore throat onset 1 day ago. Her  has covid.  Patient reports she tested positive with an at home COVID test today and wants to know what treatment is available to her. She states her COVID vaccinations are UTD. Patient attempted treatment with Tylenol. She states she takes Plaquenil daily. She is allergic to Bactrim. No furhter complaints at this time.       The history is provided by the patient. No  was used.     Review of patient's allergies indicates:   Allergen Reactions    Bactrim [sulfamethoxazole-trimethoprim] Rash     Past Medical History:   Diagnosis Date    Disorder of kidney and ureter     Fibromyalgia     Hypertension     IC (interstitial cystitis)     Lupus     STD (sexually transmitted disease)     Urinary tract infection     Vaginal infection      Past Surgical History:   Procedure Laterality Date    BLADDER FULGURATION N/A 2019    Procedure: FULGURATION, BLADDER;  Surgeon: Harris Lua MD;  Location: [facility] OR;  Service: Urology;  Laterality: N/A;    BLADDER FULGURATION N/A 2019    Procedure: FULGURATION, BLADDER;  Surgeon: Harris Lua MD;  Location: [facility] OR;  Service: Urology;  Laterality: N/A;    BLADDER FULGURATION N/A 3/1/2021    Procedure: FULGURATION, BLADDER;  Surgeon: Harris Lua MD;  Location: [facility] OR;  Service: Urology;  Laterality: N/A;    BREAST REDUCTION       SECTION      COLONOSCOPY N/A 2018    Procedure: COLONOSCOPY;  Surgeon: Catrachita ZARCO  MD Anh;  Location: [facility] at [facility] END[facility];  Service: Endoscopy;  Laterality: N/A;    CYSTOSCOPY N/A 7/23/2018    Procedure: CYSTOSCOPY;  Surgeon: Harris Lua MD;  Location: [facility] at [facility] OR;  Service: Urology;  Laterality: N/A;  200 of botox    CYSTOSCOPY W/ RETROGRADES Bilateral 6/6/2019    Procedure: CYSTOSCOPY, WITH RETROGRADE PYELOGRAM;  Surgeon: Harris Lua MD;  Location: [facility] OR;  Service: Urology;  Laterality: Bilateral;    CYSTOSCOPY WITH HYDRODISTENSION OF BLADDER N/A 10/12/2020    Procedure: CYSTOSCOPY, WITH BLADDER HYDRODISTENSION;  Surgeon: Harris Lua MD;  Location: [facility] OR;  Service: Urology;  Laterality: N/A;    CYSTOSCOPY WITH HYDRODISTENSION OF BLADDER N/A 12/15/2021    Procedure: CYSTOSCOPY, WITH BLADDER HYDRODISTENSION;  Surgeon: Harris Lua MD;  Location: [facility] beds [facility] OR;  Service: Urology;  Laterality: N/A;    CYSTOURETHROSCOPY  12/4/2019    Procedure: CYSTOURETHROSCOPY;  Surgeon: Harris Lua MD;  Location: [facility] beds at [facility] OR;  Service: Urology;;    CYSTOURETHROSCOPY N/A 3/1/2021    Procedure: CYSTOURETHROSCOPY;  Surgeon: Harris Lua MD;  Location: [facility] beds at [facility] OR;  Service: Urology;  Laterality: N/A;    hemmorroidectomy      HYSTERECTOMY  2008    WILLIAM for endometriosis    IMPLANTATION OF PERMANENT SACRAL NERVE STIMULATOR Right 4/28/2021    Procedure: INSERTION, NEUROSTIMULATOR, PERMANENT, SACRAL;  Surgeon: Harris Lua MD;  Location: [facility] beds at [facility] OR;  Service: Urology;  Laterality: Right;    INJECTION OF BOTULINUM TOXIN TYPE A N/A 7/23/2018    Procedure: INJECTION, BOTULINUM TOXIN, TYPE A;  Surgeon: Harris Lua MD;  Location: [facility] beds at [facility] OR;  Service: Urology;  Laterality: N/A;    neurostimulator for bladder      REMOVAL OF SACRAL NEUROSTIMULATOR DEVICE Left 4/28/2021    Procedure: REMOVAL, NEUROSTIMULATOR, SACRAL;  Surgeon: Harris Lua MD;  Location: [facility] beds [facility] OR;  Service: Urology;  Laterality: Left;    TOTAL REDUCTION MAMMOPLASTY      URETHRAL CATHETERIZATION N/A 12/4/2019    Procedure:  CATHETERIZATION, URETHRA;  Surgeon: Harris Lua MD;  Location: [facility];  Service: Urology;  Laterality: N/A;     Family History   Problem Relation Age of Onset    Hypertension Mother     Cancer Mother         KIDNEY    Arthritis Mother     Breast cancer Mother     Kidney disease Neg Hx      Social History     Tobacco Use    Smoking status: Never Smoker    Smokeless tobacco: Never Used   Substance Use Topics    Alcohol use: No    Drug use: No     Review of Systems   Constitutional: Positive for fever. Negative for activity change, appetite change and chills.   HENT: Positive for sore throat. Negative for congestion, rhinorrhea and sneezing.    Respiratory: Negative for cough, choking, shortness of breath and wheezing.    Cardiovascular: Negative for chest pain and palpitations.   Gastrointestinal: Negative for abdominal pain, diarrhea, nausea and vomiting.   Musculoskeletal: Positive for arthralgias and myalgias.   Neurological: Positive for headaches. Negative for dizziness, syncope and light-headedness.   All other systems reviewed and are negative.      Physical Exam     Initial Vitals [07/04/22 1321]   BP Pulse Resp Temp SpO2   (!) 170/70 74 14 98.1 °F (36.7 °C) 100 %      MAP       --         Physical Exam    Nursing note and vitals reviewed.  Constitutional: Vital signs are normal. She appears well-developed and well-nourished. She is cooperative. She does not appear ill. No distress.   HENT:   Head: Normocephalic and atraumatic.   Right Ear: Tympanic membrane normal.   Left Ear: Tympanic membrane normal.   Mouth/Throat: Uvula is midline and mucous membranes are normal.   Eyes: Conjunctivae and lids are normal.   Neck: Neck supple.   Normal range of motion.  Cardiovascular: Normal rate, regular rhythm, S1 normal, S2 normal and normal heart sounds.   No murmur heard.  Pulmonary/Chest: Effort normal and breath sounds normal. No respiratory distress. She has no wheezes.   Abdominal: Abdomen is soft.  Bowel sounds are normal. She exhibits no distension. There is no abdominal tenderness.   Musculoskeletal:         General: Normal range of motion.      Cervical back: Normal range of motion and neck supple.     Neurological: She is alert and oriented to person, place, and time.   Skin: Skin is warm, dry and intact.   Psychiatric: She has a normal mood and affect. Her speech is normal and behavior is normal.         ED Course   Procedures  Labs Reviewed - No data to display       Imaging Results    None          Medications - No data to display  Medical Decision Making:   ED Management:  Flulike symptoms with +covid.  Not a paxlovid candidate due to immunosupressant medication for lupus.  Will refer for AB infusion.  Symptomatic care at home.          Scribe Attestation:   Scribe #1: I performed the above scribed service and the documentation accurately describes the services I performed. I attest to the accuracy of the note.               I, Dr. Nyasia Negrete, personally performed the services described in this documentation.   All medical record entries made by the scribe were at my direction and in my presence.   I have reviewed the chart and agree that the record is accurate and complete.   Nyasia Negrete MD.  2:47 PM 07/04/2022     Clinical Impression:   Final diagnoses:  [U07.1] COVID-19 virus infection (Primary)          ED Disposition Condition    Discharge Stable        ED Prescriptions     None        Follow-up Information     Follow up With Specialties Details Why Contact Info    Kirk Servin III, MD Internal Medicine  As needed [address]  [facility] [address]  [phone]             Nyasia Negrete MD  07/04/22 7554

## 2022-07-04 NOTE — DISCHARGE INSTRUCTIONS
You must quarantine for the first 5 days, then you must wear a mask at all times outside your house for the next 5 days.  Return to work based on your work guidelines.  If you have worsening symptoms, return to the ER.      Rest.  Drink plenty of clear fluids. Take ibuprofen 600mg every 6 hours and tylenol 650mg every 6 hours as needed for fever, pain or headache.  Take zyrtec, allegra, or claritin for runny nose, cough, or postnasal drip.  Take sudafed or use afrin for nasal, sinus or ear congestion.  Take robitussin DM for cough.  Take imodium for diarrhea.

## 2022-07-05 ENCOUNTER — TELEPHONE (OUTPATIENT)
Dept: INFECTIOUS DISEASES | Facility: HOSPITAL | Age: 52
End: 2022-07-05
Payer: MEDICAID

## 2022-07-05 DIAGNOSIS — U07.1 COVID-19: Primary | ICD-10-CM

## 2022-07-05 NOTE — TELEPHONE ENCOUNTER
In f/u with Arpita Bradshaw, patient reports that symptoms remain the same, with no improvements. When asked if patient would like to receive treatment,  patient stated was interested in treatment.     Per [facility] policy, this patient was triaged for conversion to the appropriate treatment (as outlined in the policy and standing order). This patient was evaluated for drug interactions based on the current medication list, and contraindications to Paxlovid were present. Patient reports that they could not stop taking their medications, amlodipine, tamsulosin, estradiol, restasis. At this time, patient is not taking tramadol. Due to risk score =<3 , patient ineligible for second line treatment, however patient met criteria for conversion to the third line treatment, molnupiravir (under standing orders) and was offered a prescription for molnupiravir (dosed appropriately by eGFR). Patient states they are not expecting or currently pregnant, nor do they report any new medication allergies, or kidney or liver impairment. Patient was offered prescription, patient accepted. Medication was sent to local [facility].

## 2022-07-11 ENCOUNTER — OFFICE VISIT (OUTPATIENT)
Dept: UROLOGY | Facility: CLINIC | Age: 52
End: 2022-07-11
Payer: MEDICAID

## 2022-07-11 VITALS
TEMPERATURE: 98 F | SYSTOLIC BLOOD PRESSURE: 134 MMHG | DIASTOLIC BLOOD PRESSURE: 80 MMHG | RESPIRATION RATE: 14 BRPM | BODY MASS INDEX: 34.07 KG/M2 | WEIGHT: 224.81 LBS | HEART RATE: 62 BPM | HEIGHT: 68 IN

## 2022-07-11 DIAGNOSIS — R10.2 PELVIC PAIN IN FEMALE: ICD-10-CM

## 2022-07-11 DIAGNOSIS — R39.15 URINARY URGENCY: ICD-10-CM

## 2022-07-11 DIAGNOSIS — N30.10 INTERSTITIAL CYSTITIS: Primary | ICD-10-CM

## 2022-07-11 DIAGNOSIS — R35.1 NOCTURIA: ICD-10-CM

## 2022-07-11 LAB
BILIRUB UR QL STRIP: NEGATIVE
CLARITY UR REFRACT.AUTO: CLEAR
COLOR UR AUTO: YELLOW
GLUCOSE UR QL STRIP: NEGATIVE
HGB UR QL STRIP: ABNORMAL
KETONES UR QL STRIP: NEGATIVE
LEUKOCYTE ESTERASE UR QL STRIP: NEGATIVE
MICROSCOPIC COMMENT: ABNORMAL
NITRITE UR QL STRIP: NEGATIVE
PH UR STRIP: 6 [PH] (ref 5–8)
PROT UR QL STRIP: NEGATIVE
RBC #/AREA URNS AUTO: >100 /HPF (ref 0–4)
SP GR UR STRIP: 1.02 (ref 1–1.03)
SQUAMOUS #/AREA URNS AUTO: 0 /HPF
URN SPEC COLLECT METH UR: ABNORMAL
WBC #/AREA URNS AUTO: 8 /HPF (ref 0–5)

## 2022-07-11 PROCEDURE — 1159F PR MEDICATION LIST DOCUMENTED IN MEDICAL RECORD: ICD-10-PCS | Mod: CPTII,,, | Performed by: UROLOGY

## 2022-07-11 PROCEDURE — 3079F PR MOST RECENT DIASTOLIC BLOOD PRESSURE 80-89 MM HG: ICD-10-PCS | Mod: CPTII,,, | Performed by: UROLOGY

## 2022-07-11 PROCEDURE — 1160F PR REVIEW ALL MEDS BY PRESCRIBER/CLIN PHARMACIST DOCUMENTED: ICD-10-PCS | Mod: CPTII,,, | Performed by: UROLOGY

## 2022-07-11 PROCEDURE — 87086 URINE CULTURE/COLONY COUNT: CPT | Performed by: UROLOGY

## 2022-07-11 PROCEDURE — 3075F SYST BP GE 130 - 139MM HG: CPT | Mod: CPTII,,, | Performed by: UROLOGY

## 2022-07-11 PROCEDURE — 99999 PR PBB SHADOW E&M-EST. PATIENT-LVL IV: CPT | Mod: PBBFAC,,, | Performed by: UROLOGY

## 2022-07-11 PROCEDURE — 99215 OFFICE O/P EST HI 40 MIN: CPT | Mod: S$PBB,,, | Performed by: UROLOGY

## 2022-07-11 PROCEDURE — 3075F PR MOST RECENT SYSTOLIC BLOOD PRESS GE 130-139MM HG: ICD-10-PCS | Mod: CPTII,,, | Performed by: UROLOGY

## 2022-07-11 PROCEDURE — 1159F MED LIST DOCD IN RCRD: CPT | Mod: CPTII,,, | Performed by: UROLOGY

## 2022-07-11 PROCEDURE — 99999 PR PBB SHADOW E&M-EST. PATIENT-LVL IV: ICD-10-PCS | Mod: PBBFAC,,, | Performed by: UROLOGY

## 2022-07-11 PROCEDURE — 81001 URINALYSIS AUTO W/SCOPE: CPT | Performed by: UROLOGY

## 2022-07-11 PROCEDURE — 99215 PR OFFICE/OUTPT VISIT, EST, LEVL V, 40-54 MIN: ICD-10-PCS | Mod: S$PBB,,, | Performed by: UROLOGY

## 2022-07-11 PROCEDURE — 3008F BODY MASS INDEX DOCD: CPT | Mod: CPTII,,, | Performed by: UROLOGY

## 2022-07-11 PROCEDURE — 99214 OFFICE O/P EST MOD 30 MIN: CPT | Mod: PBBFAC,PO | Performed by: UROLOGY

## 2022-07-11 PROCEDURE — 3008F PR BODY MASS INDEX (BMI) DOCUMENTED: ICD-10-PCS | Mod: CPTII,,, | Performed by: UROLOGY

## 2022-07-11 PROCEDURE — 3079F DIAST BP 80-89 MM HG: CPT | Mod: CPTII,,, | Performed by: UROLOGY

## 2022-07-11 PROCEDURE — 1160F RVW MEDS BY RX/DR IN RCRD: CPT | Mod: CPTII,,, | Performed by: UROLOGY

## 2022-07-11 RX ORDER — SODIUM CHLORIDE 9 MG/ML
INJECTION, SOLUTION INTRAVENOUS CONTINUOUS
Status: CANCELLED | OUTPATIENT
Start: 2022-07-11

## 2022-07-11 RX ORDER — CIPROFLOXACIN 2 MG/ML
400 INJECTION, SOLUTION INTRAVENOUS
Status: CANCELLED | OUTPATIENT
Start: 2022-07-11

## 2022-07-11 RX ORDER — ERGOCALCIFEROL 1.25 MG/1
50000 CAPSULE ORAL
COMMUNITY
Start: 2022-07-05

## 2022-07-11 RX ORDER — METFORMIN HYDROCHLORIDE 500 MG/1
500 TABLET ORAL DAILY
COMMUNITY
Start: 2022-05-12 | End: 2022-09-06

## 2022-07-11 NOTE — PATIENT INSTRUCTIONS
Plan in and out catheter in office today for urinalysis and urine culture.  Reschedule cystourethroscopy bladder hydrodistention on 07/25/2022 at [facility].

## 2022-07-12 LAB — BACTERIA UR CULT: NO GROWTH

## 2022-07-18 DIAGNOSIS — N39.0 URINARY TRACT INFECTION WITH HEMATURIA, SITE UNSPECIFIED: Primary | ICD-10-CM

## 2022-07-18 DIAGNOSIS — R31.9 URINARY TRACT INFECTION WITH HEMATURIA, SITE UNSPECIFIED: Primary | ICD-10-CM

## 2022-07-21 ENCOUNTER — LAB VISIT (OUTPATIENT)
Dept: LAB | Facility: HOSPITAL | Age: 52
End: 2022-07-21
Attending: UROLOGY
Payer: MEDICAID

## 2022-07-21 DIAGNOSIS — R39.15 URINARY URGENCY: ICD-10-CM

## 2022-07-21 DIAGNOSIS — R31.9 URINARY TRACT INFECTION WITH HEMATURIA, SITE UNSPECIFIED: ICD-10-CM

## 2022-07-21 DIAGNOSIS — R35.1 NOCTURIA: ICD-10-CM

## 2022-07-21 DIAGNOSIS — N39.0 URINARY TRACT INFECTION WITH HEMATURIA, SITE UNSPECIFIED: ICD-10-CM

## 2022-07-21 DIAGNOSIS — N30.10 INTERSTITIAL CYSTITIS: ICD-10-CM

## 2022-07-21 DIAGNOSIS — R10.2 PELVIC PAIN IN FEMALE: ICD-10-CM

## 2022-07-21 LAB
BACTERIA #/AREA URNS AUTO: ABNORMAL /HPF
BILIRUB UR QL STRIP: NEGATIVE
CLARITY UR REFRACT.AUTO: ABNORMAL
COLOR UR AUTO: YELLOW
GLUCOSE UR QL STRIP: NEGATIVE
HGB UR QL STRIP: ABNORMAL
KETONES UR QL STRIP: NEGATIVE
LEUKOCYTE ESTERASE UR QL STRIP: ABNORMAL
MICROSCOPIC COMMENT: ABNORMAL
NITRITE UR QL STRIP: NEGATIVE
PH UR STRIP: 6 [PH] (ref 5–8)
PROT UR QL STRIP: NEGATIVE
RBC #/AREA URNS AUTO: >100 /HPF (ref 0–4)
SP GR UR STRIP: 1.01 (ref 1–1.03)
SQUAMOUS #/AREA URNS AUTO: 5 /HPF
URN SPEC COLLECT METH UR: ABNORMAL
WBC #/AREA URNS AUTO: 66 /HPF (ref 0–5)

## 2022-07-21 PROCEDURE — 81001 URINALYSIS AUTO W/SCOPE: CPT | Performed by: UROLOGY

## 2022-07-21 PROCEDURE — 87086 URINE CULTURE/COLONY COUNT: CPT | Performed by: UROLOGY

## 2022-07-23 LAB
BACTERIA UR CULT: NORMAL
BACTERIA UR CULT: NORMAL

## 2022-08-30 ENCOUNTER — PATIENT MESSAGE (OUTPATIENT)
Dept: UROLOGY | Facility: CLINIC | Age: 52
End: 2022-08-30
Payer: MEDICAID

## 2022-08-30 ENCOUNTER — LAB VISIT (OUTPATIENT)
Dept: LAB | Facility: HOSPITAL | Age: 52
End: 2022-08-30
Attending: NURSE PRACTITIONER
Payer: MEDICAID

## 2022-08-30 ENCOUNTER — TELEPHONE (OUTPATIENT)
Dept: UROLOGY | Facility: CLINIC | Age: 52
End: 2022-08-30
Payer: MEDICAID

## 2022-08-30 DIAGNOSIS — R31.9 URINARY TRACT INFECTION WITH HEMATURIA, SITE UNSPECIFIED: Primary | ICD-10-CM

## 2022-08-30 DIAGNOSIS — N39.0 URINARY TRACT INFECTION WITH HEMATURIA, SITE UNSPECIFIED: Primary | ICD-10-CM

## 2022-08-30 DIAGNOSIS — N39.0 URINARY TRACT INFECTION WITH HEMATURIA, SITE UNSPECIFIED: ICD-10-CM

## 2022-08-30 DIAGNOSIS — R31.9 URINARY TRACT INFECTION WITH HEMATURIA, SITE UNSPECIFIED: ICD-10-CM

## 2022-08-30 LAB
BACTERIA #/AREA URNS AUTO: ABNORMAL /HPF
BILIRUB UR QL STRIP: NEGATIVE
CLARITY UR REFRACT.AUTO: ABNORMAL
COLOR UR AUTO: YELLOW
GLUCOSE UR QL STRIP: NEGATIVE
HGB UR QL STRIP: ABNORMAL
HYALINE CASTS UR QL AUTO: 0 /LPF
KETONES UR QL STRIP: NEGATIVE
LEUKOCYTE ESTERASE UR QL STRIP: ABNORMAL
MICROSCOPIC COMMENT: ABNORMAL
NITRITE UR QL STRIP: POSITIVE
PH UR STRIP: 7 [PH] (ref 5–8)
PROT UR QL STRIP: ABNORMAL
RBC #/AREA URNS AUTO: >100 /HPF (ref 0–4)
SP GR UR STRIP: 1.02 (ref 1–1.03)
SQUAMOUS #/AREA URNS AUTO: 1 /HPF
URN SPEC COLLECT METH UR: ABNORMAL
WBC #/AREA URNS AUTO: >100 /HPF (ref 0–5)
WBC CLUMPS UR QL AUTO: ABNORMAL

## 2022-08-30 PROCEDURE — 81001 URINALYSIS AUTO W/SCOPE: CPT | Performed by: NURSE PRACTITIONER

## 2022-08-30 PROCEDURE — 87077 CULTURE AEROBIC IDENTIFY: CPT | Performed by: NURSE PRACTITIONER

## 2022-08-30 PROCEDURE — 87088 URINE BACTERIA CULTURE: CPT | Performed by: NURSE PRACTITIONER

## 2022-08-30 PROCEDURE — 87186 SC STD MICRODIL/AGAR DIL: CPT | Performed by: NURSE PRACTITIONER

## 2022-08-30 PROCEDURE — 87086 URINE CULTURE/COLONY COUNT: CPT | Performed by: NURSE PRACTITIONER

## 2022-08-31 ENCOUNTER — PATIENT MESSAGE (OUTPATIENT)
Dept: UROLOGY | Facility: CLINIC | Age: 52
End: 2022-08-31
Payer: MEDICAID

## 2022-09-02 ENCOUNTER — TELEPHONE (OUTPATIENT)
Dept: UROLOGY | Facility: CLINIC | Age: 52
End: 2022-09-02
Payer: MEDICAID

## 2022-09-02 DIAGNOSIS — N30.01 ACUTE CYSTITIS WITH HEMATURIA: Primary | ICD-10-CM

## 2022-09-02 DIAGNOSIS — R31.0 GROSS HEMATURIA: ICD-10-CM

## 2022-09-02 DIAGNOSIS — R10.9 RIGHT FLANK PAIN: ICD-10-CM

## 2022-09-02 DIAGNOSIS — R30.0 DYSURIA: ICD-10-CM

## 2022-09-02 LAB — BACTERIA UR CULT: ABNORMAL

## 2022-09-02 RX ORDER — CIPROFLOXACIN 500 MG/1
500 TABLET ORAL EVERY 12 HOURS
Qty: 28 TABLET | Refills: 0 | Status: SHIPPED | OUTPATIENT
Start: 2022-09-02 | End: 2022-09-08 | Stop reason: ALTCHOICE

## 2022-09-02 RX ORDER — FLUCONAZOLE 150 MG/1
150 TABLET ORAL DAILY
Qty: 1 TABLET | Refills: 0 | Status: SHIPPED | OUTPATIENT
Start: 2022-09-02 | End: 2022-09-03

## 2022-09-02 NOTE — TELEPHONE ENCOUNTER
Telephone call placed to patient regarding positive urine cx results and plan of care.    Diagnoses and all orders for this visit:    Acute cystitis with hematuria  -     ciprofloxacin HCl (CIPRO) 500 MG tablet; Take 1 tablet (500 mg total) by mouth every 12 (twelve) hours. for 14 days  -     fluconazole (DIFLUCAN) 150 MG Tab; Take 1 tablet (150 mg total) by mouth once daily. for 1 day  -     Urine culture; Future  -     Urinalysis; Future  -     methen-m.blue-s.phos-phsal-hyo (URIBEL) 118-10-40.8-36 mg Cap; Take 1 capsule by mouth every 6 (six) hours as needed (bladder pain).    Dysuria  -     methen-m.blue-s.phos-phsal-hyo (URIBEL) 118-10-40.8-36 mg Cap; Take 1 capsule by mouth every 6 (six) hours as needed (bladder pain).    Gross hematuria    Right flank pain    Other order  Schedule patient for in-clinic cystoscopy for evaluation of gross hematuria with hx of bladder tumor.    NOTE: Patient reports gross hematuria with blood clots. Onset 4 days ago.    Repeat u/a and urine cx after completion of antibiotic therapy.    Fracnie Cazares NP

## 2022-09-05 NOTE — PROGRESS NOTES
HISTORY OF PRESENT ILLNESS:    Arpita Bradshaw is a 52 y.o. female, , No LMP recorded. Patient has had a hysterectomy.,  presents for a problem visit, complaining of menopausal symptoms.     visit:  C/o hot flashes, night sweats, mood swings - symptoms getting worse.  Started estradiol patch    Has been on estradiol patch x 8 months.    No improvement in any of her menopausal symptoms.  Tried 2 patches and black cohosh and that didn't help either        Has ongoing bladder issues followed by urologist.  S/p hydrodistention - helps for about 2 weeks  Currently on cipro for a UTI  Gets a UTI every time she has intercourse.  Has taken prophylactic antibiotics in the past.  Wasn't sexually active for a period of time, but started back recently.       Was taking valtrex 1000 daily for prevention.  has had more outbreaks.  Prior HSV 2 IgG positive    Past Medical History:   Diagnosis Date    Disorder of kidney and ureter     Fibromyalgia     Hypertension     IC (interstitial cystitis)     Lupus     STD (sexually transmitted disease)     Urinary tract infection     Vaginal infection        Past Surgical History:   Procedure Laterality Date    BLADDER FULGURATION N/A 2019    Procedure: FULGURATION, BLADDER;  Surgeon: Harris Lua MD;  Location: [facility] OR;  Service: Urology;  Laterality: N/A;    BLADDER FULGURATION N/A 2019    Procedure: FULGURATION, BLADDER;  Surgeon: Harris Lua MD;  Location: [facility] OR;  Service: Urology;  Laterality: N/A;    BLADDER FULGURATION N/A 3/1/2021    Procedure: FULGURATION, BLADDER;  Surgeon: Harris Lua MD;  Location: [facility] OR;  Service: Urology;  Laterality: N/A;    BREAST REDUCTION       SECTION      COLONOSCOPY N/A 2018    Procedure: COLONOSCOPY;  Surgeon: Catrachita Kamara MD;  Location: [facility] ENDO;  Service: Endoscopy;  Laterality: N/A;    CYSTOSCOPY N/A 2018    Procedure: CYSTOSCOPY;  Surgeon: Harris Lua MD;  Location: [facility] OR;   Service: Urology;  Laterality: N/A;  200 of botox    CYSTOSCOPY W/ RETROGRADES Bilateral 6/6/2019    Procedure: CYSTOSCOPY, WITH RETROGRADE PYELOGRAM;  Surgeon: Harris Lua MD;  Location: [facility] OR;  Service: Urology;  Laterality: Bilateral;    CYSTOSCOPY WITH HYDRODISTENSION OF BLADDER N/A 10/12/2020    Procedure: CYSTOSCOPY, WITH BLADDER HYDRODISTENSION;  Surgeon: Harris Lua MD;  Location: [facility] OR;  Service: Urology;  Laterality: N/A;    CYSTOSCOPY WITH HYDRODISTENSION OF BLADDER N/A 12/15/2021    Procedure: CYSTOSCOPY, WITH BLADDER HYDRODISTENSION;  Surgeon: Harris Lua MD;  Location: [facility] OR;  Service: Urology;  Laterality: N/A;    CYSTOSCOPY WITH HYDRODISTENSION OF BLADDER N/A 7/25/2022    Procedure: CYSTOSCOPY, WITH BLADDER HYDRODISTENSION;  Surgeon: Harris Lua MD;  Location: [facility] OR;  Service: Urology;  Laterality: N/A;    CYSTOURETHROSCOPY  12/4/2019    Procedure: CYSTOURETHROSCOPY;  Surgeon: Harris Lua MD;  Location: [facility] OR;  Service: Urology;;    CYSTOURETHROSCOPY N/A 3/1/2021    Procedure: CYSTOURETHROSCOPY;  Surgeon: Harris Lua MD;  Location: [facility] OR;  Service: Urology;  Laterality: N/A;    hemmorroidectomy      HYSTERECTOMY  2008    [facility] for endometriosis    IMPLANTATION OF PERMANENT SACRAL NERVE STIMULATOR Right 4/28/2021    Procedure: INSERTION, NEUROSTIMULATOR, PERMANENT, SACRAL;  Surgeon: Harris Lua MD;  Location: [facility] OR;  Service: Urology;  Laterality: Right;    INJECTION OF BOTULINUM TOXIN TYPE A N/A 7/23/2018    Procedure: INJECTION, BOTULINUM TOXIN, TYPE A;  Surgeon: Harris Lua MD;  Location: [facility] OR;  Service: Urology;  Laterality: N/A;    neurostimulator for bladder      REMOVAL OF SACRAL NEUROSTIMULATOR DEVICE Left 4/28/2021    Procedure: REMOVAL, NEUROSTIMULATOR, SACRAL;  Surgeon: Harris Lua MD;  Location: [facility] OR;  Service: Urology;  Laterality: Left;    TOTAL REDUCTION MAMMOPLASTY      URETHRAL CATHETERIZATION N/A 12/4/2019    Procedure:  CATHETERIZATION, URETHRA;  Surgeon: Harris Lua MD;  Location: [facility];  Service: Urology;  Laterality: N/A;       MEDICATIONS AND ALLERGIES:      Current Outpatient Medications:     amLODIPine (NORVASC) 10 MG tablet, Take 10 mg by mouth once daily., Disp: , Rfl:     belimumab (BENLYSTA IV), Inject 1 Bag into the vein every 30 days., Disp: , Rfl:     cetirizine (ZYRTEC) 10 MG tablet, Take 10 mg by mouth once daily., Disp: , Rfl:     ciprofloxacin HCl (CIPRO) 500 MG tablet, Take 1 tablet (500 mg total) by mouth every 12 (twelve) hours. for 14 days, Disp: 28 tablet, Rfl: 0    cyclobenzaprine (FLEXERIL) 5 MG tablet, Take 5 mg by mouth daily as needed., Disp: , Rfl:     diclofenac sodium (VOLTAREN) 1 % Gel, APPLY 2 GRAMS TOPICALLY TO THE AFFECTED AREA(S) FOUR TIMES DAILY, Disp: , Rfl:     diclofenac sodium (VOLTAREN) 1 % Gel, Apply 2 g topically., Disp: , Rfl:     ergocalciferol (ERGOCALCIFEROL) 50,000 unit Cap, Take 50,000 Units by mouth., Disp: , Rfl:     famotidine (PEPCID) 20 MG tablet, Take 20 mg by mouth 2 (two) times daily as needed., Disp: , Rfl:     fluticasone propionate (FLONASE) 50 mcg/actuation nasal spray, SHAKE LQ AND U 2 SPRAYS IEN QD, Disp: , Rfl: 3    gabapentin (NEURONTIN) 300 MG capsule, Take 300 mg by mouth nightly as needed., Disp: , Rfl:     hydrOXYchloroQUINE (PLAQUENIL) 200 mg tablet, Take 1 tablet by mouth 2 (two) times daily., Disp: , Rfl:     linaCLOtide (LINZESS) 72 mcg Cap capsule, Take 1 tablet by mouth. , Disp: , Rfl:     meclizine (ANTIVERT) 25 mg tablet, Take 1 tablet (25 mg total) by mouth 3 (three) times daily as needed., Disp: 20 tablet, Rfl: 0    methen-mNubiablue-s.phos-phsal-hyo (URIBEL) 118-10-40.8-36 mg Cap, Take 1 capsule by mouth every 6 (six) hours as needed (bladder pain)., Disp: 28 capsule, Rfl: 1    RESTASIS 0.05 % ophthalmic emulsion, INT 1 GTT IN OU BID, Disp: , Rfl: 4    traMADoL (ULTRAM) 50 mg tablet, Take 50 mg by mouth daily as needed., Disp: , Rfl:     valACYclovir  (VALTREX) 1000 MG tablet, Take 1 tablet (1,000 mg total) by mouth once daily., Disp: 90 tablet, Rfl: 3    VITAMIN D2 1,250 mcg (50,000 unit) capsule, Take 50,000 Units by mouth every 7 days., Disp: , Rfl:     estradioL (ESTRACE) 1 MG tablet, Take 2 tablets (2 mg total) by mouth once daily., Disp: 180 tablet, Rfl: 3    nitrofurantoin, macrocrystal-monohydrate, (MACROBID) 100 MG capsule, Take 1 capsule (100 mg total) by mouth as needed (intercourse)., Disp: 30 capsule, Rfl: 2    Review of patient's allergies indicates:   Allergen Reactions    Bactrim [sulfamethoxazole-trimethoprim] Rash       Family History   Problem Relation Age of Onset    Hypertension Mother     Cancer Mother         KIDNEY    Arthritis Mother     Breast cancer Mother     Kidney disease Neg Hx     Ovarian cancer Neg Hx     Colon cancer Neg Hx        Social History     Socioeconomic History    Marital status: Single   Tobacco Use    Smoking status: Never    Smokeless tobacco: Never   Substance and Sexual Activity    Alcohol use: No    Drug use: No    Sexual activity: Yes     Partners: Male     Birth control/protection: Condom       ROS:  GENERAL: No weight changes. No swelling. No fatigue. No fever.  CARDIOVASCULAR: No chest pain. No shortness of breath. No leg cramps.   NEUROLOGICAL: No headaches. No vision changes.  BREASTS: No pain. No lumps. No discharge.  ABDOMEN: No pain. No nausea. No vomiting. No diarrhea. No constipation.  REPRODUCTIVE: No abnormal bleeding.   VULVA: No pain. No lesions. No itching.  VAGINA: No relaxation. No itching. No odor. No discharge. No lesions.  URINARY: No incontinence. No nocturia. No frequency. No dysuria.    BP (!) 124/90 (BP Location: Left arm, Patient Position: Sitting, BP Method: Medium (Manual))   Wt 101.3 kg (223 lb 5.2 oz)   BMI 33.96 kg/m²     PE:  APPEARANCE: Well nourished, well developed, in no acute distress.  ABDOMEN: Soft. No tenderness or masses. No hepatosplenomegaly. No hernias.  BREASTS,  FUNDOSCOPIC, RECTAL DEFERRED  PELVIC: External female genitalia without lesions.  Female hair distribution. Adequate perineal body, Normal urethral meatus. Vagina moist and well rugated without lesions or discharge.  No significant cystocele or rectocele present. Cervix pink without lesions, discharge or tenderness. Uterus is normal size, regular, mobile and nontender. Adnexa without masses or tenderness.  EXTREMITIES: No edema      DIAGNOSIS & PLAN  1. Menopausal symptoms  estradioL (ESTRACE) 1 MG tablet              COUNSELING:  Resume daily valtrex for px  Rx macrobid to take with intercourse  Change from estradiol patch to 2 mg daily  If no improvement in symptoms consider adding effexor vs. Consult for pellets        20 minutes addressing problems.  This includes face to face time and non-face to face time preparing to see the patient (eg, review of tests), Obtaining and/or reviewing separately obtained history, Documenting clinical information in the electronic or other health record, Independently interpreting resultsand communicating results to the patient/family/caregiver, or Care coordination.

## 2022-09-06 ENCOUNTER — TELEPHONE (OUTPATIENT)
Dept: OBSTETRICS AND GYNECOLOGY | Facility: CLINIC | Age: 52
End: 2022-09-06

## 2022-09-06 ENCOUNTER — OFFICE VISIT (OUTPATIENT)
Dept: OBSTETRICS AND GYNECOLOGY | Facility: CLINIC | Age: 52
End: 2022-09-06
Attending: OBSTETRICS & GYNECOLOGY
Payer: MEDICAID

## 2022-09-06 VITALS
DIASTOLIC BLOOD PRESSURE: 90 MMHG | SYSTOLIC BLOOD PRESSURE: 124 MMHG | WEIGHT: 223.31 LBS | BODY MASS INDEX: 33.96 KG/M2

## 2022-09-06 DIAGNOSIS — N95.1 MENOPAUSAL SYMPTOMS: Primary | ICD-10-CM

## 2022-09-06 PROCEDURE — 3080F DIAST BP >= 90 MM HG: CPT | Mod: CPTII,,, | Performed by: OBSTETRICS & GYNECOLOGY

## 2022-09-06 PROCEDURE — 1160F RVW MEDS BY RX/DR IN RCRD: CPT | Mod: CPTII,,, | Performed by: OBSTETRICS & GYNECOLOGY

## 2022-09-06 PROCEDURE — 3008F PR BODY MASS INDEX (BMI) DOCUMENTED: ICD-10-PCS | Mod: CPTII,,, | Performed by: OBSTETRICS & GYNECOLOGY

## 2022-09-06 PROCEDURE — 3074F SYST BP LT 130 MM HG: CPT | Mod: CPTII,,, | Performed by: OBSTETRICS & GYNECOLOGY

## 2022-09-06 PROCEDURE — 99999 PR PBB SHADOW E&M-EST. PATIENT-LVL III: CPT | Mod: PBBFAC,,, | Performed by: OBSTETRICS & GYNECOLOGY

## 2022-09-06 PROCEDURE — 3008F BODY MASS INDEX DOCD: CPT | Mod: CPTII,,, | Performed by: OBSTETRICS & GYNECOLOGY

## 2022-09-06 PROCEDURE — 1159F PR MEDICATION LIST DOCUMENTED IN MEDICAL RECORD: ICD-10-PCS | Mod: CPTII,,, | Performed by: OBSTETRICS & GYNECOLOGY

## 2022-09-06 PROCEDURE — 99213 OFFICE O/P EST LOW 20 MIN: CPT | Mod: S$PBB,,, | Performed by: OBSTETRICS & GYNECOLOGY

## 2022-09-06 PROCEDURE — 3074F PR MOST RECENT SYSTOLIC BLOOD PRESSURE < 130 MM HG: ICD-10-PCS | Mod: CPTII,,, | Performed by: OBSTETRICS & GYNECOLOGY

## 2022-09-06 PROCEDURE — 3080F PR MOST RECENT DIASTOLIC BLOOD PRESSURE >= 90 MM HG: ICD-10-PCS | Mod: CPTII,,, | Performed by: OBSTETRICS & GYNECOLOGY

## 2022-09-06 PROCEDURE — 99213 OFFICE O/P EST LOW 20 MIN: CPT | Mod: PBBFAC | Performed by: OBSTETRICS & GYNECOLOGY

## 2022-09-06 PROCEDURE — 1159F MED LIST DOCD IN RCRD: CPT | Mod: CPTII,,, | Performed by: OBSTETRICS & GYNECOLOGY

## 2022-09-06 PROCEDURE — 99999 PR PBB SHADOW E&M-EST. PATIENT-LVL III: ICD-10-PCS | Mod: PBBFAC,,, | Performed by: OBSTETRICS & GYNECOLOGY

## 2022-09-06 PROCEDURE — 1160F PR REVIEW ALL MEDS BY PRESCRIBER/CLIN PHARMACIST DOCUMENTED: ICD-10-PCS | Mod: CPTII,,, | Performed by: OBSTETRICS & GYNECOLOGY

## 2022-09-06 PROCEDURE — 99213 PR OFFICE/OUTPT VISIT, EST, LEVL III, 20-29 MIN: ICD-10-PCS | Mod: S$PBB,,, | Performed by: OBSTETRICS & GYNECOLOGY

## 2022-09-06 RX ORDER — NITROFURANTOIN 25; 75 MG/1; MG/1
100 CAPSULE ORAL
Qty: 30 CAPSULE | Refills: 2 | Status: SHIPPED | OUTPATIENT
Start: 2022-09-06 | End: 2022-10-06

## 2022-09-06 RX ORDER — ERGOCALCIFEROL 1.25 MG/1
50000 CAPSULE ORAL
COMMUNITY
Start: 2022-07-04 | End: 2022-12-13

## 2022-09-06 RX ORDER — DICLOFENAC SODIUM 10 MG/G
2 GEL TOPICAL
COMMUNITY
Start: 2022-07-12 | End: 2023-04-29

## 2022-09-06 RX ORDER — DICLOFENAC SODIUM 10 MG/G
GEL TOPICAL
COMMUNITY
Start: 2022-07-12 | End: 2023-04-29

## 2022-09-06 RX ORDER — ESTRADIOL 1 MG/1
2 TABLET ORAL DAILY
Qty: 180 TABLET | Refills: 3 | Status: SHIPPED | OUTPATIENT
Start: 2022-09-06 | End: 2023-10-26 | Stop reason: SDUPTHER

## 2022-09-06 NOTE — TELEPHONE ENCOUNTER
Spoke with Main from Pharmacy.  Reviewed directions for Macrobid for pt to take one pill following intercourse daily as needed.

## 2022-09-06 NOTE — TELEPHONE ENCOUNTER
----- Message from Matt Otto sent at 9/6/2022 10:59 AM CDT -----  Name of Who is Calling: [facility] on behalf of KEVIN RETANA [[id number]]           What is the request in detail: Pharmacy is requesting a call back to clarify instructions for nitrofurantoin, macrocrystal-monohydrate, (MACROBID) 100 MG capsule.  Please assist.           Can the clinic reply by [facility]: No           [facility]: [phone]

## 2022-09-07 ENCOUNTER — PATIENT MESSAGE (OUTPATIENT)
Dept: UROLOGY | Facility: CLINIC | Age: 52
End: 2022-09-07
Payer: MEDICAID

## 2022-09-08 DIAGNOSIS — N30.01 ACUTE CYSTITIS WITH HEMATURIA: Primary | ICD-10-CM

## 2022-09-08 RX ORDER — AMOXICILLIN AND CLAVULANATE POTASSIUM 500; 125 MG/1; MG/1
1 TABLET, FILM COATED ORAL 3 TIMES DAILY
Qty: 21 TABLET | Refills: 0 | Status: SHIPPED | OUTPATIENT
Start: 2022-09-08 | End: 2022-09-15

## 2022-09-20 ENCOUNTER — LAB VISIT (OUTPATIENT)
Dept: LAB | Facility: HOSPITAL | Age: 52
End: 2022-09-20
Attending: EMERGENCY MEDICINE
Payer: MEDICAID

## 2022-09-20 DIAGNOSIS — N30.01 ACUTE CYSTITIS WITH HEMATURIA: ICD-10-CM

## 2022-09-20 LAB
BACTERIA #/AREA URNS AUTO: ABNORMAL /HPF
BILIRUB UR QL STRIP: NEGATIVE
CLARITY UR REFRACT.AUTO: ABNORMAL
COLOR UR AUTO: ABNORMAL
GLUCOSE UR QL STRIP: NEGATIVE
HGB UR QL STRIP: ABNORMAL
KETONES UR QL STRIP: NEGATIVE
LEUKOCYTE ESTERASE UR QL STRIP: NEGATIVE
MICROSCOPIC COMMENT: ABNORMAL
NITRITE UR QL STRIP: NEGATIVE
PH UR STRIP: 6 [PH] (ref 5–8)
PROT UR QL STRIP: ABNORMAL
RBC #/AREA URNS AUTO: 55 /HPF (ref 0–4)
SP GR UR STRIP: 1.02 (ref 1–1.03)
SQUAMOUS #/AREA URNS AUTO: 15 /HPF
URN SPEC COLLECT METH UR: ABNORMAL
WBC #/AREA URNS AUTO: 19 /HPF (ref 0–5)

## 2022-09-20 PROCEDURE — 81001 URINALYSIS AUTO W/SCOPE: CPT | Performed by: NURSE PRACTITIONER

## 2022-09-22 ENCOUNTER — PATIENT MESSAGE (OUTPATIENT)
Dept: UROLOGY | Facility: CLINIC | Age: 52
End: 2022-09-22
Payer: MEDICAID

## 2022-10-20 DIAGNOSIS — M77.31 CALCANEAL SPUR OF RIGHT FOOT: Primary | ICD-10-CM

## 2022-10-28 ENCOUNTER — PROCEDURE VISIT (OUTPATIENT)
Dept: UROLOGY | Facility: CLINIC | Age: 52
End: 2022-10-28
Payer: MEDICAID

## 2022-10-28 VITALS
SYSTOLIC BLOOD PRESSURE: 140 MMHG | HEART RATE: 69 BPM | BODY MASS INDEX: 33.24 KG/M2 | DIASTOLIC BLOOD PRESSURE: 69 MMHG | HEIGHT: 68 IN | WEIGHT: 219.31 LBS

## 2022-10-28 DIAGNOSIS — N30.10 INTERSTITIAL CYSTITIS: ICD-10-CM

## 2022-10-28 DIAGNOSIS — R31.0 GROSS HEMATURIA: Primary | ICD-10-CM

## 2022-10-28 DIAGNOSIS — N39.0 RECURRENT UTI: ICD-10-CM

## 2022-10-28 LAB
BACTERIA #/AREA URNS AUTO: ABNORMAL /HPF
BILIRUB UR QL STRIP: NEGATIVE
CLARITY UR REFRACT.AUTO: ABNORMAL
COLOR UR AUTO: YELLOW
GLUCOSE UR QL STRIP: NEGATIVE
HGB UR QL STRIP: ABNORMAL
HYALINE CASTS UR QL AUTO: 0 /LPF
KETONES UR QL STRIP: NEGATIVE
LEUKOCYTE ESTERASE UR QL STRIP: ABNORMAL
MICROSCOPIC COMMENT: ABNORMAL
NITRITE UR QL STRIP: POSITIVE
PH UR STRIP: 6 [PH] (ref 5–8)
PROT UR QL STRIP: ABNORMAL
RBC #/AREA URNS AUTO: 6 /HPF (ref 0–4)
SP GR UR STRIP: 1.02 (ref 1–1.03)
SQUAMOUS #/AREA URNS AUTO: 3 /HPF
URN SPEC COLLECT METH UR: ABNORMAL
WBC #/AREA URNS AUTO: >100 /HPF (ref 0–5)

## 2022-10-28 PROCEDURE — 87077 CULTURE AEROBIC IDENTIFY: CPT | Performed by: UROLOGY

## 2022-10-28 PROCEDURE — 87086 URINE CULTURE/COLONY COUNT: CPT | Performed by: UROLOGY

## 2022-10-28 PROCEDURE — 52000 CYSTOURETHROSCOPY: CPT | Mod: PBBFAC,PO | Performed by: UROLOGY

## 2022-10-28 PROCEDURE — 87186 SC STD MICRODIL/AGAR DIL: CPT | Performed by: UROLOGY

## 2022-10-28 PROCEDURE — 87088 URINE BACTERIA CULTURE: CPT | Performed by: UROLOGY

## 2022-10-28 PROCEDURE — 52000 CYSTOSCOPY: ICD-10-PCS | Mod: S$PBB,,, | Performed by: UROLOGY

## 2022-10-28 PROCEDURE — 81001 URINALYSIS AUTO W/SCOPE: CPT | Performed by: UROLOGY

## 2022-10-28 RX ORDER — CIPROFLOXACIN 500 MG/1
500 TABLET ORAL 2 TIMES DAILY
Qty: 10 TABLET | Refills: 0 | Status: SHIPPED | OUTPATIENT
Start: 2022-10-28 | End: 2022-11-02

## 2022-10-28 NOTE — PROCEDURES
Cystoscopy    Date/Time: 10/28/2022 8:30 AM  Performed by: Harris Lua MD  Authorized by: Harris Lua MD     Consent Done?:  Yes (Written)  Timeout: prior to procedure the correct patient, procedure, and site was verified    Prep: patient was prepped and draped in usual sterile fashion    Local anesthesia used?: Yes    Anesthesia:  Intraurethral instillation  Local anesthetic:  Lidocaine 2% topical gel  Anesthetic total (ml):  5  Indications: recurrent UTIs and hematuria    Position:  Dorsal lithotomy  Anesthesia:  Intraurethral instillation  Patient sedated?: No    Preparation: Patient was prepped and draped in usual sterile fashion    Scope type:  Flexible cystoscope  Stent inserted: No    Stent removed: No    External exam normal: Yes    Digital exam performed: No    Urethra normal: Yes    Bladder neck normal: Yes    Bladder normal: Yes     patient tolerated the procedure well with no immediate complications  Comments:      Two scars noted from prior bladder resection.  Patient has no evidence of malignancy, no evidence of any bleeding, urine will be sent for culture and urinalysis.  Patient will be placed on Cipro for 5 days until results are back.

## 2022-10-28 NOTE — PATIENT INSTRUCTIONS
Cipro 500 mg twice daily for 5 days.  Urinalysis and urine culture to lab.  Notify patient of results.  Follow-up 6 months and as needed.

## 2022-10-31 ENCOUNTER — TELEPHONE (OUTPATIENT)
Dept: UROLOGY | Facility: CLINIC | Age: 52
End: 2022-10-31
Payer: MEDICAID

## 2022-10-31 LAB — BACTERIA UR CULT: ABNORMAL

## 2022-10-31 NOTE — TELEPHONE ENCOUNTER
----- Message from Harris Lua MD sent at 10/28/2022  4:51 PM CDT -----  Urine appears infected. Await C+S

## 2022-10-31 NOTE — TELEPHONE ENCOUNTER
----- Message from Harris Lua MD sent at 10/31/2022 12:49 PM CDT -----  Urine is infected with E coli.  This is resistant to Cipro.  Will send prescription for ampicillin.

## 2022-11-28 ENCOUNTER — CLINICAL SUPPORT (OUTPATIENT)
Dept: REHABILITATION | Facility: HOSPITAL | Age: 52
End: 2022-11-28
Payer: MEDICAID

## 2022-11-28 DIAGNOSIS — R26.89 GAIT, ANTALGIC: ICD-10-CM

## 2022-11-28 DIAGNOSIS — M77.31 BONE SPUR OF INFERIOR PORTION OF RIGHT CALCANEUS: ICD-10-CM

## 2022-11-28 DIAGNOSIS — G89.29 CHRONIC HEEL PAIN, RIGHT: ICD-10-CM

## 2022-11-28 DIAGNOSIS — M79.671 CHRONIC HEEL PAIN, RIGHT: ICD-10-CM

## 2022-11-28 PROBLEM — Z74.09 IMPAIRED FUNCTIONAL MOBILITY AND ACTIVITY TOLERANCE: Status: RESOLVED | Noted: 2019-06-12 | Resolved: 2022-11-28

## 2022-11-28 PROBLEM — M54.31 SCIATICA OF RIGHT SIDE: Status: RESOLVED | Noted: 2019-06-12 | Resolved: 2022-11-28

## 2022-11-28 PROCEDURE — 97110 THERAPEUTIC EXERCISES: CPT | Mod: PN

## 2022-11-28 PROCEDURE — 97161 PT EVAL LOW COMPLEX 20 MIN: CPT | Mod: PN

## 2022-11-28 PROCEDURE — 97140 MANUAL THERAPY 1/> REGIONS: CPT | Mod: PN

## 2022-11-28 NOTE — PROGRESS NOTES
"  [facility]  Physical Therapy Initial Evaluation  Date: 11/28/2022   Name: Arpita Bradshaw  Clinic Number: [id number]    Therapy Diagnosis:   Encounter Diagnoses   Name Primary?    Chronic heel pain, right     Bone spur of inferior portion of right calcaneus     Gait, antalgic      Physician: No ref. provider found    Physician Orders: PT Eval and Treat   Medical Diagnosis from Referral: M77.31 (ICD-10-CM) - Calcaneal spur of right foot   Evaluation Date: 11/28/2022  Authorization Period Expiration: 12/31/2022  Plan of Care Expiration: 2/28/2022  Visit # / Visits authorized: 1/ 20 Progress Note Due: 12/28/2022  FOTO: 1/ 1  HEP:  Access Code: [email or web address]      Precautions: Standard, fibromyalgia, lupus    Time In: 0930  Time Out: 1030  Total Appointment Time (timed & untimed codes): 60 minutes    Subjective   Date of onset: 2-3mo  History of current condition - Arpita reports: "I have heel spurs. I went to the doctor and he thought it was plantar fasciitis so I started doing exercises but the pain was worsening so then I was referred to the podiatrist who diagnosed me with heel spurs and provided me with some exercises until I could get in to PT." The podiatrist recommended staying off of my R foot but that isn't realistic. I can't walk properly so I offload to the lateral border. My morning routine takes me about 30-40min longer because I move so slowly due to the pain. As the day goes on my pain worsens. When I sit down throughout my day and try to get back up, the pain is very intense and I usually hop on my LLE. I'm currently taking tylenol or OTC meds to manage pain because I have so much other medicine I'm on that I don't want to mix.     "I don't like wearing tennis shoes. I usually wear slip on shoes." "I have gotten a prescription for specific type of shoes that should be here in about 2-3wks. I also have custom orthotics."    WATSON: insidious/chronic/gradual worsening   Any falls: " no  Any pain Plantar fascia pain: no only on the heel where achilles inserts   Any pain Deltoid ligament: no  Any pain ATFL, calcaneou fibular or posterior talofibular L: no  Any ankle bruise: no  Pain radiates: localized  Pain constant or intermittent: constant with variances in intensity   Any injection: no      Current 5/10, worst 10/10, best 3/10   Location: right feet/heel  Description: Aching, Dull, Deep, and Shooting  Aggravating Factors: Standing, Walking, Morning, and Getting out of bed/chair  Easing Factors: massage, relaxation, pain medication, lying down, and rest    Prior Therapy: not for R heel   Social History:  lives alone  Occupation: office management   Prior Level of Function: independent   Current Level of Function: independent with pain that requires additional time to complete vocational, functional tasks    Pts goals: decrease pain to allow her to perform morning routine in timely manner and alleviate pain during sit<>stand transitions that occur t/o her day      Medical History:   Past Medical History:   Diagnosis Date    Disorder of kidney and ureter     Fibromyalgia     Hypertension     IC (interstitial cystitis)     Lupus     STD (sexually transmitted disease)     Urinary tract infection     Vaginal infection        Surgical History:   Arpita Bradshaw  has a past surgical history that includes  section; BREAST REDUCTION; Hysterectomy (); neurostimulator for bladder; hemmorroidectomy; Cystoscopy (N/A, 2018); Injection of botulinum toxin type A (N/A, 2018); Colonoscopy (N/A, 2018); Total Reduction Mammoplasty; Bladder fulguration (N/A, 2019); Cystoscopy w/ retrogrades (Bilateral, 2019); Bladder fulguration (N/A, 2019); Urethral catheterization (N/A, 2019); Cystourethroscopy (2019); Cystoscopy with hydrodistension of bladder (N/A, 10/12/2020); Cystourethroscopy (N/A, 3/1/2021); Bladder fulguration (N/A, 3/1/2021); Implantation of permanent  sacral nerve stimulator (Right, 4/28/2021); Removal of sacral neurostimulator device (Left, 4/28/2021); Cystoscopy with hydrodistension of bladder (N/A, 12/15/2021); and Cystoscopy with hydrodistension of bladder (N/A, 7/25/2022).    Medications:   Arpita has a current medication list which includes the following prescription(s): amlodipine, belimumab, cetirizine, cyclobenzaprine, diclofenac sodium, diclofenac sodium, ergocalciferol, estradiol, famotidine, fluticasone propionate, gabapentin, hydroxychloroquine, linaclotide, meclizine, restasis, tramadol, valacyclovir, and vitamin d2.    Allergies:   Review of patient's allergies indicates:   Allergen Reactions    Sulfamethoxazole-trimethoprim Rash and Hives        Imaging, X-Ray:  FINDINGS:  Mild hallux valgus, more-so on the right.  Modest calcaneal enthesophyte at the right plantar fascial attachment.  No acute fracture, dislocation or osseous destruction in either foot.  Bilateral Lisfranc intervals are congruent.  Impression:  As above.  Electronically signed by: Mark Caba      Objective     Observation: antalgic gait pattern, lacks heel strike; forefoot strike, R knee excessive flexion t/o gait; decreased stance time on R      Ankle Range of Motion:   Ankle Right Left   Dorsiflexion Lacking 10 from netural 5   Plantarflexion 55 58   Inversion 22 25   Eversion 5 12      Strength:   Right Ankle   Left Ankle    Dorsiflexion: 3+/5 Dorsiflexion: 4/5   Plantarflexion:  3+/5 Plantarflexion: 4-/5   Inversion: 4-/5 Inversion: 4-/5   Eversion: 3+/5 Eversion: 4-/5       Right LE  Left LE    Hip extension:  3+/5 Hip extension: 3+/5   Hip abduction: 3+/5 Hip abduction: 4-/5   Hip adduction: 3/5 Hip adduction 3+/5     Special Tests:   Right   Posterior Drawer Test -   Anterior Drawer Test -   Squeeze test +   Polk test -   Heel walking + pain   Toe walking - no pain   Navicular Drop -       Joint Mobility: hypomobile at talonavicular and TC with AP  mobs    Palpation: TTP: achilles insertion points; achilles tendon; distal soleus and gastroc mm belly        CMS Impairment/Limitation/Restriction for FOTO LEFS Survey    Therapist reviewed FOTO scores for Arpita Bradshaw on 11/28/2022.   FOTO documents entered into [employer] - see Media section.    Limitation Score: 91%           TREATMENT     Total Treatment time separate from Evaluation: 25 minutes    Arpita received therapeutic exercises to develop strength, endurance, ROM, flexibility, posture, and core stabilization for 15 minutes including:  Seated heel raises [id number] followed by 2min active rest - seated hip abduction against RTB  Standing heel raises [id number] followed by 2min active rest - standing hip abduction     Arpita received the following manual therapy techniques: Joint mobilizations, Manual traction, Myofacial release, Soft tissue Mobilization, and Friction Massage were applied to the: R ankle for 10 minutes, including:  Gentle STM along achilles insertion points to R calcaneus  STM to distal gastroc/soleus complex   AP TC mobilizations         Home Exercises and Patient Education Provided    Education provided:   - HEP  - tendinopathy protocol  - toe splaying when barefoot  - compensations and effects up the chain    Written Home Exercises Provided: yes.  Exercises were reviewed and Arpita was able to demonstrate them prior to the end of the session.  Arpita demonstrated good  understanding of the education provided.     See EMR under Patient Instructions for exercises provided 11/28/2022.    Assessment   Arpita is a 52 y.o. female referred to outpatient Physical Therapy with a medical diagnosis of M77.31 (ICD-10-CM) - Calcaneal spur of right foot . Pt presents with signs and symptoms consistent with achilles tendinopathy 2/2 calcaneal heel spur which has resulted in functional compensations and as a result pt is experiencing noted R heel, R knee and hip pain. Pt presents with antalgic gait  pattern. Ambulating with forefoot strike in supination and plantarflexion with early offloading of RLE and excessive knee flexion t/o gait phases on RLE. Pt has associated TTP t/o insertional achilles points on calcaneus. Pt tolerated tendinopathy protocol and will continue to benefit from skilled PT for manual intervention as well as strengthening and stabilization exercises to decrease pan and enhance mobility and tolerance for functional loading of RLE.     Pt prognosis is Fair.   Pt will benefit from skilled outpatient Physical Therapy to address the deficits stated above and in the chart below, provide pt/family education, and to maximize pt's level of independence.     Plan of care discussed with patient: Yes  Pt's spiritual, cultural and educational needs considered and patient is agreeable to the plan of care and goals as stated below:     Anticipated Barriers for therapy: chronicity of symptoms and presence of bony changes     Medical Necessity is demonstrated by the following  History  Co-morbidities and personal factors that may impact the plan of care Co-morbidities:   chronic constipation, difficulty sleeping, and recurrent urinary infections    Personal Factors:   no deficits     Complexity: low   Examination  Body Structures and Functions, activity limitations and participation restrictions that may impact the plan of care Body Regions:   lower extremities    Body Systems:    gross symmetry  ROM  strength  gross coordinated movement  balance  gait  transfers  transitions  motor control  motor learning  heart rate  respiratory rate  blood pressure    Participation Restrictions:   none    Activity limitations:   Learning and applying knowledge  no deficits    General Tasks and Commands  no deficits    Communication  no deficits    Mobility  lifting and carrying objects  walking    Self care  no deficits  Requires additional time to complete 2/2 pain    Domestic Life  shopping  cooking  doing house work  (cleaning house, washing dishes, laundry)  assisting others    Interactions/Relationships  no deficits    Life Areas  no deficits    Community and Social Life  community life  recreation and leisure         Complexity: low  See FOTO outcome assessment   Clinical Presentation stable and uncomplicated low   Decision Making/ Complexity Score: low     Goals:  GOALS: Short Term Goals:  4 weeks  1.Report decreased  in  pain at worse less than  <   / =  5  /10  to increase tolerance for improved functional actvities. On going  2. Pt to improve range of motion by 25% to allow for improved functional mobility to allow for improvement in IADLs. On going  3. Increased BLE MMT 1/2 grade  to increase tolerance for ADL and work activities.On going  4. Pt to report ability to complete morning routine in timely manner (45min) with <5/10 R heel pain.   5. Pt to tolerate HEP to improve ROM and independence with ADL's. On going  6. Pt will demonstrate ability to walk for 15min with normalized gait pattern on level surface at tolerable pace.     Long Term Goals: 8 weeks  1.Report decreased  in  pain at worse  less than  <   / =  3  /10  to increase tolerance for improved functional actvities. On going  2.Pt to improve range of motion by 75% to allow for improved functional mobility to allow for improvement in IADLs. On going  3.Increased BLE MMT 1 grade  to increase tolerance for ADL and work activities.On going  4. Pt will report clinically significant improvements on FOTO outcome assessment  to increase functional activities and mobility. On going  5. Pt to be Independent with HEP to improve ROM and independence with ADL's. On going  6. Pt will report ability to ambulate for 30min with <3/10 R heel pain in appropriate shoe on level surface at tolerable pace.       Plan   Plan of care Certification: 11/28/2022 to 2/28/2022.    Outpatient Physical Therapy 2 times weekly for 7 weeks to include the following interventions: Gait  Training, Manual Therapy, Moist Heat/ Ice, Neuromuscular Re-ed, Orthotic Management and Training, Patient Education, Self Care, Therapeutic Activities, and Therapeutic Exercise. Dry needling.     Patsy Cabello, PT, DPT, Cert DN      I CERTIFY THE NEED FOR THESE SERVICES FURNISHED UNDER THIS PLAN OF TREATMENT AND WHILE UNDER MY CARE   Physician's comments:     Physician's Signature: ___________________________________________________

## 2022-11-28 NOTE — PLAN OF CARE
"  [facility]  Physical Therapy Initial Evaluation  Date: 11/28/2022   Name: Arpita Bradshaw  Clinic Number: [id number]    Therapy Diagnosis:   Encounter Diagnoses   Name Primary?    Chronic heel pain, right     Bone spur of inferior portion of right calcaneus     Gait, antalgic      Physician: KIT ROWE    Physician Orders: PT Eval and Treat   Medical Diagnosis from Referral: M77.31 (ICD-10-CM) - Calcaneal spur of right foot   Evaluation Date: 11/28/2022  Authorization Period Expiration: 12/31/2022  Plan of Care Expiration: 2/28/2022  Visit # / Visits authorized: 1/ 20 Progress Note Due: 12/28/2022  FOTO: 1/ 1  HEP:  Access Code: [email or web address]      Precautions: Standard, fibromyalgia, lupus    Time In: 0930  Time Out: 1030  Total Appointment Time (timed & untimed codes): 60 minutes    Subjective   Date of onset: 2-3mo  History of current condition - Arpita reports: "I have heel spurs. I went to the doctor and he thought it was plantar fasciitis so I started doing exercises but the pain was worsening so then I was referred to the podiatrist who diagnosed me with heel spurs and provided me with some exercises until I could get in to PT." The podiatrist recommended staying off of my R foot but that isn't realistic. I can't walk properly so I offload to the lateral border. My morning routine takes me about 30-40min longer because I move so slowly due to the pain. As the day goes on my pain worsens. When I sit down throughout my day and try to get back up, the pain is very intense and I usually hop on my LLE. I'm currently taking tylenol or OTC meds to manage pain because I have so much other medicine I'm on that I don't want to mix.     "I don't like wearing tennis shoes. I usually wear slip on shoes." "I have gotten a prescription for specific type of shoes that should be here in about 2-3wks. I also have custom orthotics."    WATSON: insidious/chronic/gradual worsening   Any falls: " no  Any pain Plantar fascia pain: no only on the heel where achilles inserts   Any pain Deltoid ligament: no  Any pain ATFL, calcaneou fibular or posterior talofibular L: no  Any ankle bruise: no  Pain radiates: localized  Pain constant or intermittent: constant with variances in intensity   Any injection: no      Current 5/10, worst 10/10, best 3/10   Location: right feet/heel  Description: Aching, Dull, Deep, and Shooting  Aggravating Factors: Standing, Walking, Morning, and Getting out of bed/chair  Easing Factors: massage, relaxation, pain medication, lying down, and rest    Prior Therapy: not for R heel   Social History:  lives alone  Occupation: office management   Prior Level of Function: independent   Current Level of Function: independent with pain that requires additional time to complete vocational, functional tasks    Pts goals: decrease pain to allow her to perform morning routine in timely manner and alleviate pain during sit<>stand transitions that occur t/o her day      Medical History:   Past Medical History:   Diagnosis Date    Disorder of kidney and ureter     Fibromyalgia     Hypertension     IC (interstitial cystitis)     Lupus     STD (sexually transmitted disease)     Urinary tract infection     Vaginal infection        Surgical History:   Arpita Bradshaw  has a past surgical history that includes  section; BREAST REDUCTION; Hysterectomy (); neurostimulator for bladder; hemmorroidectomy; Cystoscopy (N/A, 2018); Injection of botulinum toxin type A (N/A, 2018); Colonoscopy (N/A, 2018); Total Reduction Mammoplasty; Bladder fulguration (N/A, 2019); Cystoscopy w/ retrogrades (Bilateral, 2019); Bladder fulguration (N/A, 2019); Urethral catheterization (N/A, 2019); Cystourethroscopy (2019); Cystoscopy with hydrodistension of bladder (N/A, 10/12/2020); Cystourethroscopy (N/A, 3/1/2021); Bladder fulguration (N/A, 3/1/2021); Implantation of permanent  sacral nerve stimulator (Right, 4/28/2021); Removal of sacral neurostimulator device (Left, 4/28/2021); Cystoscopy with hydrodistension of bladder (N/A, 12/15/2021); and Cystoscopy with hydrodistension of bladder (N/A, 7/25/2022).    Medications:   Arpita has a current medication list which includes the following prescription(s): amlodipine, belimumab, cetirizine, cyclobenzaprine, diclofenac sodium, diclofenac sodium, ergocalciferol, estradiol, famotidine, fluticasone propionate, gabapentin, hydroxychloroquine, linaclotide, meclizine, restasis, tramadol, valacyclovir, and vitamin d2.    Allergies:   Review of patient's allergies indicates:   Allergen Reactions    Sulfamethoxazole-trimethoprim Rash and Hives        Imaging, X-Ray:  FINDINGS:  Mild hallux valgus, more-so on the right.  Modest calcaneal enthesophyte at the right plantar fascial attachment.  No acute fracture, dislocation or osseous destruction in either foot.  Bilateral Lisfranc intervals are congruent.  Impression:  As above.  Electronically signed by: Mark Caba      Objective     Observation: antalgic gait pattern, lacks heel strike; forefoot strike, R knee excessive flexion t/o gait; decreased stance time on R      Ankle Range of Motion:   Ankle Right Left   Dorsiflexion Lacking 10 from netural 5   Plantarflexion 55 58   Inversion 22 25   Eversion 5 12      Strength:   Right Ankle   Left Ankle    Dorsiflexion: 3+/5 Dorsiflexion: 4/5   Plantarflexion:  3+/5 Plantarflexion: 4-/5   Inversion: 4-/5 Inversion: 4-/5   Eversion: 3+/5 Eversion: 4-/5       Right LE  Left LE    Hip extension:  3+/5 Hip extension: 3+/5   Hip abduction: 3+/5 Hip abduction: 4-/5   Hip adduction: 3/5 Hip adduction 3+/5     Special Tests:   Right   Posterior Drawer Test -   Anterior Drawer Test -   Squeeze test +   Polk test -   Heel walking + pain   Toe walking - no pain   Navicular Drop -       Joint Mobility: hypomobile at talonavicular and TC with AP  mobs    Palpation: TTP: achilles insertion points; achilles tendon; distal soleus and gastroc mm belly        CMS Impairment/Limitation/Restriction for FOTO LEFS Survey    Therapist reviewed FOTO scores for Arpita Bradshaw on 11/28/2022.   FOTO documents entered into [employer] - see Media section.    Limitation Score: 91%           TREATMENT     Total Treatment time separate from Evaluation: 25 minutes    Arpita received therapeutic exercises to develop strength, endurance, ROM, flexibility, posture, and core stabilization for 15 minutes including:  Seated heel raises [id number] followed by 2min active rest - seated hip abduction against RTB  Standing heel raises [id number] followed by 2min active rest - standing hip abduction     Arpita received the following manual therapy techniques: Joint mobilizations, Manual traction, Myofacial release, Soft tissue Mobilization, and Friction Massage were applied to the: R ankle for 10 minutes, including:  Gentle STM along achilles insertion points to R calcaneus  STM to distal gastroc/soleus complex   AP TC mobilizations         Home Exercises and Patient Education Provided    Education provided:   - HEP  - tendinopathy protocol  - toe splaying when barefoot  - compensations and effects up the chain    Written Home Exercises Provided: yes.  Exercises were reviewed and Arpita was able to demonstrate them prior to the end of the session.  Arpita demonstrated good  understanding of the education provided.     See EMR under Patient Instructions for exercises provided 11/28/2022.    Assessment   Arpita is a 52 y.o. female referred to outpatient Physical Therapy with a medical diagnosis of M77.31 (ICD-10-CM) - Calcaneal spur of right foot . Pt presents with signs and symptoms consistent with achilles tendinopathy 2/2 calcaneal heel spur which has resulted in functional compensations and as a result pt is experiencing noted R heel, R knee and hip pain. Pt presents with antalgic gait  pattern. Ambulating with forefoot strike in supination and plantarflexion with early offloading of RLE and excessive knee flexion t/o gait phases on RLE. Pt has associated TTP t/o insertional achilles points on calcaneus. Pt tolerated tendinopathy protocol and will continue to benefit from skilled PT for manual intervention as well as strengthening and stabilization exercises to decrease pan and enhance mobility and tolerance for functional loading of RLE.     Pt prognosis is Fair.   Pt will benefit from skilled outpatient Physical Therapy to address the deficits stated above and in the chart below, provide pt/family education, and to maximize pt's level of independence.     Plan of care discussed with patient: Yes  Pt's spiritual, cultural and educational needs considered and patient is agreeable to the plan of care and goals as stated below:     Anticipated Barriers for therapy: chronicity of symptoms and presence of bony changes     Medical Necessity is demonstrated by the following  History  Co-morbidities and personal factors that may impact the plan of care Co-morbidities:   chronic constipation, difficulty sleeping, and recurrent urinary infections    Personal Factors:   no deficits     Complexity: low   Examination  Body Structures and Functions, activity limitations and participation restrictions that may impact the plan of care Body Regions:   lower extremities    Body Systems:    gross symmetry  ROM  strength  gross coordinated movement  balance  gait  transfers  transitions  motor control  motor learning  heart rate  respiratory rate  blood pressure    Participation Restrictions:   none    Activity limitations:   Learning and applying knowledge  no deficits    General Tasks and Commands  no deficits    Communication  no deficits    Mobility  lifting and carrying objects  walking    Self care  no deficits  Requires additional time to complete 2/2 pain    Domestic Life  shopping  cooking  doing house work  (cleaning house, washing dishes, laundry)  assisting others    Interactions/Relationships  no deficits    Life Areas  no deficits    Community and Social Life  community life  recreation and leisure         Complexity: low  See FOTO outcome assessment   Clinical Presentation stable and uncomplicated low   Decision Making/ Complexity Score: low     Goals:  GOALS: Short Term Goals:  4 weeks  1.Report decreased  in  pain at worse less than  <   / =  5  /10  to increase tolerance for improved functional actvities. On going  2. Pt to improve range of motion by 25% to allow for improved functional mobility to allow for improvement in IADLs. On going  3. Increased BLE MMT 1/2 grade  to increase tolerance for ADL and work activities.On going  4. Pt to report ability to complete morning routine in timely manner (45min) with <5/10 R heel pain.   5. Pt to tolerate HEP to improve ROM and independence with ADL's. On going  6. Pt will demonstrate ability to walk for 15min with normalized gait pattern on level surface at tolerable pace.     Long Term Goals: 8 weeks  1.Report decreased  in  pain at worse  less than  <   / =  3  /10  to increase tolerance for improved functional actvities. On going  2.Pt to improve range of motion by 75% to allow for improved functional mobility to allow for improvement in IADLs. On going  3.Increased BLE MMT 1 grade  to increase tolerance for ADL and work activities.On going  4. Pt will report clinically significant improvements on FOTO outcome assessment  to increase functional activities and mobility. On going  5. Pt to be Independent with HEP to improve ROM and independence with ADL's. On going  6. Pt will report ability to ambulate for 30min with <3/10 R heel pain in appropriate shoe on level surface at tolerable pace.       Plan   Plan of care Certification: 11/28/2022 to 2/28/2022.    Outpatient Physical Therapy 2 times weekly for 7 weeks to include the following interventions: Gait  Training, Manual Therapy, Moist Heat/ Ice, Neuromuscular Re-ed, Orthotic Management and Training, Patient Education, Self Care, Therapeutic Activities, and Therapeutic Exercise. Dry needling.     Patsy Cabello, PT, DPT, Cert DN      I CERTIFY THE NEED FOR THESE SERVICES FURNISHED UNDER THIS PLAN OF TREATMENT AND WHILE UNDER MY CARE   Physician's comments:     Physician's Signature: ___________________________________________________

## 2022-12-07 ENCOUNTER — CLINICAL SUPPORT (OUTPATIENT)
Dept: REHABILITATION | Facility: HOSPITAL | Age: 52
End: 2022-12-07
Payer: MEDICAID

## 2022-12-07 DIAGNOSIS — M77.31 BONE SPUR OF INFERIOR PORTION OF RIGHT CALCANEUS: ICD-10-CM

## 2022-12-07 DIAGNOSIS — R26.89 GAIT, ANTALGIC: ICD-10-CM

## 2022-12-07 DIAGNOSIS — G89.29 CHRONIC HEEL PAIN, RIGHT: Primary | ICD-10-CM

## 2022-12-07 DIAGNOSIS — M79.671 CHRONIC HEEL PAIN, RIGHT: Primary | ICD-10-CM

## 2022-12-07 PROCEDURE — 97140 MANUAL THERAPY 1/> REGIONS: CPT | Mod: PN

## 2022-12-07 PROCEDURE — 97110 THERAPEUTIC EXERCISES: CPT | Mod: PN

## 2022-12-07 NOTE — PROGRESS NOTES
"[facility]   Physical Therapy Treatment Note     Name: Arpita Bradshaw  Clinic Number: [id number]    Therapy Diagnosis: No diagnosis found.  Physician: Referring, Unknown    Visit Date: 12/7/2022    Physician Orders: PT Eval and Treat   Medical Diagnosis from Referral: M77.31 (ICD-10-CM) - Calcaneal spur of right foot   Evaluation Date: 11/28/2022  Authorization Period Expiration: 12/31/2022  Plan of Care Expiration: 2/28/2022  Visit # / Visits authorized: 1/ 20 Progress Note Due: 12/28/2022  FOTO: 1/ 1  HEP:  Access Code: [email or web address]        Precautions: Standard, fibromyalgia, lupus  PTA Visit #: 0/5     Time In: ***  Time Out: ***  Total Billable Time: *** minutes    SUBJECTIVE     Pt reports: ***.  She {Actions; was/was not:08960} compliant with home exercise program.  Response to previous treatment: ***  Functional change: ***    Pain: {0-10:69353::"0"}/10  Location: right ankles     OBJECTIVE     Objective Measures updated at progress report unless specified.     Treatment     Arpita received the treatments listed below:      Arpita received therapeutic exercises to develop strength, endurance, ROM, flexibility, posture, and core stabilization for 15 minutes including:  Seated heel raises [id number] followed by 2min active rest - seated hip abduction against RTB  Standing heel raises [id number] followed by 2min active rest - standing hip abduction     Arpita received the following manual therapy techniques: Joint mobilizations, Manual traction, Myofacial release, Soft tissue Mobilization, and Friction Massage were applied to the: R ankle for 10 minutes, including:  Gentle STM along achilles insertion points to R calcaneus  STM to distal gastroc/soleus complex   AP TC mobilizations     neuromuscular re-education activities to improve: {AMB PT PROGRESS NEURO RE-ED:91810} for *** minutes. The following activities were included:  ***    therapeutic activities to improve functional performance " "for ***  minutes, including:  ***    gait training to improve functional mobility and safety for ***  minutes, including:  ***    hot pack for *** minutes to ***.    cold pack for *** minutes to ***.        Patient Education and Home Exercises     Home Exercises Provided and Patient Education Provided     Education provided:   - ***    Written Home Exercises Provided: {Blank single:82188::"yes","Patient instructed to cont prior HEP"}. Exercises were reviewed and Aprita was able to demonstrate them prior to the end of the session.  Arpita demonstrated {Desc; good/fair/poor:98081} understanding of the education provided. See EMR under Patient Instructions for exercises provided during therapy sessions    ASSESSMENT     ***    Arpita {IS/IS NOT:46929} progressing well towards her goals.   Pt prognosis is {REHAB PROGNOSIS OHS:58843}.     Pt will continue to benefit from skilled outpatient physical therapy to address the deficits listed in the problem list box on initial evaluation, provide pt/family education and to maximize pt's level of independence in the home and community environment.     Pt's spiritual, cultural and educational needs considered and pt agreeable to plan of care and goals.     Anticipated barriers to physical therapy: ***    GOALS: Short Term Goals:  4 weeks  1.Report decreased  in  pain at worse less than  <   / =  5  /10  to increase tolerance for improved functional actvities. On going  2. Pt to improve range of motion by 25% to allow for improved functional mobility to allow for improvement in IADLs. On going  3. Increased BLE MMT 1/2 grade  to increase tolerance for ADL and work activities.On going  4. Pt to report ability to complete morning routine in timely manner (45min) with <5/10 R heel pain.   5. Pt to tolerate HEP to improve ROM and independence with ADL's. On going  6. Pt will demonstrate ability to walk for 15min with normalized gait pattern on level surface at tolerable pace.    "   Long Term Goals: 8 weeks  1.Report decreased  in  pain at worse  less than  <   / =  3  /10  to increase tolerance for improved functional actvities. On going  2.Pt to improve range of motion by 75% to allow for improved functional mobility to allow for improvement in IADLs. On going  3.Increased BLE MMT 1 grade  to increase tolerance for ADL and work activities.On going  4. Pt will report clinically significant improvements on FOTO outcome assessment  to increase functional activities and mobility. On going  5. Pt to be Independent with HEP to improve ROM and independence with ADL's. On going  6. Pt will report ability to ambulate for 30min with <3/10 R heel pain in appropriate shoe on level surface at tolerable pace.     PLAN     ***    Arthur Gillespie, PT

## 2022-12-07 NOTE — PROGRESS NOTES
"[facility]   Physical Therapy Treatment Note     Name: Arpita Bradshaw  Clinic Number: [id number]    Therapy Diagnosis:   Encounter Diagnoses   Name Primary?    Chronic heel pain, right Yes    Bone spur of inferior portion of right calcaneus     Gait, antalgic      Physician: Referring, Unknown    Visit Date: 12/7/2022    Physician Orders: PT Eval and Treat   Medical Diagnosis from Referral: M77.31 (ICD-10-CM) - Calcaneal spur of right foot   Evaluation Date: 11/28/2022  Authorization Period Expiration: 12/31/2022  Plan of Care Expiration: 2/28/2022  Visit # / Visits authorized: 1/ 20 Progress Note Due: 12/28/2022  FOTO: 1/ 1  HEP:  Access Code: [email or web address]        Precautions: Standard, fibromyalgia, lupus  PTA Visit #: 0/5     Time In: 7:00  Time Out: 7:58  Total Billable Time: 55 minutes    SUBJECTIVE     Pt reports: Significant swelling yesterday which has subsided today. Starting taking medication yesterday and it has helped significantly with pain and inflammation. Pt reports soreness after last session, and soreness with current HEP.   She was compliant with home exercise program.  Response to previous treatment: Fair  Functional change: In Progress    Pain: 5/10  Location: right ankles     OBJECTIVE     Objective Measures updated at progress report unless specified.     Treatment     Arpita received the treatments listed below:      Arpita received therapeutic exercises to develop strength, endurance, ROM, flexibility, posture, and core stabilization for 45 minutes including:    Long Sitting Towel Gastroc Stretch: 3x30"  Standing Heel Raises: 3x10 3" eccentric  Toe Raises: 2x10  Hip Adduction: 2x10   Supine Bridge: 2x10  Banded Inversion: 2x10 yellow TB c PT assistance  Banded Eversion: 2x10 yelow TB c PT assistance  Tandem Stance Balance: 2x30"  SLS: 1x30"  Incline Board Stretch: 2x30" R ankle      Arpita received the following manual therapy techniques: Joint mobilizations, " Manual traction, Myofacial release, Soft tissue Mobilization, and Friction Massage were applied to the: R ankle for 10 minutes, including:  Gentle STM along achilles insertion points to R calcaneus  STM to distal gastroc/soleus complex   AP TC mobilizations     neuromuscular re-education activities to improve:  for 00 minutes. The following activities were included:      therapeutic activities to improve functional performance for 00  minutes, including:        Patient Education and Home Exercises     Home Exercises Provided and Patient Education Provided     Education provided:   - Optimal Gait Mechanics  - Ankle Tendinopathy dx and treatment    Written Home Exercises Provided: yes. Exercises were reviewed and Arpita was able to demonstrate them prior to the end of the session.  Arpita demonstrated good  understanding of the education provided. See EMR under Patient Instructions for exercises provided during therapy sessions    ASSESSMENT     First folllow up session since IE. Pt arrived to clinic with antalgic gait; decreased stance time noted on R LE with decreased DF during R LE swing. Pt demonstrating heel toe walking with R LE rather than toe walking as previously mentioned by patient. Initiated treatment with joint mobilizations and STM for stiffness and soreness relief. Initiated eccentric heel raises to which pt was able to tolerate well. Continued with a variety of closed chain and open chain LE strengthening. Pt required extended rest breaks between sets due to muscle fatigue. Moderate cueing for proper exercise form throughout. Pt demonstrated most difficulty during toe raises, and banded eversion/inversion exercises. Pt verbalized she was appropriately challenged towards end of treatment, with no c/o significant exacerbation of sx.    Arpita Is progressing well towards her goals.   Pt prognosis is Good.     Pt will continue to benefit from skilled outpatient physical therapy to address the deficits  listed in the problem list box on initial evaluation, provide pt/family education and to maximize pt's level of independence in the home and community environment.     Pt's spiritual, cultural and educational needs considered and pt agreeable to plan of care and goals.     Anticipated barriers to physical therapy: low activity tolerance. Medical conditions (fibromyalgia, lupus)      GOALS: Short Term Goals:  4 weeks  1.Report decreased  in  pain at worse less than  <   / =  5  /10  to increase tolerance for improved functional actvities. On going  2. Pt to improve range of motion by 25% to allow for improved functional mobility to allow for improvement in IADLs. On going  3. Increased BLE MMT 1/2 grade  to increase tolerance for ADL and work activities.On going  4. Pt to report ability to complete morning routine in timely manner (45min) with <5/10 R heel pain.   5. Pt to tolerate HEP to improve ROM and independence with ADL's. On going  6. Pt will demonstrate ability to walk for 15min with normalized gait pattern on level surface at tolerable pace.      Long Term Goals: 8 weeks  1.Report decreased  in  pain at worse  less than  <   / =  3  /10  to increase tolerance for improved functional actvities. On going  2.Pt to improve range of motion by 75% to allow for improved functional mobility to allow for improvement in IADLs. On going  3.Increased BLE MMT 1 grade  to increase tolerance for ADL and work activities.On going  4. Pt will report clinically significant improvements on FOTO outcome assessment  to increase functional activities and mobility. On going  5. Pt to be Independent with HEP to improve ROM and independence with ADL's. On going  6. Pt will report ability to ambulate for 30min with <3/10 R heel pain in appropriate shoe on level surface at tolerable pace.     PLAN     Continue with [facility] LE strengthening, emphasis on lower leg and hip strengthening.     Xavier Emery, PT

## 2022-12-13 ENCOUNTER — CLINICAL SUPPORT (OUTPATIENT)
Dept: REHABILITATION | Facility: HOSPITAL | Age: 52
End: 2022-12-13
Payer: MEDICAID

## 2022-12-13 DIAGNOSIS — M77.31 BONE SPUR OF INFERIOR PORTION OF RIGHT CALCANEUS: ICD-10-CM

## 2022-12-13 DIAGNOSIS — R26.89 GAIT, ANTALGIC: ICD-10-CM

## 2022-12-13 DIAGNOSIS — G89.29 CHRONIC HEEL PAIN, RIGHT: Primary | ICD-10-CM

## 2022-12-13 DIAGNOSIS — M79.671 CHRONIC HEEL PAIN, RIGHT: Primary | ICD-10-CM

## 2022-12-13 PROCEDURE — 97110 THERAPEUTIC EXERCISES: CPT | Mod: PN

## 2022-12-13 PROCEDURE — 97140 MANUAL THERAPY 1/> REGIONS: CPT | Mod: PN

## 2022-12-13 NOTE — PROGRESS NOTES
"[facility]   Physical Therapy Treatment Note     Name: Arpita Bradshaw  Clinic Number: [id number]    Therapy Diagnosis:   Encounter Diagnoses   Name Primary?    Chronic heel pain, right Yes    Bone spur of inferior portion of right calcaneus     Gait, antalgic        Physician: Referring, Unknown    Visit Date: 12/13/2022    Physician Orders: PT Eval and Treat   Medical Diagnosis from Referral: M77.31 (ICD-10-CM) - Calcaneal spur of right foot   Evaluation Date: 11/28/2022  Authorization Period Expiration: 12/31/2022  Plan of Care Expiration: 2/28/2022  Visit # / Visits authorized: 1/ 20 Progress Note Due: 12/28/2022  FOTO: 1/ 1  HEP:  Access Code: [email or web address]        Precautions: Standard, fibromyalgia, lupus  PTA Visit #: 0/5     Time In: 7:00  Time Out: 7:58  Total Billable Time: 58 minutes; 30min 1:1 c DPT     SUBJECTIVE     Pt reports: "I was a little sore after last time but nothing that stopped me from participating in any activities." Once I get going in the morning the pain lessens however at the end of the day, I find that I can't out pressure on my R foot at all."  She was compliant with home exercise program.  Response to previous treatment: Fair  Functional change: In Progress    Pain: 5/10  Location: right ankles     OBJECTIVE     Objective Measures updated at progress report unless specified.     Treatment     Arpita received the treatments listed below:      Arpita received therapeutic exercises to develop strength, endurance, ROM, flexibility, posture, and core stabilization for 45 minutes including:    Long Sitting Towel Gastroc Stretch: 2x60"  Standing Heel Raises: 3x10 3" eccentric  Toe Raises: 2x10  Hip Adduction: 2x10   Supine Bridge: 2x10  Banded Inversion: 2x10 yellow TB c PT assistance  Banded Eversion: 2x10 yelow TB c PT assistance  Tandem Stance Balance: 2x30"  SLS: 1x30"  Incline Board Stretch: 2x30" R ankle      Arpita received the following manual therapy " techniques: Joint mobilizations, Manual traction, Myofacial release, Soft tissue Mobilization, and Friction Massage were applied to the: R ankle for 15 minutes, including:  Gentle STM along achilles insertion points to R calcaneus  STM to distal gastroc/soleus complex   AP TC mobilizations     neuromuscular re-education activities to improve:  for 00 minutes. The following activities were included:      therapeutic activities to improve functional performance for 00  minutes, including:        Patient Education and Home Exercises     Home Exercises Provided and Patient Education Provided     Education provided:   - Optimal Gait Mechanics  - Ankle Tendinopathy dx and treatment    Written Home Exercises Provided: yes. Exercises were reviewed and Arpita was able to demonstrate them prior to the end of the session.  Arpita demonstrated good  understanding of the education provided. See EMR under Patient Instructions for exercises provided during therapy sessions    ASSESSMENT     Antalgic gait persists with offloading of calcaneus using lateral border of R foot maintaining supination position thus perpetuating soft tissue restrictions t/o gastroc/soleus complex. Pt required VC for heel raises to avoid supination and preference for lateral weight bearing. Pt and DPT discussed dry needling for future sessions and pt was education on benefits as well as risk and provided with consent form to review before next session. Pt will continue to require skilled PT.     Arpita Is progressing well towards her goals.   Pt prognosis is Good.     Pt will continue to benefit from skilled outpatient physical therapy to address the deficits listed in the problem list box on initial evaluation, provide pt/family education and to maximize pt's level of independence in the home and community environment.     Pt's spiritual, cultural and educational needs considered and pt agreeable to plan of care and goals.     Anticipated barriers to  physical therapy: low activity tolerance. Medical conditions (fibromyalgia, lupus)      GOALS: Short Term Goals:  4 weeks  1.Report decreased  in  pain at worse less than  <   / =  5  /10  to increase tolerance for improved functional actvities. On going  2. Pt to improve range of motion by 25% to allow for improved functional mobility to allow for improvement in IADLs. On going  3. Increased BLE MMT 1/2 grade  to increase tolerance for ADL and work activities.On going  4. Pt to report ability to complete morning routine in timely manner (45min) with <5/10 R heel pain.   5. Pt to tolerate HEP to improve ROM and independence with ADL's. On going  6. Pt will demonstrate ability to walk for 15min with normalized gait pattern on level surface at tolerable pace.      Long Term Goals: 8 weeks  1.Report decreased  in  pain at worse  less than  <   / =  3  /10  to increase tolerance for improved functional actvities. On going  2.Pt to improve range of motion by 75% to allow for improved functional mobility to allow for improvement in IADLs. On going  3.Increased BLE MMT 1 grade  to increase tolerance for ADL and work activities.On going  4. Pt will report clinically significant improvements on FOTO outcome assessment  to increase functional activities and mobility. On going  5. Pt to be Independent with HEP to improve ROM and independence with ADL's. On going  6. Pt will report ability to ambulate for 30min with <3/10 R heel pain in appropriate shoe on level surface at tolerable pace.     PLAN     Continue with [facility] LE strengthening, emphasis on lower leg and hip strengthening.     Patsy Cabello, PT

## 2022-12-16 ENCOUNTER — CLINICAL SUPPORT (OUTPATIENT)
Dept: REHABILITATION | Facility: HOSPITAL | Age: 52
End: 2022-12-16
Payer: MEDICAID

## 2022-12-16 DIAGNOSIS — M79.671 CHRONIC HEEL PAIN, RIGHT: Primary | ICD-10-CM

## 2022-12-16 DIAGNOSIS — R26.89 GAIT, ANTALGIC: ICD-10-CM

## 2022-12-16 DIAGNOSIS — M77.31 BONE SPUR OF INFERIOR PORTION OF RIGHT CALCANEUS: ICD-10-CM

## 2022-12-16 DIAGNOSIS — G89.29 CHRONIC HEEL PAIN, RIGHT: Primary | ICD-10-CM

## 2022-12-16 PROCEDURE — 97110 THERAPEUTIC EXERCISES: CPT | Mod: PN,CQ

## 2022-12-16 PROCEDURE — 97140 MANUAL THERAPY 1/> REGIONS: CPT | Mod: PN,CQ

## 2022-12-16 NOTE — PROGRESS NOTES
"[facility]   Physical Therapy Treatment Note     Name: Arpita Bradshaw  Clinic Number: [id number]    Therapy Diagnosis:   Encounter Diagnoses   Name Primary?    Chronic heel pain, right Yes    Bone spur of inferior portion of right calcaneus     Gait, antalgic        Physician: Referring, Unknown    Visit Date: 12/16/2022    Physician Orders: PT Eval and Treat   Medical Diagnosis from Referral: M77.31 (ICD-10-CM) - Calcaneal spur of right foot   Evaluation Date: 11/28/2022  Authorization Period Expiration: 12/31/2022  Plan of Care Expiration: 2/28/2022  Visit # / Visits authorized: 3/10 Progress Note Due: 12/28/2022  FOTO: 1/ 1  HEP:  Access Code: [email or web address]        Precautions: Standard, fibromyalgia, lupus  PTA Visit #: 1/5     Time In: 0705AM  Time Out: 0803AM  Total Billable Time: 58 minutes    SUBJECTIVE     Pt reports: soreness after previous visit. She continues to limp because putting pressure on right side is painful.     She was compliant with home exercise program.  Response to previous treatment: Fair  Functional change: In Progress    Pain: 4/10  Location: right ankles     OBJECTIVE     Objective Measures updated at progress report unless specified.     Treatment     Arpita received the treatments listed below:      Arpita received therapeutic exercises to develop strength, endurance, ROM, flexibility, posture, and core stabilization for 45 minutes including:    Banded Inversion: 2x10 yellow TB c PT assistance  Banded Eversion: 2x10 yelow TB c PT assistance  DF stretch @ stairs 10x5" hold  Incline Board Stretch: 3x30" B  +Soleus Stretch on Incline 3x20" B  Standing Heel Raises on foam: 3x10 3" eccentric  +Standing Hip Abduction on foam 2x10/ea  +Standing Hip Extension on foam 2x10/ea  +Shuttle Squats w/ ball for hip add (50#) 2x10      Arpita received the following manual therapy techniques: Joint mobilizations, Manual traction, Myofacial release, Soft tissue Mobilization, " and Friction Massage were applied to the: R ankle for 15 minutes, including:  Gentle STM along achilles insertion points to R calcaneus w/ great toe stretch  STM to distal gastroc/soleus complex   AP TC mobilizations   STM to plantar surface  Heel Lift fitting    Patient Education and Home Exercises     Home Exercises Provided and Patient Education Provided     Education provided:   - Optimal Gait Mechanics  - Ankle Tendinopathy dx and treatment  -Continue HEP w/ additions    Written Home Exercises Provided: yes. Exercises were reviewed and Arpita was able to demonstrate them prior to the end of the session.  Arpita demonstrated good  understanding of the education provided. See EMR under Patient Instructions for exercises provided during therapy sessions    ASSESSMENT     Arpita tolerated the above tx session well with minimal c/o pain. Pt continues to present with left lateral trunk lean and decreased stance time on RLE. TherEx modified to challenge intrinsics in addition to hip strengthening. Fatigue observed with all standing therEx, but pt took breaks when appropriate. Continued TTP at achilles insertion, but pt tolerated MT techniques well without exacerbation of symptoms. Heel lift provided to offload heel during ambulation,and therEX for improved flexibility attached in patient instructions. Slightly improvement in gait quality upon leaving clinic; will assess effects of heel lifts NVNubia Palm Is progressing well towards her goals.   Pt prognosis is Good.     Pt will continue to benefit from skilled outpatient physical therapy to address the deficits listed in the problem list box on initial evaluation, provide pt/family education and to maximize pt's level of independence in the home and community environment.     Pt's spiritual, cultural and educational needs considered and pt agreeable to plan of care and goals.     Anticipated barriers to physical therapy: low activity tolerance. Medical  "conditions (fibromyalgia, lupus)      GOALS: Short Term Goals:  4 weeks  1.Report decreased  in  pain at worse less than  <   / =  5  /10  to increase tolerance for improved functional actvities. On going  2. Pt to improve range of motion by 25% to allow for improved functional mobility to allow for improvement in IADLs. On going  3. Increased BLE MMT 1/2 grade  to increase tolerance for ADL and work activities.On going  4. Pt to report ability to complete morning routine in timely manner (45min) with <5/10 R heel pain.   5. Pt to tolerate HEP to improve ROM and independence with ADL's. On going  6. Pt will demonstrate ability to walk for 15min with normalized gait pattern on level surface at tolerable pace.      Long Term Goals: 8 weeks  1.Report decreased  in  pain at worse  less than  <   / =  3  /10  to increase tolerance for improved functional actvities. On going  2.Pt to improve range of motion by 75% to allow for improved functional mobility to allow for improvement in IADLs. On going  3.Increased BLE MMT 1 grade  to increase tolerance for ADL and work activities.On going  4. Pt will report clinically significant improvements on FOTO outcome assessment  to increase functional activities and mobility. On going  5. Pt to be Independent with HEP to improve ROM and independence with ADL's. On going  6. Pt will report ability to ambulate for 30min with <3/10 R heel pain in appropriate shoe on level surface at tolerable pace.     PLAN     Continue with [facility] LE strengthening, emphasis on lower leg and hip strengthening.   Resume:  Tandem Stance Balance: 2x30"-NV, if applicable  SLS: 1x30"-Resume NV, if applicable  Toe Raises: 2x10  Hip Adduction: 2x10   Supine Bridge: 2x10    Danielle Perez, PTA   12/16/2022            "

## 2022-12-28 ENCOUNTER — CLINICAL SUPPORT (OUTPATIENT)
Dept: REHABILITATION | Facility: HOSPITAL | Age: 52
End: 2022-12-28
Payer: MEDICAID

## 2022-12-28 DIAGNOSIS — M77.31 BONE SPUR OF INFERIOR PORTION OF RIGHT CALCANEUS: ICD-10-CM

## 2022-12-28 DIAGNOSIS — M79.671 CHRONIC HEEL PAIN, RIGHT: Primary | ICD-10-CM

## 2022-12-28 DIAGNOSIS — G89.29 CHRONIC HEEL PAIN, RIGHT: Primary | ICD-10-CM

## 2022-12-28 DIAGNOSIS — R26.89 GAIT, ANTALGIC: ICD-10-CM

## 2022-12-28 PROCEDURE — 97140 MANUAL THERAPY 1/> REGIONS: CPT | Mod: PN

## 2022-12-28 PROCEDURE — 97110 THERAPEUTIC EXERCISES: CPT | Mod: PN

## 2022-12-28 NOTE — PLAN OF CARE
[facility]  Physical Therapy Plan of Care Note    Name: Arpita Bradshaw  Clinic Number: [id number]    Therapy Diagnosis:   Encounter Diagnoses   Name Primary?    Chronic heel pain, right Yes    Bone spur of inferior portion of right calcaneus     Gait, antalgic      Physician: Referring, Unknown    Visit Date: 12/28/2022    Physician Orders: PT Eval and Treat   Medical Diagnosis from Referral: M77.31 (ICD-10-CM) - Calcaneal spur of right foot   Evaluation Date: 11/28/2022  Authorization Period Expiration: 12/31/2022  Plan of Care Expiration: 2/28/2022  Visit # / Visits authorized: 3/10  FOTO: 2/2  HEP:  Access Code: [email or web address]    Precautions: Standard, fibromyalgia, lupus  PTA Visit #: 1/5     SUBJECTIVE     Update:   Pt reports that physical therapy has been helpful and she feels slight improvement with her walking    OBJECTIVE     Update:   Observation: antalgic gait pattern, lacks heel strike; forefoot strike, R knee excessive flexion t/o gait; decreased stance time on R    Antalgic, inconsistent heel strike on R. Decreased step length with L LE. L lateral trunk lean    Ankle Range of Motion:   Ankle Right Left   Dorsiflexion Lacking 5 from netural 5   Plantarflexion 55 58   Inversion 22 25   Eversion 12 12      Strength:   Right Ankle    Left Ankle     Dorsiflexion: 4/5 Dorsiflexion: 4/5   Plantarflexion:  3+/5 Plantarflexion: 4-/5   Inversion: 4-/5 Inversion: 4-/5   Eversion: 4-/5 Eversion: 4-/5         Right LE   Left LE     Hip extension:  4/5 Hip extension: 4/5   Hip abduction: 3+/5 Hip abduction: 4-/5   Hip adduction: 3/5 Hip adduction 3+/5        CMS Impairment/Limitation/Restriction for FOTO LEFS Survey     Therapist reviewed FOTO scores for Arpita Bradshaw on 11/28/2022.   FOTO documents entered into EPIC - see Media section.     Limitation Score: 86%             ASSESSMENT     Update:   Pt demonstrates improvement in gait mechanics since IE. Pt still demonstrating  difficulty with with R ankle DF passively and actively. General LE strength is below normal, particularly in R foot. Pt has been responding well to manual therapy.     Reasons for Recertification of Therapy:   Will continue towards current POC end date    GOALS  Short Term Goals:  4 weeks  1.Report decreased  in  pain at worse less than  <   / =  5  /10  to increase tolerance for improved functional actvities. On going  2. Pt to improve range of motion by 25% to allow for improved functional mobility to allow for improvement in IADLs. Met  3. Increased BLE MMT 1/2 grade  to increase tolerance for ADL and work activities.On going Ongoing   4. Pt to report ability to complete morning routine in timely manner (45min) with <5/10 R heel pain.  Ongoing   5. Pt to tolerate HEP to improve ROM and independence with ADL's. Met  6. Pt will demonstrate ability to walk for 15min with normalized gait pattern on level surface at tolerable pace. Ongoing      Long Term Goals: 8 weeks  1.Report decreased  in  pain at worse  less than  <   / =  3  /10  to increase tolerance for improved functional actvities. On going  2.Pt to improve range of motion by 75% to allow for improved functional mobility to allow for improvement in IADLs. On going  3.Increased BLE MMT 1 grade  to increase tolerance for ADL and work activities.On going  4. Pt will report clinically significant improvements on FOTO outcome assessment  to increase functional activities and mobility. On going  5. Pt to be Independent with HEP to improve ROM and independence with ADL's. On going  6. Pt will report ability to ambulate for 30min with <3/10 R heel pain in appropriate shoe on level surface at tolerable pace.      PLAN     Updated Certification Period: 2/28/2023   Recommended Treatment Plan: 2 times per week for 8 weeks:  Manual Therapy, Neuromuscular Re-ed, Self Care, Therapeutic Activities, and Therapeutic Exercise      Xavier Emery, PT    I CERTIFY THE NEED FOR  THESE SERVICES FURNISHED UNDER THIS PLAN OF TREATMENT AND WHILE UNDER MY CARE  Physician's comments:      Physician's Signature: ___________________________________________________

## 2022-12-28 NOTE — PROGRESS NOTES
[facility]   Physical Therapy Treatment Note     Name: Arpita Bradshaw  Clinic Number: [id number]    Therapy Diagnosis:   Encounter Diagnoses   Name Primary?    Chronic heel pain, right Yes    Bone spur of inferior portion of right calcaneus     Gait, antalgic        Physician: Referring, Unknown    Visit Date: 12/28/2022    Physician Orders: PT Eval and Treat   Medical Diagnosis from Referral: M77.31 (ICD-10-CM) - Calcaneal spur of right foot   Evaluation Date: 11/28/2022  Authorization Period Expiration: 12/31/2022  Plan of Care Expiration: 2/28/2022  Visit # / Visits authorized: 3/10 Progress Note Due: 12/28/2022  FOTO: 1/ 1  HEP:  Access Code: [email or web address]        Precautions: Standard, fibromyalgia, lupus  PTA Visit #: 1/5     Time In: 0705AM  Time Out: 0803AM  Total Billable Time: 58 minutes    SUBJECTIVE     Pt reports: she still feels soreness in her R foot but it has improved since first visit.    She was compliant with home exercise program.  Response to previous treatment: Fair  Functional change: In Progress    Pain: 7/10  Location: right ankles     OBJECTIVE     Observation: antalgic gait pattern, lacks heel strike; forefoot strike, R knee excessive flexion t/o gait; decreased stance time on R    Antalgic, inconsistent heel strike on R. Decreased step length with L LE. L lateral trunk lean    Ankle Range of Motion:   Ankle Right Left   Dorsiflexion Lacking 5 from netural 5   Plantarflexion 55 58   Inversion 22 25   Eversion 12 12      Strength:   Right Ankle    Left Ankle     Dorsiflexion: 4/5 Dorsiflexion: 4/5   Plantarflexion:  3+/5 Plantarflexion: 4-/5   Inversion: 4-/5 Inversion: 4-/5   Eversion: 4-/5 Eversion: 4-/5         Right LE   Left LE     Hip extension:  4/5 Hip extension: 4/5   Hip abduction: 3+/5 Hip abduction: 4-/5   Hip adduction: 3/5 Hip adduction 3+/5              CMS Impairment/Limitation/Restriction for FOTO LEFS Survey     Therapist reviewed FOTO scores for  "Arpita Bradshaw on 11/28/2022.   FOTO documents entered into EPIC - see Media section.     Limitation Score: 86%              Treatment     Arpita received the treatments listed below:      Arpita received therapeutic exercises to develop strength, endurance, ROM, flexibility, posture, and core stabilization for 35 minutes including:    Banded Inversion: 2x10 yellow TB c PT assistance  Banded Eversion: 2x10 yelow TB c PT assistance  + Banded DF: 2x10 yellow TB c PT assistance    DF stretch @ stairs 10x5" hold  Incline Board Stretch: 3x30" B  Soleus Stretch on Incline 3x30" B  Standing Heel Raises on foam: 3x10 3" eccentric  Standing Hip Abduction on foam 2x10/ea  Standing Hip Extension on foam 2x10/ea  Shuttle Squats w/ ball for hip add (50#) +3x10  + Sidelying Hip Adduction: 2x10 B    Arpita received the following manual therapy techniques: Joint mobilizations, Manual traction, Myofacial release, Soft tissue Mobilization, and Friction Massage were applied to the: R ankle for 15 minutes, including:  Gentle STM along achilles insertion points to R calcaneus w/ great toe stretch  STM to distal gastroc/soleus complex   AP TC mobilizations   STM to plantar surface      Patient Education and Home Exercises     Home Exercises Provided and Patient Education Provided     Education provided:   - Optimal Gait Mechanics  - Ankle Tendinopathy dx and treatment  -Continue HEP w/ additions    Written Home Exercises Provided: yes. Exercises were reviewed and Arpita was able to demonstrate them prior to the end of the session.  Arpita demonstrated good  understanding of the education provided. See EMR under Patient Instructions for exercises provided during therapy sessions    ASSESSMENT     Reassessment performed today. See updated POC for details. Pt tolerated manual therapy and therex well this session with mild exacerbation of heel pain during incline board stretches. Pt instructed to reduce intensity of stretch to " alleviate pain. Pt tolerated modification well. Tolerated increase in rep ranges and additional exercises this session. Minimal cueing required for proper exercise form.    Arpita Is progressing well towards her goals.   Pt prognosis is Good.     Pt will continue to benefit from skilled outpatient physical therapy to address the deficits listed in the problem list box on initial evaluation, provide pt/family education and to maximize pt's level of independence in the home and community environment.     Pt's spiritual, cultural and educational needs considered and pt agreeable to plan of care and goals.     Anticipated barriers to physical therapy: low activity tolerance. Medical conditions (fibromyalgia, lupus)      GOALS: Short Term Goals:  4 weeks  1.Report decreased  in  pain at worse less than  <   / =  5  /10  to increase tolerance for improved functional actvities. On going  2. Pt to improve range of motion by 25% to allow for improved functional mobility to allow for improvement in IADLs. Met  3. Increased BLE MMT 1/2 grade  to increase tolerance for ADL and work activities.On going  4. Pt to report ability to complete morning routine in timely manner (45min) with <5/10 R heel pain.   5. Pt to tolerate HEP to improve ROM and independence with ADL's. Met  6. Pt will demonstrate ability to walk for 15min with normalized gait pattern on level surface at tolerable pace.      Long Term Goals: 8 weeks  1.Report decreased  in  pain at worse  less than  <   / =  3  /10  to increase tolerance for improved functional actvities. On going  2.Pt to improve range of motion by 75% to allow for improved functional mobility to allow for improvement in IADLs. On going  3.Increased BLE MMT 1 grade  to increase tolerance for ADL and work activities.On going  4. Pt will report clinically significant improvements on FOTO outcome assessment  to increase functional activities and mobility. On going  5. Pt to be Independent with  "HEP to improve ROM and independence with ADL's. On going  6. Pt will report ability to ambulate for 30min with <3/10 R heel pain in appropriate shoe on level surface at tolerable pace.     PLAN     Continue with [facility] LE strengthening, emphasis on lower leg and hip strengthening.   Resume:  Tandem Stance Balance: 2x30"-NV, if applicable  SLS: 1x30"-Resume NV, if applicable  Toe Raises: 2x10  Hip Adduction: 2x10   Supine Bridge: 2x10    Xavier Emery, PT   12/28/2022              "

## 2022-12-29 NOTE — PROGRESS NOTES
"[facility]   Physical Therapy Treatment Note     Name: Arpita Bradshaw  Clinic Number: [id number]    Therapy Diagnosis:   No diagnosis found.      Physician: Referring, Unknown    Visit Date: 12/30/2022    Physician Orders: PT Eval and Treat   Medical Diagnosis from Referral: M77.31 (ICD-10-CM) - Calcaneal spur of right foot   Evaluation Date: 11/28/2022  Authorization Period Expiration: 12/31/2022  Plan of Care Expiration: 2/28/2022  Visit # / Visits authorized: 3/10 Progress Note Due: 1/29/2023  FOTO: 1/ 1  HEP:  Access Code: [email or web address]        Precautions: Standard, fibromyalgia, lupus  PTA Visit #: 1/5     Time In: 0705AM  Time Out: 0803AM  Total Billable Time: 58 minutes    SUBJECTIVE     Pt reports: she still feels soreness in her R foot but it has improved since first visit.    She was compliant with home exercise program.  Response to previous treatment: Fair  Functional change: In Progress    Pain: 7/10  Location: right ankles     OBJECTIVE   No objective measures taken today    Treatment     Arpita received the treatments listed below:      Arpita received therapeutic exercises to develop strength, endurance, ROM, flexibility, posture, and core stabilization for 35 minutes including:    Banded Inversion: 2x10 yellow TB c PT assistance  Banded Eversion: 2x10 yelow TB c PT assistance  + Banded DF: 2x10 yellow TB c PT assistance    DF stretch @ stairs 10x5" hold  Incline Board Stretch: 3x30" B  Soleus Stretch on Incline 3x30" B  Standing Heel Raises on foam: 3x10 3" eccentric  Standing Hip Abduction on foam 2x10/ea  Standing Hip Extension on foam 2x10/ea  Shuttle Squats w/ ball for hip add (50#) +3x10  + Sidelying Hip Adduction: 2x10 B    Arpita received the following manual therapy techniques: Joint mobilizations, Manual traction, Myofacial release, Soft tissue Mobilization, and Friction Massage were applied to the: R ankle for 15 minutes, including:  Gentle STM along achilles " insertion points to R calcaneus w/ great toe stretch  STM to distal gastroc/soleus complex   AP TC mobilizations   STM to plantar surface      Patient Education and Home Exercises     Home Exercises Provided and Patient Education Provided     Education provided:   - Optimal Gait Mechanics  - Ankle Tendinopathy dx and treatment  -Continue HEP w/ additions    Written Home Exercises Provided: yes. Exercises were reviewed and Arpita was able to demonstrate them prior to the end of the session.  Arpita demonstrated good  understanding of the education provided. See EMR under Patient Instructions for exercises provided during therapy sessions    ASSESSMENT     Reassessment performed today. See updated POC for details. Pt tolerated manual therapy and therex well this session with mild exacerbation of heel pain during incline board stretches. Pt instructed to reduce intensity of stretch to alleviate pain. Pt tolerated modification well. Tolerated increase in rep ranges and additional exercises this session. Minimal cueing required for proper exercise form.    Arpita Is progressing well towards her goals.   Pt prognosis is Good.     Pt will continue to benefit from skilled outpatient physical therapy to address the deficits listed in the problem list box on initial evaluation, provide pt/family education and to maximize pt's level of independence in the home and community environment.     Pt's spiritual, cultural and educational needs considered and pt agreeable to plan of care and goals.     Anticipated barriers to physical therapy: low activity tolerance. Medical conditions (fibromyalgia, lupus)      GOALS: Short Term Goals:  4 weeks  1.Report decreased  in  pain at worse less than  <   / =  5  /10  to increase tolerance for improved functional actvities. On going  2. Pt to improve range of motion by 25% to allow for improved functional mobility to allow for improvement in IADLs. Met  3. Increased BLE MMT 1/2 grade  to  "increase tolerance for ADL and work activities.On going  4. Pt to report ability to complete morning routine in timely manner (45min) with <5/10 R heel pain.   5. Pt to tolerate HEP to improve ROM and independence with ADL's. Met  6. Pt will demonstrate ability to walk for 15min with normalized gait pattern on level surface at tolerable pace.      Long Term Goals: 8 weeks  1.Report decreased  in  pain at worse  less than  <   / =  3  /10  to increase tolerance for improved functional actvities. On going  2.Pt to improve range of motion by 75% to allow for improved functional mobility to allow for improvement in IADLs. On going  3.Increased BLE MMT 1 grade  to increase tolerance for ADL and work activities.On going  4. Pt will report clinically significant improvements on FOTO outcome assessment  to increase functional activities and mobility. On going  5. Pt to be Independent with HEP to improve ROM and independence with ADL's. On going  6. Pt will report ability to ambulate for 30min with <3/10 R heel pain in appropriate shoe on level surface at tolerable pace.     PLAN     Continue with [facility] LE strengthening, emphasis on lower leg and hip strengthening.   Resume:  Tandem Stance Balance: 2x30"-NV, if applicable  SLS: 1x30"-Resume NV, if applicable  Toe Raises: 2x10  Hip Adduction: 2x10   Supine Bridge: 2x10    Arthur Gillespie, PT   12/29/2022                "

## 2022-12-30 ENCOUNTER — CLINICAL SUPPORT (OUTPATIENT)
Dept: REHABILITATION | Facility: HOSPITAL | Age: 52
End: 2022-12-30
Payer: MEDICAID

## 2022-12-30 DIAGNOSIS — G89.29 CHRONIC HEEL PAIN, RIGHT: Primary | ICD-10-CM

## 2022-12-30 DIAGNOSIS — M79.671 CHRONIC HEEL PAIN, RIGHT: Primary | ICD-10-CM

## 2022-12-30 DIAGNOSIS — M77.31 BONE SPUR OF INFERIOR PORTION OF RIGHT CALCANEUS: ICD-10-CM

## 2022-12-30 DIAGNOSIS — R26.89 GAIT, ANTALGIC: ICD-10-CM

## 2022-12-30 PROCEDURE — 97110 THERAPEUTIC EXERCISES: CPT | Mod: PN,CQ

## 2022-12-30 NOTE — PROGRESS NOTES
"[facility]   Physical Therapy Treatment Note     Name: Arpita Bradshaw  Clinic Number: [id number]    Therapy Diagnosis:   Encounter Diagnoses   Name Primary?    Chronic heel pain, right Yes    Bone spur of inferior portion of right calcaneus     Gait, antalgic          Physician: Referring, Unknown    Visit Date: 12/30/2022    Physician Orders: PT Eval and Treat   Medical Diagnosis from Referral: M77.31 (ICD-10-CM) - Calcaneal spur of right foot   Evaluation Date: 11/28/2022  Authorization Period Expiration: 12/31/2022  Plan of Care Expiration: 2/28/2022  Visit # / Visits authorized: 3/10 Progress Note Due: 12/28/2022  FOTO: 1/ 1  HEP:  Access Code: [email or web address]        Precautions: Standard, fibromyalgia, lupus  PTA Visit #: 1/5     Time In: 1117AM  Time Out: 1200  Total Billable Time: 43 minutes    SUBJECTIVE     Pt reports: She feels like she is getting better bus she still has pain in her heel like something is stabbing her.     She was compliant with home exercise program.  Response to previous treatment: Tired   Functional change: In Progress    Pain: 8/10  Location: right ankles     OBJECTIVE           CMS Impairment/Limitation/Restriction for FOTO LEFS Survey     Therapist reviewed FOTO scores for Arpita Bradshaw on 11/28/2022.   FOTO documents entered into [employer] - see Media section.     Limitation Score: 86%              Treatment     Arpita received the treatments listed below:      Arpita received therapeutic exercises to develop strength, endurance, ROM, flexibility, posture, and core stabilization for 43 minutes including:      +Recumbent bike x 6'  Banded Inversion: 2x10 yellow TB   Banded Eversion: 2x10 yelow TB   Banded DF: 2x10 yellow TB    +PF/DF standing on rocker x 2'  +Long sitting gastroc stretch 20 x 5"   +Long sitting soleus strech 20 x 5"   +PF/DF on shuttle 2 blk  : 3x10   Standing Hip Abduction on foam 2x10/ea  Standing Hip Extension on foam 2x10/ea  Tandem " "Stance Balance: 2x30" ea  Shuttle Squats w/ ball for hip add (+75#) +3x10      Arpita received the following manual therapy techniques: Joint mobilizations, Manual traction, Myofacial release, Soft tissue Mobilization, and Friction Massage were applied to the: R ankle for 00 minutes, including:  Gentle STM along achilles insertion points to R calcaneus w/ great toe stretch  STM to distal gastroc/soleus complex   AP TC mobilizations   STM to plantar surface      Patient Education and Home Exercises     Home Exercises Provided and Patient Education Provided     Education provided:   - Optimal Gait Mechanics  - Ankle Tendinopathy dx and treatment  -Continue HEP w/ additions    Written Home Exercises Provided: yes. Exercises were reviewed and Arpita was able to demonstrate them prior to the end of the session.  Arpita demonstrated good  understanding of the education provided. See EMR under Patient Instructions for exercises provided during therapy sessions    ASSESSMENT     Arpita tolerated session well despite report high levels of pain on this day. Modified stretches to long sitting due to pts reports of pain on previous session with slant board stretch. Added shuttle calf raises to reduce pain with un weighting and to assist with gastroc stretch. Pt was challenged with tandem balance on foam requiring frequent HA. Pt fatigued following session with only a slight increase in pain reported at 8.5/10.     Arpita Is progressing well towards her goals.   Pt prognosis is Good.     Pt will continue to benefit from skilled outpatient physical therapy to address the deficits listed in the problem list box on initial evaluation, provide pt/family education and to maximize pt's level of independence in the home and community environment.     Pt's spiritual, cultural and educational needs considered and pt agreeable to plan of care and goals.     Anticipated barriers to physical therapy: low activity tolerance. Medical " "conditions (fibromyalgia, lupus)      GOALS: Short Term Goals:  4 weeks  1.Report decreased  in  pain at worse less than  <   / =  5  /10  to increase tolerance for improved functional actvities. On going  2. Pt to improve range of motion by 25% to allow for improved functional mobility to allow for improvement in IADLs. Met  3. Increased BLE MMT 1/2 grade  to increase tolerance for ADL and work activities.On going  4. Pt to report ability to complete morning routine in timely manner (45min) with <5/10 R heel pain.   5. Pt to tolerate HEP to improve ROM and independence with ADL's. Met  6. Pt will demonstrate ability to walk for 15min with normalized gait pattern on level surface at tolerable pace.      Long Term Goals: 8 weeks  1.Report decreased  in  pain at worse  less than  <   / =  3  /10  to increase tolerance for improved functional actvities. On going  2.Pt to improve range of motion by 75% to allow for improved functional mobility to allow for improvement in IADLs. On going  3.Increased BLE MMT 1 grade  to increase tolerance for ADL and work activities.On going  4. Pt will report clinically significant improvements on FOTO outcome assessment  to increase functional activities and mobility. On going  5. Pt to be Independent with HEP to improve ROM and independence with ADL's. On going  6. Pt will report ability to ambulate for 30min with <3/10 R heel pain in appropriate shoe on level surface at tolerable pace.     PLAN     Continue with [facility] LE strengthening, emphasis on lower leg and hip strengthening.   Resume:  -NV, if applicable  SLS: 1x30"-Resume NV, if applicable  Hip Adduction: 2x10       True Tinajero PTA   12/30/2022                "

## 2023-01-06 ENCOUNTER — CLINICAL SUPPORT (OUTPATIENT)
Dept: REHABILITATION | Facility: HOSPITAL | Age: 53
End: 2023-01-06
Payer: MEDICAID

## 2023-01-06 DIAGNOSIS — R26.89 GAIT, ANTALGIC: ICD-10-CM

## 2023-01-06 DIAGNOSIS — G89.29 CHRONIC HEEL PAIN, RIGHT: Primary | ICD-10-CM

## 2023-01-06 DIAGNOSIS — M79.671 CHRONIC HEEL PAIN, RIGHT: Primary | ICD-10-CM

## 2023-01-06 DIAGNOSIS — M77.31 BONE SPUR OF INFERIOR PORTION OF RIGHT CALCANEUS: ICD-10-CM

## 2023-01-06 PROCEDURE — 97140 MANUAL THERAPY 1/> REGIONS: CPT | Mod: PN

## 2023-01-06 PROCEDURE — 97110 THERAPEUTIC EXERCISES: CPT | Mod: PN

## 2023-01-06 NOTE — PROGRESS NOTES
"[facility]   Physical Therapy Treatment Note     Name: Arpita Bradshaw  Clinic Number: [id number]    Therapy Diagnosis:   Encounter Diagnoses   Name Primary?    Chronic heel pain, right Yes    Bone spur of inferior portion of right calcaneus     Gait, antalgic          Physician: Referring, Unknown    Visit Date: 1/6/2023    Physician Orders: PT Eval and Treat   Medical Diagnosis from Referral: M77.31 (ICD-10-CM) - Calcaneal spur of right foot   Evaluation Date: 11/28/2022  Authorization Period Expiration: 12/31/2022  Plan of Care Expiration: 2/28/2022  Visit # / Visits authorized: 3/10 Progress Note Due: 1/28/2022  FOTO: 1/ 1  HEP:  Access Code: [email or web address]        Precautions: Standard, fibromyalgia, lupus  PTA Visit #: 1/5     Time In: 0805AM  Time Out: 0900AM  Total Billable Time: 55 minutes 35min 1:1 c DPT     SUBJECTIVE     Pt reports: "My foot feels like it is coming along."     She was compliant with home exercise program.  Response to previous treatment: Tired   Functional change: In Progress    Pain: 6/10  Location: right ankles     OBJECTIVE         Treatment     Arpita received the treatments listed below:      Arpita received therapeutic exercises to develop strength, endurance, ROM, flexibility, posture, and core stabilization for 45 minutes including:      Banded Inversion: 2x10 yellow TB   Banded Eversion: 2x10 yelow TB   Banded DF: 2x10 yellow TB   PF/DF standing on rocker x 2'  Long sitting gastroc stretch 20 x 5"   Long sitting soleus strech 20 x 5"   PF/DF on shuttle 2 blk  : 3x10   Standing Hip Abduction on foam 2x10/ea  Standing Hip Extension on foam 2x10/ea  Tandem Stance Balance: 2x30" ea  Shuttle Squats w/ ball for hip add (+75#) +3x10      Arpita received the following manual therapy techniques: Joint mobilizations, Manual traction, Myofacial release, Soft tissue Mobilization, and Friction Massage were applied to the: R ankle for 10 minutes, including:  Gentle " STM along achilles insertion points to R calcaneus w/ great toe stretch  STM to distal gastroc/soleus complex   AP TC mobilizations   STM to plantar surface      Patient Education and Home Exercises     Home Exercises Provided and Patient Education Provided     Education provided:   - Optimal Gait Mechanics  - Ankle Tendinopathy dx and treatment  -Continue HEP w/ additions    Written Home Exercises Provided: yes. Exercises were reviewed and Arpita was able to demonstrate them prior to the end of the session.  Arpita demonstrated good  understanding of the education provided. See EMR under Patient Instructions for exercises provided during therapy sessions    ASSESSMENT     Arpita tolerated session well although unable to tolerate SLS on R 2/2 increasing pain in R heel following SL hip extension on foam pad. Pt has tendency to hold weight in heels and requires frequent VC to maintain great toe planted to ground to assist in arch engagement and stabilization which decreases pain in R heel. Arpita is tolerating treatment well and will continue to benefit.       Arpita Is progressing well towards her goals.   Pt prognosis is Good.     Pt will continue to benefit from skilled outpatient physical therapy to address the deficits listed in the problem list box on initial evaluation, provide pt/family education and to maximize pt's level of independence in the home and community environment.     Pt's spiritual, cultural and educational needs considered and pt agreeable to plan of care and goals.     Anticipated barriers to physical therapy: low activity tolerance. Medical conditions (fibromyalgia, lupus)      GOALS: Short Term Goals:  4 weeks  1.Report decreased  in  pain at worse less than  <   / =  5  /10  to increase tolerance for improved functional actvities. On going  2. Pt to improve range of motion by 25% to allow for improved functional mobility to allow for improvement in IADLs. Met  3. Increased BLE MMT 1/2  "grade  to increase tolerance for ADL and work activities.On going  4. Pt to report ability to complete morning routine in timely manner (45min) with <5/10 R heel pain.   5. Pt to tolerate HEP to improve ROM and independence with ADL's. Met  6. Pt will demonstrate ability to walk for 15min with normalized gait pattern on level surface at tolerable pace.      Long Term Goals: 8 weeks  1.Report decreased  in  pain at worse  less than  <   / =  3  /10  to increase tolerance for improved functional actvities. On going  2.Pt to improve range of motion by 75% to allow for improved functional mobility to allow for improvement in IADLs. On going  3.Increased BLE MMT 1 grade  to increase tolerance for ADL and work activities.On going  4. Pt will report clinically significant improvements on FOTO outcome assessment  to increase functional activities and mobility. On going  5. Pt to be Independent with HEP to improve ROM and independence with ADL's. On going  6. Pt will report ability to ambulate for 30min with <3/10 R heel pain in appropriate shoe on level surface at tolerable pace.     PLAN     Continue with [facility] LE strengthening, emphasis on lower leg and hip strengthening.   Resume:  -NV, if applicable  SLS: 1x30"-Resume NV, if applicable  Hip Adduction: 2x10       Patsy Cabello, PT, DPT, Cert DN  01/06/2023                  "

## 2023-01-20 ENCOUNTER — PATIENT MESSAGE (OUTPATIENT)
Dept: OBSTETRICS AND GYNECOLOGY | Facility: CLINIC | Age: 53
End: 2023-01-20
Payer: MEDICAID

## 2023-01-25 ENCOUNTER — LAB VISIT (OUTPATIENT)
Dept: LAB | Facility: OTHER | Age: 53
End: 2023-01-25
Attending: OBSTETRICS & GYNECOLOGY
Payer: MEDICAID

## 2023-01-25 ENCOUNTER — TELEPHONE (OUTPATIENT)
Dept: OBSTETRICS AND GYNECOLOGY | Facility: CLINIC | Age: 53
End: 2023-01-25
Payer: MEDICAID

## 2023-01-25 ENCOUNTER — OFFICE VISIT (OUTPATIENT)
Dept: OBSTETRICS AND GYNECOLOGY | Facility: CLINIC | Age: 53
End: 2023-01-25
Attending: OBSTETRICS & GYNECOLOGY
Payer: MEDICAID

## 2023-01-25 VITALS
WEIGHT: 229.25 LBS | HEIGHT: 68 IN | SYSTOLIC BLOOD PRESSURE: 151 MMHG | HEART RATE: 53 BPM | DIASTOLIC BLOOD PRESSURE: 91 MMHG | BODY MASS INDEX: 34.75 KG/M2

## 2023-01-25 DIAGNOSIS — R30.0 DYSURIA: ICD-10-CM

## 2023-01-25 DIAGNOSIS — Z12.31 ENCOUNTER FOR SCREENING MAMMOGRAM FOR MALIGNANT NEOPLASM OF BREAST: Primary | ICD-10-CM

## 2023-01-25 DIAGNOSIS — M25.50 ARTHRALGIA, UNSPECIFIED JOINT: ICD-10-CM

## 2023-01-25 DIAGNOSIS — N95.1 MENOPAUSAL SYMPTOMS: ICD-10-CM

## 2023-01-25 DIAGNOSIS — L65.9 HAIR LOSS: ICD-10-CM

## 2023-01-25 DIAGNOSIS — L65.9 HAIR LOSS: Primary | ICD-10-CM

## 2023-01-25 DIAGNOSIS — N90.89 VULVAR LESION: ICD-10-CM

## 2023-01-25 LAB — TSH SERPL DL<=0.005 MIU/L-ACNC: 1.67 UIU/ML (ref 0.4–4)

## 2023-01-25 PROCEDURE — 3008F PR BODY MASS INDEX (BMI) DOCUMENTED: ICD-10-PCS | Mod: CPTII,,, | Performed by: OBSTETRICS & GYNECOLOGY

## 2023-01-25 PROCEDURE — 99999 PR PBB SHADOW E&M-EST. PATIENT-LVL IV: ICD-10-PCS | Mod: PBBFAC,,, | Performed by: OBSTETRICS & GYNECOLOGY

## 2023-01-25 PROCEDURE — 99214 OFFICE O/P EST MOD 30 MIN: CPT | Mod: S$PBB,,, | Performed by: OBSTETRICS & GYNECOLOGY

## 2023-01-25 PROCEDURE — 3080F DIAST BP >= 90 MM HG: CPT | Mod: CPTII,,, | Performed by: OBSTETRICS & GYNECOLOGY

## 2023-01-25 PROCEDURE — 3077F PR MOST RECENT SYSTOLIC BLOOD PRESSURE >= 140 MM HG: ICD-10-PCS | Mod: CPTII,,, | Performed by: OBSTETRICS & GYNECOLOGY

## 2023-01-25 PROCEDURE — 1160F RVW MEDS BY RX/DR IN RCRD: CPT | Mod: CPTII,,, | Performed by: OBSTETRICS & GYNECOLOGY

## 2023-01-25 PROCEDURE — 3008F BODY MASS INDEX DOCD: CPT | Mod: CPTII,,, | Performed by: OBSTETRICS & GYNECOLOGY

## 2023-01-25 PROCEDURE — 87529 HSV DNA AMP PROBE: CPT | Performed by: OBSTETRICS & GYNECOLOGY

## 2023-01-25 PROCEDURE — 3077F SYST BP >= 140 MM HG: CPT | Mod: CPTII,,, | Performed by: OBSTETRICS & GYNECOLOGY

## 2023-01-25 PROCEDURE — 87086 URINE CULTURE/COLONY COUNT: CPT | Performed by: OBSTETRICS & GYNECOLOGY

## 2023-01-25 PROCEDURE — 1160F PR REVIEW ALL MEDS BY PRESCRIBER/CLIN PHARMACIST DOCUMENTED: ICD-10-PCS | Mod: CPTII,,, | Performed by: OBSTETRICS & GYNECOLOGY

## 2023-01-25 PROCEDURE — 99999 PR PBB SHADOW E&M-EST. PATIENT-LVL IV: CPT | Mod: PBBFAC,,, | Performed by: OBSTETRICS & GYNECOLOGY

## 2023-01-25 PROCEDURE — 1159F MED LIST DOCD IN RCRD: CPT | Mod: CPTII,,, | Performed by: OBSTETRICS & GYNECOLOGY

## 2023-01-25 PROCEDURE — 1159F PR MEDICATION LIST DOCUMENTED IN MEDICAL RECORD: ICD-10-PCS | Mod: CPTII,,, | Performed by: OBSTETRICS & GYNECOLOGY

## 2023-01-25 PROCEDURE — 99214 OFFICE O/P EST MOD 30 MIN: CPT | Mod: PBBFAC | Performed by: OBSTETRICS & GYNECOLOGY

## 2023-01-25 PROCEDURE — 99214 PR OFFICE/OUTPT VISIT, EST, LEVL IV, 30-39 MIN: ICD-10-PCS | Mod: S$PBB,,, | Performed by: OBSTETRICS & GYNECOLOGY

## 2023-01-25 PROCEDURE — 84443 ASSAY THYROID STIM HORMONE: CPT | Performed by: OBSTETRICS & GYNECOLOGY

## 2023-01-25 PROCEDURE — 3080F PR MOST RECENT DIASTOLIC BLOOD PRESSURE >= 90 MM HG: ICD-10-PCS | Mod: CPTII,,, | Performed by: OBSTETRICS & GYNECOLOGY

## 2023-01-25 PROCEDURE — 36415 COLL VENOUS BLD VENIPUNCTURE: CPT | Performed by: OBSTETRICS & GYNECOLOGY

## 2023-01-25 RX ORDER — TERBINAFINE HYDROCHLORIDE 250 MG/1
250 TABLET ORAL
COMMUNITY
Start: 2022-12-13 | End: 2023-09-11 | Stop reason: ALTCHOICE

## 2023-01-25 NOTE — PATIENT INSTRUCTIONS
Clothing and Laundry  Wear all-white cotton underwear.  Avoid thong underwear  Do not wear pantyhose (wear thigh high or knee high hose instead).   Wear loose-fitting pants or skirts.   Remove wet bathing suits and exercise clothing promptly.   Use hypoallergenic, dermatologically approved detergent such as Tide Free, or All Free and Clear.  Double-rinse underwear and any other clothing that comes into contact with the vulva.   Do not use fabric softener on undergarments.    Hygiene  Use soft, white, unscented toilet paper.   Use lukewarm or cool sitz baths to relieve burning and irritation.   Avoid getting shampoo on the vulvar area.   Do not use bubble bath, feminine hygiene products, or any perfumed creams or soaps.   Wash the vulva with cool to lukewarm water only.  Rinse the vulva with water after urination.  Urinate before the bladder is full.   Prevent constipation by adding fiber to your diet (if necessary, use a psyllium product such as Metamucil) and drinking at least 8 glasses of water daily.  Use unscented menstrual pads and tampons. Do not wear panty liners daily.    Sexual intercourse  Use a lubricant that is water soluble, e.g., Astroglide and unscented.  Apply ice or a frozen blue gel pack (lunch box size) wrapped in one layer of a hand towel to relieve burning after intercourse.   Urinate (to prevent infection) and rinse vulva with cool water after sexual intercourse.  Do not use contraceptive creams or spermicides.    Physical Activities  Avoid exercises that put direct pressure on the vulva such as bicycle riding and horseback riding.  Limit intense exercises that create a lot of friction in the vulvar area (try lower intensity exercises such as walking).  Use a frozen gel pack wrapped in a towel to relieve symptoms after exercise.   Enroll in an exercise class such as yoga to learn stretching and relaxation exercises.  Don't swim in highly chlorinated pools.   Avoid the use of hot tubs.    Add  probiotics to your diet  -from yogurt  -[employer] ultimate probiotic women's  -[employer]

## 2023-01-25 NOTE — PROGRESS NOTES
"HISTORY OF PRESENT ILLNESS:    Arpita Bradshaw is a 52 y.o. female, , No LMP recorded. Patient has had a hysterectomy.,  presents for a problem visit, with multiple complaints.        visit:  C/o hot flashes, night sweats, mood swings - symptoms getting worse.  Started estradiol patch      visit:  Has been on estradiol patch x 8 months.    No improvement in any of her menopausal symptoms.  Tried 2 patches and black cohosh and that didn't help either     Today:    On Estradiol 2 mg oral daily x 4 months  Helps with hot flashes - still having them but not as intense  Still having trouble sleeping - waking up due night sweats  Reports shooting breast pain, bilateral, resolves on its own.  Started in last few months  C/o hair falling out, skin itching, back pain, knee/joint pain.  Feels different from prior lupus flare-ups  Absent sex drive - no difference with ERT  Has monthly infusion for lupus - helps control flare-ups     Has ongoing bladder issues followed by urologist.  S/p hydrodistention - helps for about 2 weeks  Has taken prophylactic antibiotics after intercourse in the past  Unsure if she has a UTI currently     C/o Right labial lesion, initially had pus/blood come out   Has hx bilateral groin "boils" - long hx, intermittent, but more frequent  Shaves vulvar area monthly    Prior HSV 2 IgG positive  Is taking valtrex daily for prevention.         Past Medical History:   Diagnosis Date    Disorder of kidney and ureter     Fibromyalgia     Hypertension     IC (interstitial cystitis)     Lupus     STD (sexually transmitted disease)     Urinary tract infection     Vaginal infection        Past Surgical History:   Procedure Laterality Date    BLADDER FULGURATION N/A 2019    Procedure: FULGURATION, BLADDER;  Surgeon: Harris Lua MD;  Location: [facility];  Service: Urology;  Laterality: N/A;    BLADDER FULGURATION N/A 2019    Procedure: FULGURATION, BLADDER;  Surgeon: Harris Lua, " MD;  Location: [facility] OR;  Service: Urology;  Laterality: N/A;    BLADDER FULGURATION N/A 3/1/2021    Procedure: FULGURATION, BLADDER;  Surgeon: Harris Lua MD;  Location: [facility] OR;  Service: Urology;  Laterality: N/A;    BREAST REDUCTION       SECTION      COLONOSCOPY N/A 2018    Procedure: COLONOSCOPY;  Surgeon: Catrachita aKmara MD;  Location: [facility] ENDO;  Service: Endoscopy;  Laterality: N/A;    CYSTOSCOPY N/A 2018    Procedure: CYSTOSCOPY;  Surgeon: Harris Lua MD;  Location: [facility] OR;  Service: Urology;  Laterality: N/A;  200 of botox    CYSTOSCOPY W/ RETROGRADES Bilateral 2019    Procedure: CYSTOSCOPY, WITH RETROGRADE PYELOGRAM;  Surgeon: Harris Lua MD;  Location: [facility] OR;  Service: Urology;  Laterality: Bilateral;    CYSTOSCOPY WITH HYDRODISTENSION OF BLADDER N/A 10/12/2020    Procedure: CYSTOSCOPY, WITH BLADDER HYDRODISTENSION;  Surgeon: Harris Lua MD;  Location: [facility] OR;  Service: Urology;  Laterality: N/A;    CYSTOSCOPY WITH HYDRODISTENSION OF BLADDER N/A 12/15/2021    Procedure: CYSTOSCOPY, WITH BLADDER HYDRODISTENSION;  Surgeon: Harris Lua MD;  Location: [facility] OR;  Service: Urology;  Laterality: N/A;    CYSTOSCOPY WITH HYDRODISTENSION OF BLADDER N/A 2022    Procedure: CYSTOSCOPY, WITH BLADDER HYDRODISTENSION;  Surgeon: Harris Lua MD;  Location: [facility] OR;  Service: Urology;  Laterality: N/A;    CYSTOURETHROSCOPY  2019    Procedure: CYSTOURETHROSCOPY;  Surgeon: Harris Lua MD;  Location: [facility] OR;  Service: Urology;;    CYSTOURETHROSCOPY N/A 3/1/2021    Procedure: CYSTOURETHROSCOPY;  Surgeon: Harris Lua MD;  Location: [facility] OR;  Service: Urology;  Laterality: N/A;    hemmorroidectomy      HYSTERECTOMY  2008    [facility] for endometriosis    IMPLANTATION OF PERMANENT SACRAL NERVE STIMULATOR Right 2021    Procedure: INSERTION, NEUROSTIMULATOR, PERMANENT, SACRAL;  Surgeon: Harris Lua MD;  Location: [facility];  Service: Urology;   Laterality: Right;    INJECTION OF BOTULINUM TOXIN TYPE A N/A 7/23/2018    Procedure: INJECTION, BOTULINUM TOXIN, TYPE A;  Surgeon: Harris Lua MD;  Location: [facility] OR;  Service: Urology;  Laterality: N/A;    neurostimulator for bladder      REMOVAL OF SACRAL NEUROSTIMULATOR DEVICE Left 4/28/2021    Procedure: REMOVAL, NEUROSTIMULATOR, SACRAL;  Surgeon: Harris Lua MD;  Location: [facility] OR;  Service: Urology;  Laterality: Left;    TOTAL REDUCTION MAMMOPLASTY      URETHRAL CATHETERIZATION N/A 12/4/2019    Procedure: CATHETERIZATION, URETHRA;  Surgeon: Harris Lua MD;  Location: [facility] OR;  Service: Urology;  Laterality: N/A;       MEDICATIONS AND ALLERGIES:      Current Outpatient Medications:     amLODIPine (NORVASC) 10 MG tablet, Take 10 mg by mouth once daily., Disp: , Rfl:     belimumab (BENLYSTA IV), Inject 1 Bag into the vein every 30 days., Disp: , Rfl:     cetirizine (ZYRTEC) 10 MG tablet, Take 10 mg by mouth once daily., Disp: , Rfl:     cyclobenzaprine (FLEXERIL) 5 MG tablet, Take 5 mg by mouth daily as needed., Disp: , Rfl:     diclofenac sodium (VOLTAREN) 1 % Gel, APPLY 2 GRAMS TOPICALLY TO THE AFFECTED AREA(S) FOUR TIMES DAILY, Disp: , Rfl:     diclofenac sodium (VOLTAREN) 1 % Gel, Apply 2 g topically., Disp: , Rfl:     estradioL (ESTRACE) 1 MG tablet, Take 2 tablets (2 mg total) by mouth once daily., Disp: 180 tablet, Rfl: 3    famotidine (PEPCID) 20 MG tablet, Take 20 mg by mouth 2 (two) times daily as needed., Disp: , Rfl:     fluticasone propionate (FLONASE) 50 mcg/actuation nasal spray, SHAKE LQ AND U 2 SPRAYS IEN QD, Disp: , Rfl: 3    gabapentin (NEURONTIN) 300 MG capsule, Take 300 mg by mouth nightly as needed., Disp: , Rfl:     hydrOXYchloroQUINE (PLAQUENIL) 200 mg tablet, Take 1 tablet by mouth 2 (two) times daily., Disp: , Rfl:     linaCLOtide (LINZESS) 72 mcg Cap capsule, Take 1 tablet by mouth. , Disp: , Rfl:     meclizine (ANTIVERT) 25 mg tablet, Take 1 tablet (25 mg total) by  "mouth 3 (three) times daily as needed., Disp: 20 tablet, Rfl: 0    RESTASIS 0.05 % ophthalmic emulsion, INT 1 GTT IN OU BID, Disp: , Rfl: 4    terbinafine HCL (LAMISIL) 250 mg tablet, Take 250 mg by mouth., Disp: , Rfl:     traMADoL (ULTRAM) 50 mg tablet, Take 50 mg by mouth daily as needed., Disp: , Rfl:     valACYclovir (VALTREX) 1000 MG tablet, TAKE 1 TABLET (1,000MG) BY MOUTH ONCE DAILY, Disp: 90 tablet, Rfl: 0    VITAMIN D2 1,250 mcg (50,000 unit) capsule, Take 50,000 Units by mouth every 7 days., Disp: , Rfl:     Review of patient's allergies indicates:   Allergen Reactions    Sulfamethoxazole-trimethoprim Rash and Hives       Family History   Problem Relation Age of Onset    Hypertension Mother     Cancer Mother         KIDNEY    Arthritis Mother     Breast cancer Mother     Kidney disease Neg Hx     Ovarian cancer Neg Hx     Colon cancer Neg Hx        Social History     Socioeconomic History    Marital status: Single   Tobacco Use    Smoking status: Never    Smokeless tobacco: Never   Substance and Sexual Activity    Alcohol use: No    Drug use: No    Sexual activity: Yes     Partners: Male     Birth control/protection: Condom       ROS:  GENERAL: No weight changes. No swelling. No fever.  CARDIOVASCULAR: No chest pain. No shortness of breath. No leg cramps.   NEUROLOGICAL: No headaches. No vision changes.  BREASTS: No pain. No lumps. No discharge.  ABDOMEN: No pain. No nausea. No vomiting. No diarrhea. No constipation.  REPRODUCTIVE: No abnormal bleeding.   VULVA: see above  VAGINA: No relaxation. No itching. No odor. No discharge. No lesions.  URINARY: see above    BP (!) 151/91 (BP Location: Left arm, Patient Position: Sitting, BP Method: Large (Automatic))   Pulse (!) 53   Ht 5' 8" (1.727 m)   Wt 104 kg (229 lb 4.5 oz)   BMI 34.86 kg/m²     PE:  APPEARANCE: Well nourished, well developed, in no acute distress.  ABDOMEN: Soft. No tenderness or masses. No hepatosplenomegaly. No hernias.  BREASTS, " FUNDOSCOPIC, RECTAL DEFERRED  PELVIC: External female genitalia with healing folliculitis right labia majora - slightly tender to palpation..  Female hair distribution. Adequate perineal body, Normal urethral meatus. Vagina moist and well rugated without lesions or discharge.  No significant cystocele or rectocele present. Cervix pink without lesions, discharge or tenderness. Uterus absent.  Pelvis without masses or tenderness.  EXTREMITIES: No edema      DIAGNOSIS & PLAN  1. Hair loss  TSH      2. Dysuria  Urine culture      3. Vulvar lesion  HSV by Rapid PCR, Non-Blood [facility]; Vagina      4. Menopausal symptoms        5. Arthralgia, unspecified joint                COUNSELING:  Discontinue shaving.  Discussed wound care  F/u rheumatologist  F/u urology  Will refer to Dr John for management of  sx.        30 minutes addressing problems.  This includes face to face time and non-face to face time preparing to see the patient (eg, review of tests), Obtaining and/or reviewing separately obtained history, Documenting clinical information in the electronic or other health record, Independently interpreting resultsand communicating results to the patient/family/caregiver, or Care coordination.

## 2023-01-27 LAB — BACTERIA UR CULT: NORMAL

## 2023-01-29 LAB
HSV1 DNA SPEC QL NAA+PROBE: NEGATIVE
HSV2 DNA SPEC QL NAA+PROBE: NEGATIVE
SPECIMEN SOURCE: NORMAL

## 2023-01-31 NOTE — PROGRESS NOTES
[facility]   Physical Therapy Treatment Note     Name: Arpita Bradshaw  Clinic Number: [id number]    Therapy Diagnosis:   Encounter Diagnoses   Name Primary?    Chronic heel pain, right Yes    Bone spur of inferior portion of right calcaneus     Gait, antalgic            Physician: Referring, Unknown    Visit Date: 2/1/2023    Physician Orders: PT Eval and Treat   Medical Diagnosis from Referral: M77.31 (ICD-10-CM) - Calcaneal spur of right foot   Evaluation Date: 11/28/2022  Authorization Period Expiration: 12/31/2022  Plan of Care Expiration: 2/28/2022  Visit # / Visits authorized: 3/10 Progress Note Due: 3/1/2023  FOTO: 1/ 1  HEP:  Access Code: [email or web address]        Precautions: Standard, fibromyalgia, lupus  PTA Visit #: 1/5     Time In: 7:00 AM   Time Out: 8:08 AM   Total Billable Time: 24 minutes (1 on 1)     SUBJECTIVE     Pt reports: R foot/heel is improving and coming along. Continuing to do stretches at home with some good results. Continues to have difficulty with prolonged walking and completing stairs ascent/descent. Noticing L heel and ankle starting to give her some discomfort that was not present 1 month prior.     She was compliant with home exercise program.  Response to previous treatment: Tired   Functional change: In Progress    Pain: 5/10  Location: right ankles     OBJECTIVE     Re-assessment completed 2/1/2023    Observation: antalgic gait pattern, mild improvements in heel strike; forefoot strike, inconsistent stance time on R leg     Antalgic, inconsistent heel strike on R. Decreased step length with L LE. Reduction in lateral trunk lean and greater upright posture     Ankle Range of Motion:   Ankle Right Left   Dorsiflexion 2 5   Plantarflexion 50 58   Inversion 28 25   Eversion 14 P! 12      Strength:   Right Ankle    Left Ankle     Dorsiflexion: 4/5 Dorsiflexion: 4+/5   Plantarflexion:  4-/5 Plantarflexion: 4+/5   Inversion: 4-/5 Inversion: 4/5   Eversion: 4-/5  "Eversion: 4/5         Right LE   Left LE     Hip extension:  4/5 Hip extension: 4/5   Hip abduction: 4-/5 Hip abduction: 44/5   Hip adduction: 4-/5 Hip adduction 4/5         Palpation: distal achilles insertion tenderness to mild/moderate pressure. Improved tolerance for palpation and tissues extensibility of medial plantar foot.      Treatment     Arpita received the treatments listed below:      Arpita received therapeutic exercises to develop strength, endurance, ROM, flexibility, posture, and core stabilization for 45 minutes including:      Banded Inversion: 2x10 red TB  Banded Eversion: 2x10 red TB  Banded DF: 2x10 red TB   +Seated YTB DF march x 10 each leg   +Standing DF stretch on incline board (airex cushion for heels) 15'' x 5   PF/DF standing on rocker x 2'-np  Long sitting gastroc stretch 20 x 5"   Long sitting soleus strech 20 x 5"   PF/DF on shuttle 2 blk  : 2 x 10   Standing Hip Abduction on foam 2x10/ea-np  Standing Hip Extension on foam 2x10/ea-np  Tandem Stance Balance: 2x30" ea  Shuttle Squats w/ ball for hip add (+75#) +2 x 10      Arpita received the following manual therapy techniques: Joint mobilizations, Manual traction, Myofacial release, Soft tissue Mobilization, and Friction Massage were applied to the: R ankle for 5 minutes, including:  Gentle STM along achilles insertion points to R calcaneus w/ great toe stretch  STM to distal gastroc/soleus complex   AP TC mobilizations   STM to plantar surface    Cold pack R heel x 8 minutes       Patient Education and Home Exercises     Home Exercises Provided and Patient Education Provided     Education provided:   - Optimal Gait Mechanics  - Ankle Tendinopathy dx and treatment  -Continue HEP w/ additions    Written Home Exercises Provided: yes. Exercises were reviewed and Arpita was able to demonstrate them prior to the end of the session.  Arpita demonstrated good  understanding of the education provided. See EMR under Patient Instructions " for exercises provided during therapy sessions    ASSESSMENT     Upon re-assessment patient demonstrating improvements in ambulation and step pattern while gaining tolerance for heel contact on complaint surfaces and with adequate footwear. Strength increasing globally in lower extremities with green TB provided to assist in progressing HEP. SLS on R continues to aggravate pain where deferred in there-ex today. VC continue to be provided for medial arch doming during shuttle presses to reduce over pronation and calcaneal valgus tendencies. Manual interventions continue to be beneficial for talocrural mobility and distal achilles insertion tenderness. Patient provided secondary heel lift (<1/4 inch) to place within L shoe per MD recommendation. Monitoring of L foot and ankle where patient reports similar initial presentation as R heel. Further progress to be made within PT.       Arpita Is progressing well towards her goals.   Pt prognosis is Good.     Pt will continue to benefit from skilled outpatient physical therapy to address the deficits listed in the problem list box on initial evaluation, provide pt/family education and to maximize pt's level of independence in the home and community environment.     Pt's spiritual, cultural and educational needs considered and pt agreeable to plan of care and goals.     Anticipated barriers to physical therapy: low activity tolerance. Medical conditions (fibromyalgia, lupus)      GOALS: Short Term Goals:  4 weeks  1.Report decreased  in  pain at worse less than  <   / =  5  /10  to increase tolerance for improved functional actvities. Inconsistently Met  2. Pt to improve range of motion by 25% to allow for improved functional mobility to allow for improvement in IADLs. Met  3. Increased BLE MMT 1/2 grade  to increase tolerance for ADL and work activities.--Partially Met  4. Pt to report ability to complete morning routine in timely manner (45min) with <5/10 R heel pain. On  going   5. Pt to tolerate HEP to improve ROM and independence with ADL's. Met  6. Pt will demonstrate ability to walk for 15min with normalized gait pattern on level surface at tolerable pace. On going      Long Term Goals: 8 weeks  1.Report decreased  in  pain at worse  less than  <   / =  3  /10  to increase tolerance for improved functional actvities. On going  2.Pt to improve range of motion by 75% to allow for improved functional mobility to allow for improvement in IADLs. On going  3.Increased BLE MMT 1 grade  to increase tolerance for ADL and work activities.On going  4. Pt will report clinically significant improvements on FOTO outcome assessment  to increase functional activities and mobility. On going  5. Pt to be Independent with HEP to improve ROM and independence with ADL's. On going  6. Pt will report ability to ambulate for 30min with <3/10 R heel pain in appropriate shoe on level surface at tolerable pace. On going     PLAN     Continue with [facility] LE strengthening, emphasis on lower leg and hip strengthening.     Arthur Gillespie, PT, DPT  02/01/2023

## 2023-02-01 ENCOUNTER — CLINICAL SUPPORT (OUTPATIENT)
Dept: REHABILITATION | Facility: HOSPITAL | Age: 53
End: 2023-02-01
Payer: MEDICAID

## 2023-02-01 DIAGNOSIS — M77.31 BONE SPUR OF INFERIOR PORTION OF RIGHT CALCANEUS: ICD-10-CM

## 2023-02-01 DIAGNOSIS — R26.89 GAIT, ANTALGIC: ICD-10-CM

## 2023-02-01 DIAGNOSIS — G89.29 CHRONIC HEEL PAIN, RIGHT: Primary | ICD-10-CM

## 2023-02-01 DIAGNOSIS — M79.671 CHRONIC HEEL PAIN, RIGHT: Primary | ICD-10-CM

## 2023-02-01 PROCEDURE — 97110 THERAPEUTIC EXERCISES: CPT | Mod: PN

## 2023-02-01 NOTE — PLAN OF CARE
[facility]   Physical Therapy Treatment Note     Name: Arpita Bradshaw  Clinic Number: [id number]    Therapy Diagnosis:   Encounter Diagnoses   Name Primary?    Chronic heel pain, right Yes    Bone spur of inferior portion of right calcaneus     Gait, antalgic            Physician: Referring, Unknown    Visit Date: 2/1/2023    Physician Orders: PT Eval and Treat   Medical Diagnosis from Referral: M77.31 (ICD-10-CM) - Calcaneal spur of right foot   Evaluation Date: 11/28/2022  Authorization Period Expiration: 12/31/2022  Plan of Care Expiration: 2/28/2022  Visit # / Visits authorized: 3/10 Progress Note Due: 3/1/2023  FOTO: 1/ 1  HEP:  Access Code: [email or web address]        Precautions: Standard, fibromyalgia, lupus  PTA Visit #: 1/5     Time In: 7:00 AM   Time Out: 8:08 AM   Total Billable Time: 24 minutes (1 on 1)     SUBJECTIVE     Pt reports: R foot/heel is improving and coming along. Continuing to do stretches at home with some good results. Continues to have difficulty with prolonged walking and completing stairs ascent/descent. Noticing L heel and ankle starting to give her some discomfort that was not present 1 month prior.     She was compliant with home exercise program.  Response to previous treatment: Tired   Functional change: In Progress    Pain: 5/10  Location: right ankles     OBJECTIVE     Re-assessment completed 2/1/2023    Observation: antalgic gait pattern, mild improvements in heel strike; forefoot strike, inconsistent stance time on R leg     Antalgic, inconsistent heel strike on R. Decreased step length with L LE. Reduction in lateral trunk lean and greater upright posture     Ankle Range of Motion:   Ankle Right Left   Dorsiflexion 2 5   Plantarflexion 50 58   Inversion 28 25   Eversion 14 P! 12      Strength:   Right Ankle    Left Ankle     Dorsiflexion: 4/5 Dorsiflexion: 4+/5   Plantarflexion:  4-/5 Plantarflexion: 4+/5   Inversion: 4-/5 Inversion: 4/5   Eversion: 4-/5  "Eversion: 4/5         Right LE   Left LE     Hip extension:  4/5 Hip extension: 4/5   Hip abduction: 4-/5 Hip abduction: 44/5   Hip adduction: 4-/5 Hip adduction 4/5         Palpation: distal achilles insertion tenderness to mild/moderate pressure. Improved tolerance for palpation and tissues extensibility of medial plantar foot.      Treatment     Arpita received the treatments listed below:      Arpita received therapeutic exercises to develop strength, endurance, ROM, flexibility, posture, and core stabilization for 45 minutes including:      Banded Inversion: 2x10 red TB  Banded Eversion: 2x10 red TB  Banded DF: 2x10 red TB   +Seated YTB DF march x 10 each leg   +Standing DF stretch on incline board (airex cushion for heels) 15'' x 5   PF/DF standing on rocker x 2'-np  Long sitting gastroc stretch 20 x 5"   Long sitting soleus strech 20 x 5"   PF/DF on shuttle 2 blk  : 2 x 10   Standing Hip Abduction on foam 2x10/ea-np  Standing Hip Extension on foam 2x10/ea-np  Tandem Stance Balance: 2x30" ea  Shuttle Squats w/ ball for hip add (+75#) +2 x 10      Arpita received the following manual therapy techniques: Joint mobilizations, Manual traction, Myofacial release, Soft tissue Mobilization, and Friction Massage were applied to the: R ankle for 5 minutes, including:  Gentle STM along achilles insertion points to R calcaneus w/ great toe stretch  STM to distal gastroc/soleus complex   AP TC mobilizations   STM to plantar surface    Cold pack R heel x 8 minutes       Patient Education and Home Exercises     Home Exercises Provided and Patient Education Provided     Education provided:   - Optimal Gait Mechanics  - Ankle Tendinopathy dx and treatment  -Continue HEP w/ additions    Written Home Exercises Provided: yes. Exercises were reviewed and Arpita was able to demonstrate them prior to the end of the session.  Arpita demonstrated good  understanding of the education provided. See EMR under Patient Instructions " for exercises provided during therapy sessions    ASSESSMENT     Upon re-assessment patient demonstrating improvements in ambulation and step pattern while gaining tolerance for heel contact on complaint surfaces and with adequate footwear. Strength increasing globally in lower extremities with green TB provided to assist in progressing HEP. SLS on R continues to aggravate pain where deferred in there-ex today. VC continue to be provided for medial arch doming during shuttle presses to reduce over pronation and calcaneal valgus tendencies. Manual interventions continue to be beneficial for talocrural mobility and distal achilles insertion tenderness. Patient provided secondary heel lift (<1/4 inch) to place within L shoe per MD recommendation. Monitoring of L foot and ankle where patient reports similar initial presentation as R heel. Further progress to be made within PT.       Arpita Is progressing well towards her goals.   Pt prognosis is Good.     Pt will continue to benefit from skilled outpatient physical therapy to address the deficits listed in the problem list box on initial evaluation, provide pt/family education and to maximize pt's level of independence in the home and community environment.     Pt's spiritual, cultural and educational needs considered and pt agreeable to plan of care and goals.     Anticipated barriers to physical therapy: low activity tolerance. Medical conditions (fibromyalgia, lupus)      GOALS: Short Term Goals:  4 weeks  1.Report decreased  in  pain at worse less than  <   / =  5  /10  to increase tolerance for improved functional actvities. Inconsistently Met  2. Pt to improve range of motion by 25% to allow for improved functional mobility to allow for improvement in IADLs. Met  3. Increased BLE MMT 1/2 grade  to increase tolerance for ADL and work activities.--Partially Met  4. Pt to report ability to complete morning routine in timely manner (45min) with <5/10 R heel pain. On  going   5. Pt to tolerate HEP to improve ROM and independence with ADL's. Met  6. Pt will demonstrate ability to walk for 15min with normalized gait pattern on level surface at tolerable pace. On going      Long Term Goals: 8 weeks  1.Report decreased  in  pain at worse  less than  <   / =  3  /10  to increase tolerance for improved functional actvities. On going  2.Pt to improve range of motion by 75% to allow for improved functional mobility to allow for improvement in IADLs. On going  3.Increased BLE MMT 1 grade  to increase tolerance for ADL and work activities.On going  4. Pt will report clinically significant improvements on FOTO outcome assessment  to increase functional activities and mobility. On going  5. Pt to be Independent with HEP to improve ROM and independence with ADL's. On going  6. Pt will report ability to ambulate for 30min with <3/10 R heel pain in appropriate shoe on level surface at tolerable pace. On going     PLAN     Continue with [facility] LE strengthening, emphasis on lower leg and hip strengthening.     Arthur Gillespie, PT, DPT  02/01/2023

## 2023-02-01 NOTE — Clinical Note
Re-assessment completed today. Please see plan of care for further details.   Thank you,  Arthur BHAKTAT

## 2023-02-03 ENCOUNTER — CLINICAL SUPPORT (OUTPATIENT)
Dept: REHABILITATION | Facility: HOSPITAL | Age: 53
End: 2023-02-03
Payer: MEDICAID

## 2023-02-03 ENCOUNTER — HOSPITAL ENCOUNTER (OUTPATIENT)
Dept: RADIOLOGY | Facility: HOSPITAL | Age: 53
Discharge: HOME | End: 2023-02-03
Attending: OBSTETRICS & GYNECOLOGY
Payer: MEDICAID

## 2023-02-03 DIAGNOSIS — G89.29 CHRONIC HEEL PAIN, RIGHT: Primary | ICD-10-CM

## 2023-02-03 DIAGNOSIS — M79.671 CHRONIC HEEL PAIN, RIGHT: Primary | ICD-10-CM

## 2023-02-03 DIAGNOSIS — R26.89 GAIT, ANTALGIC: ICD-10-CM

## 2023-02-03 DIAGNOSIS — M77.31 BONE SPUR OF INFERIOR PORTION OF RIGHT CALCANEUS: ICD-10-CM

## 2023-02-03 DIAGNOSIS — Z12.31 ENCOUNTER FOR SCREENING MAMMOGRAM FOR MALIGNANT NEOPLASM OF BREAST: ICD-10-CM

## 2023-02-03 PROCEDURE — 77067 MAMMO DIGITAL SCREENING BILAT WITH TOMO: ICD-10-PCS | Mod: 26,,, | Performed by: RADIOLOGY

## 2023-02-03 PROCEDURE — 97110 THERAPEUTIC EXERCISES: CPT | Mod: PN

## 2023-02-03 PROCEDURE — 97140 MANUAL THERAPY 1/> REGIONS: CPT | Mod: PN

## 2023-02-03 PROCEDURE — 77067 SCR MAMMO BI INCL CAD: CPT | Mod: TC

## 2023-02-03 PROCEDURE — 77067 SCR MAMMO BI INCL CAD: CPT | Mod: 26,,, | Performed by: RADIOLOGY

## 2023-02-03 PROCEDURE — 77063 MAMMO DIGITAL SCREENING BILAT WITH TOMO: ICD-10-PCS | Mod: 26,,, | Performed by: RADIOLOGY

## 2023-02-03 PROCEDURE — 77063 BREAST TOMOSYNTHESIS BI: CPT | Mod: 26,,, | Performed by: RADIOLOGY

## 2023-02-03 NOTE — PROGRESS NOTES
[facility]   Physical Therapy Treatment Note     Name: Arpita Bradshaw  Clinic Number: [id number]    Therapy Diagnosis:   Encounter Diagnoses   Name Primary?    Chronic heel pain, right Yes    Bone spur of inferior portion of right calcaneus     Gait, antalgic        Physician: Referring, Unknown    Visit Date: 2/3/2023    Physician Orders: PT Eval and Treat   Medical Diagnosis from Referral: M77.31 (ICD-10-CM) - Calcaneal spur of right foot   Evaluation Date: 11/28/2022  Authorization Period Expiration: 12/31/2022  Plan of Care Expiration: 2/28/2022  Visit # / Visits authorized: 3/10 Progress Note Due: 3/1/2023  FOTO: 1/ 1  HEP:  Access Code: [email or web address]        Precautions: Standard, fibromyalgia, lupus  PTA Visit #: 1/5     Time In: 1035  Time Out: 1130  Total Billable Time:  25 minutes (1 on 1)     SUBJECTIVE     Pt reports: Heel lift is helping and my custom shoes should be arriving by early next week so I'll have those on when I come back next week. Pt continues on to report, great improvements in overall pain and exacerbations are less frequent. She is able to stand and walk for longer periods of time before pain sets in.     She was compliant with home exercise program.  Response to previous treatment: Tired   Functional change: In Progress    Pain: 3/10  Location: right ankles     OBJECTIVE     Re-assessment completed 2/1/2023    Observation: antalgic gait pattern, mild improvements in heel strike; forefoot strike, inconsistent stance time on R leg     Antalgic, inconsistent heel strike on R. Decreased step length with L LE. Reduction in lateral trunk lean and greater upright posture     Ankle Range of Motion:   Ankle Right Left   Dorsiflexion 2 5   Plantarflexion 50 58   Inversion 28 25   Eversion 14 P! 12      Strength:   Right Ankle    Left Ankle     Dorsiflexion: 4/5 Dorsiflexion: 4+/5   Plantarflexion:  4-/5 Plantarflexion: 4+/5   Inversion: 4-/5 Inversion: 4/5   Eversion: 4-/5  "Eversion: 4/5         Right LE   Left LE     Hip extension:  4/5 Hip extension: 4/5   Hip abduction: 4-/5 Hip abduction: 44/5   Hip adduction: 4-/5 Hip adduction 4/5         Palpation: distal achilles insertion tenderness to mild/moderate pressure. Improved tolerance for palpation and tissues extensibility of medial plantar foot.      Treatment     Arpita received the treatments listed below:      Arpita received therapeutic exercises to develop strength, endurance, ROM, flexibility, posture, and core stabilization for 45 minutes including:      Banded Inversion: 2x10 red TB  Banded Eversion: 2x10 red TB  Banded DF: 2x10 red TB   Seated YTB DF march x 10 each leg   Standing DF stretch on incline board (airex cushion for heels) 15'' x 5   Long sitting gastroc stretch 20 x 5"   Long sitting soleus strech 20 x 5"   PF/DF on shuttle 2 blk  : 2 x 10   Tandem Stance Balance: 2x30" ea  Shuttle Squats w/ ball for hip add (+75#) +2 x 10      Arpita received the following manual therapy techniques: Joint mobilizations, Manual traction, Myofacial release, Soft tissue Mobilization, and Friction Massage were applied to the: R ankle for 15 minutes, including:  Gentle STM along achilles insertion points to B calcaneus w/ great toe stretch  STM to distal gastroc/soleus complex B  TC mobilizations   STM to plantar surface B    Hot pack R gastroc/soleus mm belly x 8 minutes       Patient Education and Home Exercises     Home Exercises Provided and Patient Education Provided     Education provided:   - Optimal Gait Mechanics  - Ankle Tendinopathy dx and treatment  -Continue HEP w/ additions    Written Home Exercises Provided: yes. Exercises were reviewed and Arpita was able to demonstrate them prior to the end of the session.  Arpita demonstrated good  understanding of the education provided. See EMR under Patient Instructions for exercises provided during therapy sessions    ASSESSMENT     Arpita is making good progress in PT. " Noted improvements in pain and ability to tolerate additional exercises. Majority of exercise will continue to be in un-loaded positions with progression towards greavity resisted postures and SL activities. Arpita will receive custom shoes next week and is planning to wear shoes to her follow up appt. Noted soft tissue restriction in R medial gastroc muscle belly on R and L heel cord/soleus tension. Pt will continue to benefit.       Arpita Is progressing well towards her goals.   Pt prognosis is Good.     Pt will continue to benefit from skilled outpatient physical therapy to address the deficits listed in the problem list box on initial evaluation, provide pt/family education and to maximize pt's level of independence in the home and community environment.     Pt's spiritual, cultural and educational needs considered and pt agreeable to plan of care and goals.     Anticipated barriers to physical therapy: low activity tolerance. Medical conditions (fibromyalgia, lupus)      GOALS: Short Term Goals:  4 weeks  1.Report decreased  in  pain at worse less than  <   / =  5  /10  to increase tolerance for improved functional actvities. Inconsistently Met  2. Pt to improve range of motion by 25% to allow for improved functional mobility to allow for improvement in IADLs. Met  3. Increased BLE MMT 1/2 grade  to increase tolerance for ADL and work activities.--Partially Met  4. Pt to report ability to complete morning routine in timely manner (45min) with <5/10 R heel pain. On going   5. Pt to tolerate HEP to improve ROM and independence with ADL's. Met  6. Pt will demonstrate ability to walk for 15min with normalized gait pattern on level surface at tolerable pace. On going      Long Term Goals: 8 weeks  1.Report decreased  in  pain at worse  less than  <   / =  3  /10  to increase tolerance for improved functional actvities. On going  2.Pt to improve range of motion by 75% to allow for improved functional mobility to  allow for improvement in IADLs. On going  3.Increased BLE MMT 1 grade  to increase tolerance for ADL and work activities.On going  4. Pt will report clinically significant improvements on FOTO outcome assessment  to increase functional activities and mobility. On going  5. Pt to be Independent with HEP to improve ROM and independence with ADL's. On going  6. Pt will report ability to ambulate for 30min with <3/10 R heel pain in appropriate shoe on level surface at tolerable pace. On going     PLAN     Continue with [facility] LE strengthening, emphasis on lower leg and hip strengthening.     Patsy Cabello, PT, DPT, Cert DN  02/03/2023

## 2023-02-07 ENCOUNTER — CLINICAL SUPPORT (OUTPATIENT)
Dept: REHABILITATION | Facility: HOSPITAL | Age: 53
End: 2023-02-07
Payer: MEDICAID

## 2023-02-07 DIAGNOSIS — M79.671 CHRONIC HEEL PAIN, RIGHT: Primary | ICD-10-CM

## 2023-02-07 DIAGNOSIS — M77.31 BONE SPUR OF INFERIOR PORTION OF RIGHT CALCANEUS: ICD-10-CM

## 2023-02-07 DIAGNOSIS — R26.89 GAIT, ANTALGIC: ICD-10-CM

## 2023-02-07 DIAGNOSIS — G89.29 CHRONIC HEEL PAIN, RIGHT: Primary | ICD-10-CM

## 2023-02-07 PROCEDURE — 97110 THERAPEUTIC EXERCISES: CPT | Mod: PN

## 2023-02-07 NOTE — PROGRESS NOTES
"[facility]   Physical Therapy Treatment Note     Name: Arpita Bradshaw  Clinic Number: [id number]    Therapy Diagnosis:   Encounter Diagnoses   Name Primary?    Chronic heel pain, right Yes    Bone spur of inferior portion of right calcaneus     Gait, antalgic          Physician: Referring, Unknown    Visit Date: 2/7/2023    Physician Orders: PT Eval and Treat   Medical Diagnosis from Referral: M77.31 (ICD-10-CM) - Calcaneal spur of right foot   Evaluation Date: 11/28/2022  Authorization Period Expiration: 12/31/2022  Plan of Care Expiration: 2/28/2022  Visit # / Visits authorized: 3/10 Progress Note Due: 3/1/2023  FOTO: 1/ 1  HEP:  Access Code: [email or web address]        Precautions: Standard, fibromyalgia, lupus  PTA Visit #: 0/5     Time In: 0702a  Time Out: 0800a  Total Billable Time:  58 minutes (1 on 1) 4 TE Medicaid    SUBJECTIVE     Pt reports: Her new shoes have not yet come in. She still has some pain but she overall feels like she is getting better.     She was compliant with home exercise program.  Response to previous treatment: Tired   Functional change: In Progress    Pain: 5/10  Location: right ankles     OBJECTIVE     Re-assessment completed 2/1/2023      Treatment     Arpita received the treatments listed below:      Arpita received therapeutic exercises to develop strength, endurance, ROM, flexibility, posture, and core stabilization for 48 minutes including:    Seated heel raise on 4" step into dorsiflexion 5"x20  Dorsiflexion mobilization on step 5"x20  Banded Inversion: 3x10 red TB  Banded Eversion: 3x10 red TB  Banded DF: 3x10 red TB   Seated YTB DF march 2x 10 each leg   Standing DF stretch on incline board (airex cushion for heels) 15'' x 5 (knees bent and straight)  Long sitting gastroc stretch 20 x 5"   Long sitting soleus strech 20 x 5"   PF/DF on shuttle 2 blk  : 3 x 10   Shuttle Squats w/ ball for hip add (+75#) +3 x 10  Tandem Stance Balance: 2x30" nita Palm " received the following manual therapy techniques: Joint mobilizations, Manual traction, Myofacial release, Soft tissue Mobilization, and Friction Massage were applied to the: R ankle for 10 minutes, including:  Gentle STM along achilles insertion points to B calcaneus w/ great toe stretch  STM to distal gastroc/soleus complex B  TC mobilizations   TC distraction   STM to plantar surface B    Hot pack R gastroc/soleus mm belly x 00 minutes       Patient Education and Home Exercises     Home Exercises Provided and Patient Education Provided     Education provided:   - updated Home exercise program     Written Home Exercises Provided: yes. Exercises were reviewed and Arpita was able to demonstrate them prior to the end of the session.  Arpita demonstrated good  understanding of the education provided. See EMR under Patient Instructions for exercises provided during therapy sessions    ASSESSMENT     Arpita arrived to clinic with moderate pain. She continues to display joint tightness with dorsiflexion and talocrural distractions. PT added seated dorsiflexion/plantarflexion on step and dorsiflexion mobilization on step to improve ankle mobility. Patient was challenged with all strengthening today without significant increase in symptoms.       Arpita Is progressing well towards her goals.   Pt prognosis is Good.     Pt will continue to benefit from skilled outpatient physical therapy to address the deficits listed in the problem list box on initial evaluation, provide pt/family education and to maximize pt's level of independence in the home and community environment.     Pt's spiritual, cultural and educational needs considered and pt agreeable to plan of care and goals.     Anticipated barriers to physical therapy: low activity tolerance. Medical conditions (fibromyalgia, lupus)      GOALS: Short Term Goals:  4 weeks  1.Report decreased  in  pain at worse less than  <   / =  5  /10  to increase tolerance for  improved functional actvities. Inconsistently Met  2. Pt to improve range of motion by 25% to allow for improved functional mobility to allow for improvement in IADLs. Met  3. Increased BLE MMT 1/2 grade  to increase tolerance for ADL and work activities.--Partially Met  4. Pt to report ability to complete morning routine in timely manner (45min) with <5/10 R heel pain. On going   5. Pt to tolerate HEP to improve ROM and independence with ADL's. Met  6. Pt will demonstrate ability to walk for 15min with normalized gait pattern on level surface at tolerable pace. On going      Long Term Goals: 8 weeks  1.Report decreased  in  pain at worse  less than  <   / =  3  /10  to increase tolerance for improved functional actvities. On going  2.Pt to improve range of motion by 75% to allow for improved functional mobility to allow for improvement in IADLs. On going  3.Increased BLE MMT 1 grade  to increase tolerance for ADL and work activities.On going  4. Pt will report clinically significant improvements on FOTO outcome assessment  to increase functional activities and mobility. On going  5. Pt to be Independent with HEP to improve ROM and independence with ADL's. On going  6. Pt will report ability to ambulate for 30min with <3/10 R heel pain in appropriate shoe on level surface at tolerable pace. On going     PLAN     Continue with [facility] LE strengthening, emphasis on lower leg and hip strengthening.     Joelle Sim, PT, DPT  02/07/2023

## 2023-02-14 ENCOUNTER — CLINICAL SUPPORT (OUTPATIENT)
Dept: REHABILITATION | Facility: HOSPITAL | Age: 53
End: 2023-02-14
Payer: MEDICAID

## 2023-02-14 DIAGNOSIS — M77.31 BONE SPUR OF INFERIOR PORTION OF RIGHT CALCANEUS: ICD-10-CM

## 2023-02-14 DIAGNOSIS — R26.89 GAIT, ANTALGIC: ICD-10-CM

## 2023-02-14 DIAGNOSIS — M79.671 CHRONIC HEEL PAIN, RIGHT: Primary | ICD-10-CM

## 2023-02-14 DIAGNOSIS — G89.29 CHRONIC HEEL PAIN, RIGHT: Primary | ICD-10-CM

## 2023-02-14 PROCEDURE — 97110 THERAPEUTIC EXERCISES: CPT | Mod: PN

## 2023-02-14 PROCEDURE — 97140 MANUAL THERAPY 1/> REGIONS: CPT | Mod: PN

## 2023-02-14 NOTE — PROGRESS NOTES
"[facility]   Physical Therapy Treatment Note     Name: Arpita Bradshaw  Clinic Number: [id number]    Therapy Diagnosis:   Encounter Diagnoses   Name Primary?    Chronic heel pain, right Yes    Bone spur of inferior portion of right calcaneus     Gait, antalgic            Physician: Referring, Unknown    Visit Date: 2/14/2023    Physician Orders: PT Eval and Treat   Medical Diagnosis from Referral: M77.31 (ICD-10-CM) - Calcaneal spur of right foot   Evaluation Date: 11/28/2022  Authorization Period Expiration: 12/31/2022  Plan of Care Expiration: 2/28/2022  Visit # / Visits authorized: 3/10 Progress Note Due: 3/1/2023  FOTO: 1/ 1  HEP:  Access Code: [email or web address]        Precautions: Standard, fibromyalgia, lupus  PTA Visit #: 0/5     Time In: 0702  Time Out: 0800  Total Billable Time:  58 minutes     SUBJECTIVE     Pt reports: Her shoes still have not come in and she is uncertain when the shoes will be available for her to . I am trying to build up my time that I can tolerate single leg stance without R heel pain.   She was compliant with home exercise program.  Response to previous treatment: Tired   Functional change: In Progress    Pain: 5/10  Location: right heel    OBJECTIVE     Re-assessment completed 2/1/2023      Treatment     Arpita received the treatments listed below:      Arpita received therapeutic exercises to develop strength, endurance, ROM, flexibility, posture, and core stabilization for 50 minutes including:    Seated heel raise on 4" step into dorsiflexion 5"x20  Dorsiflexion mobilization on step 5"x20  Banded Inversion: 3x10 red TB  Banded Eversion: 3x10 red TB  Banded DF: 3x10 red TB   Seated YTB DF march 2x 10 each leg   Standing DF stretch on incline board (airex cushion for heels) 15'' x 5 (knees bent and straight)  Long sitting gastroc stretch 20 x 5"   Long sitting soleus strech 20 x 5"   PF/DF on shuttle 2 blk  : 3 x 10   Shuttle Squats w/ ball for hip add " "3black bands, 1 red band +3 x 8  Tandem Stance Balance : 2x30" ea        Arpita received the following manual therapy techniques: Joint mobilizations, Manual traction, Myofacial release, Soft tissue Mobilization, and Friction Massage were applied to the: R ankle for 8 minutes, including:    STM to distal gastroc/soleus complex B  TC mobilizations   TC distraction       Hot pack R gastroc/soleus mm belly x 10 minutes       Patient Education and Home Exercises     Home Exercises Provided and Patient Education Provided     Education provided:   - updated Home exercise program     Written Home Exercises Provided: yes. Exercises were reviewed and Arpita was able to demonstrate them prior to the end of the session.  Arpita demonstrated good  understanding of the education provided. See EMR under Patient Instructions for exercises provided during therapy sessions    ASSESSMENT     Arpita performed and tolerated all TE and manual intervention well without increased R heel pain. Improvements noted during tandem balance with dynamic arm motions. Improving soft tissue mobility t/o B gastroc and soleus, especially on R.       Arpita Is progressing well towards her goals.   Pt prognosis is Good.     Pt will continue to benefit from skilled outpatient physical therapy to address the deficits listed in the problem list box on initial evaluation, provide pt/family education and to maximize pt's level of independence in the home and community environment.     Pt's spiritual, cultural and educational needs considered and pt agreeable to plan of care and goals.     Anticipated barriers to physical therapy: low activity tolerance. Medical conditions (fibromyalgia, lupus)      GOALS: Short Term Goals:  4 weeks  1.Report decreased  in  pain at worse less than  <   / =  5  /10  to increase tolerance for improved functional actvities. Inconsistently Met  2. Pt to improve range of motion by 25% to allow for improved functional mobility " to allow for improvement in IADLs. Met  3. Increased BLE MMT 1/2 grade  to increase tolerance for ADL and work activities.--Partially Met  4. Pt to report ability to complete morning routine in timely manner (45min) with <5/10 R heel pain. On going   5. Pt to tolerate HEP to improve ROM and independence with ADL's. Met  6. Pt will demonstrate ability to walk for 15min with normalized gait pattern on level surface at tolerable pace. On going      Long Term Goals: 8 weeks  1.Report decreased  in  pain at worse  less than  <   / =  3  /10  to increase tolerance for improved functional actvities. On going  2.Pt to improve range of motion by 75% to allow for improved functional mobility to allow for improvement in IADLs. On going  3.Increased BLE MMT 1 grade  to increase tolerance for ADL and work activities.On going  4. Pt will report clinically significant improvements on FOTO outcome assessment  to increase functional activities and mobility. On going  5. Pt to be Independent with HEP to improve ROM and independence with ADL's. On going  6. Pt will report ability to ambulate for 30min with <3/10 R heel pain in appropriate shoe on level surface at tolerable pace. On going     PLAN     Continue with [facility] LE strengthening, emphasis on lower leg and hip strengthening.     Patsy Cabello, PT, DPT, Cert DN  02/14/2023

## 2023-02-17 NOTE — PROGRESS NOTES
"[facility]   Physical Therapy Treatment Note     Name: Arpita Bradshaw  Clinic Number: [id number]    Therapy Diagnosis:   Encounter Diagnoses   Name Primary?    Chronic heel pain, right Yes    Bone spur of inferior portion of right calcaneus     Gait, antalgic            Physician: Referring, Unknown    Visit Date: 2/20/2023    Physician Orders: PT Eval and Treat   Medical Diagnosis from Referral: M77.31 (ICD-10-CM) - Calcaneal spur of right foot   Evaluation Date: 11/28/2022  Authorization Period Expiration: 12/31/2022  Plan of Care Expiration: 2/28/2022  Visit # / Visits authorized: 6/10 Progress Note Due: 3/1/2023  FOTO: 1/ 1  HEP:  Access Code: [email or web address]        Precautions: Standard, fibromyalgia, lupus  PTA Visit #: 0/5     Time In: 7:00 AM   Time Out: 8:03 AM   Total Billable Time:  30 minutes (1 on 1)     SUBJECTIVE     Pt reports: Her shoes will be coming in this Friday hopefully. R heel is doing pretty good today.   She was compliant with home exercise program.  Response to previous treatment: Tired   Functional change: In Progress    Pain: 3/10  Location: right heel    OBJECTIVE     Re-assessment completed 2/1/2023      Treatment     Arpita received the treatments listed below:      Arpita received therapeutic exercises to develop strength, endurance, ROM, flexibility, posture, and core stabilization for 55 minutes including:    Seated heel raise on 4" step into dorsiflexion 5"x20  Dorsiflexion mobilization on step 5"x20  Banded Inversion: 3x10 red TB  Banded Eversion: 3x10 red TB  Banded DF: 3x10 red TB   Seated RTB DF march 2 x 10 each leg   Standing DF stretch on incline board (airex cushion for heels) 15'' x 5 (knees bent and straight)  Long sitting gastroc stretch 20 x 5"   Long sitting soleus strech 20 x 5"   PF/DF on shuttle 2 blk  : 3 x 10   Shuttle Squats w/ ball for hip add 3black bands, 1 red band +3 x 8-np  +Heel to toe walking on white line: 3 x 30"  +Heel to toe " walking on line w/ heel raise: 3 x 30''  +Rebounder 1 kg ball tandem stance on airex x 30 each       Arpita received the following manual therapy techniques: Joint mobilizations, Manual traction, Myofacial release, Soft tissue Mobilization, and Friction Massage were applied to the: R ankle for 00 minutes, including:    STM to distal gastroc/soleus complex B  TC mobilizations   TC distraction       Cold pack R foot/ankle x 8 minutes       Patient Education and Home Exercises     Home Exercises Provided and Patient Education Provided     Education provided:   - updated Home exercise program     Written Home Exercises Provided: yes. Exercises were reviewed and Arpita was able to demonstrate them prior to the end of the session.  Arpita demonstrated good  understanding of the education provided. See EMR under Patient Instructions for exercises provided during therapy sessions    ASSESSMENT     Arpita performed all TE with good tolerance and mild provocation to heel and ankle discomfort. Modifications provided for comfort. Greatest challenge with incline board stretch where heel position cushion and offloading provided. Dynamic balance progressed on airex pad with tandem ball tosses. Continuation per plan of care with additional visits to be scheduled to reach functional goals. Patient awaiting orthopedic shoes that are specially ordered to arrive by Friday.       Arpita Is progressing well towards her goals.   Pt prognosis is Good.     Pt will continue to benefit from skilled outpatient physical therapy to address the deficits listed in the problem list box on initial evaluation, provide pt/family education and to maximize pt's level of independence in the home and community environment.     Pt's spiritual, cultural and educational needs considered and pt agreeable to plan of care and goals.     Anticipated barriers to physical therapy: low activity tolerance. Medical conditions (fibromyalgia, lupus)      GOALS:  Short Term Goals:  4 weeks  1.Report decreased  in  pain at worse less than  <   / =  5  /10  to increase tolerance for improved functional actvities. Inconsistently Met  2. Pt to improve range of motion by 25% to allow for improved functional mobility to allow for improvement in IADLs. Met  3. Increased BLE MMT 1/2 grade  to increase tolerance for ADL and work activities.--Partially Met  4. Pt to report ability to complete morning routine in timely manner (45min) with <5/10 R heel pain. On going   5. Pt to tolerate HEP to improve ROM and independence with ADL's. Met  6. Pt will demonstrate ability to walk for 15min with normalized gait pattern on level surface at tolerable pace. On going      Long Term Goals: 8 weeks  1.Report decreased  in  pain at worse  less than  <   / =  3  /10  to increase tolerance for improved functional actvities. On going  2.Pt to improve range of motion by 75% to allow for improved functional mobility to allow for improvement in IADLs. On going  3.Increased BLE MMT 1 grade  to increase tolerance for ADL and work activities.On going  4. Pt will report clinically significant improvements on FOTO outcome assessment  to increase functional activities and mobility. On going  5. Pt to be Independent with HEP to improve ROM and independence with ADL's. On going  6. Pt will report ability to ambulate for 30min with <3/10 R heel pain in appropriate shoe on level surface at tolerable pace. On going     PLAN     Continue with [facility] LE strengthening, emphasis on lower leg and hip strengthening.     Arthur Gillespie, PT, DPT  02/20/2023

## 2023-02-20 ENCOUNTER — CLINICAL SUPPORT (OUTPATIENT)
Dept: REHABILITATION | Facility: HOSPITAL | Age: 53
End: 2023-02-20
Payer: MEDICAID

## 2023-02-20 DIAGNOSIS — M77.31 BONE SPUR OF INFERIOR PORTION OF RIGHT CALCANEUS: ICD-10-CM

## 2023-02-20 DIAGNOSIS — M79.671 CHRONIC HEEL PAIN, RIGHT: Primary | ICD-10-CM

## 2023-02-20 DIAGNOSIS — R26.89 GAIT, ANTALGIC: ICD-10-CM

## 2023-02-20 DIAGNOSIS — G89.29 CHRONIC HEEL PAIN, RIGHT: Primary | ICD-10-CM

## 2023-02-20 PROCEDURE — 97110 THERAPEUTIC EXERCISES: CPT | Mod: PN

## 2023-02-23 NOTE — PROGRESS NOTES
"[facility]   Physical Therapy Treatment Note     Name: Arpita Bradshaw  Clinic Number: [id number]    Therapy Diagnosis:   Encounter Diagnoses   Name Primary?    Chronic heel pain, right Yes    Bone spur of inferior portion of right calcaneus     Gait, antalgic          Physician: Referring, Unknown    Visit Date: 2/24/2023    Physician Orders: PT Eval and Treat   Medical Diagnosis from Referral: M77.31 (ICD-10-CM) - Calcaneal spur of right foot   Evaluation Date: 11/28/2022  Authorization Period Expiration: 12/31/2022  Plan of Care Expiration: 2/28/2022  Visit # / Visits authorized: 7/10 (6 prior visits)  Progress Note Due: 3/1/2023  FOTO: 1/ 1  HEP:  Access Code: [email or web address]        Precautions: Standard, fibromyalgia, lupus  PTA Visit #: 0/5     Time In: 9:35 AM   Time Out: 10:34 AM   Total Billable Time:  32 minutes (1 on 1)     SUBJECTIVE     Pt reports: Her shoes will be coming today to . R heel is doing pretty well where pain reduced at baseline today.   She was compliant with home exercise program.  Response to previous treatment: Tired   Functional change: In Progress    Pain: 3/10  Location: right heel    OBJECTIVE     Re-assessment completed 2/1/2023      Treatment     Arpita received the treatments listed below:      Arpita received therapeutic exercises to develop strength, endurance, ROM, flexibility, posture, and core stabilization for 41 minutes including:    Recumbent bike 2.5 resistance x 8 minutes   Seated heel raise on 4" step into dorsiflexion 5"x20  Dorsiflexion mobilization on step 5"x20  Banded Inversion: 3x10 red TB  Banded Eversion: 3x10 red TB  Banded DF: 3x10 red TB   Seated RTB DF march 2 x 10 each leg   Standing DF stretch on incline board (airex cushion for heels) 15'' x 5 (knees bent and straight)  Long sitting gastroc stretch 20 x 5"   Long sitting soleus strech 20 x 5"   PF/DF on shuttle 2 blk : 3 x 10   Shuttle Squats w/ ball for hip add 3black " "bands, 1 red band +3 x 8-np  Heel to toe walking on white line: 3 x 30"  Heel to toe walking on line w/ heel raise: 3 x 30''  Rebounder 1 kg ball tandem stance on airex x 30 each       Arpita received the following manual therapy techniques: Joint mobilizations, Manual traction, Myofacial release, Soft tissue Mobilization, and Friction Massage were applied to the: R ankle for 10 minutes, including:    STM to distal gastroc/soleus complex B  TC mobilizations   TC distraction       Cold pack R foot/ankle x 8 minutes       Patient Education and Home Exercises     Home Exercises Provided and Patient Education Provided     Education provided:   - updated Home exercise program     Written Home Exercises Provided: yes. Exercises were reviewed and Arpita was able to demonstrate them prior to the end of the session.  Arpita demonstrated good  understanding of the education provided. See EMR under Patient Instructions for exercises provided during therapy sessions    ASSESSMENT     Arpita arrived with reduced pain reports within R heel and ankle. Happy to report new orthotic shoes will be arriving today. Additionally feeling capable to "do more" and will be cleaning out her garage this weekend. Performance during session improved with medial arch support during ambulatory and WB tasks. DF mobility improving with modifications including airex cushion to reduce compressive forces. Continuation per plan of care moving forward.       Arpita Is progressing well towards her goals.   Pt prognosis is Good.     Pt will continue to benefit from skilled outpatient physical therapy to address the deficits listed in the problem list box on initial evaluation, provide pt/family education and to maximize pt's level of independence in the home and community environment.     Pt's spiritual, cultural and educational needs considered and pt agreeable to plan of care and goals.     Anticipated barriers to physical therapy: low activity " tolerance. Medical conditions (fibromyalgia, lupus)      GOALS: Short Term Goals:  4 weeks  1.Report decreased  in  pain at worse less than  <   / =  5  /10  to increase tolerance for improved functional actvities. Inconsistently Met  2. Pt to improve range of motion by 25% to allow for improved functional mobility to allow for improvement in IADLs. Met  3. Increased BLE MMT 1/2 grade  to increase tolerance for ADL and work activities.--Partially Met  4. Pt to report ability to complete morning routine in timely manner (45min) with <5/10 R heel pain. On going   5. Pt to tolerate HEP to improve ROM and independence with ADL's. Met  6. Pt will demonstrate ability to walk for 15min with normalized gait pattern on level surface at tolerable pace. On going      Long Term Goals: 8 weeks  1.Report decreased  in  pain at worse  less than  <   / =  3  /10  to increase tolerance for improved functional actvities. On going  2.Pt to improve range of motion by 75% to allow for improved functional mobility to allow for improvement in IADLs. On going  3.Increased BLE MMT 1 grade  to increase tolerance for ADL and work activities.On going  4. Pt will report clinically significant improvements on FOTO outcome assessment  to increase functional activities and mobility. On going  5. Pt to be Independent with HEP to improve ROM and independence with ADL's. On going  6. Pt will report ability to ambulate for 30min with <3/10 R heel pain in appropriate shoe on level surface at tolerable pace. On going     PLAN     Continue with [facility] LE strengthening, emphasis on lower leg and hip strengthening.     Arthur Gillespie, PT, DPT  02/24/2023

## 2023-02-24 ENCOUNTER — CLINICAL SUPPORT (OUTPATIENT)
Dept: REHABILITATION | Facility: HOSPITAL | Age: 53
End: 2023-02-24
Payer: MEDICAID

## 2023-02-24 DIAGNOSIS — R26.89 GAIT, ANTALGIC: ICD-10-CM

## 2023-02-24 DIAGNOSIS — M77.31 BONE SPUR OF INFERIOR PORTION OF RIGHT CALCANEUS: ICD-10-CM

## 2023-02-24 DIAGNOSIS — M79.671 CHRONIC HEEL PAIN, RIGHT: Primary | ICD-10-CM

## 2023-02-24 DIAGNOSIS — G89.29 CHRONIC HEEL PAIN, RIGHT: Primary | ICD-10-CM

## 2023-02-24 PROCEDURE — 97110 THERAPEUTIC EXERCISES: CPT | Mod: PN

## 2023-03-01 ENCOUNTER — CLINICAL SUPPORT (OUTPATIENT)
Dept: REHABILITATION | Facility: HOSPITAL | Age: 53
End: 2023-03-01
Payer: MEDICAID

## 2023-03-01 DIAGNOSIS — G89.29 CHRONIC HEEL PAIN, RIGHT: Primary | ICD-10-CM

## 2023-03-01 DIAGNOSIS — M79.671 CHRONIC HEEL PAIN, RIGHT: Primary | ICD-10-CM

## 2023-03-01 DIAGNOSIS — R26.89 GAIT, ANTALGIC: ICD-10-CM

## 2023-03-01 DIAGNOSIS — M77.31 BONE SPUR OF INFERIOR PORTION OF RIGHT CALCANEUS: ICD-10-CM

## 2023-03-01 PROCEDURE — 97110 THERAPEUTIC EXERCISES: CPT | Mod: PN,CQ

## 2023-03-01 NOTE — PROGRESS NOTES
"[facility]   Physical Therapy Treatment Note     Name: Arpita Bradshaw  Clinic Number: [id number]    Therapy Diagnosis:   Encounter Diagnoses   Name Primary?    Chronic heel pain, right Yes    Bone spur of inferior portion of right calcaneus     Gait, antalgic          Physician: Referring, Unknown    Visit Date: 3/1/2023    Physician Orders: PT Eval and Treat   Medical Diagnosis from Referral: M77.31 (ICD-10-CM) - Calcaneal spur of right foot   Evaluation Date: 11/28/2022  Authorization Period Expiration: 12/31/2022  Plan of Care Expiration: 2/28/2022  Visit # / Visits authorized: 8/40 (6 prior visits)  Progress Note Due: 3/1/2023  FOTO: 1/ 1  HEP:  Access Code: [email or web address]        Precautions: Standard, fibromyalgia, lupus  PTA Visit #: 1/5     Time In: 0700AM   Time Out: 0810AM   Total Billable Time:  60 minutes (1 on 1)     SUBJECTIVE     Pt reports: she has to go  her shoes today. The heel is okay today, but she's pretty much aching all over due to her fibromyalgia.    She was compliant with home exercise program.  Response to previous treatment: Tired   Functional change: In Progress    Pain: 3/10  Location: right heel    OBJECTIVE     Re-assessment completed 2/1/2023, DUE NV!      Treatment     Arpita received the treatments listed below:      rApita received therapeutic exercises to develop strength, endurance, ROM, flexibility, posture, and core stabilization for 45 minutes including:    Recumbent bike 2.5 resistance x 8 minutes   Soleus Stretch on Incline 3x30"  Standing heel raise on 4" step into dorsiflexion 5"x20  Dorsiflexion mobilization w/ blue monster band on step 5"x20  Banded Inversion: 3x10 red TB  Banded Eversion: 3x10 red TB  Banded DF: 3x10 red TB   STS on foam (shoes off) 3x30"  Tandem Stance on foam (shoes off) 3x30"      Arpita received the following manual therapy techniques: Joint mobilizations, Manual traction, Myofacial release, Soft tissue " Mobilization, and Friction Massage were applied to the: R ankle for 15 minutes, including:    STM to distal gastroc/soleus complex B  TC mobilizations Grade II-III  TC distraction       Cold pack R foot/ankle x 8 minutes       Patient Education and Home Exercises     Home Exercises Provided and Patient Education Provided     Education provided:   - updated Home exercise program     Written Home Exercises Provided: yes. Exercises were reviewed and Arpita was able to demonstrate them prior to the end of the session.  Arpita demonstrated good  understanding of the education provided. See EMR under Patient Instructions for exercises provided during therapy sessions    ASSESSMENT     Arpita continues to arrived with reduced pain reports within R heel and ankle. Today's session focused around improving ankle ROM, strength and stability. There were no adverse effects, however pt continues to display decreased length as well as tension in soleus on involved side. Verbal cueing required for all therEx, as well as close supervision while performed balance on uneven surface. Pt encouraged to continue HEP that includes soleus stretching between sessions.        Arpita Is progressing well towards her goals.   Pt prognosis is Good.     Pt will continue to benefit from skilled outpatient physical therapy to address the deficits listed in the problem list box on initial evaluation, provide pt/family education and to maximize pt's level of independence in the home and community environment.     Pt's spiritual, cultural and educational needs considered and pt agreeable to plan of care and goals.     Anticipated barriers to physical therapy: low activity tolerance. Medical conditions (fibromyalgia, lupus)      GOALS: Short Term Goals:  4 weeks  1.Report decreased  in  pain at worse less than  <   / =  5  /10  to increase tolerance for improved functional actvities. Inconsistently Met  2. Pt to improve range of motion by 25% to  "allow for improved functional mobility to allow for improvement in IADLs. Met  3. Increased BLE MMT 1/2 grade  to increase tolerance for ADL and work activities.--Partially Met  4. Pt to report ability to complete morning routine in timely manner (45min) with <5/10 R heel pain. On going   5. Pt to tolerate HEP to improve ROM and independence with ADL's. Met  6. Pt will demonstrate ability to walk for 15min with normalized gait pattern on level surface at tolerable pace. On going      Long Term Goals: 8 weeks  1.Report decreased  in  pain at worse  less than  <   / =  3  /10  to increase tolerance for improved functional actvities. On going  2.Pt to improve range of motion by 75% to allow for improved functional mobility to allow for improvement in IADLs. On going  3.Increased BLE MMT 1 grade  to increase tolerance for ADL and work activities.On going  4. Pt will report clinically significant improvements on FOTO outcome assessment  to increase functional activities and mobility. On going  5. Pt to be Independent with HEP to improve ROM and independence with ADL's. On going  6. Pt will report ability to ambulate for 30min with <3/10 R heel pain in appropriate shoe on level surface at tolerable pace. On going     PLAN     Continue with [facility] LE strengthening, emphasis on lower leg and hip strengthening.   Resume NV, if applicable:  Seated RTB DF march 2 x 10 each leg   Standing DF stretch on incline board (airex cushion for heels) 15'' x 5 (knees bent and straight)  Long sitting gastroc stretch 20 x 5"   Long sitting soleus strech 20 x 5"   PF/DF on shuttle 2 blk : 3 x 10   Shuttle Squats w/ ball for hip add 3black bands, 1 red band +3 x 8-np  Heel to toe walking on white line: 3 x 30"  Heel to toe walking on line w/ heel raise: 3 x 30''  Rebounder 1 kg ball tandem stance on airex x 30 each     Danielle Perez, PTA  03/01/2023                                  "

## 2023-03-17 ENCOUNTER — CLINICAL SUPPORT (OUTPATIENT)
Dept: REHABILITATION | Facility: HOSPITAL | Age: 53
End: 2023-03-17
Payer: MEDICAID

## 2023-03-17 DIAGNOSIS — M77.31 BONE SPUR OF INFERIOR PORTION OF RIGHT CALCANEUS: ICD-10-CM

## 2023-03-17 DIAGNOSIS — G89.29 CHRONIC HEEL PAIN, RIGHT: Primary | ICD-10-CM

## 2023-03-17 DIAGNOSIS — R26.89 GAIT, ANTALGIC: ICD-10-CM

## 2023-03-17 DIAGNOSIS — M79.671 CHRONIC HEEL PAIN, RIGHT: Primary | ICD-10-CM

## 2023-03-17 PROCEDURE — 97110 THERAPEUTIC EXERCISES: CPT | Mod: PN

## 2023-03-17 NOTE — PROGRESS NOTES
"[facility]   Discharge Note    Name: Arpita Bradshaw  Clinic Number: [id number]    Therapy Diagnosis:   Encounter Diagnoses   Name Primary?    Chronic heel pain, right Yes    Bone spur of inferior portion of right calcaneus     Gait, antalgic        Physician: Referring, Unknown    Visit Date: 3/17/2023    Physician Orders: PT Eval and Treat   Medical Diagnosis from Referral: M77.31 (ICD-10-CM) - Calcaneal spur of right foot   Evaluation Date: 11/28/2022  Authorization Period Expiration: 12/31/2022  Plan of Care Expiration: 2/28/2022  Visit # / Visits authorized:16/40     FOTO: 1/ 1  HEP:  Access Code: [email or web address]        Precautions: Standard, fibromyalgia, lupus  PTA Visit #: 1/5     Time In: 0930  Time Out: 1010    Total Billable Time:  40 minutes     SUBJECTIVE     Pt reports: the is much improved. She denies pain and has sustained for the last couple of weeks.  States she would like this to be her last day.     She was compliant with home exercise program.  Response to previous treatment: Tired   Functional change: In Progress    Pain: 3/10  Location: right heel    OBJECTIVE        Ankle Range of Motion:   Ankle Right Left   Dorsiflexion 5 5   Plantarflexion 55 58   Inversion 28 25   Eversion 14  12      Strength:   Right Ankle    Left Ankle     Dorsiflexion: 4/5 Dorsiflexion: 4+/5   Plantarflexion:  4/5 Plantarflexion: 4+/5   Inversion: 4/5 Inversion: 4/5   Eversion: 4/5 Eversion: 4/5         Right LE   Left LE     Hip extension:  4/5 Hip extension: 4/5   Hip abduction: 4/5 Hip abduction: 44/5   Hip adduction: 4/5 Hip adduction 4/5        Treatment     Arpita received the treatments listed below:      Arpita received therapeutic exercises to develop strength, endurance, ROM, flexibility, posture, and core stabilization for 40 minutes including:    Recumbent bike 2.5 resistance x 8 minutes   Soleus Stretch on Incline 3x30"  Standing heel raise on 4" step into dorsiflexion " "5"x20  Dorsiflexion mobilization w/ blue monster band on step 5"x20  Banded Inversion: 3x10 red TB  Banded Eversion: 3x10 red TB  Banded DF: 3x10 red TB   STS on foam (shoes off) 3x30"  Tandem Stance on foam (shoes off) 3x30"  with HT  Standing DF stretch on incline board (airex cushion for heels) 15'' x 5 (knees bent and straight)  Long sitting gastroc stretch 20 x 5"   Long sitting soleus strech 20 x 5"       Patient Education and Home Exercises     Home Exercises Provided and Patient Education Provided     Education provided:   - updated Home exercise program     Written Home Exercises Provided: yes. Exercises were reviewed and Arpita was able to demonstrate them prior to the end of the session.  Arpita demonstrated good  understanding of the education provided. See EMR under Patient Instructions for exercises provided during therapy sessions    ASSESSMENT     Long Term Goals: 8 weeks  MET  1.Report decreased  in  pain at worse  less than  <   / =  3  /10  to increase tolerance for improved functional actvities.   2.Pt to improve range of motion by 75% to allow for improved functional mobility to allow for improvement in IADLs.   3.Increased BLE MMT 1 grade  to increase tolerance for ADL and work activities.  4. Pt will report clinically significant improvements on FOTO outcome assessment  to increase functional activities and mobility.   5. Pt to be Independent with HEP to improve ROM and independence with ADL's.   6. Pt will report ability to ambulate for 30min with <3/10 R heel pain in appropriate shoe on level surface at tolerable pace.     Mr. Bradshaw has attended 16 sessions in our clinic beginning 11/28/23  for PT consisting of manual therapy, modalities, ROM activities, therex and HEP instruction. The patient has responded well to physical therapy intervention. Demonstrates a good understanding of home program. Patient will discharged secondary to diminished complaints and goal achievement.      PLAN "     D/c to comprehensive home program. Pt to follow up with MD if further therapy is warranted.      Fred Duff, PT  03/17/2023

## 2023-04-22 ENCOUNTER — PATIENT MESSAGE (OUTPATIENT)
Dept: UROLOGY | Facility: CLINIC | Age: 53
End: 2023-04-22
Payer: MEDICAID

## 2023-04-28 ENCOUNTER — PATIENT MESSAGE (OUTPATIENT)
Dept: UROLOGY | Facility: CLINIC | Age: 53
End: 2023-04-28
Payer: MEDICAID

## 2023-04-29 ENCOUNTER — HOSPITAL ENCOUNTER (EMERGENCY)
Facility: HOSPITAL | Age: 53
Discharge: HOME | End: 2023-04-29
Attending: EMERGENCY MEDICINE
Payer: MEDICAID

## 2023-04-29 VITALS
OXYGEN SATURATION: 100 % | WEIGHT: 214 LBS | TEMPERATURE: 98 F | SYSTOLIC BLOOD PRESSURE: 135 MMHG | BODY MASS INDEX: 33.59 KG/M2 | HEART RATE: 52 BPM | HEIGHT: 67 IN | RESPIRATION RATE: 18 BRPM | DIASTOLIC BLOOD PRESSURE: 78 MMHG

## 2023-04-29 DIAGNOSIS — N39.0 ACUTE URINARY TRACT INFECTION: Primary | ICD-10-CM

## 2023-04-29 DIAGNOSIS — R10.9 FLANK PAIN: ICD-10-CM

## 2023-04-29 LAB
ALBUMIN SERPL-MCNC: 3.8 G/DL (ref 3.3–5.5)
ALBUMIN SERPL-MCNC: 3.9 G/DL (ref 3.3–5.5)
ALP SERPL-CCNC: 57 U/L (ref 42–141)
ALP SERPL-CCNC: 65 U/L (ref 42–141)
BILIRUB SERPL-MCNC: 0.6 MG/DL (ref 0.2–1.6)
BILIRUB SERPL-MCNC: 0.6 MG/DL (ref 0.2–1.6)
BILIRUBIN, POC UA: NEGATIVE
BLOOD, POC UA: ABNORMAL
BUN SERPL-MCNC: 11 MG/DL (ref 7–22)
CALCIUM SERPL-MCNC: 9.7 MG/DL (ref 8–10.3)
CHLORIDE SERPL-SCNC: 110 MMOL/L (ref 98–108)
CLARITY, POC UA: ABNORMAL
COLOR, POC UA: ABNORMAL
CREAT SERPL-MCNC: 0.9 MG/DL (ref 0.6–1.2)
GLUCOSE SERPL-MCNC: 81 MG/DL (ref 73–118)
GLUCOSE, POC UA: NEGATIVE
KETONES, POC UA: NEGATIVE
LEUKOCYTE EST, POC UA: ABNORMAL
NITRITE, POC UA: POSITIVE
PH UR STRIP: 5.5 [PH]
POC ALT (SGPT): 12 U/L (ref 10–47)
POC ALT (SGPT): <5 U/L (ref 10–47)
POC AMYLASE: 64 U/L (ref 14–97)
POC AST (SGOT): 16 U/L (ref 11–38)
POC AST (SGOT): 18 U/L (ref 11–38)
POC GGT: 6 U/L (ref 5–65)
POC TCO2: 30 MMOL/L (ref 18–33)
POTASSIUM BLD-SCNC: 4.3 MMOL/L (ref 3.6–5.1)
PROTEIN, POC UA: NEGATIVE
PROTEIN, POC: 7.3 G/DL (ref 6.4–8.1)
PROTEIN, POC: 7.5 G/DL (ref 6.4–8.1)
SODIUM BLD-SCNC: 142 MMOL/L (ref 128–145)
SPECIFIC GRAVITY, POC UA: 1.02
UROBILINOGEN, POC UA: 0.2 E.U./DL

## 2023-04-29 PROCEDURE — 81001 URINALYSIS AUTO W/SCOPE: CPT | Mod: ER

## 2023-04-29 PROCEDURE — 87077 CULTURE AEROBIC IDENTIFY: CPT | Performed by: EMERGENCY MEDICINE

## 2023-04-29 PROCEDURE — 80053 COMPREHEN METABOLIC PANEL: CPT | Mod: ER

## 2023-04-29 PROCEDURE — 87086 URINE CULTURE/COLONY COUNT: CPT | Performed by: EMERGENCY MEDICINE

## 2023-04-29 PROCEDURE — 87088 URINE BACTERIA CULTURE: CPT | Performed by: EMERGENCY MEDICINE

## 2023-04-29 PROCEDURE — 99285 EMERGENCY DEPT VISIT HI MDM: CPT | Mod: 25,ER

## 2023-04-29 PROCEDURE — 96365 THER/PROPH/DIAG IV INF INIT: CPT | Mod: ER

## 2023-04-29 PROCEDURE — 96375 TX/PRO/DX INJ NEW DRUG ADDON: CPT | Mod: ER

## 2023-04-29 PROCEDURE — 63600175 PHARM REV CODE 636 W HCPCS: Mod: ER | Performed by: EMERGENCY MEDICINE

## 2023-04-29 PROCEDURE — 87186 SC STD MICRODIL/AGAR DIL: CPT | Performed by: EMERGENCY MEDICINE

## 2023-04-29 PROCEDURE — 85025 COMPLETE CBC W/AUTO DIFF WBC: CPT | Mod: ER

## 2023-04-29 PROCEDURE — 25000003 PHARM REV CODE 250: Mod: ER | Performed by: EMERGENCY MEDICINE

## 2023-04-29 PROCEDURE — 82150 ASSAY OF AMYLASE: CPT | Mod: ER

## 2023-04-29 PROCEDURE — 96361 HYDRATE IV INFUSION ADD-ON: CPT | Mod: ER

## 2023-04-29 RX ORDER — ONDANSETRON 8 MG/1
8 TABLET, ORALLY DISINTEGRATING ORAL EVERY 12 HOURS PRN
Qty: 15 TABLET | Refills: 0 | Status: SHIPPED | OUTPATIENT
Start: 2023-04-29 | End: 2023-05-01

## 2023-04-29 RX ORDER — ACETAMINOPHEN 500 MG
1000 TABLET ORAL EVERY 6 HOURS PRN
Qty: 30 TABLET | Refills: 0 | Status: SHIPPED | OUTPATIENT
Start: 2023-04-29

## 2023-04-29 RX ORDER — ONDANSETRON 2 MG/ML
8 INJECTION INTRAMUSCULAR; INTRAVENOUS
Status: COMPLETED | OUTPATIENT
Start: 2023-04-29 | End: 2023-04-29

## 2023-04-29 RX ORDER — KETOROLAC TROMETHAMINE 30 MG/ML
15 INJECTION, SOLUTION INTRAMUSCULAR; INTRAVENOUS
Status: COMPLETED | OUTPATIENT
Start: 2023-04-29 | End: 2023-04-29

## 2023-04-29 RX ORDER — IBUPROFEN 600 MG/1
600 TABLET ORAL EVERY 6 HOURS PRN
Qty: 20 TABLET | Refills: 0 | Status: SHIPPED | OUTPATIENT
Start: 2023-04-29

## 2023-04-29 RX ORDER — AMOXICILLIN AND CLAVULANATE POTASSIUM 875; 125 MG/1; MG/1
1 TABLET, FILM COATED ORAL 2 TIMES DAILY
Qty: 20 TABLET | Refills: 0 | Status: SHIPPED | OUTPATIENT
Start: 2023-04-29 | End: 2023-05-09

## 2023-04-29 RX ORDER — PHENAZOPYRIDINE HYDROCHLORIDE 100 MG/1
100 TABLET, FILM COATED ORAL 3 TIMES DAILY PRN
Qty: 6 TABLET | Refills: 0 | Status: SHIPPED | OUTPATIENT
Start: 2023-04-29 | End: 2023-05-09

## 2023-04-29 RX ADMIN — ONDANSETRON 8 MG: 2 INJECTION INTRAMUSCULAR; INTRAVENOUS at 10:04

## 2023-04-29 RX ADMIN — CEFTRIAXONE 1 G: 1 INJECTION, SOLUTION INTRAVENOUS at 10:04

## 2023-04-29 RX ADMIN — SODIUM CHLORIDE 1000 ML: 9 INJECTION, SOLUTION INTRAVENOUS at 09:04

## 2023-04-29 RX ADMIN — KETOROLAC TROMETHAMINE 15 MG: 30 INJECTION, SOLUTION INTRAMUSCULAR; INTRAVENOUS at 09:04

## 2023-04-29 NOTE — ED PROVIDER NOTES
Encounter Date: 4/29/2023    SCRIBE #1 NOTE: I, Rosa Daigle, am scribing for, and in the presence of,  Yajaira Kapadia DO. I have scribed the following portions of the note - Other sections scribed: HPI, ROS, PE.     History     Chief Complaint   Patient presents with    Flank Pain     Pt c/o R sided flank pain and hematuria for 2 weeks, unable to see urologist until Tuesday     Arpita Bradshaw is a 52 y.o. female with Hx of Lupus, UTI, HTN, and Disorder of kidney and ureter, who presents to the ED for chief complaint of right-sided back pain onset 2 weeks ago. Patient also reports dysuria, hematuria, and nausea. Patient was seen at an urgent care 8 days ago where she got an x-ray done. Patient states that the x-ray showed gas and stool. Patient reports taking Linzess for constipation. Patient denies recent antibiotic use. Patient denies any other associated symptoms. Patient denies tobacco, alcohol, or recreational drug use. Patient is allergic to Sulfamethoxazole-trimethoprim.    The history is provided by the patient. No  was used.   Review of patient's allergies indicates:   Allergen Reactions    Sulfamethoxazole-trimethoprim Rash and Hives     Past Medical History:   Diagnosis Date    Disorder of kidney and ureter     Fibromyalgia     Hypertension     IC (interstitial cystitis)     Lupus     STD (sexually transmitted disease)     Urinary tract infection     Vaginal infection      Past Surgical History:   Procedure Laterality Date    BLADDER FULGURATION N/A 6/6/2019    Procedure: FULGURATION, BLADDER;  Surgeon: Harris Lua MD;  [facility];  Service: Urology;  Laterality: N/A;    BLADDER FULGURATION N/A 12/4/2019    Procedure: FULGURATION, BLADDER;  Surgeon: Harris Lua MD;  [facility];  Service: Urology;  Laterality: N/A;    BLADDER FULGURATION N/A 3/1/2021    Procedure: FULGURATION, BLADDER;  Surgeon: Harris Lua MD;  [facility];  Service: Urology;   Laterality: N/A;    BREAST REDUCTION       SECTION      COLONOSCOPY N/A 2018    Procedure: COLONOSCOPY;  Surgeon: Catrachita Kamara MD;  Location: [facility] ENDO;  Service: Endoscopy;  Laterality: N/A;    CYSTOSCOPY N/A 2018    Procedure: CYSTOSCOPY;  Surgeon: Harris Lua MD;  Location: [facility] OR;  Service: Urology;  Laterality: N/A;  200 of botox    CYSTOSCOPY W/ RETROGRADES Bilateral 2019    Procedure: CYSTOSCOPY, WITH RETROGRADE PYELOGRAM;  Surgeon: Harris Lua MD;  Location: [facility] OR;  Service: Urology;  Laterality: Bilateral;    CYSTOSCOPY WITH HYDRODISTENSION OF BLADDER N/A 10/12/2020    Procedure: CYSTOSCOPY, WITH BLADDER HYDRODISTENSION;  Surgeon: Harris Lua MD;  Location: [facility] OR;  Service: Urology;  Laterality: N/A;    CYSTOSCOPY WITH HYDRODISTENSION OF BLADDER N/A 12/15/2021    Procedure: CYSTOSCOPY, WITH BLADDER HYDRODISTENSION;  Surgeon: Harris Lua MD;  Location: [facility] OR;  Service: Urology;  Laterality: N/A;    CYSTOSCOPY WITH HYDRODISTENSION OF BLADDER N/A 2022    Procedure: CYSTOSCOPY, WITH BLADDER HYDRODISTENSION;  Surgeon: Harris Lua MD;  Location: [facility] OR;  Service: Urology;  Laterality: N/A;    CYSTOURETHROSCOPY  2019    Procedure: CYSTOURETHROSCOPY;  Surgeon: Harris Lua MD;  Location: [facility] OR;  Service: Urology;;    CYSTOURETHROSCOPY N/A 3/1/2021    Procedure: CYSTOURETHROSCOPY;  Surgeon: Harris Lua MD;  Location: [facility] OR;  Service: Urology;  Laterality: N/A;    hemmorroidectomy      HYSTERECTOMY  2008    [facility] for endometriosis    IMPLANTATION OF PERMANENT SACRAL NERVE STIMULATOR Right 2021    Procedure: INSERTION, NEUROSTIMULATOR, PERMANENT, SACRAL;  Surgeon: Harris Lua MD;  Location: [facility] OR;  Service: Urology;  Laterality: Right;    INJECTION OF BOTULINUM TOXIN TYPE A N/A 2018    Procedure: INJECTION, BOTULINUM TOXIN, TYPE A;  Surgeon: Harris Lua MD;  [facility]: [facility];  Service: Urology;  Laterality: N/A;     neurostimulator for bladder      REMOVAL OF SACRAL NEUROSTIMULATOR DEVICE Left 4/28/2021    Procedure: REMOVAL, NEUROSTIMULATOR, SACRAL;  Surgeon: Harris Lua MD;  Location: [facility] OR;  Service: Urology;  Laterality: Left;    TOTAL REDUCTION MAMMOPLASTY      URETHRAL CATHETERIZATION N/A 12/4/2019    Procedure: CATHETERIZATION, URETHRA;  Surgeon: Harris Lua MD;  Location: [facility] OR;  Service: Urology;  Laterality: N/A;     Family History   Problem Relation Age of Onset    Hypertension Mother     Cancer Mother         KIDNEY    Arthritis Mother     Breast cancer Mother     Kidney disease Neg Hx     Ovarian cancer Neg Hx     Colon cancer Neg Hx      Social History     Tobacco Use    Smoking status: Never    Smokeless tobacco: Never   Substance Use Topics    Alcohol use: No    Drug use: No     Review of Systems   Constitutional:  Negative for fever.   HENT:  Negative for rhinorrhea and sore throat.    Eyes:  Negative for redness.   Respiratory:  Negative for shortness of breath.    Cardiovascular:  Negative for chest pain and leg swelling.   Gastrointestinal:  Positive for nausea. Negative for abdominal pain, diarrhea and vomiting.   Genitourinary:  Positive for dysuria and hematuria.   Musculoskeletal:  Positive for back pain (right-sided).   Skin:  Negative for rash.   Neurological:  Negative for syncope and headaches.   All other systems reviewed and are negative.    Physical Exam     Initial Vitals [04/29/23 0847]   BP Pulse Resp Temp SpO2   124/75 75 16 98.1 °F (36.7 °C) 100 %      MAP       --         Patient gave consent to have physical exam performed.   Physical Exam    Nursing note and vitals reviewed.  Constitutional: She appears well-developed and well-nourished.   HENT:   Head: Normocephalic and atraumatic.   Right Ear: External ear normal.   Left Ear: External ear normal.   Nose: Nose normal.   Mouth/Throat: Oropharynx is clear and moist.   Eyes: Conjunctivae and EOM are normal. Pupils are equal,  round, and reactive to light.   Neck: Phonation normal. Neck supple.   Normal range of motion.  Cardiovascular:  Normal rate, regular rhythm, normal heart sounds and intact distal pulses.     Exam reveals no gallop and no friction rub.       No murmur heard.  Pulmonary/Chest: Effort normal and breath sounds normal. No stridor. No respiratory distress. She has no wheezes. She has no rhonchi. She has no rales. She exhibits no tenderness.   Abdominal: Abdomen is soft. Bowel sounds are normal. She exhibits no distension. There is abdominal tenderness in the suprapubic area.   There is right CVA tenderness.There is no rigidity, no rebound and no guarding.   Musculoskeletal:         General: No tenderness or edema. Normal range of motion.      Cervical back: Normal range of motion and neck supple.     Neurological: She is alert and oriented to person, place, and time. She has normal strength. No cranial nerve deficit or sensory deficit. GCS score is 15. GCS eye subscore is 4. GCS verbal subscore is 5. GCS motor subscore is 6.   Skin: Skin is warm and dry. Capillary refill takes less than 2 seconds. No rash noted.   Psychiatric: She has a normal mood and affect. Her behavior is normal.       ED Course   Procedures  Labs Reviewed   POCT URINALYSIS W/O SCOPE - Abnormal; Notable for the following components:       Result Value    Blood, UA 3+ (*)     Nitrite, UA Positive (*)     Leukocytes, UA 1+ (*)     All other components within normal limits   POCT CMP - Abnormal; Notable for the following components:    POC Chloride 110 (*)     All other components within normal limits   POCT LIVER PANEL - Abnormal; Notable for the following components:    ALT (SGPT), POC <5 (*)     All other components within normal limits   CULTURE, URINE   POCT CBC   POCT URINALYSIS W/O SCOPE   POCT CMP   POCT LIVER PANEL          Imaging Results              CT Abdomen Pelvis  Without Contrast (Final result)  Result time 04/29/23 10:02:40      Final  result by Jael Emmanuel MD (04/29/23 10:02:40)                   Impression:      1. No evidence of nephrolithiasis or obstructive uropathy.  2. Right renal hypodensity corresponds with previously seen cyst.  3. Bladder is incompletely distended and not well evaluated but there is a suggestion of bladder wall thickening which is similar to prior CT urogram and renal stone study.  This could reflect cystitis or chronic change.  Long-term stability makes bladder lesion unlikely.      Electronically signed by: Jael Emmanuel MD  Date:    04/29/2023  Time:    10:02               Narrative:    EXAMINATION:  CT ABDOMEN PELVIS WITHOUT CONTRAST    CLINICAL HISTORY:  Flank pain, kidney stone suspected;    TECHNIQUE:  Low dose axial images, sagittal and coronal reformations were obtained from the lung bases to the pubic symphysis.  Contrast was not administered.    COMPARISON:  Prior CT urogram dated 04/26/2022 and renal stone study dated 02/07/2020.    FINDINGS:  The lung bases are clear.    The liver is normal in size and contour.  Evaluation for focal lesions is limited by lack of intravenous contrast.  Hypodense in the right kidney corresponds to the previously seen cyst.  The gallbladder is nondistended.  The pancreas, spleen, and adrenal glands have a normal noncontrast appearance.  Kidneys demonstrate normal cortical thickness with no evidence of hydronephrosis or nephrolithiasis.    Stomach and small bowel are nondistended.  There is no evidence of colonic obstruction.  Appendix is within normal limits.    Bladder is incompletely distended and not well evaluated but there is a suggestion of bladder wall thickening which is similar to prior CT urogram exam.  This could reflect cystitis or chronic change.    There is no evidence of free air or free fluid in the abdomen or pelvis.  Stimulator device in the sacrum is stable.    No osseous abnormalities are seen.                                       Medications    sodium chloride 0.9% bolus 1,000 mL 1,000 mL (0 mLs Intravenous Stopped 4/29/23 1001)   ketorolac injection 15 mg (15 mg Intravenous Given 4/29/23 0908)   ondansetron injection 8 mg (8 mg Intravenous Given 4/29/23 1001)   cefTRIAXone (ROCEPHIN) 1 g/50 mL D5W IVPB (0 g Intravenous Stopped 4/29/23 1102)     Medical Decision Making:   History:   Old Medical Records: I decided to obtain old medical records.  Clinical Tests:   Lab Tests: Ordered and Reviewed  Radiological Study: Ordered and Reviewed        This is an evaluation of a 52 y.o. female that presents to the Emergency Department for   Chief Complaint   Patient presents with    Flank Pain     Pt c/o R sided flank pain and hematuria for 2 weeks, unable to see urologist until Tuesday       The patient is a non-toxic and well appearing patient. On physical exam, patient appears well hydrated with moist mucus membranes. Breath sounds are clear and equal bilaterally with no adventitious breath sounds, tachypnea or respiratory distress. Regular rate and rhythm. No murmurs. Abdomen soft and non tender. Patient is tolerating PO without difficulty.  Vital Signs Are Reassuring.     Differential diagnosis: Abdominal pain, flank pain, UTI, hematuria, and renal failure.    ED Course:Treatment in the ED included Physical Exam and medications given in ED  Medications   sodium chloride 0.9% bolus 1,000 mL 1,000 mL (0 mLs Intravenous Stopped 4/29/23 1001)   ketorolac injection 15 mg (15 mg Intravenous Given 4/29/23 0908)   ondansetron injection 8 mg (8 mg Intravenous Given 4/29/23 1001)   cefTRIAXone (ROCEPHIN) 1 g/50 mL D5W IVPB (0 g Intravenous Stopped 4/29/23 1102)   .   Patient reports feeling better after treatment in the ER.     Labs Reviewed        Admission on 04/29/2023, Discharged on 04/29/2023   Component Date Value Ref Range Status    Glucose, UA 04/29/2023 Negative   Final    Bilirubin, UA 04/29/2023 Negative   Final    Ketones, UA 04/29/2023 Negative   Final     Spec Grav UA 04/29/2023 1.020   Final    Blood, UA 04/29/2023 3+ (A)   Final    PH, UA 04/29/2023 5.5   Final    Protein, UA 04/29/2023 Negative   Final    Urobilinogen, UA 04/29/2023 0.2  E.U./dL Final    Nitrite, UA 04/29/2023 Positive (P)   Final    Leukocytes, UA 04/29/2023 1+ (A)   Final    Color, UA 04/29/2023 Light yellow   Final    Clarity, UA 04/29/2023 Cloudy   Final    Albumin, POC 04/29/2023 3.8  3.3 - 5.5 g/dL Final    Alkaline Phosphatase, POC 04/29/2023 57  42 - 141 U/L Final    ALT (SGPT), POC 04/29/2023 12  10 - 47 U/L Final    AST (SGOT), POC 04/29/2023 18  11 - 38 U/L Final    POC BUN 04/29/2023 11  7 - 22 mg/dL Final    Calcium, POC 04/29/2023 9.7  8.0 - 10.3 mg/dL Final    POC Chloride 04/29/2023 110 (H)  98 - 108 mmol/L Final    POC Creatinine 04/29/2023 0.9  0.6 - 1.2 mg/dL Final    POC Glucose 04/29/2023 81  73 - 118 mg/dL Final    POC Potassium 04/29/2023 4.3  3.6 - 5.1 mmol/L Final    POC Sodium 04/29/2023 142  128 - 145 mmol/L Final    Bilirubin, POC 04/29/2023 0.6  0.2 - 1.6 mg/dL Final    POC TCO2 04/29/2023 30  18 - 33 mmol/L Final    Protein, POC 04/29/2023 7.3  6.4 - 8.1 g/dL Final    Albumin, POC 04/29/2023 3.9  3.3 - 5.5 g/dL Final    Alkaline Phosphatase, POC 04/29/2023 65  42 - 141 U/L Final    ALT (SGPT), POC 04/29/2023 <5 (<)  10 - 47 U/L Final    Amylase, POC 04/29/2023 64  14 - 97 U/L Final    AST (SGOT), POC 04/29/2023 16  11 - 38 U/L Final    POC GGT 04/29/2023 6  5 - 65 U/L Final    Bilirubin, POC 04/29/2023 0.6  0.2 - 1.6 mg/dL Final    Protein, POC 04/29/2023 7.5  6.4 - 8.1 g/dL Final        Imaging Reviewed    Imaging Results              CT Abdomen Pelvis  Without Contrast (Final result)  Result time 04/29/23 10:02:40      Final result by Jael Emmanuel MD (04/29/23 10:02:40)                   Impression:      1. No evidence of nephrolithiasis or obstructive uropathy.  2. Right renal hypodensity corresponds with previously seen cyst.  3. Bladder is incompletely  distended and not well evaluated but there is a suggestion of bladder wall thickening which is similar to prior CT urogram and renal stone study.  This could reflect cystitis or chronic change.  Long-term stability makes bladder lesion unlikely.      Electronically signed by: Jael Emmanuel MD  Date:    04/29/2023  Time:    10:02               Narrative:    EXAMINATION:  CT ABDOMEN PELVIS WITHOUT CONTRAST    CLINICAL HISTORY:  Flank pain, kidney stone suspected;    TECHNIQUE:  Low dose axial images, sagittal and coronal reformations were obtained from the lung bases to the pubic symphysis.  Contrast was not administered.    COMPARISON:  Prior CT urogram dated 04/26/2022 and renal stone study dated 02/07/2020.    FINDINGS:  The lung bases are clear.    The liver is normal in size and contour.  Evaluation for focal lesions is limited by lack of intravenous contrast.  Hypodense in the right kidney corresponds to the previously seen cyst.  The gallbladder is nondistended.  The pancreas, spleen, and adrenal glands have a normal noncontrast appearance.  Kidneys demonstrate normal cortical thickness with no evidence of hydronephrosis or nephrolithiasis.    Stomach and small bowel are nondistended.  There is no evidence of colonic obstruction.  Appendix is within normal limits.    Bladder is incompletely distended and not well evaluated but there is a suggestion of bladder wall thickening which is similar to prior CT urogram exam.  This could reflect cystitis or chronic change.    There is no evidence of free air or free fluid in the abdomen or pelvis.  Stimulator device in the sacrum is stable.    No osseous abnormalities are seen.                                      In shared decision making with the patient, we discussed treatment, prescriptions, labs, and imaging results.    Discharge home with   ED Prescriptions       Medication Sig Dispense Start Date End Date Auth. Provider    amoxicillin-clavulanate 875-125mg  (AUGMENTIN) 875-125 mg per tablet Take 1 tablet by mouth 2 (two) times daily. for 10 days 20 tablet 4/29/2023 5/9/2023 Yajaira Kapadia DO    ibuprofen (ADVIL,MOTRIN) 600 MG tablet Take 1 tablet (600 mg total) by mouth every 6 (six) hours as needed for Pain (Take with food as needed for mild-to-moderate pain). 20 tablet 4/29/2023 -- Yajaira Kapadia DO    acetaminophen (TYLENOL) 500 MG tablet Take 2 tablets (1,000 mg total) by mouth every 6 (six) hours as needed for Pain (As needed for pain and fever). 30 tablet 4/29/2023 -- Yajaira Kapadia DO    ondansetron (ZOFRAN-ODT) 8 MG TbDL Take 1 tablet (8 mg total) by mouth every 12 (twelve) hours as needed (As needed for nausea vomiting). 15 tablet 4/29/2023 5/1/2023 Yajaira Kapadia DO    phenazopyridine (PYRIDIUM) 100 MG tablet Take 1 tablet (100 mg total) by mouth 3 (three) times daily as needed for Pain (As needed for dysuria and discomfort). 6 tablet 4/29/2023 5/9/2023 Yajaira Kapadia DO          Fill and take prescriptions as directed.  Return to the ED if symptoms worsen or do not resolve.   Answered questions and discussed discharge plan.    Patient feels better and is ready for discharge.  Follow up with PCP/specialist in 1 day     Scribe Attestation:   Scribe #1: I performed the above scribed service and the documentation accurately describes the services I performed. I attest to the accuracy of the note.                  I, Dr. Yajaira Kapadia, personally performed the services described in this documentation. This document was produced by a scribe under my direction and in my presence. All medical record entries made by the scribe were at my direction and in my presence.  I have reviewed the chart and agree that the record reflects my personal performance and is accurate and complete. Yajaira Kapadia DO.     04/29/2023 5:14 PM    Clinical Impression:   Final diagnoses:  [N39.0] Acute urinary tract infection (Primary)  [R10.9] Flank pain        ED Disposition Condition     Discharge Stable          ED Prescriptions       Medication Sig Dispense Start Date End Date Auth. Provider    amoxicillin-clavulanate 875-125mg (AUGMENTIN) 875-125 mg per tablet Take 1 tablet by mouth 2 (two) times daily. for 10 days 20 tablet 4/29/2023 5/9/2023 Yajaira Kapadia DO    ibuprofen (ADVIL,MOTRIN) 600 MG tablet Take 1 tablet (600 mg total) by mouth every 6 (six) hours as needed for Pain (Take with food as needed for mild-to-moderate pain). 20 tablet 4/29/2023 -- Yajaira Kapadia DO    acetaminophen (TYLENOL) 500 MG tablet Take 2 tablets (1,000 mg total) by mouth every 6 (six) hours as needed for Pain (As needed for pain and fever). 30 tablet 4/29/2023 -- Yajaira Kapadia DO    ondansetron (ZOFRAN-ODT) 8 MG TbDL Take 1 tablet (8 mg total) by mouth every 12 (twelve) hours as needed (As needed for nausea vomiting). 15 tablet 4/29/2023 5/1/2023 Yajaira Kapadia DO    phenazopyridine (PYRIDIUM) 100 MG tablet Take 1 tablet (100 mg total) by mouth 3 (three) times daily as needed for Pain (As needed for dysuria and discomfort). 6 tablet 4/29/2023 5/9/2023 Yajaira Kapadia DO          Follow-up Information       Follow up With Specialties Details Why Contact Info    Kirk Servin III, MD Internal Medicine Schedule an appointment as soon as possible for a visit  As needed [id number]  [facility]  [place] [address]  [phone]      [facility] - Emergency Dept Emergency Medicine Go to  Please go to [facility] emergency department if symptoms worsen [address] [place] [id number] [phone]             Yajaira Kapdaia DO  04/29/23 8611

## 2023-04-29 NOTE — Clinical Note
"Arpita Fontaineilyangela Bradshaw was seen and treated in our emergency department on 4/29/2023.  She may return to work on 05/01/2023.       If you have any questions or concerns, please don't hesitate to call.      Yajaira Kapadia, DO"

## 2023-05-01 LAB — BACTERIA UR CULT: ABNORMAL

## 2023-05-02 ENCOUNTER — OFFICE VISIT (OUTPATIENT)
Dept: UROLOGY | Facility: CLINIC | Age: 53
End: 2023-05-02
Payer: MEDICAID

## 2023-05-02 VITALS
HEART RATE: 64 BPM | HEIGHT: 67 IN | SYSTOLIC BLOOD PRESSURE: 137 MMHG | DIASTOLIC BLOOD PRESSURE: 87 MMHG | BODY MASS INDEX: 32.93 KG/M2 | WEIGHT: 209.81 LBS

## 2023-05-02 DIAGNOSIS — R31.0 GROSS HEMATURIA: ICD-10-CM

## 2023-05-02 DIAGNOSIS — N30.01 ACUTE CYSTITIS WITH HEMATURIA: Primary | ICD-10-CM

## 2023-05-02 DIAGNOSIS — N28.1 RENAL CYST, RIGHT: ICD-10-CM

## 2023-05-02 DIAGNOSIS — R10.9 RIGHT FLANK PAIN: ICD-10-CM

## 2023-05-02 PROCEDURE — 3008F PR BODY MASS INDEX (BMI) DOCUMENTED: ICD-10-PCS | Mod: CPTII,,, | Performed by: NURSE PRACTITIONER

## 2023-05-02 PROCEDURE — 3075F SYST BP GE 130 - 139MM HG: CPT | Mod: CPTII,,, | Performed by: NURSE PRACTITIONER

## 2023-05-02 PROCEDURE — 99999 PR PBB SHADOW E&M-EST. PATIENT-LVL IV: CPT | Mod: PBBFAC,,, | Performed by: NURSE PRACTITIONER

## 2023-05-02 PROCEDURE — 1160F PR REVIEW ALL MEDS BY PRESCRIBER/CLIN PHARMACIST DOCUMENTED: ICD-10-PCS | Mod: CPTII,,, | Performed by: NURSE PRACTITIONER

## 2023-05-02 PROCEDURE — 3079F DIAST BP 80-89 MM HG: CPT | Mod: CPTII,,, | Performed by: NURSE PRACTITIONER

## 2023-05-02 PROCEDURE — 3075F PR MOST RECENT SYSTOLIC BLOOD PRESS GE 130-139MM HG: ICD-10-PCS | Mod: CPTII,,, | Performed by: NURSE PRACTITIONER

## 2023-05-02 PROCEDURE — 1159F MED LIST DOCD IN RCRD: CPT | Mod: CPTII,,, | Performed by: NURSE PRACTITIONER

## 2023-05-02 PROCEDURE — 99214 OFFICE O/P EST MOD 30 MIN: CPT | Mod: PBBFAC,PO | Performed by: NURSE PRACTITIONER

## 2023-05-02 PROCEDURE — 99214 OFFICE O/P EST MOD 30 MIN: CPT | Mod: S$PBB,,, | Performed by: NURSE PRACTITIONER

## 2023-05-02 PROCEDURE — 1160F RVW MEDS BY RX/DR IN RCRD: CPT | Mod: CPTII,,, | Performed by: NURSE PRACTITIONER

## 2023-05-02 PROCEDURE — 3008F BODY MASS INDEX DOCD: CPT | Mod: CPTII,,, | Performed by: NURSE PRACTITIONER

## 2023-05-02 PROCEDURE — 1159F PR MEDICATION LIST DOCUMENTED IN MEDICAL RECORD: ICD-10-PCS | Mod: CPTII,,, | Performed by: NURSE PRACTITIONER

## 2023-05-02 PROCEDURE — 99999 PR PBB SHADOW E&M-EST. PATIENT-LVL IV: ICD-10-PCS | Mod: PBBFAC,,, | Performed by: NURSE PRACTITIONER

## 2023-05-02 PROCEDURE — 99214 PR OFFICE/OUTPT VISIT, EST, LEVL IV, 30-39 MIN: ICD-10-PCS | Mod: S$PBB,,, | Performed by: NURSE PRACTITIONER

## 2023-05-02 PROCEDURE — 3079F PR MOST RECENT DIASTOLIC BLOOD PRESSURE 80-89 MM HG: ICD-10-PCS | Mod: CPTII,,, | Performed by: NURSE PRACTITIONER

## 2023-05-02 NOTE — PATIENT INSTRUCTIONS
U/A and urine cx after completion of antibiotic therapy (home collect; drop urine specimen off to any [facility] outpatient lab).   Schedule patient for in-clinic cystoscopy due to history of bladder tumor.   Follow-up pending urine cx results.

## 2023-05-02 NOTE — PROGRESS NOTES
Subjective:       Patient ID: Arpita Bradshaw is a 52 y.o. female.    Chief Complaint: Follow-up (From urgent care)    Patient is here today for an EF follow-up for UTI on 4/29/2023. She was started on Augmentin at that time, which she finds effective.     Urinary Tract Infection   This is a new problem. Episode onset: 2 weeks ago. The problem has been gradually improving. The pain is at a severity of 6/10. The pain is moderate. There has been no fever. There is No history of pyelonephritis. Associated symptoms include flank pain and frequency (drinking a lot of water). Pertinent negatives include no behavior changes, chills, hematuria, hesitancy, nausea, urgency, vomiting or bubble bath use. She has tried antibiotics (Augmentin) for the symptoms. Her past medical history is significant for recurrent UTIs and a urological procedure. There is no history of diabetes mellitus, hypertension, kidney stones or a single kidney.   Flank Pain  This is a new problem. The current episode started 1 to 4 weeks ago. The problem occurs daily. The problem has been waxing and waning since onset. The pain is present in the costovertebral angle (right). The quality of the pain is described as aching. The pain is at a severity of 6/10. The pain is moderate. Associated symptoms include dysuria. Pertinent negatives include no abdominal pain, bladder incontinence, bowel incontinence, fever, headaches, pelvic pain or weakness. Risk factors include obesity. She has tried nothing for the symptoms.   Review of Systems   Constitutional:  Negative for appetite change, chills, fatigue and fever.   Gastrointestinal:  Positive for change in bowel habit and change in bowel habit. Negative for abdominal pain, bowel incontinence, diarrhea, nausea and vomiting.   Genitourinary:  Positive for dysuria, flank pain and frequency (drinking a lot of water). Negative for bladder incontinence, decreased urine volume, difficulty urinating, hematuria,  hesitancy, nocturia, pelvic pain, urgency, vaginal bleeding, vaginal discharge and vaginal pain.   Neurological:  Negative for dizziness, weakness and headaches.   Psychiatric/Behavioral: Negative.         Objective:      Physical Exam  Vitals and nursing note reviewed.   Constitutional:       General: She is not in acute distress.     Appearance: She is well-developed. She is obese. She is not ill-appearing.   HENT:      Head: Normocephalic and atraumatic.   Eyes:      Pupils: Pupils are equal, round, and reactive to light.   Cardiovascular:      Rate and Rhythm: Normal rate.   Pulmonary:      Effort: Pulmonary effort is normal. No respiratory distress.   Abdominal:      Palpations: Abdomen is soft.      Tenderness: There is no abdominal tenderness.   Musculoskeletal:         General: Normal range of motion.      Cervical back: Normal range of motion.   Skin:     General: Skin is warm and dry.   Neurological:      Mental Status: She is alert and oriented to person, place, and time.      Coordination: Coordination normal.   Psychiatric:         Mood and Affect: Mood normal.         Behavior: Behavior normal.         Thought Content: Thought content normal.         Judgment: Judgment normal.       Assessment:       Problem List Items Addressed This Visit          Renal/    Renal cyst, right    Relevant Orders    Urine culture    Urinalysis    Acute cystitis with hematuria - Primary    Relevant Orders    Urine culture    Urinalysis    Gross hematuria    Relevant Orders    Urine culture    Urinalysis     Other Visit Diagnoses       Right flank pain        Relevant Orders    Urine culture    Urinalysis            Plan:           Arpita was seen today for follow-up.    Diagnoses and all orders for this visit:    Acute cystitis with hematuria  -     Urine culture; Future  -     Urinalysis; Future    Right flank pain  -     Urine culture; Future  -     Urinalysis; Future    Gross hematuria  -     Urine culture;  Future  -     Urinalysis; Future    Renal cyst, right  -     Urine culture; Future  -     Urinalysis; Future    Other orders  U/A and urine cx after completion of antibiotic therapy (home collect; drop urine specimen off to any [facility] outpatient lab).   Schedule patient for in-clinic cystoscopy due to history of bladder tumor.     Follow-up pending urine cx results.     Francie Cazares, NP

## 2023-06-08 ENCOUNTER — TELEPHONE (OUTPATIENT)
Dept: UROLOGY | Facility: CLINIC | Age: 53
End: 2023-06-08
Payer: MEDICAID

## 2023-06-08 ENCOUNTER — PATIENT MESSAGE (OUTPATIENT)
Dept: UROLOGY | Facility: CLINIC | Age: 53
End: 2023-06-08
Payer: MEDICAID

## 2023-06-09 ENCOUNTER — PROCEDURE VISIT (OUTPATIENT)
Dept: UROLOGY | Facility: CLINIC | Age: 53
End: 2023-06-09
Payer: MEDICAID

## 2023-06-09 VITALS — WEIGHT: 199 LBS | BODY MASS INDEX: 31.23 KG/M2 | HEIGHT: 67 IN

## 2023-06-09 DIAGNOSIS — N39.0 RECURRENT UTI: Primary | ICD-10-CM

## 2023-06-09 DIAGNOSIS — N30.10 INTERSTITIAL CYSTITIS: ICD-10-CM

## 2023-06-09 DIAGNOSIS — N94.10 DYSPAREUNIA IN FEMALE: ICD-10-CM

## 2023-06-09 DIAGNOSIS — R35.0 URINARY FREQUENCY: ICD-10-CM

## 2023-06-09 PROCEDURE — 52000 CYSTOSCOPY: ICD-10-PCS | Mod: S$PBB,,, | Performed by: UROLOGY

## 2023-06-09 PROCEDURE — 52000 CYSTOURETHROSCOPY: CPT | Mod: PBBFAC,PO | Performed by: UROLOGY

## 2023-06-09 RX ORDER — NITROFURANTOIN 25; 75 MG/1; MG/1
100 CAPSULE ORAL
Qty: 90 CAPSULE | Refills: 1 | Status: SHIPPED | OUTPATIENT
Start: 2023-06-09 | End: 2023-12-06

## 2023-06-09 RX ORDER — LEVOCETIRIZINE DIHYDROCHLORIDE 5 MG/1
TABLET, FILM COATED ORAL
COMMUNITY
Start: 2023-05-08

## 2023-06-09 NOTE — PATIENT INSTRUCTIONS
Finish Ampicillin   Macrobid 100 mg  post sexual activity  Macrobid QHS for UTI prevention.  F/U 3 months

## 2023-06-09 NOTE — PROCEDURES
Cystoscopy    Date/Time: 6/9/2023 9:30 AM  Performed by: Harris Lua MD  Authorized by: Harris Lua MD     Consent Done?:  Yes (Written)  Timeout: prior to procedure the correct patient, procedure, and site was verified    Prep: patient was prepped and draped in usual sterile fashion    Local anesthesia used?: Yes    Anesthesia:  Intraurethral instillation  Local anesthetic:  Lidocaine 2% topical gel  Anesthetic total (ml):  10  Indications: recurrent UTIs    Position:  Supine  Anesthesia:  Intraurethral instillation  Patient sedated?: No    Preparation: Patient was prepped and draped in usual sterile fashion    Scope type:  Flexible cystoscope  Stent inserted: No    Stent removed: No    External exam normal: Yes    Digital exam performed: No    Urethra normal: Yes    Bladder neck normal: Yes    Bladder normal: No (areas of erythema and cystitis)

## 2023-06-23 DIAGNOSIS — R42 DIZZINESS AND GIDDINESS: Primary | ICD-10-CM

## 2023-06-23 DIAGNOSIS — Z12.31 ENCOUNTER FOR SCREENING MAMMOGRAM FOR MALIGNANT NEOPLASM OF BREAST: Primary | ICD-10-CM

## 2023-07-17 ENCOUNTER — TELEPHONE (OUTPATIENT)
Dept: SURGERY | Facility: CLINIC | Age: 53
End: 2023-07-17
Payer: MEDICAID

## 2023-07-17 NOTE — TELEPHONE ENCOUNTER
Spoke with pt regarding appt this Friday with MD. Requested that pt move appt to 1100 at [facility]. Pt verbally confirmed switching appt to 1100 at [facility].

## 2023-07-19 ENCOUNTER — TELEPHONE (OUTPATIENT)
Dept: SURGERY | Facility: CLINIC | Age: 53
End: 2023-07-19
Payer: MEDICAID

## 2023-07-21 ENCOUNTER — PATIENT MESSAGE (OUTPATIENT)
Dept: SURGERY | Facility: CLINIC | Age: 53
End: 2023-07-21
Payer: MEDICAID

## 2023-07-21 ENCOUNTER — OFFICE VISIT (OUTPATIENT)
Dept: SURGERY | Facility: CLINIC | Age: 53
End: 2023-07-21
Payer: MEDICAID

## 2023-07-21 VITALS
HEIGHT: 67 IN | DIASTOLIC BLOOD PRESSURE: 56 MMHG | RESPIRATION RATE: 19 BRPM | OXYGEN SATURATION: 100 % | BODY MASS INDEX: 30.13 KG/M2 | HEART RATE: 67 BPM | SYSTOLIC BLOOD PRESSURE: 120 MMHG | WEIGHT: 192 LBS

## 2023-07-21 DIAGNOSIS — Z98.890 H/O HEMORRHOIDECTOMY: Primary | ICD-10-CM

## 2023-07-21 PROCEDURE — 3008F BODY MASS INDEX DOCD: CPT | Mod: CPTII,,, | Performed by: SURGERY

## 2023-07-21 PROCEDURE — 99203 PR OFFICE/OUTPT VISIT, NEW, LEVL III, 30-44 MIN: ICD-10-PCS | Mod: 25,S$PBB,, | Performed by: SURGERY

## 2023-07-21 PROCEDURE — 46600 DIAGNOSTIC ANOSCOPY SPX: CPT | Mod: S$PBB,,, | Performed by: SURGERY

## 2023-07-21 PROCEDURE — 46600 DIAGNOSTIC ANOSCOPY SPX: CPT | Mod: PBBFAC | Performed by: SURGERY

## 2023-07-21 PROCEDURE — 3078F DIAST BP <80 MM HG: CPT | Mod: CPTII,,, | Performed by: SURGERY

## 2023-07-21 PROCEDURE — 1159F MED LIST DOCD IN RCRD: CPT | Mod: CPTII,,, | Performed by: SURGERY

## 2023-07-21 PROCEDURE — 99999 PR PBB SHADOW E&M-EST. PATIENT-LVL V: ICD-10-PCS | Mod: PBBFAC,,, | Performed by: SURGERY

## 2023-07-21 PROCEDURE — 1159F PR MEDICATION LIST DOCUMENTED IN MEDICAL RECORD: ICD-10-PCS | Mod: CPTII,,, | Performed by: SURGERY

## 2023-07-21 PROCEDURE — 99215 OFFICE O/P EST HI 40 MIN: CPT | Mod: PBBFAC | Performed by: SURGERY

## 2023-07-21 PROCEDURE — 3078F PR MOST RECENT DIASTOLIC BLOOD PRESSURE < 80 MM HG: ICD-10-PCS | Mod: CPTII,,, | Performed by: SURGERY

## 2023-07-21 PROCEDURE — 46600 PR DIAG2STIC A2SCOPY: ICD-10-PCS | Mod: S$PBB,,, | Performed by: SURGERY

## 2023-07-21 PROCEDURE — 99203 OFFICE O/P NEW LOW 30 MIN: CPT | Mod: 25,S$PBB,, | Performed by: SURGERY

## 2023-07-21 PROCEDURE — 3008F PR BODY MASS INDEX (BMI) DOCUMENTED: ICD-10-PCS | Mod: CPTII,,, | Performed by: SURGERY

## 2023-07-21 PROCEDURE — 3074F PR MOST RECENT SYSTOLIC BLOOD PRESSURE < 130 MM HG: ICD-10-PCS | Mod: CPTII,,, | Performed by: SURGERY

## 2023-07-21 PROCEDURE — 99999 PR PBB SHADOW E&M-EST. PATIENT-LVL V: CPT | Mod: PBBFAC,,, | Performed by: SURGERY

## 2023-07-21 PROCEDURE — 3074F SYST BP LT 130 MM HG: CPT | Mod: CPTII,,, | Performed by: SURGERY

## 2023-07-21 RX ORDER — HYDROCORTISONE 25 MG/G
CREAM TOPICAL 2 TIMES DAILY
Qty: 28 G | Refills: 3 | Status: SHIPPED | OUTPATIENT
Start: 2023-07-21 | End: 2024-03-11 | Stop reason: SDUPTHER

## 2023-07-21 NOTE — PROGRESS NOTES
CRS Office Visit History and Physical    Referring Md:   Aaareferral Self  No address on file    SUBJECTIVE:     Chief Complaint: hemorrhoids    History of Present Illness:  The patient is a new patient to this practice (last saw Denisa Tompkins 2018)  Course is as follows:  Arpita Bradshaw is a 53 y.o. female presents with hemorrhoids.  Reports a long standing history of constipation, now taking linzess.  Reports good bowel movements with linzess, but this worsens her hemorrhoid symptoms so she does not take the medication every day.  She reports protruding tissue that is painful when it becomes inflamed.  Prior history of hemorrhoidectomy in 2008.  Hemorrhoid banding in 2018 with brief relief but fairly rapid return of symptoms.  Last colonoscopy 2018, 10 year interval recommended    Last Colonoscopy: 2018    Review of patient's allergies indicates:   Allergen Reactions    Sulfamethoxazole-trimethoprim Rash and Hives       Past Medical History:   Diagnosis Date    Disorder of kidney and ureter     Fibromyalgia     Hypertension     IC (interstitial cystitis)     Lupus     STD (sexually transmitted disease)     Urinary tract infection     Vaginal infection      Past Surgical History:   Procedure Laterality Date    BLADDER FULGURATION N/A 2019    Procedure: FULGURATION, BLADDER;  Surgeon: Harris Lua MD;  Location: [facility] OR;  Service: Urology;  Laterality: N/A;    BLADDER FULGURATION N/A 2019    Procedure: FULGURATION, BLADDER;  Surgeon: Harris Lua MD;  Location: [facility] OR;  Service: Urology;  Laterality: N/A;    BLADDER FULGURATION N/A 3/1/2021    Procedure: FULGURATION, BLADDER;  Surgeon: Harris Lua MD;  Location: [facility] OR;  Service: Urology;  Laterality: N/A;    BREAST REDUCTION       SECTION      COLONOSCOPY N/A 2018    Procedure: COLONOSCOPY;  Surgeon: Catrachita Kamara MD;  Location: [facility] ENDO;  Service: Endoscopy;  Laterality: N/A;    CYSTOSCOPY N/A 2018     Procedure: CYSTOSCOPY;  Surgeon: Harris Lua MD;  Location: [facility] OR;  Service: Urology;  Laterality: N/A;  200 of botox    CYSTOSCOPY W/ RETROGRADES Bilateral 6/6/2019    Procedure: CYSTOSCOPY, WITH RETROGRADE PYELOGRAM;  Surgeon: Harris Lua MD;  Location: [facility] OR;  Service: Urology;  Laterality: Bilateral;    CYSTOSCOPY WITH HYDRODISTENSION OF BLADDER N/A 10/12/2020    Procedure: CYSTOSCOPY, WITH BLADDER HYDRODISTENSION;  Surgeon: Harris Lua MD;  Location: [facility] OR;  Service: Urology;  Laterality: N/A;    CYSTOSCOPY WITH HYDRODISTENSION OF BLADDER N/A 12/15/2021    Procedure: CYSTOSCOPY, WITH BLADDER HYDRODISTENSION;  Surgeon: Harris Lua MD;  Location: [facility] OR;  Service: Urology;  Laterality: N/A;    CYSTOSCOPY WITH HYDRODISTENSION OF BLADDER N/A 7/25/2022    Procedure: CYSTOSCOPY, WITH BLADDER HYDRODISTENSION;  Surgeon: Harris Lua MD;  Location: [facility] OR;  Service: Urology;  Laterality: N/A;    CYSTOURETHROSCOPY  12/4/2019    Procedure: CYSTOURETHROSCOPY;  Surgeon: Harris Lua MD;  Location: [facility] OR;  Service: Urology;;    CYSTOURETHROSCOPY N/A 3/1/2021    Procedure: CYSTOURETHROSCOPY;  Surgeon: Harris Lua MD;  Location: [facility] OR;  Service: Urology;  Laterality: N/A;    hemmorroidectomy      HYSTERECTOMY  2008    [facility] for endometriosis    IMPLANTATION OF PERMANENT SACRAL NERVE STIMULATOR Right 4/28/2021    Procedure: INSERTION, NEUROSTIMULATOR, PERMANENT, SACRAL;  Surgeon: Harris Lua MD;  Location: [facility] OR;  Service: Urology;  Laterality: Right;    INJECTION OF BOTULINUM TOXIN TYPE A N/A 7/23/2018    Procedure: INJECTION, BOTULINUM TOXIN, TYPE A;  Surgeon: Harris Lua MD;  Location: [facility] OR;  Service: Urology;  Laterality: N/A;    neurostimulator for bladder      REMOVAL OF SACRAL NEUROSTIMULATOR DEVICE Left 4/28/2021    Procedure: REMOVAL, NEUROSTIMULATOR, SACRAL;  Surgeon: Harris Lua MD;  Location: [facility];  Service: Urology;  Laterality: Left;    TOTAL  "REDUCTION MAMMOPLASTY      URETHRAL CATHETERIZATION N/A 12/4/2019    Procedure: CATHETERIZATION, URETHRA;  Surgeon: Harris Lua MD;  Location: [facility];  Service: Urology;  Laterality: N/A;     Family History   Problem Relation Age of Onset    Hypertension Mother     Cancer Mother         KIDNEY    Arthritis Mother     Breast cancer Mother     Kidney disease Neg Hx     Ovarian cancer Neg Hx     Colon cancer Neg Hx      Social History     Tobacco Use    Smoking status: Never    Smokeless tobacco: Never   Substance Use Topics    Alcohol use: No    Drug use: No        Review of Systems:  Review of Systems   All other systems reviewed and are negative.    OBJECTIVE:     Vital Signs (Most Recent)  BP (!) 120/56 (BP Location: Left arm, Patient Position: Sitting)   Pulse 67   Resp 19   Ht 5' 7" (1.702 m)   Wt 87.1 kg (192 lb 0.3 oz)   SpO2 100%   BMI 30.07 kg/m²     Physical Exam:  General: 53 y.o. female in no distress   Neuro: alert and oriented x 4.  Moves all extremities.     HEENT: normocephalic, atraumatic, PERRL, EOMI   Respiratory: respirations are even and unlabored  Cardiac: regular rate and rhythm  Abdomen: soft, NTND  Extremities: Warm dry and intact  Skin: no rashes  Anorectal: external hemorrhoid in the right anterolateral position, smaller hemorrhoid in the left posterolateral position without thrombosis, granular area in anterior midline     Anoscopy:   Verbal consent obtained. A lubricated anoscope was inserted into the anal canal and circumferential inspection performed.  There was an internal hemorrhoid in the right anterolateral position with prolapse.  The scope was withdrawn.     Labs: NA    Imaging: NA      ASSESSMENT/PLAN:     Diagnoses and all orders for this visit:    H/O hemorrhoidectomy  -     Case Request Operating Room: HEMORRHOIDECTOMY, biopsy of anal lesion    Other orders  -     hydrocortisone (ANUSOL-HC) 2.5 % rectal cream; Place rectally 2 (two) times daily.        53 y.o. " female with symptomatic hemorrhoids    - Prior records reviewed.  Patient with history of constipation and hemorrhoids  - she has an focal abnormal appearing lesion in the anterior midline, unclear if this is scar related to prior surgery vs. New lesion.  Will plan for excisional biopsy.  She has a mixed hemorrhoid in the right anterolateral position that is symptomatic.  Given history of prior hemorrhoidectomy, we discussed limited nature of surgery to decrease risk of anal stenosis.  Will plan for excision of single hemorrhoid column.   - discussed need for ongoing bowel regimen to decrease risk of recurrence  - risks, benefits and alternatives discussed, consent signed     Joelle Parks MD  Staff Surgeon  Colon & Rectal Surgery

## 2023-08-07 ENCOUNTER — TELEPHONE (OUTPATIENT)
Dept: SURGERY | Facility: CLINIC | Age: 53
End: 2023-08-07
Payer: MEDICAID

## 2023-08-07 NOTE — TELEPHONE ENCOUNTER
Spoke with pt regarding f/u appt with Dr. Parks to reschedule surgery. Offered pt to be seen at either [facility] or [facility]. Pt requested to be seen a [facility]. Offered pt 9/15 at 2:40 PM. Pt verbally confirmed. Informed of appt details in portal. Pt denies further questions.

## 2023-08-10 NOTE — TELEPHONE ENCOUNTER
----- Message from Harris Lua MD sent at 12/2/2019  7:51 AM CST -----  Urine appears contaminated, will obtain cath urine at time of procedure   Patent asking about the letter to avoid getting a flu shot

## 2023-09-11 ENCOUNTER — OFFICE VISIT (OUTPATIENT)
Dept: UROLOGY | Facility: CLINIC | Age: 53
End: 2023-09-11
Payer: MEDICAID

## 2023-09-11 VITALS
SYSTOLIC BLOOD PRESSURE: 157 MMHG | HEART RATE: 55 BPM | DIASTOLIC BLOOD PRESSURE: 93 MMHG | BODY MASS INDEX: 31.18 KG/M2 | HEIGHT: 67 IN | WEIGHT: 198.63 LBS

## 2023-09-11 DIAGNOSIS — D49.4 BLADDER TUMOR: ICD-10-CM

## 2023-09-11 DIAGNOSIS — N30.80 CYSTITIS CYSTICA: ICD-10-CM

## 2023-09-11 DIAGNOSIS — N28.1 RENAL CYST, RIGHT: ICD-10-CM

## 2023-09-11 DIAGNOSIS — R39.82 CHRONIC BLADDER PAIN: ICD-10-CM

## 2023-09-11 DIAGNOSIS — N30.11 INTERSTITIAL CYSTITIS (CHRONIC) WITH HEMATURIA: Primary | ICD-10-CM

## 2023-09-11 DIAGNOSIS — Z87.440 HISTORY OF RECURRENT UTIS: ICD-10-CM

## 2023-09-11 DIAGNOSIS — G47.9 SLEEP DISTURBANCE: ICD-10-CM

## 2023-09-11 DIAGNOSIS — R35.1 NOCTURIA: ICD-10-CM

## 2023-09-11 DIAGNOSIS — Z09 FOLLOW-UP EXAM, 3-6 MONTHS SINCE PREVIOUS EXAM: ICD-10-CM

## 2023-09-11 DIAGNOSIS — N31.9 NEUROGENIC URINARY BLADDER DISORDER: ICD-10-CM

## 2023-09-11 LAB
BILIRUB UR QL STRIP: NEGATIVE
CLARITY UR REFRACT.AUTO: CLEAR
COLOR UR AUTO: ABNORMAL
GLUCOSE UR QL STRIP: NEGATIVE
HGB UR QL STRIP: ABNORMAL
KETONES UR QL STRIP: NEGATIVE
LEUKOCYTE ESTERASE UR QL STRIP: NEGATIVE
MICROSCOPIC COMMENT: ABNORMAL
NITRITE UR QL STRIP: NEGATIVE
PH UR STRIP: 6 [PH] (ref 5–8)
PROT UR QL STRIP: NEGATIVE
RBC #/AREA URNS AUTO: 12 /HPF (ref 0–4)
SP GR UR STRIP: 1.01 (ref 1–1.03)
SQUAMOUS #/AREA URNS AUTO: 0 /HPF
URN SPEC COLLECT METH UR: ABNORMAL
WBC #/AREA URNS AUTO: 0 /HPF (ref 0–5)

## 2023-09-11 PROCEDURE — 99214 OFFICE O/P EST MOD 30 MIN: CPT | Mod: PBBFAC,PO | Performed by: NURSE PRACTITIONER

## 2023-09-11 PROCEDURE — 99999 PR PBB SHADOW E&M-EST. PATIENT-LVL IV: ICD-10-PCS | Mod: PBBFAC,,, | Performed by: NURSE PRACTITIONER

## 2023-09-11 PROCEDURE — 1160F RVW MEDS BY RX/DR IN RCRD: CPT | Mod: CPTII,,, | Performed by: NURSE PRACTITIONER

## 2023-09-11 PROCEDURE — 87086 URINE CULTURE/COLONY COUNT: CPT | Performed by: NURSE PRACTITIONER

## 2023-09-11 PROCEDURE — 1159F MED LIST DOCD IN RCRD: CPT | Mod: CPTII,,, | Performed by: NURSE PRACTITIONER

## 2023-09-11 PROCEDURE — 3008F PR BODY MASS INDEX (BMI) DOCUMENTED: ICD-10-PCS | Mod: CPTII,,, | Performed by: NURSE PRACTITIONER

## 2023-09-11 PROCEDURE — 3080F PR MOST RECENT DIASTOLIC BLOOD PRESSURE >= 90 MM HG: ICD-10-PCS | Mod: CPTII,,, | Performed by: NURSE PRACTITIONER

## 2023-09-11 PROCEDURE — 99214 PR OFFICE/OUTPT VISIT, EST, LEVL IV, 30-39 MIN: ICD-10-PCS | Mod: S$PBB,,, | Performed by: NURSE PRACTITIONER

## 2023-09-11 PROCEDURE — 3080F DIAST BP >= 90 MM HG: CPT | Mod: CPTII,,, | Performed by: NURSE PRACTITIONER

## 2023-09-11 PROCEDURE — 1160F PR REVIEW ALL MEDS BY PRESCRIBER/CLIN PHARMACIST DOCUMENTED: ICD-10-PCS | Mod: CPTII,,, | Performed by: NURSE PRACTITIONER

## 2023-09-11 PROCEDURE — 3077F SYST BP >= 140 MM HG: CPT | Mod: CPTII,,, | Performed by: NURSE PRACTITIONER

## 2023-09-11 PROCEDURE — 99999 PR PBB SHADOW E&M-EST. PATIENT-LVL IV: CPT | Mod: PBBFAC,,, | Performed by: NURSE PRACTITIONER

## 2023-09-11 PROCEDURE — 81001 URINALYSIS AUTO W/SCOPE: CPT | Performed by: NURSE PRACTITIONER

## 2023-09-11 PROCEDURE — 3077F PR MOST RECENT SYSTOLIC BLOOD PRESSURE >= 140 MM HG: ICD-10-PCS | Mod: CPTII,,, | Performed by: NURSE PRACTITIONER

## 2023-09-11 PROCEDURE — 99214 OFFICE O/P EST MOD 30 MIN: CPT | Mod: S$PBB,,, | Performed by: NURSE PRACTITIONER

## 2023-09-11 PROCEDURE — 3008F BODY MASS INDEX DOCD: CPT | Mod: CPTII,,, | Performed by: NURSE PRACTITIONER

## 2023-09-11 PROCEDURE — 1159F PR MEDICATION LIST DOCUMENTED IN MEDICAL RECORD: ICD-10-PCS | Mod: CPTII,,, | Performed by: NURSE PRACTITIONER

## 2023-09-11 RX ORDER — HYDROXYZINE HYDROCHLORIDE 25 MG/1
25 TABLET, FILM COATED ORAL NIGHTLY PRN
Qty: 30 TABLET | Refills: 0 | Status: SHIPPED | OUTPATIENT
Start: 2023-09-11 | End: 2023-10-11

## 2023-09-11 RX ORDER — FLUCONAZOLE 150 MG/1
150 TABLET ORAL DAILY
Qty: 2 TABLET | Refills: 0 | Status: SHIPPED | OUTPATIENT
Start: 2023-09-11 | End: 2023-09-13

## 2023-09-11 NOTE — PATIENT INSTRUCTIONS
U/A and urine cx   Start taking Macrobid every evening for UTI prevention and after sexual intercourse as previously prescribed by Dr. Lua.  Start trial of hydroxyzine 25 mg nightly as needed for sleep disturbance and nocturia.   Follow-up pending urine cx results.

## 2023-09-11 NOTE — PROGRESS NOTES
Subjective:       Patient ID: Arpita Bradshaw is a 53 y.o. female.    Chief Complaint: Follow-up    Patient is here today for a 3 month follow-up for IC and hx of recurrent UTIs. Patient reports she been taking her Macrobid post-coital only. She wasn't aware that she should be taking it nightly for UTI prevention.     Follow-up  This is a chronic (IC and hx of recurrent UTIs) problem. The current episode started more than 1 year ago. The problem occurs intermittently. The problem has been waxing and waning. Associated symptoms include urinary symptoms. Pertinent negatives include no abdominal pain, anorexia, arthralgias, chills, fatigue, fever, headaches, nausea, swollen glands, vomiting or weakness. Nothing aggravates the symptoms. Treatments tried: macrobid. The treatment provided moderate relief.     Review of Systems   Constitutional:  Negative for chills, fatigue and fever.   Gastrointestinal:  Negative for abdominal pain, anorexia, nausea and vomiting.        Hemorrhoids   Genitourinary:  Positive for frequency, hematuria (microscopic), nocturia (x4) and urgency. Negative for decreased urine volume, difficulty urinating, dysuria and flank pain.   Musculoskeletal:  Positive for back pain (right low back pain). Negative for arthralgias.   Neurological:  Negative for dizziness, weakness and headaches.   Psychiatric/Behavioral:  Positive for sleep disturbance.          Objective:      Physical Exam  Vitals and nursing note reviewed.   Constitutional:       General: She is not in acute distress.     Appearance: She is well-developed. She is obese. She is not ill-appearing.   HENT:      Head: Normocephalic and atraumatic.   Eyes:      Pupils: Pupils are equal, round, and reactive to light.   Cardiovascular:      Rate and Rhythm: Normal rate.   Pulmonary:      Effort: Pulmonary effort is normal. No respiratory distress.   Abdominal:      Palpations: Abdomen is soft.      Tenderness: There is no abdominal  tenderness.   Musculoskeletal:         General: Normal range of motion.      Cervical back: Normal range of motion.   Skin:     General: Skin is warm and dry.   Neurological:      Mental Status: She is alert and oriented to person, place, and time.      Coordination: Coordination normal.   Psychiatric:         Mood and Affect: Mood normal.         Behavior: Behavior normal.         Thought Content: Thought content normal.         Judgment: Judgment normal.         Assessment:       Problem List Items Addressed This Visit          Renal/    Interstitial cystitis (chronic) with hematuria - Primary    Relevant Orders    Urine culture    Urinalysis    Nocturia    Relevant Medications    hydrOXYzine HCL (ATARAX) 25 MG tablet    Neurogenic urinary bladder disorder    Relevant Orders    Urine culture    Urinalysis    Renal cyst, right    Relevant Orders    Urine culture    Urinalysis    History of recurrent UTIs    Relevant Medications    fluconazole (DIFLUCAN) 150 MG Tab    Other Relevant Orders    Urine culture    Urinalysis    Cystitis cystica    Relevant Orders    Urine culture    Urinalysis    Bladder pain       Oncology    Bladder tumor    Relevant Orders    Urine culture    Urinalysis     Other Visit Diagnoses       Follow-up exam, 3-6 months since previous exam        Relevant Orders    Urine culture    Urinalysis    Sleep disturbance        Relevant Medications    hydrOXYzine HCL (ATARAX) 25 MG tablet            Plan:           Arpita was seen today for follow-up.    Diagnoses and all orders for this visit:    Interstitial cystitis (chronic) with hematuria  -     Urine culture  -     Urinalysis    Cystitis cystica  -     Urine culture  -     Urinalysis    Neurogenic urinary bladder disorder  -     Urine culture  -     Urinalysis    Chronic bladder pain  -     Urine culture  -     Urinalysis    Bladder tumor  -     Urine culture  -     Urinalysis    History of recurrent UTIs  -     Urine culture  -      Urinalysis  -     fluconazole (DIFLUCAN) 150 MG Tab; Take 1 tablet (150 mg total) by mouth once daily. for 2 days    Renal cyst, right  -     Urine culture  -     Urinalysis    Follow-up exam, 3-6 months since previous exam  -     Urine culture  -     Urinalysis    Sleep disturbance  -     hydrOXYzine HCL (ATARAX) 25 MG tablet; Take 1 tablet (25 mg total) by mouth nightly as needed (sleep disturbance and nocturia).    Nocturia  -     hydrOXYzine HCL (ATARAX) 25 MG tablet; Take 1 tablet (25 mg total) by mouth nightly as needed (sleep disturbance and nocturia).    Other orders  Start taking Macrobid every evening for UTI prevention and after sexual intercourse as previously prescribed by Dr. Lua.  Start trial of hydroxyzine 25 mg nightly as needed for sleep disturbance and nocturia.     Follow-up pending urine cx results.     Francie Cazares NP

## 2023-09-12 LAB — BACTERIA UR CULT: NO GROWTH

## 2023-09-13 ENCOUNTER — TELEPHONE (OUTPATIENT)
Dept: UROLOGY | Facility: CLINIC | Age: 53
End: 2023-09-13
Payer: MEDICAID

## 2023-09-13 NOTE — TELEPHONE ENCOUNTER
Pt informed via phone call      ----- Message from Francie Cazares NP sent at 9/13/2023  3:50 PM CDT -----  Please inform patient via telephone that her urine cx was normal. Follow-up in 6 months or sooner if needed.    Quality 226: Preventive Care And Screening: Tobacco Use: Screening And Cessation Intervention: Patient screened for tobacco use and is an ex/non-smoker

## 2023-09-14 ENCOUNTER — TELEPHONE (OUTPATIENT)
Dept: SURGERY | Facility: CLINIC | Age: 53
End: 2023-09-14
Payer: MEDICAID

## 2023-09-15 ENCOUNTER — OFFICE VISIT (OUTPATIENT)
Dept: SURGERY | Facility: CLINIC | Age: 53
End: 2023-09-15
Payer: MEDICAID

## 2023-09-15 ENCOUNTER — TELEPHONE (OUTPATIENT)
Dept: SURGERY | Facility: CLINIC | Age: 53
End: 2023-09-15
Payer: MEDICAID

## 2023-09-15 VITALS
SYSTOLIC BLOOD PRESSURE: 129 MMHG | DIASTOLIC BLOOD PRESSURE: 85 MMHG | OXYGEN SATURATION: 100 % | HEIGHT: 67 IN | HEART RATE: 87 BPM | RESPIRATION RATE: 19 BRPM | BODY MASS INDEX: 31.11 KG/M2

## 2023-09-15 DIAGNOSIS — Z98.890 H/O HEMORRHOIDECTOMY: Primary | ICD-10-CM

## 2023-09-15 DIAGNOSIS — K64.5 THROMBOSED EXTERNAL HEMORRHOID: ICD-10-CM

## 2023-09-15 PROCEDURE — 3079F PR MOST RECENT DIASTOLIC BLOOD PRESSURE 80-89 MM HG: ICD-10-PCS | Mod: CPTII,,, | Performed by: SURGERY

## 2023-09-15 PROCEDURE — 3008F PR BODY MASS INDEX (BMI) DOCUMENTED: ICD-10-PCS | Mod: CPTII,,, | Performed by: SURGERY

## 2023-09-15 PROCEDURE — 1159F MED LIST DOCD IN RCRD: CPT | Mod: CPTII,,, | Performed by: SURGERY

## 2023-09-15 PROCEDURE — 99999 PR PBB SHADOW E&M-EST. PATIENT-LVL V: CPT | Mod: PBBFAC,,, | Performed by: SURGERY

## 2023-09-15 PROCEDURE — 99999 PR PBB SHADOW E&M-EST. PATIENT-LVL V: ICD-10-PCS | Mod: PBBFAC,,, | Performed by: SURGERY

## 2023-09-15 PROCEDURE — 3008F BODY MASS INDEX DOCD: CPT | Mod: CPTII,,, | Performed by: SURGERY

## 2023-09-15 PROCEDURE — 99213 PR OFFICE/OUTPT VISIT, EST, LEVL III, 20-29 MIN: ICD-10-PCS | Mod: S$PBB,,, | Performed by: SURGERY

## 2023-09-15 PROCEDURE — 3079F DIAST BP 80-89 MM HG: CPT | Mod: CPTII,,, | Performed by: SURGERY

## 2023-09-15 PROCEDURE — 3074F SYST BP LT 130 MM HG: CPT | Mod: CPTII,,, | Performed by: SURGERY

## 2023-09-15 PROCEDURE — 3074F PR MOST RECENT SYSTOLIC BLOOD PRESSURE < 130 MM HG: ICD-10-PCS | Mod: CPTII,,, | Performed by: SURGERY

## 2023-09-15 PROCEDURE — 1159F PR MEDICATION LIST DOCUMENTED IN MEDICAL RECORD: ICD-10-PCS | Mod: CPTII,,, | Performed by: SURGERY

## 2023-09-15 PROCEDURE — 99215 OFFICE O/P EST HI 40 MIN: CPT | Mod: PBBFAC | Performed by: SURGERY

## 2023-09-15 PROCEDURE — 99213 OFFICE O/P EST LOW 20 MIN: CPT | Mod: S$PBB,,, | Performed by: SURGERY

## 2023-09-15 NOTE — PROGRESS NOTES
Colon & Rectal Surgery Clinic Follow Up    HPI:   Arpita Bradshaw is a 53 y.o. female who presents for follow up of hemorrhoids.   Reports a long standing history of constipation, now taking linzess.  Reports good bowel movements with linzess, but this worsens her hemorrhoid symptoms so she does not take the medication every day.  She reports protruding tissue that is painful when it becomes inflamed.  Prior history of hemorrhoidectomy in 2008.  Hemorrhoid banding in 2018 with brief relief but fairly rapid return of symptoms.  Last colonoscopy 2018, 10 year interval recommended    Interval history:   Patient's surgery was delayed due to concern for new cardiomyopathy per her cardiologist.  Patient reports worsening hemorrhoid symptoms on the left.  Using OTC preparation H. Cleared by her cardiologist yesterday      Objective:   Vitals:    09/15/23 1502   BP: 129/85   Pulse: 87   Resp: 19        Physical Exam   Gen: well developed female, NAD  HEENT: normocephalic, atraumatic, PERRL, EOMI   CV: RRR, no murmurs  Resp: nonlabored, CTAB   Abd: soft, NTND   MSK: no gross deformities, no cyanosis or edema   Anorectal: bilobed thrombosed external hemorrhoid in the left lateral position, RAMA deferred     Assessment and Plan:   Arpita Bradshaw  is a 53 y.o. female who presents for follow up of hemorrhoids    - patient now with thrombosed external hemorrhoid  - we discussed treatment options.  We discussed treatment options of excision in the clinic for more immediate relief vs. Excision in the operating room as early as 9/19.  Patient would prefer anesthesia given significant pain with last hemorrhoid surgery.  We discussed dates, patient would like to have surgery in October.  Agreed on 10/18.    - Consent signed from prior visit. Patient cleared for procedure by her cardiologist yesterday after work up for cardiomyopathy.   - continue preparation H, sitz baths for comfort.       Joelle Parks MD  Staff Surgeon    Colon & Rectal Surgery

## 2023-09-15 NOTE — TELEPHONE ENCOUNTER
"Spoke with pt regarding location for appt this afternoon. Pt states, "they have checked at [facility] and are sitting near elevators." Confirmed for appt.   "

## 2023-09-15 NOTE — TELEPHONE ENCOUNTER
Contacted pt in reference to appt/pt seem to be checked in at 2:32 called to see if pt was in the correct location no answer lvm for pt to call back

## 2023-09-18 ENCOUNTER — PATIENT MESSAGE (OUTPATIENT)
Dept: SURGERY | Facility: OTHER | Age: 53
End: 2023-09-18
Payer: MEDICAID

## 2023-10-05 ENCOUNTER — TELEPHONE (OUTPATIENT)
Dept: SURGERY | Facility: HOSPITAL | Age: 53
End: 2023-10-05
Payer: MEDICAID

## 2023-10-06 ENCOUNTER — OFFICE VISIT (OUTPATIENT)
Dept: SURGERY | Facility: CLINIC | Age: 53
End: 2023-10-06
Payer: MEDICAID

## 2023-10-06 VITALS
SYSTOLIC BLOOD PRESSURE: 137 MMHG | DIASTOLIC BLOOD PRESSURE: 73 MMHG | BODY MASS INDEX: 30.65 KG/M2 | RESPIRATION RATE: 19 BRPM | HEIGHT: 67 IN | WEIGHT: 195.31 LBS

## 2023-10-06 DIAGNOSIS — K64.5 THROMBOSED EXTERNAL HEMORRHOID: Primary | ICD-10-CM

## 2023-10-06 PROCEDURE — 99213 OFFICE O/P EST LOW 20 MIN: CPT | Mod: S$PBB,,, | Performed by: SURGERY

## 2023-10-06 PROCEDURE — 3008F BODY MASS INDEX DOCD: CPT | Mod: CPTII,,, | Performed by: SURGERY

## 2023-10-06 PROCEDURE — 99999 PR PBB SHADOW E&M-EST. PATIENT-LVL III: CPT | Mod: PBBFAC,,, | Performed by: SURGERY

## 2023-10-06 PROCEDURE — 3075F PR MOST RECENT SYSTOLIC BLOOD PRESS GE 130-139MM HG: ICD-10-PCS | Mod: CPTII,,, | Performed by: SURGERY

## 2023-10-06 PROCEDURE — 99213 OFFICE O/P EST LOW 20 MIN: CPT | Mod: PBBFAC | Performed by: SURGERY

## 2023-10-06 PROCEDURE — 3078F DIAST BP <80 MM HG: CPT | Mod: CPTII,,, | Performed by: SURGERY

## 2023-10-06 PROCEDURE — 3075F SYST BP GE 130 - 139MM HG: CPT | Mod: CPTII,,, | Performed by: SURGERY

## 2023-10-06 PROCEDURE — 99213 PR OFFICE/OUTPT VISIT, EST, LEVL III, 20-29 MIN: ICD-10-PCS | Mod: S$PBB,,, | Performed by: SURGERY

## 2023-10-06 PROCEDURE — 1159F MED LIST DOCD IN RCRD: CPT | Mod: CPTII,,, | Performed by: SURGERY

## 2023-10-06 PROCEDURE — 3008F PR BODY MASS INDEX (BMI) DOCUMENTED: ICD-10-PCS | Mod: CPTII,,, | Performed by: SURGERY

## 2023-10-06 PROCEDURE — 99999 PR PBB SHADOW E&M-EST. PATIENT-LVL III: ICD-10-PCS | Mod: PBBFAC,,, | Performed by: SURGERY

## 2023-10-06 PROCEDURE — 1159F PR MEDICATION LIST DOCUMENTED IN MEDICAL RECORD: ICD-10-PCS | Mod: CPTII,,, | Performed by: SURGERY

## 2023-10-06 PROCEDURE — 3078F PR MOST RECENT DIASTOLIC BLOOD PRESSURE < 80 MM HG: ICD-10-PCS | Mod: CPTII,,, | Performed by: SURGERY

## 2023-10-06 NOTE — H&P (VIEW-ONLY)
Colon & Rectal Surgery Clinic Follow Up    HPI:   Arpita Bradshaw is a 53 y.o. female who presents for follow up of thrombosed hemorrhoid    States that hemorrhoid burst after last visit.  Pain is improved, still has swollen lump though.  Would still like to proceed with surgery as she is having difficulty with hygiene.       Objective:   Vitals:    10/06/23 1423   BP: 137/73   Resp: 19        Physical Exam   Gen:well developed female, NAD  HEENT: normocephalic, atraumatic, PERRL, EOMI   CV: RRR, no murmurs  Resp: nonlabored, CTAB   Abd: soft, NTND   MSK: no gross deformities, no cyanosis or edema   Anorectal: resolving thrombosed external hemorrhoid in the right lateral position, granular mucosa in anterior midline       Assessment and Plan:   Arpita Bradshaw  is a 53 y.o. female who presents for follow up of thrombosed hemorrhoid    - thrombosed clot has evacuated but there is still residual disease   - patient would still like to proceed with excisional hemorrhoidectomy and excisional biopsy of abnormal mucosa  - to OR 10/18      Joelle Parks MD  Staff Surgeon   Colon & Rectal Surgery

## 2023-10-13 ENCOUNTER — HOSPITAL ENCOUNTER (OUTPATIENT)
Dept: PREADMISSION TESTING | Facility: OTHER | Age: 53
Discharge: HOME | End: 2023-10-13
Attending: SURGERY
Payer: MEDICAID

## 2023-10-13 ENCOUNTER — ANESTHESIA EVENT (OUTPATIENT)
Dept: SURGERY | Facility: OTHER | Age: 53
End: 2023-10-13
Payer: MEDICAID

## 2023-10-13 VITALS
BODY MASS INDEX: 30.61 KG/M2 | HEIGHT: 67 IN | HEART RATE: 60 BPM | SYSTOLIC BLOOD PRESSURE: 146 MMHG | OXYGEN SATURATION: 100 % | DIASTOLIC BLOOD PRESSURE: 90 MMHG | TEMPERATURE: 98 F | WEIGHT: 195 LBS

## 2023-10-13 RX ORDER — MONTELUKAST SODIUM 10 MG/1
TABLET ORAL
COMMUNITY
Start: 2023-09-18

## 2023-10-13 RX ORDER — ACETAMINOPHEN 500 MG
1000 TABLET ORAL
Status: CANCELLED | OUTPATIENT
Start: 2023-10-13 | End: 2023-10-13

## 2023-10-13 RX ORDER — AZELASTINE 1 MG/ML
2 SPRAY, METERED NASAL 2 TIMES DAILY
COMMUNITY
Start: 2023-10-02

## 2023-10-13 RX ORDER — LIDOCAINE HYDROCHLORIDE 10 MG/ML
0.5 INJECTION, SOLUTION EPIDURAL; INFILTRATION; INTRACAUDAL; PERINEURAL ONCE
Status: CANCELLED | OUTPATIENT
Start: 2023-10-13 | End: 2023-10-13

## 2023-10-13 RX ORDER — SODIUM CHLORIDE, SODIUM LACTATE, POTASSIUM CHLORIDE, CALCIUM CHLORIDE 600; 310; 30; 20 MG/100ML; MG/100ML; MG/100ML; MG/100ML
INJECTION, SOLUTION INTRAVENOUS CONTINUOUS
Status: CANCELLED | OUTPATIENT
Start: 2023-10-13

## 2023-10-13 NOTE — ANESTHESIA PREPROCEDURE EVALUATION
10/13/2023  Arpita Bradshaw is a 53 y.o., female.      Pre-op Assessment    I have reviewed the Patient Summary Reports.     I have reviewed the Nursing Notes. I have reviewed the NPO Status.   I have reviewed the Medications.     Review of Systems  Anesthesia Hx:  History of prior surgery of interest to airway management or planning: Previous anesthesia: General multiple cysto/hydrodistention with prop qqt with general anesthesia.  Denies Family Hx of Anesthesia complications.   Denies Personal Hx of Anesthesia complications.   Social:  Non-Smoker    Hematology/Oncology:  Hematology Normal   Oncology Normal     EENT/Dental:   chronic allergic rhinitis   Cardiovascular:   Hypertension    Pulmonary:  Pulmonary Normal    Renal/:   Interstitial cystitis  Sacral nerve stim in situ   Hepatic/GI:  Hepatic/GI Normal    Musculoskeletal:   Arthritis  Lupus, joint involvement   Neurological:   Chronic Pain Syndrome (fibromyalgia)   Endocrine:  Endocrine Normal    Dermatological:  Skin Normal    Psych:  Psychiatric Normal           Physical Exam  General: Well nourished, Cooperative, Alert and Oriented    Airway:  Mallampati: II   Mouth Opening: Normal  TM Distance: Normal  Neck ROM: Normal ROM    Dental:  Intact        Anesthesia Plan  Type of Anesthesia, risks & benefits discussed:    Anesthesia Type: Gen Supraglottic Airway  Intra-op Monitoring Plan: Standard ASA Monitors  Post Op Pain Control Plan: multimodal analgesia and IV/PO Opioids PRN  Induction:  IV  Informed Consent: Informed consent signed with the Patient and all parties understand the risks and agree with anesthesia plan.  All questions answered.   ASA Score: 3  Anesthesia Plan Notes: Pt will turn off stimulator  Recent BMP OK    Ready For Surgery From Anesthesia Perspective.     .

## 2023-10-13 NOTE — DISCHARGE INSTRUCTIONS
Information to Prepare you for your Surgery    PRE-ADMIT TESTING -  [phone]    [address]          Your surgery has been scheduled at [facility]. We are pleased to have the opportunity to serve you. For Further Information please call [phone].    On the day of surgery please report to the Information Desk on the 1st floor.    CONTACT YOUR PHYSICIAN'S OFFICE THE DAY PRIOR TO YOUR SURGERY TO OBTAIN YOUR ARRIVAL TIME.     The evening before surgery do not eat anything after 9 p.m. ( this includes hard candy, chewing gum and mints).  You may only have GATORADE, POWERADE AND WATER  from 9 p.m. until you leave your home.   DO NOT DRINK ANY LIQUIDS ON THE WAY TO THE HOSPITAL.      Why does your anesthesiologist allow you to drink Gatorade/Powerade before surgery?  Gatorade/Powerade helps to increase your comfort before surgery and to decrease your nausea after surgery. The carbohydrates in Gatorade/Powerade help reduce your body's stress response to surgery.  If you are a diabetic-drink only water prior to surgery.    Outpatient Surgery- May allow 2 adult Support Persons (1 being the designated ) for all surgical/procedural patients. A breastfeeding mother will be allowed her infant and 2 adult Support Persons.       SPECIAL MEDICATION INSTRUCTIONS: TAKE medications checked off by the Anesthesiologist on your Medication List.    Angiogram Patients: Take medications as instructed by your physician, including aspirin.     Surgery Patients:    If you take ASPIRIN - Your PHYSICIAN/SURGEON will need to inform you IF/OR when you need to stop taking aspirin prior to your surgery.     The week prior to surgery do not ot take any medications containing IBUPROFEN or NSAIDS ( Advil, Motrin, Goodys, BC, Aleve, Naproxen etc) If you are not sure if you should take a medicine please call your surgeon's office.  Ok to take Tylenol    Do Not Wear any  make-up (especially eye make-up) to surgery. Please remove any false eyelashes or eyelash extensions. If you arrive the day of surgery with makeup/eyelashes on you will be required to remove prior to surgery. (There is a risk of corneal abrasions if eye makeup/eyelash extensions are not removed)      Leave all valuables at home.   Do Not wear any jewelry or watches, including any metal in body piercings. Jewelry must be removed prior to coming to the hospital.  There is a possibility that rings that are unable to be removed may be cut off if they are on the surgical extremity.    Please remove all hair extensions, wigs, clips and any other metal accessories/ ornaments from your hair.  These items may pose a flammable/fire risk in Surgery and must be removed.    Do not shave your surgical area at least 5 days prior to your surgery. The surgical prep will be performed at the hospital according to Infection Control regulations.    Contact Lens must be removed before surgery. Either do not wear the contact lens or bring a case and solution for storage.  Please bring a container for eyeglasses or dentures as required.  Bring any paperwork your physician has provided, such as consent forms,  history and physicals, doctor's orders, etc.   Bring comfortable clothes that are loose fitting to wear upon discharge. Take into consideration the type of surgery being performed.  Maintain your diet as advised per your physician the day prior to surgery.      Adequate rest the night before surgery is advised.   Park in the Parking lot behind the hospital or in the [facility] across the street from the parking lot. Parking is complimentary.  If you will be discharged the same day as your procedure, please arrange for a responsible adult to drive you home or to accompany you if traveling by taxi.   YOU WILL NOT BE PERMITTED TO DRIVE OR TO LEAVE THE HOSPITAL ALONE AFTER SURGERY.   If you are being discharged the same day,  it is strongly recommended that you arrange for someone to remain with you for the first 24 hrs following your surgery.    The Surgeon will speak to your family/visitor after your surgery regarding the outcome of your surgery and post op care.  The Surgeon may speak to you after your surgery, but there is a possibility you may not remember the details.  Please check with your family members regarding the conversation with the Surgeon.    We strongly recommend whoever is bringing you home be present for discharge instructions.  This will ensure a thorough understanding for your post op home care.          Thank you for your cooperation.  The Staff of [facility].            Bathing Instructions with Hibiclens    Shower the evening before and morning of your procedure with Chlorhexidine (Hibiclens)  do not use Chlorhexidine on your face or genitals. Do not get in your eyes.  Wash your face with water and your regular face wash/soap  Use your regular shampoo  Apply Chlorhexidine (Hibiclens) directly on your skin or on a wet washcloth and wash gently. When showering: Move away from the shower stream when applying Chlorhexidine (Hibiclens) to avoid rinsing off too soon.  Rinse thoroughly with warm water  Do not dilute Chlorhexidine (Hibiclens)   Dry off as usual, do not use any deodorant, powder, body lotions, perfume, after shave or cologne.

## 2023-10-17 ENCOUNTER — TELEPHONE (OUTPATIENT)
Dept: SURGERY | Facility: CLINIC | Age: 53
End: 2023-10-17
Payer: MEDICAID

## 2023-10-18 ENCOUNTER — ANESTHESIA (OUTPATIENT)
Dept: SURGERY | Facility: OTHER | Age: 53
End: 2023-10-18
Payer: MEDICAID

## 2023-10-18 ENCOUNTER — HOSPITAL ENCOUNTER (OUTPATIENT)
Facility: OTHER | Age: 53
Discharge: HOME | End: 2023-10-18
Attending: SURGERY | Admitting: SURGERY
Payer: MEDICAID

## 2023-10-18 VITALS
TEMPERATURE: 98 F | HEART RATE: 62 BPM | DIASTOLIC BLOOD PRESSURE: 82 MMHG | OXYGEN SATURATION: 98 % | BODY MASS INDEX: 30.61 KG/M2 | SYSTOLIC BLOOD PRESSURE: 165 MMHG | RESPIRATION RATE: 18 BRPM | HEIGHT: 67 IN | WEIGHT: 195 LBS

## 2023-10-18 DIAGNOSIS — K64.9 HEMORRHOIDS: ICD-10-CM

## 2023-10-18 PROCEDURE — D9220A PRA ANESTHESIA: Mod: CRNA,,, | Performed by: NURSE ANESTHETIST, CERTIFIED REGISTERED

## 2023-10-18 PROCEDURE — 88305 TISSUE EXAM BY PATHOLOGIST: CPT | Performed by: PATHOLOGY

## 2023-10-18 PROCEDURE — 25000003 PHARM REV CODE 250: Performed by: ANESTHESIOLOGY

## 2023-10-18 PROCEDURE — 88342 CHG IMMUNOCYTOCHEMISTRY: ICD-10-PCS | Mod: 26,,, | Performed by: PATHOLOGY

## 2023-10-18 PROCEDURE — 37000009 HC ANESTHESIA EA ADD 15 MINS: Performed by: SURGERY

## 2023-10-18 PROCEDURE — 71000033 HC RECOVERY, INTIAL HOUR: Performed by: SURGERY

## 2023-10-18 PROCEDURE — 63600175 PHARM REV CODE 636 W HCPCS: Performed by: NURSE ANESTHETIST, CERTIFIED REGISTERED

## 2023-10-18 PROCEDURE — 88342 IMHCHEM/IMCYTCHM 1ST ANTB: CPT | Mod: 26,,, | Performed by: PATHOLOGY

## 2023-10-18 PROCEDURE — 37000008 HC ANESTHESIA 1ST 15 MINUTES: Performed by: SURGERY

## 2023-10-18 PROCEDURE — D9220A PRA ANESTHESIA: Mod: ANES,,, | Performed by: ANESTHESIOLOGY

## 2023-10-18 PROCEDURE — D9220A PRA ANESTHESIA: ICD-10-PCS | Mod: ANES,,, | Performed by: ANESTHESIOLOGY

## 2023-10-18 PROCEDURE — 36000707: Performed by: SURGERY

## 2023-10-18 PROCEDURE — 36000706: Performed by: SURGERY

## 2023-10-18 PROCEDURE — 88304 TISSUE EXAM BY PATHOLOGIST: CPT | Mod: 26,,, | Performed by: PATHOLOGY

## 2023-10-18 PROCEDURE — 63600175 PHARM REV CODE 636 W HCPCS: Performed by: SURGERY

## 2023-10-18 PROCEDURE — 88305 TISSUE EXAM BY PATHOLOGIST: ICD-10-PCS | Mod: 26,,, | Performed by: PATHOLOGY

## 2023-10-18 PROCEDURE — 46260 PR HEMORRHOIDECTOMY,INT/EXT, 2+ COLUMNS/GROUPS: ICD-10-PCS | Mod: ,,, | Performed by: SURGERY

## 2023-10-18 PROCEDURE — 46260 REMOVE IN/EX HEM GROUPS 2+: CPT | Mod: ,,, | Performed by: SURGERY

## 2023-10-18 PROCEDURE — 88304 PR  SURG PATH,LEVEL III: ICD-10-PCS | Mod: 26,,, | Performed by: PATHOLOGY

## 2023-10-18 PROCEDURE — D9220A PRA ANESTHESIA: ICD-10-PCS | Mod: CRNA,,, | Performed by: NURSE ANESTHETIST, CERTIFIED REGISTERED

## 2023-10-18 PROCEDURE — 63600175 PHARM REV CODE 636 W HCPCS: Performed by: ANESTHESIOLOGY

## 2023-10-18 PROCEDURE — 88305 TISSUE EXAM BY PATHOLOGIST: CPT | Mod: 26,,, | Performed by: PATHOLOGY

## 2023-10-18 PROCEDURE — 88304 TISSUE EXAM BY PATHOLOGIST: CPT | Performed by: PATHOLOGY

## 2023-10-18 PROCEDURE — 71000015 HC POSTOP RECOV 1ST HR: Performed by: SURGERY

## 2023-10-18 PROCEDURE — 25000003 PHARM REV CODE 250: Performed by: NURSE ANESTHETIST, CERTIFIED REGISTERED

## 2023-10-18 PROCEDURE — 71000016 HC POSTOP RECOV ADDL HR: Performed by: SURGERY

## 2023-10-18 PROCEDURE — 88342 IMHCHEM/IMCYTCHM 1ST ANTB: CPT | Performed by: PATHOLOGY

## 2023-10-18 PROCEDURE — 27201423 OPTIME MED/SURG SUP & DEVICES STERILE SUPPLY: Performed by: SURGERY

## 2023-10-18 PROCEDURE — 71000039 HC RECOVERY, EACH ADD'L HOUR: Performed by: SURGERY

## 2023-10-18 RX ORDER — MEPERIDINE HYDROCHLORIDE 25 MG/ML
12.5 INJECTION INTRAMUSCULAR; INTRAVENOUS; SUBCUTANEOUS ONCE AS NEEDED
Status: DISCONTINUED | OUTPATIENT
Start: 2023-10-18 | End: 2023-10-18 | Stop reason: HOSPADM

## 2023-10-18 RX ORDER — PROCHLORPERAZINE EDISYLATE 5 MG/ML
5 INJECTION INTRAMUSCULAR; INTRAVENOUS EVERY 30 MIN PRN
Status: DISCONTINUED | OUTPATIENT
Start: 2023-10-18 | End: 2023-10-18 | Stop reason: HOSPADM

## 2023-10-18 RX ORDER — ONDANSETRON 8 MG/1
8 TABLET, ORALLY DISINTEGRATING ORAL ONCE
Status: COMPLETED | OUTPATIENT
Start: 2023-10-18 | End: 2023-10-18

## 2023-10-18 RX ORDER — DEXAMETHASONE SODIUM PHOSPHATE 4 MG/ML
INJECTION, SOLUTION INTRA-ARTICULAR; INTRALESIONAL; INTRAMUSCULAR; INTRAVENOUS; SOFT TISSUE
Status: DISCONTINUED | OUTPATIENT
Start: 2023-10-18 | End: 2023-10-18

## 2023-10-18 RX ORDER — SODIUM CHLORIDE 0.9 % (FLUSH) 0.9 %
3 SYRINGE (ML) INJECTION
Status: DISCONTINUED | OUTPATIENT
Start: 2023-10-18 | End: 2023-10-18 | Stop reason: HOSPADM

## 2023-10-18 RX ORDER — LIDOCAINE HYDROCHLORIDE 10 MG/ML
0.5 INJECTION, SOLUTION EPIDURAL; INFILTRATION; INTRACAUDAL; PERINEURAL ONCE
Status: DISCONTINUED | OUTPATIENT
Start: 2023-10-18 | End: 2023-10-18 | Stop reason: HOSPADM

## 2023-10-18 RX ORDER — MIDAZOLAM HYDROCHLORIDE 1 MG/ML
INJECTION INTRAMUSCULAR; INTRAVENOUS
Status: DISCONTINUED | OUTPATIENT
Start: 2023-10-18 | End: 2023-10-18

## 2023-10-18 RX ORDER — KETAMINE HCL IN 0.9 % NACL 50 MG/5 ML
SYRINGE (ML) INTRAVENOUS
Status: DISCONTINUED | OUTPATIENT
Start: 2023-10-18 | End: 2023-10-18

## 2023-10-18 RX ORDER — ONDANSETRON 8 MG/1
8 TABLET, ORALLY DISINTEGRATING ORAL ONCE
Status: DISCONTINUED | OUTPATIENT
Start: 2023-10-18 | End: 2023-10-18

## 2023-10-18 RX ORDER — PROPOFOL 10 MG/ML
VIAL (ML) INTRAVENOUS
Status: DISCONTINUED | OUTPATIENT
Start: 2023-10-18 | End: 2023-10-18

## 2023-10-18 RX ORDER — HYDROMORPHONE HYDROCHLORIDE 2 MG/ML
INJECTION, SOLUTION INTRAMUSCULAR; INTRAVENOUS; SUBCUTANEOUS
Status: DISCONTINUED | OUTPATIENT
Start: 2023-10-18 | End: 2023-10-18

## 2023-10-18 RX ORDER — FENTANYL CITRATE 50 UG/ML
INJECTION, SOLUTION INTRAMUSCULAR; INTRAVENOUS
Status: DISCONTINUED | OUTPATIENT
Start: 2023-10-18 | End: 2023-10-18

## 2023-10-18 RX ORDER — DIPHENHYDRAMINE HYDROCHLORIDE 50 MG/ML
12.5 INJECTION INTRAMUSCULAR; INTRAVENOUS EVERY 30 MIN PRN
Status: DISCONTINUED | OUTPATIENT
Start: 2023-10-18 | End: 2023-10-18 | Stop reason: HOSPADM

## 2023-10-18 RX ORDER — LIDOCAINE HYDROCHLORIDE 20 MG/ML
INJECTION INTRAVENOUS
Status: DISCONTINUED | OUTPATIENT
Start: 2023-10-18 | End: 2023-10-18

## 2023-10-18 RX ORDER — OXYCODONE HYDROCHLORIDE 5 MG/1
5 TABLET ORAL EVERY 4 HOURS PRN
Qty: 35 TABLET | Refills: 0 | Status: SHIPPED | OUTPATIENT
Start: 2023-10-18

## 2023-10-18 RX ORDER — SODIUM CHLORIDE, SODIUM LACTATE, POTASSIUM CHLORIDE, CALCIUM CHLORIDE 600; 310; 30; 20 MG/100ML; MG/100ML; MG/100ML; MG/100ML
INJECTION, SOLUTION INTRAVENOUS CONTINUOUS
Status: DISCONTINUED | OUTPATIENT
Start: 2023-10-18 | End: 2023-10-18 | Stop reason: HOSPADM

## 2023-10-18 RX ORDER — HYDROMORPHONE HYDROCHLORIDE 2 MG/ML
0.4 INJECTION, SOLUTION INTRAMUSCULAR; INTRAVENOUS; SUBCUTANEOUS EVERY 5 MIN PRN
Status: DISCONTINUED | OUTPATIENT
Start: 2023-10-18 | End: 2023-10-18 | Stop reason: HOSPADM

## 2023-10-18 RX ORDER — OXYCODONE HYDROCHLORIDE 5 MG/1
5 TABLET ORAL EVERY 4 HOURS PRN
Status: DISCONTINUED | OUTPATIENT
Start: 2023-10-18 | End: 2023-10-18 | Stop reason: HOSPADM

## 2023-10-18 RX ORDER — ONDANSETRON 2 MG/ML
INJECTION INTRAMUSCULAR; INTRAVENOUS
Status: DISCONTINUED | OUTPATIENT
Start: 2023-10-18 | End: 2023-10-18

## 2023-10-18 RX ORDER — ACETAMINOPHEN 500 MG
1000 TABLET ORAL
Status: COMPLETED | OUTPATIENT
Start: 2023-10-18 | End: 2023-10-18

## 2023-10-18 RX ORDER — BUPIVACAINE HYDROCHLORIDE 2.5 MG/ML
INJECTION, SOLUTION EPIDURAL; INFILTRATION; INTRACAUDAL
Status: DISCONTINUED | OUTPATIENT
Start: 2023-10-18 | End: 2023-10-18 | Stop reason: HOSPADM

## 2023-10-18 RX ADMIN — GLYCOPYRROLATE 0.2 MG: 0.2 INJECTION, SOLUTION INTRAMUSCULAR; INTRAVITREAL at 08:10

## 2023-10-18 RX ADMIN — HYDROMORPHONE HYDROCHLORIDE 0.5 MG: 2 INJECTION INTRAMUSCULAR; INTRAVENOUS; SUBCUTANEOUS at 08:10

## 2023-10-18 RX ADMIN — ONDANSETRON 8 MG: 8 TABLET, ORALLY DISINTEGRATING ORAL at 11:10

## 2023-10-18 RX ADMIN — OXYCODONE HYDROCHLORIDE 5 MG: 5 TABLET ORAL at 08:10

## 2023-10-18 RX ADMIN — MIDAZOLAM HYDROCHLORIDE 2 MG: 1 INJECTION, SOLUTION INTRAMUSCULAR; INTRAVENOUS at 07:10

## 2023-10-18 RX ADMIN — SODIUM CHLORIDE, SODIUM LACTATE, POTASSIUM CHLORIDE, AND CALCIUM CHLORIDE: 600; 310; 30; 20 INJECTION, SOLUTION INTRAVENOUS at 07:10

## 2023-10-18 RX ADMIN — ACETAMINOPHEN 1000 MG: 500 TABLET ORAL at 06:10

## 2023-10-18 RX ADMIN — FENTANYL CITRATE 100 MCG: 50 INJECTION, SOLUTION INTRAMUSCULAR; INTRAVENOUS at 07:10

## 2023-10-18 RX ADMIN — FENTANYL CITRATE 50 MCG: 50 INJECTION, SOLUTION INTRAMUSCULAR; INTRAVENOUS at 08:10

## 2023-10-18 RX ADMIN — PROPOFOL 200 MG: 10 INJECTION, EMULSION INTRAVENOUS at 07:10

## 2023-10-18 RX ADMIN — Medication 30 MG: at 08:10

## 2023-10-18 RX ADMIN — DEXAMETHASONE SODIUM PHOSPHATE 8 MG: 4 INJECTION, SOLUTION INTRAMUSCULAR; INTRAVENOUS at 08:10

## 2023-10-18 RX ADMIN — CARBOXYMETHYLCELLULOSE SODIUM 2 DROP: 2.5 SOLUTION/ DROPS OPHTHALMIC at 07:10

## 2023-10-18 RX ADMIN — ONDANSETRON HYDROCHLORIDE 4 MG: 2 INJECTION INTRAMUSCULAR; INTRAVENOUS at 08:10

## 2023-10-18 RX ADMIN — LIDOCAINE HYDROCHLORIDE 60 MG: 20 INJECTION, SOLUTION INTRAVENOUS at 07:10

## 2023-10-18 NOTE — DISCHARGE SUMMARY
[facility] ([place])  Discharge Note  Short Stay    Procedure(s) (LRB):  HEMORRHOIDECTOMY (N/A)      OUTCOME: Patient tolerated treatment/procedure well without complication and is now ready for discharge.    DISPOSITION: Home or Self Care    FINAL DIAGNOSIS:  hemorrhoids    FOLLOWUP: In clinic    DISCHARGE INSTRUCTIONS:   Anal Surgery Post Op Instructions:    1. Take tylenol 1000 mg q8h and ibuprofen 400 mg q6h for pain.  Take prescription medication for breakthrough pain.  Take sitz baths as needed for comfort.   2. Take linzess daily. Take capful of miralax daily while taking pain medication.  Increase to once capful twice a day if no bowel movement in 24-48 hours.   3.  Some blood per rectum is expected after this procedure and may last for 1-2 weeks post op.  Call if bleeding is > 1 cup.   4. Call [phone] for worsening pain, inability to urinate or fever > 101.  Call or schedule follow up via [facility] for 4 weeks after surgery     Joelle Parks MD  Staff Surgeon  Colon & Rectal Surgery

## 2023-10-18 NOTE — OP NOTE
Date of surgery: 10/18/2023    Operative Report  Pre-operative diagnosis: internal/external hemorrhoids  Post-operative diagnosis: Same as above    Procedure:  Internal and external 2 column hemorrhoidectomy    Findings:  Near circumferential external hemorrhoids       Surgeon: Joelle Parks MD   Assistant:  none    Indication: Ms. Bradshaw is a 53 year old woman with a history of internal and external hemorrhoids  who is scheduled to undergo excisional hemorrhoidectomy after failure to improve with medical therapy and recurrent thrombosis. The benefits, risks, and alternatives were discussed with the patient, they were given the opportunity to ask questions and they elected to proceed with operative intervention after signing written consent.    Procedure:  After pre-operative assessment and review of informed consent, the patient was taken to the operating room and received general anesthesia. Pre-operative antibiotics were administered if indicated and the patient was placed in lithotomy position. The perineum was prepped and draped in the usual sterile fashion and a timeout was performed according to [facility] Quality and Safety guidelines.      A perianal block was performed.  Visual inspection performed and there were near circumferential external hemorrhoids. Anoscopy with a bullet retractor revealed a granular area of mucosa in the anterior midline that was excised and sent for specimen.   There were two significant internal hemorrhoid columns in the left and right lateral positions.  The right hemorrhoid was grasped at the anal verge and dissected away from the underlying sphincter muscle.  The external component was transected to preserve anoderm.  The hemorrhoid was then ligated and ampuated with the ligasure.  This same process was repeated for the left column.  In a similar fashion, the external component was also divided to preserve a bridge of anoderm between the columns.  The wound beds were inspected  and hemostasis was obtained.      Prior to closure and after final closure instrument, needle, and sponge counts were correct.    The procedure was completed without complication and was well-tolerated. The patient was then brought to the post-anesthesia care unit in stable condition. I was present for the entire operation.    Complications: None  Estimated blood loss: 50 mL  Disposition: PACU    Joelle Parks MD  Staff Surgeon   Colon & Rectal Surgery

## 2023-10-18 NOTE — OR NURSING
VSS on RA. Pt states pain is tolerable. Dressing intact and PIV C/D/I. Meets Phase I discharge criteria. Ready for transfer to Phase II. Friend notified.

## 2023-10-18 NOTE — DISCHARGE INSTRUCTIONS
Anal Surgery Post Op Instructions:    1. Take tylenol 1000 mg q8h and ibuprofen 400 mg for pain.  Take prescription medication for breakthrough pain.  Take sitz baths as needed for comfort.   2. Take capful of miralax daily while taking pain medication.  Increase to once capful twice a day if no bowel movement in 24-48 hours.   3.  Some blood per rectum is expected after this procedure and may last for 1-2 weeks post op.  Call if bleeding is > 1 cup.   4. Call [phone] for worsening pain, inability to urinate or fever > 101.  Call or schedule follow up via [facility] for 4 weeks after surgery     Joelle Parks MD  Staff Surgeon  Colon & Rectal Surgery

## 2023-10-18 NOTE — TRANSFER OF CARE
"Anesthesia Transfer of Care Note    Patient: Arpita Bradshaw    Procedure(s) Performed: Procedure(s) (LRB):  HEMORRHOIDECTOMY (N/A)    Patient location: PACU    Anesthesia Type: general    Transport from OR: Transported from OR on 2-3 L/min O2 by NC with adequate spontaneous ventilation    Post pain: adequate analgesia    Post assessment: no apparent anesthetic complications    Post vital signs: stable    Level of consciousness: awake and alert    Nausea/Vomiting: no nausea/vomiting    Complications: none    Transfer of care protocol was followed      Last vitals:   Visit Vitals  BP (!) 150/74 (BP Location: Right arm, Patient Position: Lying)   Pulse 76   Temp 36.1 °C (97 °F) (Skin)   Resp 16   Ht 5' 7" (1.702 m)   Wt 88.5 kg (195 lb)   SpO2 100%   Breastfeeding No   BMI 30.54 kg/m²     "

## 2023-10-18 NOTE — ANESTHESIA PROCEDURE NOTES
LMA    Date/Time: 10/18/2023 7:57 AM    Performed by: Joelle Sun CRNA  Authorized by: Renita Borrero MD    Intubation:     Induction:  Intravenous    Mask Ventilation:  Easy mask    Attempts:  1    Attempted By:  CRNA    Difficult Airway Encountered?: No      Complications:  None    Airway Device Size:  4.0    Secured at:  The lips    Placement Verified By:  Capnometry    Complicating Factors:  None    Findings Post-Intubation:  BS equal bilateral and atraumatic/condition of teeth unchanged

## 2023-10-18 NOTE — ANESTHESIA POSTPROCEDURE EVALUATION
Anesthesia Post Evaluation    Patient: Arpita Bradshaw    Procedure(s) Performed: Procedure(s) (LRB):  HEMORRHOIDECTOMY (N/A)    Final Anesthesia Type: general      Patient location during evaluation: PACU  Patient participation: Yes- Able to Participate  Level of consciousness: awake and alert  Post-procedure vital signs: reviewed and stable  Pain management: adequate  Airway patency: patent    PONV status at discharge: No PONV  Anesthetic complications: no      Cardiovascular status: blood pressure returned to baseline and stable  Respiratory status: unassisted, spontaneous ventilation and room air  Hydration status: euvolemic  Follow-up not needed.          Vitals Value Taken Time   /82 10/18/23 0917   Temp 36.6 °C (97.9 °F) 10/18/23 0915   Pulse 59 10/18/23 0917   Resp 16 10/18/23 0915   SpO2 98 % 10/18/23 0917   Vitals shown include unvalidated device data.      Event Time   Out of Recovery 09:32:00         Pain/Karyn Score: Pain Rating Prior to Med Admin: 4 (10/18/2023  8:51 AM)  Pain Rating Post Med Admin: 2 (10/18/2023  9:15 AM)  Karyn Score: 10 (10/18/2023  9:15 AM)

## 2023-10-24 LAB
FINAL PATHOLOGIC DIAGNOSIS: NORMAL
GROSS: NORMAL
Lab: NORMAL

## 2023-10-26 ENCOUNTER — OFFICE VISIT (OUTPATIENT)
Dept: OBSTETRICS AND GYNECOLOGY | Facility: CLINIC | Age: 53
End: 2023-10-26
Payer: MEDICAID

## 2023-10-26 VITALS
BODY MASS INDEX: 30.66 KG/M2 | DIASTOLIC BLOOD PRESSURE: 80 MMHG | WEIGHT: 195.75 LBS | SYSTOLIC BLOOD PRESSURE: 130 MMHG

## 2023-10-26 DIAGNOSIS — Z01.419 WELL WOMAN EXAM WITH ROUTINE GYNECOLOGICAL EXAM: Primary | ICD-10-CM

## 2023-10-26 DIAGNOSIS — Z12.31 BREAST CANCER SCREENING BY MAMMOGRAM: ICD-10-CM

## 2023-10-26 DIAGNOSIS — N95.1 MENOPAUSAL SYMPTOMS: ICD-10-CM

## 2023-10-26 DIAGNOSIS — N60.01 BREAST CYST, RIGHT: ICD-10-CM

## 2023-10-26 PROCEDURE — 3075F SYST BP GE 130 - 139MM HG: CPT | Mod: CPTII,,, | Performed by: OBSTETRICS & GYNECOLOGY

## 2023-10-26 PROCEDURE — 99396 PREV VISIT EST AGE 40-64: CPT | Mod: S$PBB,,, | Performed by: OBSTETRICS & GYNECOLOGY

## 2023-10-26 PROCEDURE — 99396 PR PREVENTIVE VISIT,EST,40-64: ICD-10-PCS | Mod: S$PBB,,, | Performed by: OBSTETRICS & GYNECOLOGY

## 2023-10-26 PROCEDURE — 3008F PR BODY MASS INDEX (BMI) DOCUMENTED: ICD-10-PCS | Mod: CPTII,,, | Performed by: OBSTETRICS & GYNECOLOGY

## 2023-10-26 PROCEDURE — 3079F DIAST BP 80-89 MM HG: CPT | Mod: CPTII,,, | Performed by: OBSTETRICS & GYNECOLOGY

## 2023-10-26 PROCEDURE — 3079F PR MOST RECENT DIASTOLIC BLOOD PRESSURE 80-89 MM HG: ICD-10-PCS | Mod: CPTII,,, | Performed by: OBSTETRICS & GYNECOLOGY

## 2023-10-26 PROCEDURE — 3075F PR MOST RECENT SYSTOLIC BLOOD PRESS GE 130-139MM HG: ICD-10-PCS | Mod: CPTII,,, | Performed by: OBSTETRICS & GYNECOLOGY

## 2023-10-26 PROCEDURE — 99999 PR PBB SHADOW E&M-EST. PATIENT-LVL III: ICD-10-PCS | Mod: PBBFAC,,, | Performed by: OBSTETRICS & GYNECOLOGY

## 2023-10-26 PROCEDURE — 1159F MED LIST DOCD IN RCRD: CPT | Mod: CPTII,,, | Performed by: OBSTETRICS & GYNECOLOGY

## 2023-10-26 PROCEDURE — 99213 OFFICE O/P EST LOW 20 MIN: CPT | Mod: PBBFAC | Performed by: OBSTETRICS & GYNECOLOGY

## 2023-10-26 PROCEDURE — 1159F PR MEDICATION LIST DOCUMENTED IN MEDICAL RECORD: ICD-10-PCS | Mod: CPTII,,, | Performed by: OBSTETRICS & GYNECOLOGY

## 2023-10-26 PROCEDURE — 3008F BODY MASS INDEX DOCD: CPT | Mod: CPTII,,, | Performed by: OBSTETRICS & GYNECOLOGY

## 2023-10-26 PROCEDURE — 99999 PR PBB SHADOW E&M-EST. PATIENT-LVL III: CPT | Mod: PBBFAC,,, | Performed by: OBSTETRICS & GYNECOLOGY

## 2023-10-26 RX ORDER — ESTRADIOL 1 MG/1
2 TABLET ORAL DAILY
Qty: 180 TABLET | Refills: 3 | Status: SHIPPED | OUTPATIENT
Start: 2023-10-26 | End: 2024-10-25

## 2023-10-26 NOTE — PROGRESS NOTES
SUBJECTIVE:   Arpita Bradshaw is a 53 y.o. female   for annual well woman exam. No LMP recorded. Patient has had a hysterectomy..  She has no unusual complaints.        S/p WILLIAM for endometriosis in     From Dr. Thompson  C/o hot flashes, night sweats, mood swings - symptoms getting worse.  Started estradiol patch      visit:  Has been on estradiol patch x 8 months.    No improvement in any of her menopausal symptoms.  Tried 2 patches and black cohosh and that didn't help either       On Estradiol 2 mg oral daily x 4 months  Helps with hot flashes - still having them but not as intense  Still having trouble sleeping - waking up due night sweats  Reports shooting breast pain, bilateral, resolves on its own.  Started in last few months  C/o hair falling out, skin itching, back pain, knee/joint pain.  Feels different from prior lupus flare-ups  Absent sex drive - no difference with ERT  Has monthly infusion for lupus - helps control flare-ups  Will refer to Dr John for management of  sx.      Today, pt was not able to f/u with Dr. Thompson or Dr. John  Her vasomotor symptoms are still there with estradiol 2 mg daily, however much better than before     Past Medical History:   Diagnosis Date    Disorder of kidney and ureter     Fibromyalgia     Hypertension     no meds since 2023    IC (interstitial cystitis)     Lupus     Status post insertion of sacral nerve stimulator     STD (sexually transmitted disease)     Urinary tract infection     Vaginal infection      Past Surgical History:   Procedure Laterality Date    BLADDER FULGURATION N/A 2019    Procedure: FULGURATION, BLADDER;  Surgeon: Harris Lua MD;  Location: [facility];  Service: Urology;  Laterality: N/A;    BLADDER FULGURATION N/A 2019    Procedure: FULGURATION, BLADDER;  Surgeon: Harris Lua MD;  Location: [facility] OR;  Service: Urology;  Laterality: N/A;    BLADDER FULGURATION N/A 3/1/2021    Procedure: FULGURATION,  BLADDER;  Surgeon: Harrsi Lua MD;  [facility];  Service: Urology;  Laterality: N/A;    BREAST REDUCTION       SECTION      COLONOSCOPY N/A 2018    Procedure: COLONOSCOPY;  Surgeon: Catrachita Kamara MD;  [facility];  Service: Endoscopy;  Laterality: N/A;    CYSTOSCOPY N/A 2018    Procedure: CYSTOSCOPY;  Surgeon: Harris Lua MD;  [facility];  Service: Urology;  Laterality: N/A;  200 of botox    CYSTOSCOPY W/ RETROGRADES Bilateral 2019    Procedure: CYSTOSCOPY, WITH RETROGRADE PYELOGRAM;  Surgeon: Harris Lua MD;  [facility];  Service: Urology;  Laterality: Bilateral;    CYSTOSCOPY WITH HYDRODISTENSION OF BLADDER N/A 10/12/2020    Procedure: CYSTOSCOPY, WITH BLADDER HYDRODISTENSION;  Surgeon: Harris Lua MD;  [facility];  Service: Urology;  Laterality: N/A;    CYSTOSCOPY WITH HYDRODISTENSION OF BLADDER N/A 12/15/2021    Procedure: CYSTOSCOPY, WITH BLADDER HYDRODISTENSION;  Surgeon: Harris Lua MD;  [facility];  Service: Urology;  Laterality: N/A;    CYSTOSCOPY WITH HYDRODISTENSION OF BLADDER N/A 2022    Procedure: CYSTOSCOPY, WITH BLADDER HYDRODISTENSION;  Surgeon: Harris Lua MD;  [facility];  Service: Urology;  Laterality: N/A;    CYSTOURETHROSCOPY  2019    Procedure: CYSTOURETHROSCOPY;  Surgeon: Harris Lau MD;  [facility];  Service: Urology;;    CYSTOURETHROSCOPY N/A 3/1/2021    Procedure: CYSTOURETHROSCOPY;  Surgeon: Harris Lua MD;  [facility];  Service: Urology;  Laterality: N/A;    EXCISIONAL HEMORRHOIDECTOMY N/A 10/18/2023    Procedure: HEMORRHOIDECTOMY;  Surgeon: Joelle Parks MD;  [facility];  Service: Colon and Rectal;  Laterality: N/A;    hemmorroidectomy      HYSTERECTOMY  2008    WILLIAM for endometriosis    IMPLANTATION OF PERMANENT SACRAL NERVE STIMULATOR Right 2021    Procedure: INSERTION, NEUROSTIMULATOR, PERMANENT, SACRAL;  Surgeon: Harris Lua,  MD;  Location: [facility] OR;  Service: Urology;  Laterality: Right;    INJECTION OF BOTULINUM TOXIN TYPE A N/A 2018    Procedure: INJECTION, BOTULINUM TOXIN, TYPE A;  Surgeon: Harris Lua MD;  Location: [facility] OR;  Service: Urology;  Laterality: N/A;    neurostimulator for bladder      REMOVAL OF SACRAL NEUROSTIMULATOR DEVICE Left 2021    Procedure: REMOVAL, NEUROSTIMULATOR, SACRAL;  Surgeon: Harris Lua MD;  Location: [facility] OR;  Service: Urology;  Laterality: Left;    TOTAL REDUCTION MAMMOPLASTY      URETHRAL CATHETERIZATION N/A 2019    Procedure: CATHETERIZATION, URETHRA;  Surgeon: Harris Lua MD;  Location: [facility] OR;  Service: Urology;  Laterality: N/A;     Social History     Socioeconomic History    Marital status: Single   Tobacco Use    Smoking status: Never    Smokeless tobacco: Never   Substance and Sexual Activity    Alcohol use: No    Drug use: No    Sexual activity: Yes     Partners: Male     Birth control/protection: Condom     Family History   Problem Relation Age of Onset    Hypertension Mother     Cancer Mother         KIDNEY    Arthritis Mother     Breast cancer Mother     Kidney disease Neg Hx     Ovarian cancer Neg Hx     Colon cancer Neg Hx      OB History    Para Term  AB Living   3 2 2 0 1 2   SAB IAB Ectopic Multiple Live Births   1 0 0 0 0      # Outcome Date GA Lbr Nain/2nd Weight Sex Delivery Anes PTL Lv   3 SAB            2 Term      CS-Unspec      1 Term      CS-Unspec            Current Outpatient Medications   Medication Sig Dispense Refill    acetaminophen (TYLENOL) 500 MG tablet Take 2 tablets (1,000 mg total) by mouth every 6 (six) hours as needed for Pain (As needed for pain and fever). 30 tablet 0    azelastine (ASTELIN) 137 mcg (0.1 %) nasal spray 2 sprays 2 (two) times daily.      belimumab (BENLYSTA IV) Inject 1 Bag into the vein every 30 days.      cetirizine (ZYRTEC) 10 MG tablet Take 10 mg by mouth once daily.      cyclobenzaprine (FLEXERIL) 5  MG tablet Take 5 mg by mouth daily as needed.      estradioL (ESTRACE) 1 MG tablet Take 2 tablets (2 mg total) by mouth once daily. 180 tablet 3    famotidine (PEPCID) 20 MG tablet Take 20 mg by mouth 2 (two) times daily as needed.      fluticasone propionate (FLONASE) 50 mcg/actuation nasal spray SHAKE LQ AND U 2 SPRAYS IEN QD  3    gabapentin (NEURONTIN) 300 MG capsule Take 300 mg by mouth nightly as needed.      hydrocortisone (ANUSOL-HC) 2.5 % rectal cream Place rectally 2 (two) times daily. 28 g 3    hydrOXYchloroQUINE (PLAQUENIL) 200 mg tablet Take 1 tablet by mouth 2 (two) times daily.      ibuprofen (ADVIL,MOTRIN) 600 MG tablet Take 1 tablet (600 mg total) by mouth every 6 (six) hours as needed for Pain (Take with food as needed for mild-to-moderate pain). 20 tablet 0    levocetirizine (XYZAL) 5 MG tablet TAKE 1 TABLET (5MG) BY MOUTH ONCE DAILY AT BEDTIME      linaCLOtide (LINZESS) 72 mcg Cap capsule Take 1 tablet by mouth.       montelukast (SINGULAIR) 10 mg tablet TAKE 1 TABLET (10MG) BY MOUTH ONCE DAILY AT BEDTIME FOR ALLERGIC RHINITIS      nitrofurantoin, macrocrystal-monohydrate, (MACROBID) 100 MG capsule Take 1 capsule (100 mg total) by mouth before evening meal. 90 capsule 1    oxyCODONE (ROXICODONE) 5 MG immediate release tablet Take 1 tablet (5 mg total) by mouth every 4 (four) hours as needed for Pain. 35 tablet 0    RESTASIS 0.05 % ophthalmic emulsion INT 1 GTT IN OU BID  4    valACYclovir (VALTREX) 1000 MG tablet TAKE 1 TABLET (1,000MG) BY MOUTH ONCE DAILY 90 tablet 0    VITAMIN D2 1,250 mcg (50,000 unit) capsule Take 50,000 Units by mouth every 7 days.       No current facility-administered medications for this visit.     Allergies: Sulfamethoxazole-trimethoprim       ROS:  GENERAL: Denies weight gain or weight loss. Feeling well overall.   SKIN: Denies rash or lesions.   HEAD: Denies head injury or headache.   NODES: Denies enlarged lymph nodes.   CHEST: Denies chest pain or shortness of  breath.   CARDIOVASCULAR: Denies palpitations or left sided chest pain.   ABDOMEN: No abdominal pain, constipation, diarrhea, nausea, vomiting or rectal bleeding.   URINARY: No frequency, dysuria, hematuria, or burning on urination.  REPRODUCTIVE: Denies vaginal discharge, abnormal vaginal bleeding, lesions, pelvic pain  BREASTS: The patient performs breast self-examination and denies pain, lumps, or nipple discharge.   HEMATOLOGIC: No easy bruisability or excessive bleeding.  MUSCULOSKELETAL: Denies joint pain or swelling.   NEUROLOGIC: Denies syncope or weakness.   PSYCHIATRIC: Denies depression, anxiety or mood swings.      OBJECTIVE:   /80   Wt 88.8 kg (195 lb 12.3 oz)   BMI 30.66 kg/m²   The patient appears well, alert, oriented x 3, in no distress.  PSYCH:  Normal, full range of affect  NECK: negative, no thyromegaly, trachea midline  SKIN: normal, good color, good turgor and no acne, striae, hirsutism  BREAST EXAM: left breast normal without mass, skin or nipple changes or axillary nodes, abnormal mass palpable at 9 oclock on right breast (appx 3-4 cm)  ABDOMEN: soft, non-tender; bowel sounds normal; no masses,  no organomegaly and no hernias, masses, or hepatosplenomegaly  GENITALIA: normal external genitalia, no erythema, no discharge  BLADDER: soft  URETHRA: normal appearing urethra with no masses, tenderness or lesions and normal urethra, normal urethral meatus  VAGINA: Normal  CERVIX: absent  UTERUS: uterus absent  ADNEXA: no mass, fullness, tenderness      ASSESSMENT:   1. Health maintenance  -pap not indicated  -right breast cyst -screening MMG ordered, US ordered  -counseled on exercise and healthy diet,   -bone health:  Discussed Vitamin D and Calcium supplementation, weight bearing exercises  2.  Discussed safety at home/school/work, healthy and balanced diet, sleep hygiene, as well as violence/weapons exposure.   3.  HRT:  refilled  estradiol 2mg daily.  If symptoms worsening or not  improving - pt advised to see Dr. Payne

## 2023-10-31 ENCOUNTER — HOSPITAL ENCOUNTER (OUTPATIENT)
Dept: RADIOLOGY | Facility: HOSPITAL | Age: 53
Discharge: HOME | End: 2023-10-31
Attending: OBSTETRICS & GYNECOLOGY
Payer: MEDICAID

## 2023-10-31 DIAGNOSIS — N63.10 MASS OF BREAST, RIGHT: ICD-10-CM

## 2023-10-31 DIAGNOSIS — Z12.31 BREAST CANCER SCREENING BY MAMMOGRAM: ICD-10-CM

## 2023-11-01 ENCOUNTER — HOSPITAL ENCOUNTER (OUTPATIENT)
Dept: RADIOLOGY | Facility: HOSPITAL | Age: 53
Discharge: HOME | End: 2023-11-01
Attending: OBSTETRICS & GYNECOLOGY
Payer: MEDICAID

## 2023-11-01 PROCEDURE — 77065 DX MAMMO INCL CAD UNI: CPT | Mod: TC,RT

## 2023-11-01 PROCEDURE — 77065 DX MAMMO INCL CAD UNI: CPT | Mod: 26,RT,, | Performed by: RADIOLOGY

## 2023-11-01 PROCEDURE — 77061 MAMMO DIGITAL DIAGNOSTIC RIGHT WITH TOMO: ICD-10-PCS | Mod: 26,RT,, | Performed by: RADIOLOGY

## 2023-11-01 PROCEDURE — 77061 BREAST TOMOSYNTHESIS UNI: CPT | Mod: 26,RT,, | Performed by: RADIOLOGY

## 2023-11-01 PROCEDURE — 77065 MAMMO DIGITAL DIAGNOSTIC RIGHT WITH TOMO: ICD-10-PCS | Mod: 26,RT,, | Performed by: RADIOLOGY

## 2024-02-17 ENCOUNTER — HOSPITAL ENCOUNTER (EMERGENCY)
Facility: HOSPITAL | Age: 54
Discharge: HOME | End: 2024-02-17
Attending: EMERGENCY MEDICINE
Payer: MEDICAID

## 2024-02-17 VITALS
HEART RATE: 58 BPM | DIASTOLIC BLOOD PRESSURE: 86 MMHG | RESPIRATION RATE: 20 BRPM | OXYGEN SATURATION: 99 % | WEIGHT: 200 LBS | SYSTOLIC BLOOD PRESSURE: 129 MMHG | TEMPERATURE: 99 F | BODY MASS INDEX: 31.32 KG/M2

## 2024-02-17 DIAGNOSIS — K59.00 CONSTIPATION, UNSPECIFIED CONSTIPATION TYPE: ICD-10-CM

## 2024-02-17 DIAGNOSIS — R00.1 BRADYCARDIA: ICD-10-CM

## 2024-02-17 DIAGNOSIS — R10.31 RIGHT LOWER QUADRANT ABDOMINAL PAIN: Primary | ICD-10-CM

## 2024-02-17 LAB
ALBUMIN SERPL-MCNC: 3.8 G/DL (ref 3.3–5.5)
ALBUMIN SERPL-MCNC: 4 G/DL (ref 3.3–5.5)
ALP SERPL-CCNC: 50 U/L (ref 42–141)
ALP SERPL-CCNC: 53 U/L (ref 42–141)
BILIRUB SERPL-MCNC: 0.7 MG/DL (ref 0.2–1.6)
BILIRUB SERPL-MCNC: 0.7 MG/DL (ref 0.2–1.6)
BILIRUBIN, POC UA: NEGATIVE
BLOOD, POC UA: ABNORMAL
BUN SERPL-MCNC: 10 MG/DL (ref 7–22)
CALCIUM SERPL-MCNC: 9.5 MG/DL (ref 8–10.3)
CHLORIDE SERPL-SCNC: 104 MMOL/L (ref 98–108)
CLARITY, POC UA: CLEAR
COLOR, POC UA: YELLOW
CREAT SERPL-MCNC: 0.9 MG/DL (ref 0.6–1.2)
GLUCOSE SERPL-MCNC: 92 MG/DL (ref 73–118)
GLUCOSE, POC UA: NEGATIVE
HCT, POC: NORMAL
HGB, POC: NORMAL (ref 14–18)
KETONES, POC UA: NEGATIVE
LEUKOCYTE EST, POC UA: NEGATIVE
MCH, POC: NORMAL
MCHC, POC: NORMAL
MCV, POC: NORMAL
MPV, POC: NORMAL
NITRITE, POC UA: NEGATIVE
PH UR STRIP: 6.5 [PH]
POC ALT (SGPT): 17 U/L (ref 10–47)
POC ALT (SGPT): 19 U/L (ref 10–47)
POC AMYLASE: 65 U/L (ref 14–97)
POC AST (SGOT): 14 U/L (ref 11–38)
POC AST (SGOT): 19 U/L (ref 11–38)
POC GGT: 6 U/L (ref 5–65)
POC PLATELET COUNT: NORMAL
POC RAPID STREP A: NEGATIVE
POC TCO2: 29 MMOL/L (ref 18–33)
POTASSIUM BLD-SCNC: 4.3 MMOL/L (ref 3.6–5.1)
PROTEIN, POC UA: NEGATIVE
PROTEIN, POC: 6.8 G/DL (ref 6.4–8.1)
PROTEIN, POC: 7.1 G/DL (ref 6.4–8.1)
RBC, POC: NORMAL
RDW, POC: NORMAL
SODIUM BLD-SCNC: 143 MMOL/L (ref 128–145)
SPECIFIC GRAVITY, POC UA: 1.01
UROBILINOGEN, POC UA: 0.2 E.U./DL
WBC, POC: NORMAL

## 2024-02-17 PROCEDURE — 82150 ASSAY OF AMYLASE: CPT | Mod: ER

## 2024-02-17 PROCEDURE — 25000003 PHARM REV CODE 250: Mod: ER

## 2024-02-17 PROCEDURE — 96375 TX/PRO/DX INJ NEW DRUG ADDON: CPT | Mod: ER

## 2024-02-17 PROCEDURE — 25500020 PHARM REV CODE 255: Mod: ER | Performed by: EMERGENCY MEDICINE

## 2024-02-17 PROCEDURE — 63600175 PHARM REV CODE 636 W HCPCS: Mod: JZ,JG,ER

## 2024-02-17 PROCEDURE — 96374 THER/PROPH/DIAG INJ IV PUSH: CPT | Mod: 59,ER

## 2024-02-17 PROCEDURE — 85025 COMPLETE CBC W/AUTO DIFF WBC: CPT | Mod: ER

## 2024-02-17 PROCEDURE — 93010 ELECTROCARDIOGRAM REPORT: CPT | Mod: ,,, | Performed by: INTERNAL MEDICINE

## 2024-02-17 PROCEDURE — 93005 ELECTROCARDIOGRAM TRACING: CPT | Mod: ER

## 2024-02-17 PROCEDURE — 87880 STREP A ASSAY W/OPTIC: CPT | Mod: ER

## 2024-02-17 PROCEDURE — 82040 ASSAY OF SERUM ALBUMIN: CPT | Mod: 59,ER

## 2024-02-17 PROCEDURE — 80053 COMPREHEN METABOLIC PANEL: CPT | Mod: ER

## 2024-02-17 PROCEDURE — 99285 EMERGENCY DEPT VISIT HI MDM: CPT | Mod: 25,ER

## 2024-02-17 PROCEDURE — 96361 HYDRATE IV INFUSION ADD-ON: CPT | Mod: ER

## 2024-02-17 RX ORDER — DICYCLOMINE HYDROCHLORIDE 20 MG/1
20 TABLET ORAL 2 TIMES DAILY
Qty: 20 TABLET | Refills: 0 | Status: SHIPPED | OUTPATIENT
Start: 2024-02-17 | End: 2024-03-18

## 2024-02-17 RX ORDER — POLYETHYLENE GLYCOL 3350 17 G/17G
17 POWDER, FOR SOLUTION ORAL DAILY
Qty: 30 EACH | Refills: 0 | Status: SHIPPED | OUTPATIENT
Start: 2024-02-17

## 2024-02-17 RX ORDER — MORPHINE SULFATE 4 MG/ML
6 INJECTION, SOLUTION INTRAMUSCULAR; INTRAVENOUS
Status: COMPLETED | OUTPATIENT
Start: 2024-02-17 | End: 2024-02-17

## 2024-02-17 RX ORDER — ONDANSETRON HYDROCHLORIDE 2 MG/ML
8 INJECTION, SOLUTION INTRAVENOUS
Status: COMPLETED | OUTPATIENT
Start: 2024-02-17 | End: 2024-02-17

## 2024-02-17 RX ORDER — BUTALBITAL, ACETAMINOPHEN AND CAFFEINE 50; 325; 40 MG/1; MG/1; MG/1
1 TABLET ORAL EVERY 8 HOURS PRN
Qty: 15 TABLET | Refills: 0 | Status: SHIPPED | OUTPATIENT
Start: 2024-02-17

## 2024-02-17 RX ORDER — ONDANSETRON 4 MG/1
4 TABLET, ORALLY DISINTEGRATING ORAL EVERY 8 HOURS PRN
Qty: 15 TABLET | Refills: 0 | Status: SHIPPED | OUTPATIENT
Start: 2024-02-17 | End: 2024-05-09

## 2024-02-17 RX ORDER — FAMOTIDINE 20 MG/1
40 TABLET, FILM COATED ORAL
Status: COMPLETED | OUTPATIENT
Start: 2024-02-17 | End: 2024-02-17

## 2024-02-17 RX ORDER — ALUMINUM HYDROXIDE, MAGNESIUM HYDROXIDE, AND SIMETHICONE 1200; 120; 1200 MG/30ML; MG/30ML; MG/30ML
30 SUSPENSION ORAL
Status: COMPLETED | OUTPATIENT
Start: 2024-02-17 | End: 2024-02-17

## 2024-02-17 RX ORDER — DOCUSATE SODIUM 100 MG/1
100 CAPSULE, LIQUID FILLED ORAL 2 TIMES DAILY
Qty: 60 CAPSULE | Refills: 0 | Status: SHIPPED | OUTPATIENT
Start: 2024-02-17 | End: 2024-06-13 | Stop reason: ALTCHOICE

## 2024-02-17 RX ADMIN — SODIUM CHLORIDE 1000 ML: 9 INJECTION, SOLUTION INTRAVENOUS at 09:02

## 2024-02-17 RX ADMIN — MORPHINE SULFATE 6 MG: 4 INJECTION, SOLUTION INTRAMUSCULAR; INTRAVENOUS at 09:02

## 2024-02-17 RX ADMIN — FAMOTIDINE 40 MG: 20 TABLET ORAL at 08:02

## 2024-02-17 RX ADMIN — ALUMINUM HYDROXIDE, MAGNESIUM HYDROXIDE, AND SIMETHICONE 30 ML: 1200; 120; 1200 SUSPENSION ORAL at 08:02

## 2024-02-17 RX ADMIN — IOHEXOL 85 ML: 350 INJECTION, SOLUTION INTRAVENOUS at 09:02

## 2024-02-17 RX ADMIN — ONDANSETRON 8 MG: 2 INJECTION INTRAMUSCULAR; INTRAVENOUS at 09:02

## 2024-02-17 NOTE — DISCHARGE INSTRUCTIONS

## 2024-02-17 NOTE — ED NOTES
Pt informed of the need of a urine specimen.   Pt reports she can not urinate at this time  Pt informed to please call RN when she feels the urge to urination   Call bell in reach

## 2024-02-17 NOTE — ED PROVIDER NOTES
"Encounter Date: 2024    SCRIBE #1 NOTE: I, Axel Bonner, am scribing for, and in the presence of,  Aries Middleton PA-C.       History     Chief Complaint   Patient presents with    Abdominal Pain     Abd pain RLQ and R flank x 3 days, & headache. Pt states she was seen on Thursday by her PCP for same complaint and they ran a UA which did not show a UTI. Pt states pain is getting worse. Pt reports nausea     53-year-old female with a past medical history of interstitial cystitis, fibromyalgia, hypertension, lupus, presents to ED for multiple complaints. Patient reports of epigastric abdominal pain, as well as RLQ abdominal pain radiating to her right lower back for the past three days. Associated symptoms are nausea and a "pressure" sensation. Report being evaluated by primary care two days ago in which they ran a urinalysis. She reports that the UA showed no evidence of a UTI. However was reportedly put on keflex TID. Patient is also complaining of a headache as well as a swollen lymph node to the right jaw that started one week ago. She further reports of chronic constipation with hematochezia noted during bowel movements. She reportedly takes Linzess for this, but states that she ran out. Attempted treatment for headache with ibuprofen 800 mg, only taken at night, w/o relief. No alleviating or exacerbating factors noted. Denies sore throat, frequency, urgency, this area, fever, difficulty swallowing, ear pain, or any associated symptoms. Denies any recent sick contact. Past surgical history significant for  section.    The history is provided by the patient. No  was used.     Review of patient's allergies indicates:   Allergen Reactions    Sulfamethoxazole-trimethoprim Rash and Hives     Past Medical History:   Diagnosis Date    Disorder of kidney and ureter     Fibromyalgia     Hypertension     no meds since 2023    IC (interstitial cystitis)     Lupus     Status post " insertion of sacral nerve stimulator     STD (sexually transmitted disease)     Urinary tract infection     Vaginal infection      Past Surgical History:   Procedure Laterality Date    BLADDER FULGURATION N/A 2019    Procedure: FULGURATION, BLADDER;  Surgeon: Harris Lua MD;  Location: [facility] OR;  Service: Urology;  Laterality: N/A;    BLADDER FULGURATION N/A 2019    Procedure: FULGURATION, BLADDER;  Surgeon: Harris Lua MD;  Location: [facility] OR;  Service: Urology;  Laterality: N/A;    BLADDER FULGURATION N/A 3/1/2021    Procedure: FULGURATION, BLADDER;  Surgeon: Harris Lua MD;  Location: [facility] OR;  Service: Urology;  Laterality: N/A;    BREAST REDUCTION       SECTION      COLONOSCOPY N/A 2018    Procedure: COLONOSCOPY;  Surgeon: Catrachita Kamara MD;  Location: [facility] ENDO;  Service: Endoscopy;  Laterality: N/A;    CYSTOSCOPY N/A 2018    Procedure: CYSTOSCOPY;  Surgeon: Harris Lua MD;  Location: [facility] OR;  Service: Urology;  Laterality: N/A;  200 of botox    CYSTOSCOPY W/ RETROGRADES Bilateral 2019    Procedure: CYSTOSCOPY, WITH RETROGRADE PYELOGRAM;  Surgeon: Harris Lua MD;  Location: [facility] OR;  Service: Urology;  Laterality: Bilateral;    CYSTOSCOPY WITH HYDRODISTENSION OF BLADDER N/A 10/12/2020    Procedure: CYSTOSCOPY, WITH BLADDER HYDRODISTENSION;  Surgeon: Harris Lua MD;  Location: [facility] OR;  Service: Urology;  Laterality: N/A;    CYSTOSCOPY WITH HYDRODISTENSION OF BLADDER N/A 12/15/2021    Procedure: CYSTOSCOPY, WITH BLADDER HYDRODISTENSION;  Surgeon: Harris Lua MD;  Location: [facility] OR;  Service: Urology;  Laterality: N/A;    CYSTOSCOPY WITH HYDRODISTENSION OF BLADDER N/A 2022    Procedure: CYSTOSCOPY, WITH BLADDER HYDRODISTENSION;  Surgeon: Harris Lua MD;  Location: [facility] OR;  Service: Urology;  Laterality: N/A;    CYSTOURETHROSCOPY  2019    Procedure: CYSTOURETHROSCOPY;  Surgeon: Harris Lua MD;  Location: [facility];  Service:  Urology;;    CYSTOURETHROSCOPY N/A 3/1/2021    Procedure: CYSTOURETHROSCOPY;  Surgeon: Harris Lua MD;  [facility] OR;  Service: Urology;  Laterality: N/A;    EXCISIONAL HEMORRHOIDECTOMY N/A 10/18/2023    Procedure: HEMORRHOIDECTOMY;  Surgeon: Joelle Parks MD;  Location: [facility] OR;  Service: Colon and Rectal;  Laterality: N/A;    hemmorroidectomy      HYSTERECTOMY  2008    [facility] for endometriosis    IMPLANTATION OF PERMANENT SACRAL NERVE STIMULATOR Right 4/28/2021    Procedure: INSERTION, NEUROSTIMULATOR, PERMANENT, SACRAL;  Surgeon: Harris Lua MD;  Location: [facility] OR;  Service: Urology;  Laterality: Right;    INJECTION OF BOTULINUM TOXIN TYPE A N/A 7/23/2018    Procedure: INJECTION, BOTULINUM TOXIN, TYPE A;  Surgeon: Harris Lua MD;  Location: [facility];  Service: Urology;  Laterality: N/A;    neurostimulator for bladder      REMOVAL OF SACRAL NEUROSTIMULATOR DEVICE Left 4/28/2021    Procedure: REMOVAL, NEUROSTIMULATOR, SACRAL;  Surgeon: Harris Lua MD;  Location: [facility];  Service: Urology;  Laterality: Left;    TOTAL REDUCTION MAMMOPLASTY      URETHRAL CATHETERIZATION N/A 12/4/2019    Procedure: CATHETERIZATION, URETHRA;  Surgeon: Harris Lua MD;  Location: [facility];  Service: Urology;  Laterality: N/A;     Family History   Problem Relation Age of Onset    Hypertension Mother     Cancer Mother         KIDNEY    Arthritis Mother     Breast cancer Mother     Kidney disease Neg Hx     Ovarian cancer Neg Hx     Colon cancer Neg Hx      Social History     Tobacco Use    Smoking status: Never    Smokeless tobacco: Never   Substance Use Topics    Alcohol use: No    Drug use: No     Review of Systems   Constitutional:  Negative for diaphoresis, fatigue and unexpected weight change.   HENT:  Negative for ear pain, sinus pain, sore throat and trouble swallowing.    Eyes:  Negative for pain, redness and visual disturbance.   Respiratory:  Negative for cough, chest tightness, shortness of breath and  wheezing.    Cardiovascular:  Negative for chest pain and palpitations.   Gastrointestinal:  Positive for abdominal pain, blood in stool, constipation and nausea. Negative for diarrhea and vomiting.   Endocrine: Negative for polydipsia, polyphagia and polyuria.   Genitourinary:  Negative for dysuria, frequency and urgency.   Musculoskeletal:  Negative for arthralgias, back pain and myalgias.   Skin:  Negative for rash.   Allergic/Immunologic: Negative for environmental allergies.   Neurological:  Positive for headaches. Negative for syncope, weakness and numbness.   Hematological:  Positive for adenopathy.   Psychiatric/Behavioral:  Negative for suicidal ideas.        Physical Exam     Initial Vitals [02/17/24 0826]   BP Pulse Resp Temp SpO2   129/86 81 18 98.6 °F (37 °C) 100 %      MAP       --         Physical Exam    Nursing note and vitals reviewed.  Constitutional: She appears well-developed and well-nourished. She is not diaphoretic. She is cooperative. She does not appear ill. No distress.   Well-appearing.  No acute distress.   HENT:   Head: Normocephalic and atraumatic.   Right Ear: Hearing, tympanic membrane, external ear and ear canal normal.   Left Ear: Hearing, tympanic membrane, external ear and ear canal normal.   Nose: Rhinorrhea present. No mucosal edema or sinus tenderness. Right sinus exhibits no maxillary sinus tenderness and no frontal sinus tenderness. Left sinus exhibits no maxillary sinus tenderness and no frontal sinus tenderness.   Mouth/Throat: Oropharynx is clear and moist and mucous membranes are normal. No posterior oropharyngeal edema, posterior oropharyngeal erythema or tonsillar abscesses.   Eyes: Conjunctivae and EOM are normal. Pupils are equal, round, and reactive to light.   Neck: Neck supple.    Full passive range of motion without pain.     Cardiovascular:  Normal rate, regular rhythm, S1 normal, S2 normal, normal heart sounds and normal pulses.           No murmur  heard.  Pulses:       Radial pulses are 2+ on the right side and 2+ on the left side.        Dorsalis pedis pulses are 2+ on the right side and 2+ on the left side.   Regular rate and rhythm.  No murmur.  No friction rub.   Pulmonary/Chest: Effort normal and breath sounds normal. No accessory muscle usage. No respiratory distress.   Abdominal: Abdomen is soft and flat. Bowel sounds are normal. She exhibits no distension. There is generalized abdominal tenderness and tenderness in the right lower quadrant and epigastric area.   Abdomen is soft.  Generalized tenderness to palpation that is worst in the epigastrium in the right lower quadrant.  No rebound or guarding.  Negative Doran's sign.   No right CVA tenderness.  No left CVA tenderness. There is no rebound and no guarding.   Musculoskeletal:      Cervical back: Full passive range of motion without pain and neck supple. No edema or rigidity. No muscular tenderness. Normal range of motion.     Neurological: She is alert.   Skin: Skin is warm and dry. Capillary refill takes less than 2 seconds. No abrasion, no lesion and no rash noted.         ED Course   Procedures  Labs Reviewed   POCT URINALYSIS W/O SCOPE - Abnormal; Notable for the following components:       Result Value    Blood, UA Trace-intact (*)     All other components within normal limits   POCT CBC   POCT URINALYSIS(INSTRUMENT)   POCT STREP A MOLECULAR   POCT CMP   POCT LIVER PANEL   POCT STREP A, RAPID   POCT LIVER PANEL   POCT CMP          Imaging Results              CT Abdomen Pelvis With IV Contrast NO Oral Contrast (Final result)  Result time 02/17/24 10:11:45      Final result by Ubaldo Macias MD (02/17/24 10:11:45)                   Impression:      No acute findings in the abdomen or pelvis.    Other findings above.      Electronically signed by: Ubaldo Macias MD  Date:    02/17/2024  Time:    10:11               Narrative:    EXAMINATION:  CT ABDOMEN PELVIS WITH IV  CONTRAST    CLINICAL HISTORY:  Epigastric pain;Abdominal pain, acute, nonlocalized;    TECHNIQUE:  Low dose axial images, sagittal and coronal reformations were obtained from the lung bases to the pubic symphysis following the IV administration of 85 mL of Omnipaque 350 .  Oral contrast was not given.    COMPARISON:  CT abdomen pelvis without contrast, 04/29/2023.    FINDINGS:  Lower Chest:    Lung bases are essentially clear.  Heart size is stable.    Abdomen:    Liver is stable in size and contour.  Benign-appearing liver lesions in the right hepatic lobe, similar to prior CT urogram in 2022. Gallbladder is unremarkable. No intrahepatic biliary ductal dilatation.    Spleen, pancreas, and adrenal glands are negative for acute finding.  Similar benign-appearing small splenic hypodensities.    The kidneys are symmetric.  No hydronephrosis. No asymmetric perinephric inflammatory fat stranding.  Probable simple right renal cyst.    No small bowel obstruction.  Moderate stool burden in the colon.  Appendix is unremarkable.    No pneumoperitoneum or organized fluid collection.    No bulky retroperitoneal lymphadenopathy.    Abdominal aorta is normal in caliber with mild atherosclerosis.    Portal vein is patent.  No portal venous gas.    Pelvis:    Urinary bladder is decompressed and not well evaluated.  Uterus appears surgically absent.  Rectum is unremarkable.  Stimulator device again noted extending into the pelvis.  No significant pelvic free fluid.    Bones and soft tissues:    No aggressive osseous lesions.  Extraperitoneal soft tissues are negative for acute finding.                                       Medications   sodium chloride 0.9% bolus 1,000 mL 1,000 mL (0 mLs Intravenous Stopped 2/17/24 1022)   ondansetron injection 8 mg (8 mg Intravenous Given 2/17/24 0908)   aluminum-magnesium hydroxide-simethicone 200-200-20 mg/5 mL suspension 30 mL (30 mLs Oral Given 2/17/24 4944)   famotidine tablet 40 mg (40 mg  Oral Given 2/17/24 3456)   iohexoL (OMNIPAQUE 350) injection 100 mL (85 mLs Intravenous Given 2/17/24 0932)   morphine injection 6 mg (6 mg Intravenous Given 2/17/24 0955)     Medical Decision Making  53-year-old female presenting to the emergency department with a chief complaint of epigastric and right lower quadrant abdominal pain.  Evaluated by primary care 2 days ago, placed on Keflex which she has been taking as prescribed. History of chronic constipation.  Associated nausea without vomiting.  Denies any urinary symptoms, fever, chest pain, shortness of breath.    Differential diagnosis is broad and includes but is not limited to gastroenteritis, appendicitis, pancreatitis, cholecystitis, diverticulitis, peritonitis, small-bowel obstruction, epiploic appendagitis, constipation, urinary tract infection including cystitis or pyelonephritis, ovarian torsion, ruptured ovarian cyst, pregnancy, ectopic pregnancy, tubo-ovarian abscess, dysmenorrhea, pelvic inflammatory disease, sexually transmitted infection, vaginitis, cervicitis, musculoskeletal pain.     Labs reassuring.  Mild leukopenia which is chronic per chart review.  No electrolyte abnormalities.  No elevations liver enzymes.  Kidney function within normal limits.  Urinalysis with trace blood, no other abnormalities.  No signs of infectious process.  COVID and flu 2 days ago negative at PCP office.  Strep negative today.  CT negative for any acute abdominal findings, does show moderate stool burden throughout the colon.  Presentation is consistent with acute worsening of the patient's chronic constipation.  No indications for observation, admission, or surgical intervention.  Doubt any infectious or inflammatory process requiring further emergent workup or intervention at this time.  Patient was stable for discharge home and can follow up with GI as an outpatient.  I will send her with Bentyl, Colace, MiraLax.  Also with some Zofran for her nausea, Fioricet  for headache.  Follow up with primary care as soon as possible.    Return precautions were discussed, all patient questions were answered, and the patient was agreeable to the plan of care.  She was discharged home in stable condition and will follow up with her primary care provider or return to the emergency department if her symptoms worsen or do not improve.     Amount and/or Complexity of Data Reviewed  Labs: ordered. Decision-making details documented in ED Course.  Radiology: ordered. Decision-making details documented in ED Course.    Risk  OTC drugs.  Prescription drug management.  Diagnosis or treatment significantly limited by social determinants of health.            Scribe Attestation:   Scribe #1: I performed the above scribed service and the documentation accurately describes the services I performed. I attest to the accuracy of the note.                                 I, Aries Middleton PA-C, personally performed the services described in this documentation. All medical record entries made by the scribe were at my direction and in my presence. I have reviewed the chart and agree that the record reflects my personal performance and is accurate and complete.            Clinical Impression:  Final diagnoses:  [R10.31] Right lower quadrant abdominal pain (Primary)  [K59.00] Constipation, unspecified constipation type  [R00.1] Bradycardia          ED Disposition Condition    Discharge Stable          ED Prescriptions       Medication Sig Dispense Start Date End Date Auth. Provider    dicyclomine (BENTYL) 20 mg tablet Take 1 tablet (20 mg total) by mouth 2 (two) times daily. 20 tablet 2/17/2024 3/18/2024 Aries Middleton PA-C    docusate sodium (COLACE) 100 MG capsule Take 1 capsule (100 mg total) by mouth 2 (two) times daily. 60 capsule 2/17/2024 -- Aries Middleton PA-C    polyethylene glycol (GLYCOLAX) 17 gram PwPk Take 17 g by mouth once daily. 30 each 2/17/2024 -- Aries Middleton PA-C     ondansetron (ZOFRAN-ODT) 4 MG TbDL Take 1 tablet (4 mg total) by mouth every 8 (eight) hours as needed. 15 tablet 2/17/2024 -- Aries Middleton PA-C    butalbital-acetaminophen-caffeine -40 mg (FIORICET, ESGIC) -40 mg per tablet Take 1 tablet by mouth every 8 (eight) hours as needed for Headaches. 15 tablet 2/17/2024 -- Aries Middleton PA-C          Follow-up Information       Follow up With Specialties Details Why Contact Info    Kirk Servin III, MD Internal Medicine Schedule an appointment as soon as possible for a visit  As needed, If symptoms worsen [address] [facility] [address]  [place] [facility]  [place] [address]  [phone]      Your gastroenterologist  Schedule an appointment as soon as possible for a visit       [facility] Emergency Medicine Go to  If symptoms worsen [address] [facility] [address] [phone]             Aries Middleton, BONNIE  02/17/24 1142

## 2024-02-20 LAB
OHS QRS DURATION: 90 MS
OHS QTC CALCULATION: 334 MS

## 2024-02-27 ENCOUNTER — HOSPITAL ENCOUNTER (OUTPATIENT)
Dept: RADIOLOGY | Facility: HOSPITAL | Age: 54
Discharge: HOME | End: 2024-02-27
Payer: MEDICAID

## 2024-02-27 DIAGNOSIS — Z12.31 ENCOUNTER FOR SCREENING MAMMOGRAM FOR MALIGNANT NEOPLASM OF BREAST: ICD-10-CM

## 2024-02-27 PROCEDURE — 77067 SCR MAMMO BI INCL CAD: CPT | Mod: TC

## 2024-02-27 PROCEDURE — 77063 BREAST TOMOSYNTHESIS BI: CPT | Mod: 26,,, | Performed by: RADIOLOGY

## 2024-02-27 PROCEDURE — 77067 SCR MAMMO BI INCL CAD: CPT | Mod: 26,,, | Performed by: RADIOLOGY

## 2024-03-07 ENCOUNTER — TELEPHONE (OUTPATIENT)
Dept: SURGERY | Facility: CLINIC | Age: 54
End: 2024-03-07
Payer: MEDICAID

## 2024-03-11 ENCOUNTER — OFFICE VISIT (OUTPATIENT)
Dept: SURGERY | Facility: CLINIC | Age: 54
End: 2024-03-11
Payer: MEDICAID

## 2024-03-11 VITALS
BODY MASS INDEX: 29.48 KG/M2 | DIASTOLIC BLOOD PRESSURE: 76 MMHG | HEART RATE: 76 BPM | OXYGEN SATURATION: 100 % | SYSTOLIC BLOOD PRESSURE: 142 MMHG | WEIGHT: 187.81 LBS | RESPIRATION RATE: 19 BRPM | HEIGHT: 67 IN

## 2024-03-11 DIAGNOSIS — Z98.890 H/O HEMORRHOIDECTOMY: Primary | ICD-10-CM

## 2024-03-11 PROCEDURE — 1159F MED LIST DOCD IN RCRD: CPT | Mod: CPTII,,, | Performed by: SURGERY

## 2024-03-11 PROCEDURE — 3077F SYST BP >= 140 MM HG: CPT | Mod: CPTII,,, | Performed by: SURGERY

## 2024-03-11 PROCEDURE — 99214 OFFICE O/P EST MOD 30 MIN: CPT | Mod: PBBFAC | Performed by: SURGERY

## 2024-03-11 PROCEDURE — 99213 OFFICE O/P EST LOW 20 MIN: CPT | Mod: S$PBB,,, | Performed by: SURGERY

## 2024-03-11 PROCEDURE — 3078F DIAST BP <80 MM HG: CPT | Mod: CPTII,,, | Performed by: SURGERY

## 2024-03-11 PROCEDURE — 3008F BODY MASS INDEX DOCD: CPT | Mod: CPTII,,, | Performed by: SURGERY

## 2024-03-11 PROCEDURE — 99999 PR PBB SHADOW E&M-EST. PATIENT-LVL IV: CPT | Mod: PBBFAC,,, | Performed by: SURGERY

## 2024-03-11 RX ORDER — HYDROCORTISONE 25 MG/G
CREAM TOPICAL 2 TIMES DAILY
Qty: 28 G | Refills: 3 | Status: SHIPPED | OUTPATIENT
Start: 2024-03-11

## 2024-03-11 NOTE — PROGRESS NOTES
Colon & Rectal Surgery Clinic Follow Up    HPI:   Arpita Bradshaw is a 53 y.o. female who presents for follow up of hemorrhoids    10/18/23 excisional hemorrhoidectomy    1. 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   - Negative for high-grade dysplasia and malignancy.    - See comment.     2. Hemorrhoid, resection:   - Benign squamous mucosa with underlying dilated vessels with thrombosis consistent with hemorrhoid.     COMMENT:  The anterior midline biopsy (specimen 1) show squamous mucosa with koilocytic changes.  Immunostain for p16 is negative for strong full-thickness staining.  This is consistent with low-grade squamous intraepithelial lesion with HPV effect   (AIN-1).  Part 1 of this case is reviewed by Dr. JANET Soria who agrees with the above diagnosis.  Appropriate positive controls are examined.     Interval history:    Having hard, painful bowel movements every 3-4 days, does have some soft bowel movements in between these episodes.  Taking linzess every 3-4 days.  Still having rectal bleeding     Objective:   Vitals:    03/11/24 0835   BP: (!) 142/76   Pulse: 76   Resp: 19        Physical Exam   Gen: well developed female, NAD  HEENT: normocephalic, atraumatic, PERRL, EOMI   CV: RRR, no murmurs  Resp: nonlabored, CTAB   Abd: soft, NTND   MSK: no gross deformities, no cyanosis or edema   Anorectal: well healed hemorrhoidectomy sites, tender on exam, anoscopy deferred      Assessment and Plan:   Arpita Bradshaw  is a 53 y.o. female who presents for follow up after excisional hemorrhoidectomy and anal biopsy showing AIN I     - pathology reviewed  - patient with significant chronic constipation and blood per rectum.  Will increase linzess to every other day, okay to continue miralax daily.  Increase water intake   - Anusol BID x 10 days  - last colonoscopy in 2018 for chronic constipation, recommended 10 year interval.  If symptoms persist, may  need diagnostic colonoscopy.  -follow up in 4-6 weeks        Joelle Parks MD  Staff Surgeon   Colon & Rectal Surgery

## 2024-03-14 ENCOUNTER — LAB VISIT (OUTPATIENT)
Dept: LAB | Facility: HOSPITAL | Age: 54
End: 2024-03-14
Payer: MEDICAID

## 2024-03-14 ENCOUNTER — PATIENT MESSAGE (OUTPATIENT)
Dept: UROLOGY | Facility: CLINIC | Age: 54
End: 2024-03-14
Payer: MEDICAID

## 2024-03-14 DIAGNOSIS — Z87.440 HISTORY OF RECURRENT UTIS: ICD-10-CM

## 2024-03-14 DIAGNOSIS — Z87.440 HISTORY OF RECURRENT UTIS: Primary | ICD-10-CM

## 2024-03-14 PROCEDURE — 87086 URINE CULTURE/COLONY COUNT: CPT | Performed by: NURSE PRACTITIONER

## 2024-03-16 LAB — BACTERIA UR CULT: NORMAL

## 2024-03-17 ENCOUNTER — PATIENT MESSAGE (OUTPATIENT)
Dept: OBSTETRICS AND GYNECOLOGY | Facility: CLINIC | Age: 54
End: 2024-03-17
Payer: MEDICAID

## 2024-03-18 ENCOUNTER — PATIENT MESSAGE (OUTPATIENT)
Dept: UROLOGY | Facility: CLINIC | Age: 54
End: 2024-03-18
Payer: MEDICAID

## 2024-03-18 RX ORDER — VALACYCLOVIR HYDROCHLORIDE 1 G/1
TABLET, FILM COATED ORAL
Qty: 90 TABLET | Refills: 3 | Status: SHIPPED | OUTPATIENT
Start: 2024-03-18 | End: 2024-03-20 | Stop reason: SDUPTHER

## 2024-03-20 RX ORDER — VALACYCLOVIR HYDROCHLORIDE 1 G/1
TABLET, FILM COATED ORAL
Qty: 90 TABLET | Refills: 3 | Status: SHIPPED | OUTPATIENT
Start: 2024-03-20

## 2024-03-21 ENCOUNTER — TELEPHONE (OUTPATIENT)
Dept: UROLOGY | Facility: CLINIC | Age: 54
End: 2024-03-21
Payer: MEDICAID

## 2024-03-21 ENCOUNTER — OFFICE VISIT (OUTPATIENT)
Dept: UROLOGY | Facility: CLINIC | Age: 54
End: 2024-03-21
Payer: MEDICAID

## 2024-03-21 VITALS
BODY MASS INDEX: 29.69 KG/M2 | SYSTOLIC BLOOD PRESSURE: 141 MMHG | HEIGHT: 67 IN | DIASTOLIC BLOOD PRESSURE: 87 MMHG | WEIGHT: 189.13 LBS | HEART RATE: 60 BPM

## 2024-03-21 DIAGNOSIS — N30.01 ACUTE CYSTITIS WITH HEMATURIA: Primary | ICD-10-CM

## 2024-03-21 DIAGNOSIS — N89.8 VAGINAL IRRITATION: ICD-10-CM

## 2024-03-21 DIAGNOSIS — N30.10 INTERSTITIAL CYSTITIS: ICD-10-CM

## 2024-03-21 DIAGNOSIS — R30.0 DYSURIA: ICD-10-CM

## 2024-03-21 DIAGNOSIS — R39.89 BLADDER PAIN: ICD-10-CM

## 2024-03-21 DIAGNOSIS — R31.0 GROSS HEMATURIA: ICD-10-CM

## 2024-03-21 DIAGNOSIS — N31.9 NEUROGENIC URINARY BLADDER DISORDER: ICD-10-CM

## 2024-03-21 DIAGNOSIS — R30.0 STRANGURIA: ICD-10-CM

## 2024-03-21 LAB
BILIRUB SERPL-MCNC: NEGATIVE MG/DL
BLOOD URINE, POC: NORMAL
CLARITY, POC UA: NORMAL
COLOR, POC UA: YELLOW
GLUCOSE UR QL STRIP: NEGATIVE
KETONES UR QL STRIP: NORMAL
LEUKOCYTE ESTERASE URINE, POC: NORMAL
NITRITE, POC UA: POSITIVE
PH, POC UA: 5.5
PROTEIN, POC: NORMAL
SPECIFIC GRAVITY, POC UA: 1.03
UROBILINOGEN, POC UA: NORMAL

## 2024-03-21 PROCEDURE — 87086 URINE CULTURE/COLONY COUNT: CPT | Performed by: NURSE PRACTITIONER

## 2024-03-21 PROCEDURE — 3008F BODY MASS INDEX DOCD: CPT | Mod: CPTII,,, | Performed by: NURSE PRACTITIONER

## 2024-03-21 PROCEDURE — 87088 URINE BACTERIA CULTURE: CPT | Performed by: NURSE PRACTITIONER

## 2024-03-21 PROCEDURE — 99215 OFFICE O/P EST HI 40 MIN: CPT | Mod: PBBFAC,PO | Performed by: NURSE PRACTITIONER

## 2024-03-21 PROCEDURE — 3079F DIAST BP 80-89 MM HG: CPT | Mod: CPTII,,, | Performed by: NURSE PRACTITIONER

## 2024-03-21 PROCEDURE — 1159F MED LIST DOCD IN RCRD: CPT | Mod: CPTII,,, | Performed by: NURSE PRACTITIONER

## 2024-03-21 PROCEDURE — 99999PBSHW POCT URINE DIPSTICK WITHOUT MICROSCOPE: Mod: PBBFAC,,,

## 2024-03-21 PROCEDURE — 99999 PR PBB SHADOW E&M-EST. PATIENT-LVL V: CPT | Mod: PBBFAC,,, | Performed by: NURSE PRACTITIONER

## 2024-03-21 PROCEDURE — 87186 SC STD MICRODIL/AGAR DIL: CPT | Performed by: NURSE PRACTITIONER

## 2024-03-21 PROCEDURE — 87077 CULTURE AEROBIC IDENTIFY: CPT | Performed by: NURSE PRACTITIONER

## 2024-03-21 PROCEDURE — 1160F RVW MEDS BY RX/DR IN RCRD: CPT | Mod: CPTII,,, | Performed by: NURSE PRACTITIONER

## 2024-03-21 PROCEDURE — 3077F SYST BP >= 140 MM HG: CPT | Mod: CPTII,,, | Performed by: NURSE PRACTITIONER

## 2024-03-21 PROCEDURE — 99214 OFFICE O/P EST MOD 30 MIN: CPT | Mod: S$PBB,,, | Performed by: NURSE PRACTITIONER

## 2024-03-21 PROCEDURE — 81002 URINALYSIS NONAUTO W/O SCOPE: CPT | Mod: PBBFAC,PO | Performed by: NURSE PRACTITIONER

## 2024-03-21 RX ORDER — FLUCONAZOLE 100 MG/1
100 TABLET ORAL DAILY
Qty: 7 TABLET | Refills: 0 | Status: SHIPPED | OUTPATIENT
Start: 2024-03-21 | End: 2024-03-25 | Stop reason: ALTCHOICE

## 2024-03-21 RX ORDER — NYSTATIN 100000 U/G
CREAM TOPICAL 2 TIMES DAILY
Qty: 15 G | Refills: 2 | Status: SHIPPED | OUTPATIENT
Start: 2024-03-21 | End: 2024-04-16 | Stop reason: SDUPTHER

## 2024-03-21 RX ORDER — IMIPRAMINE HYDROCHLORIDE 25 MG/1
25 TABLET, FILM COATED ORAL NIGHTLY
Qty: 30 TABLET | Refills: 11 | Status: SHIPPED | OUTPATIENT
Start: 2024-03-21 | End: 2025-03-21

## 2024-03-21 NOTE — PATIENT INSTRUCTIONS
Urine dipstick and urine cx today  Start taking Uribel as prescribed for dysuria.  Start trial of imipramine 25 mg nightly for IC.  Start taking Macrobid antibiotic as prescribed by PCP today.  Once UTI has resolved, may move forward with cysto with bladder hydrodistension. Cardiac clearance will be needed for new medical dx of bradycardia.  Follow-up in 4 weeks.

## 2024-03-21 NOTE — PROGRESS NOTES
Subjective:       Patient ID: Arpita Bradshaw is a 53 y.o. female.    Chief Complaint: Urinary Tract Infection and Pain While Urinating    Patient is here today with c/o bladder pain, dysuria, and passing a blood clot when voiding x1. She thinks she has a UTI. She had a urine dipstick performed with her PCP (at [facility] [place]) this morning. He also prescribed her Macrobid. She has not started antibiotic at this time. Patient has a hx of IC and neurogenic bladder. She has neurostimulator that has been off greater than 6 months now. She states it was ineffective with managing her urinary symptoms. She is interested in having cysto with bladder hydrodistension by Dr. Lua. We will revisit this once UTI has cleared. Patient reports a new medical diagnosis of bradycardia, which is being managed by a cardiologist at [place].     Dysuria   This is a recurrent problem. Episode onset: current episode 2 weeks ago. The problem occurs every urination. The problem has been gradually worsening. The quality of the pain is described as burning and aching. The pain is at a severity of 8/10. The pain is severe. There has been no fever. There is No history of pyelonephritis. Associated symptoms include hematuria, nausea, vomiting and constipation. Pertinent negatives include no chills, flank pain, frequency or urgency. Treatments tried: uribel, diflucan, an old antibiotic. The treatment provided no relief. Her past medical history is significant for recurrent UTIs and a urological procedure. There is no history of diabetes mellitus, hypertension, kidney stones or a single kidney.     Review of Systems   Constitutional:  Positive for fatigue. Negative for chills and fever.   Gastrointestinal:  Positive for constipation, nausea and vomiting.   Genitourinary:  Positive for difficulty urinating (straining), dysuria, hematuria, hot flashes, nocturia and pelvic pain (bladder). Negative for decreased urine volume, flank pain, frequency and urgency.    Neurological:  Negative for dizziness, weakness and headaches.   Psychiatric/Behavioral: Negative.           Objective:      Physical Exam  Vitals and nursing note reviewed.   Constitutional:       General: She is not in acute distress.     Appearance: She is well-developed. She is not ill-appearing.   HENT:      Head: Normocephalic and atraumatic.   Eyes:      Pupils: Pupils are equal, round, and reactive to light.   Cardiovascular:      Rate and Rhythm: Normal rate.   Pulmonary:      Effort: Pulmonary effort is normal. No respiratory distress.   Abdominal:      Palpations: Abdomen is soft.      Tenderness: There is no abdominal tenderness.   Musculoskeletal:         General: Normal range of motion.      Cervical back: Normal range of motion.   Skin:     General: Skin is warm and dry.   Neurological:      Mental Status: She is alert and oriented to person, place, and time.      Coordination: Coordination normal.   Psychiatric:         Mood and Affect: Mood normal.         Behavior: Behavior normal.         Thought Content: Thought content normal.         Judgment: Judgment normal.         Assessment:       Problem List Items Addressed This Visit          Renal/    Dysuria    Relevant Medications    methen-m.blue-s.phos-phsal-hyo (URIBEL) 118-10-40.8-36 mg Cap    Other Relevant Orders    POCT URINE DIPSTICK WITHOUT MICROSCOPE (Completed)    Urine culture    Interstitial cystitis    Relevant Medications    imipramine (TOFRANIL) 25 MG tablet    Other Relevant Orders    POCT URINE DIPSTICK WITHOUT MICROSCOPE (Completed)    Urine culture    Neurogenic urinary bladder disorder    Relevant Orders    POCT URINE DIPSTICK WITHOUT MICROSCOPE (Completed)    Urine culture    Acute cystitis with hematuria - Primary    Relevant Orders    POCT URINE DIPSTICK WITHOUT MICROSCOPE (Completed)    Urine culture    Gross hematuria    Relevant Orders    POCT URINE DIPSTICK WITHOUT MICROSCOPE (Completed)    Urine culture    Bladder  pain    Relevant Medications    methen-m.blue-s.phos-phsal-hyo (URIBEL) 118-10-40.8-36 mg Cap    imipramine (TOFRANIL) 25 MG tablet    Other Relevant Orders    POCT URINE DIPSTICK WITHOUT MICROSCOPE (Completed)    Urine culture     Other Visit Diagnoses       Stranguria        Relevant Orders    POCT URINE DIPSTICK WITHOUT MICROSCOPE (Completed)    Urine culture    Vaginal irritation        Relevant Medications    fluconazole (DIFLUCAN) 100 MG tablet    nystatin (MYCOSTATIN) cream    Other Relevant Orders    POCT URINE DIPSTICK WITHOUT MICROSCOPE (Completed)    Urine culture            Plan:           Arpita was seen today for urinary tract infection and pain while urinating.    Diagnoses and all orders for this visit:    Acute cystitis with hematuria  -     POCT URINE DIPSTICK WITHOUT MICROSCOPE  -     Urine culture    Bladder pain  -     methen-m.blue-s.phos-phsal-hyo (URIBEL) 118-10-40.8-36 mg Cap; Take 1 capsule by mouth every 6 (six) hours as needed (dysuria).  -     POCT URINE DIPSTICK WITHOUT MICROSCOPE  -     imipramine (TOFRANIL) 25 MG tablet; Take 1 tablet (25 mg total) by mouth every evening.  -     Urine culture    Dysuria  -     methen-m.blue-s.phos-phsal-hyo (URIBEL) 118-10-40.8-36 mg Cap; Take 1 capsule by mouth every 6 (six) hours as needed (dysuria).  -     POCT URINE DIPSTICK WITHOUT MICROSCOPE  -     Urine culture    Gross hematuria  -     POCT URINE DIPSTICK WITHOUT MICROSCOPE  -     Urine culture    Stranguria  -     POCT URINE DIPSTICK WITHOUT MICROSCOPE  -     Urine culture    Interstitial cystitis  -     POCT URINE DIPSTICK WITHOUT MICROSCOPE  -     imipramine (TOFRANIL) 25 MG tablet; Take 1 tablet (25 mg total) by mouth every evening.  -     Urine culture    Neurogenic urinary bladder disorder  -     POCT URINE DIPSTICK WITHOUT MICROSCOPE  -     Urine culture    Vaginal irritation  -     fluconazole (DIFLUCAN) 100 MG tablet; Take 1 tablet (100 mg total) by mouth once daily. for 7 days  -      nystatin (MYCOSTATIN) cream; Apply topically 2 (two) times daily. for 7 days  -     POCT URINE DIPSTICK WITHOUT MICROSCOPE  -     Urine culture    Other orders  Start taking Uribel as prescribed for dysuria.  Start trial of imipramine 25 mg nightly for IC.  Start taking Macrobid antibiotic as prescribed by PCP today.  Once UTI has resolved, may move forward with cysto with bladder hydrodistension. Cardiac clearance will be needed for new medical dx of bradycardia.    Follow-up in 4 weeks.     Francie Cazares, DNP

## 2024-03-23 LAB — BACTERIA UR CULT: ABNORMAL

## 2024-03-25 ENCOUNTER — TELEPHONE (OUTPATIENT)
Dept: UROLOGY | Facility: CLINIC | Age: 54
End: 2024-03-25
Payer: MEDICAID

## 2024-03-25 ENCOUNTER — PATIENT MESSAGE (OUTPATIENT)
Dept: UROLOGY | Facility: CLINIC | Age: 54
End: 2024-03-25
Payer: MEDICAID

## 2024-03-25 DIAGNOSIS — N30.01 ACUTE CYSTITIS WITH HEMATURIA: Primary | ICD-10-CM

## 2024-03-25 RX ORDER — CIPROFLOXACIN 500 MG/1
500 TABLET ORAL EVERY 12 HOURS
Qty: 20 TABLET | Refills: 0 | Status: SHIPPED | OUTPATIENT
Start: 2024-03-25 | End: 2024-04-04

## 2024-03-25 NOTE — TELEPHONE ENCOUNTER
----- Message from Francie Cazares NP sent at 3/25/2024  2:03 PM CDT -----  Please inform patient via telephone that her urine cx came back positive for a UTI. She can continue taking Macrobid antibiotic prescribed by her PCP. Antibiotic is sensitive to bacteria on urine cx. Repeat urine cx a few days after completing antibiotic therapy. Please arrange. Thanks.

## 2024-04-12 ENCOUNTER — LAB VISIT (OUTPATIENT)
Dept: LAB | Facility: HOSPITAL | Age: 54
End: 2024-04-12
Payer: MEDICAID

## 2024-04-12 DIAGNOSIS — N30.01 ACUTE CYSTITIS WITH HEMATURIA: ICD-10-CM

## 2024-04-12 PROCEDURE — 87086 URINE CULTURE/COLONY COUNT: CPT | Performed by: NURSE PRACTITIONER

## 2024-04-13 LAB — BACTERIA UR CULT: NORMAL

## 2024-04-15 ENCOUNTER — TELEPHONE (OUTPATIENT)
Dept: UROLOGY | Facility: CLINIC | Age: 54
End: 2024-04-15
Payer: MEDICAID

## 2024-04-15 NOTE — TELEPHONE ENCOUNTER
----- Message from Francie Cazares NP sent at 4/15/2024  4:46 PM CDT -----  Please inform patient via telephone that her urine cx was normal. Her UTI has resolved. Find out how patient is doing and send me an update. Thanks.

## 2024-04-15 NOTE — TELEPHONE ENCOUNTER
Yes, she would like for her meds to be refilled and she will follow up with you at her upcoming visit on 4/18

## 2024-04-15 NOTE — TELEPHONE ENCOUNTER
Spoke to patient and she states she is still having irritation in her private area, and she is having pain and pressure  while urinating.

## 2024-04-16 DIAGNOSIS — N89.8 VAGINAL IRRITATION: Primary | ICD-10-CM

## 2024-04-16 RX ORDER — FLUCONAZOLE 100 MG/1
100 TABLET ORAL DAILY
Qty: 3 TABLET | Refills: 0 | Status: SHIPPED | OUTPATIENT
Start: 2024-04-16 | End: 2024-04-19 | Stop reason: ALTCHOICE

## 2024-04-16 RX ORDER — NYSTATIN 100000 U/G
CREAM TOPICAL 2 TIMES DAILY
Qty: 15 G | Refills: 2 | Status: SHIPPED | OUTPATIENT
Start: 2024-04-16 | End: 2024-05-23

## 2024-04-18 ENCOUNTER — OFFICE VISIT (OUTPATIENT)
Dept: UROLOGY | Facility: CLINIC | Age: 54
End: 2024-04-18
Payer: MEDICAID

## 2024-04-18 VITALS
HEART RATE: 62 BPM | DIASTOLIC BLOOD PRESSURE: 89 MMHG | SYSTOLIC BLOOD PRESSURE: 126 MMHG | HEIGHT: 67 IN | WEIGHT: 182.56 LBS | BODY MASS INDEX: 28.65 KG/M2

## 2024-04-18 DIAGNOSIS — N30.10 INTERSTITIAL CYSTITIS: Primary | ICD-10-CM

## 2024-04-18 DIAGNOSIS — Z01.818 PRE-OP EXAM: ICD-10-CM

## 2024-04-18 DIAGNOSIS — N31.9 NEUROGENIC URINARY BLADDER DISORDER: ICD-10-CM

## 2024-04-18 DIAGNOSIS — R39.82 CHRONIC BLADDER PAIN: ICD-10-CM

## 2024-04-18 DIAGNOSIS — R35.0 URINARY FREQUENCY: ICD-10-CM

## 2024-04-18 PROCEDURE — 3074F SYST BP LT 130 MM HG: CPT | Mod: CPTII,,, | Performed by: NURSE PRACTITIONER

## 2024-04-18 PROCEDURE — 3079F DIAST BP 80-89 MM HG: CPT | Mod: CPTII,,, | Performed by: NURSE PRACTITIONER

## 2024-04-18 PROCEDURE — 1159F MED LIST DOCD IN RCRD: CPT | Mod: CPTII,,, | Performed by: NURSE PRACTITIONER

## 2024-04-18 PROCEDURE — 4010F ACE/ARB THERAPY RXD/TAKEN: CPT | Mod: CPTII,,, | Performed by: NURSE PRACTITIONER

## 2024-04-18 PROCEDURE — 3008F BODY MASS INDEX DOCD: CPT | Mod: CPTII,,, | Performed by: NURSE PRACTITIONER

## 2024-04-18 PROCEDURE — 99999 PR PBB SHADOW E&M-EST. PATIENT-LVL IV: CPT | Mod: PBBFAC,,, | Performed by: NURSE PRACTITIONER

## 2024-04-18 PROCEDURE — 1160F RVW MEDS BY RX/DR IN RCRD: CPT | Mod: CPTII,,, | Performed by: NURSE PRACTITIONER

## 2024-04-18 PROCEDURE — 99214 OFFICE O/P EST MOD 30 MIN: CPT | Mod: PBBFAC,PO | Performed by: NURSE PRACTITIONER

## 2024-04-18 PROCEDURE — 99214 OFFICE O/P EST MOD 30 MIN: CPT | Mod: S$PBB,,, | Performed by: NURSE PRACTITIONER

## 2024-04-18 RX ORDER — CARVEDILOL 3.12 MG/1
TABLET ORAL
COMMUNITY
Start: 2024-04-04

## 2024-04-18 RX ORDER — VALSARTAN 80 MG/1
TABLET ORAL
COMMUNITY
Start: 2024-04-04

## 2024-04-18 NOTE — PATIENT INSTRUCTIONS
Schedule patient for cysto with bladder hydrodistension by Dr. Lua for IC flare-up on 5/23/2024.  Cardiac clearance needed. Echo scheduled for 5/10/2024. F/U with Cardiology at [facility] on 5/17/2024.  Pre-op lab needed (CBC, BMP, U/A, and urine cx)  Continue to avoid the use of blood thinners for 7-10 days prior to surgery to reduce risk of bleeding.   Surgery packet provided to patient.   Follow-up post-op.   negative

## 2024-04-18 NOTE — PROGRESS NOTES
Subjective:       Patient ID: Arpita Bradshaw is a 53 y.o. female.    Chief Complaint: Cystitis (Abdominal pain/)    Patient is here today for a follow-up for IC and UTI. She was treated with Cipro for UTI. Repeat urine cx performed on 4/12/2024 showed UTI has resolved. Patient was also started on imipramine 25 mg nightly for her chronic bladder pain. She reports medication is effective and helps during the night only, but does not give her relief during the day. Her bladder pain is aggravated when bladder is full. Patient forgot to take her uribel to help her with bladder pain during the day time. She would like to move forward with scheduling cysto with bladder hydrodistension by Dr. Lua. Patient was recently diagnosed with bradycardia and is seeing cardiology at [facility]. She reports being scheduled for an echo on 5/10/2024 and f/u appt with cardiologist on 5/17/2024. At that appt he will determine if patient is cleared for bladder hydrodistension. Patient stated she will see if her cardiologist can move up her testing because she's not sure if she can deal with her bladder pain until then.     Follow-up  This is a recurrent (IC) problem. The problem occurs daily. The problem has been waxing and waning. Associated symptoms include urinary symptoms. Pertinent negatives include no abdominal pain, chills, fever, nausea, swollen glands or vomiting. Nothing aggravates the symptoms. Treatments tried: imipramine 25 mg nightly.     Review of Systems   Constitutional:  Negative for chills and fever.   Gastrointestinal:  Negative for abdominal pain, nausea and vomiting.   Genitourinary:  Positive for dysuria, frequency, nocturia, pelvic pain (bladder pain) and urgency. Negative for decreased urine volume, difficulty urinating, flank pain and hematuria.   Neurological: Negative.    Psychiatric/Behavioral: Negative.           Objective:      Physical Exam  Vitals and nursing note reviewed.   Constitutional:       General:  She is not in acute distress.     Appearance: She is well-developed. She is not ill-appearing.   HENT:      Head: Normocephalic and atraumatic.   Eyes:      Pupils: Pupils are equal, round, and reactive to light.   Cardiovascular:      Rate and Rhythm: Normal rate.   Pulmonary:      Effort: Pulmonary effort is normal. No respiratory distress.   Abdominal:      Palpations: Abdomen is soft.      Tenderness: There is no abdominal tenderness.   Musculoskeletal:         General: Normal range of motion.      Cervical back: Normal range of motion.   Skin:     General: Skin is warm and dry.   Neurological:      Mental Status: She is alert and oriented to person, place, and time.      Coordination: Coordination normal.   Psychiatric:         Mood and Affect: Mood normal.         Behavior: Behavior normal.         Thought Content: Thought content normal.         Judgment: Judgment normal.         Assessment:       Problem List Items Addressed This Visit          Renal/    Interstitial cystitis - Primary    Relevant Medications    methen-m.blue-s.phos-phsal-hyo (URIBEL) 118-10-40.8-36 mg Cap    Other Relevant Orders    CBC Auto Differential    Basic Metabolic Panel    Urine culture    Urinalysis    Case Request Operating Room: CYSTOSCOPY, WITH BLADDER HYDRODISTENSION (Completed)    Diet NPO    Urinary frequency    Relevant Orders    CBC Auto Differential    Basic Metabolic Panel    Urine culture    Urinalysis    Case Request Operating Room: CYSTOSCOPY, WITH BLADDER HYDRODISTENSION (Completed)    Diet NPO    Neurogenic urinary bladder disorder    Relevant Orders    CBC Auto Differential    Basic Metabolic Panel    Urine culture    Urinalysis    Case Request Operating Room: CYSTOSCOPY, WITH BLADDER HYDRODISTENSION (Completed)    Diet NPO     Other Visit Diagnoses       Chronic bladder pain        Relevant Medications    methen-m.blue-s.phos-phsal-hyo (URIBEL) 118-10-40.8-36 mg Cap    Other Relevant Orders    CBC Auto  Differential    Basic Metabolic Panel    Urine culture    Urinalysis    Case Request Operating Room: CYSTOSCOPY, WITH BLADDER HYDRODISTENSION (Completed)    Diet NPO    Pre-op exam        Relevant Orders    CBC Auto Differential    Basic Metabolic Panel    Urine culture    Urinalysis            Plan:           Arpita was seen today for cystitis.    Diagnoses and all orders for this visit:    Interstitial cystitis  -     CBC Auto Differential; Future  -     Basic Metabolic Panel; Future  -     Urine culture; Future  -     Urinalysis; Future  -     methen-m.blue-s.phos-phsal-hyo (URIBEL) 118-10-40.8-36 mg Cap; Take 1 capsule by mouth every 6 (six) hours as needed (bladder pain).  -     Case Request Operating Room: CYSTOSCOPY, WITH BLADDER HYDRODISTENSION  -     Vital Signs ; Standing  -     Notify physician ; Standing  -     Diet NPO; Standing  -     Place in Outpatient; Standing  -     Place AVANI hose; Standing  -     Diet NPO    Neurogenic urinary bladder disorder  -     CBC Auto Differential; Future  -     Basic Metabolic Panel; Future  -     Urine culture; Future  -     Urinalysis; Future  -     Case Request Operating Room: CYSTOSCOPY, WITH BLADDER HYDRODISTENSION  -     Vital Signs ; Standing  -     Notify physician ; Standing  -     Diet NPO; Standing  -     Place in Outpatient; Standing  -     Place AVANI hose; Standing  -     Diet NPO    Chronic bladder pain  -     CBC Auto Differential; Future  -     Basic Metabolic Panel; Future  -     Urine culture; Future  -     Urinalysis; Future  -     methen-m.blue-s.phos-phsal-hyo (URIBEL) 118-10-40.8-36 mg Cap; Take 1 capsule by mouth every 6 (six) hours as needed (bladder pain).  -     Case Request Operating Room: CYSTOSCOPY, WITH BLADDER HYDRODISTENSION  -     Vital Signs ; Standing  -     Notify physician ; Standing  -     Diet NPO; Standing  -     Place in Outpatient; Standing  -     Place AVANI hose; Standing  -     Diet NPO    Urinary frequency  -     CBC Auto  Differential; Future  -     Basic Metabolic Panel; Future  -     Urine culture; Future  -     Urinalysis; Future  -     Case Request Operating Room: CYSTOSCOPY, WITH BLADDER HYDRODISTENSION  -     Vital Signs ; Standing  -     Notify physician ; Standing  -     Diet NPO; Standing  -     Place in Outpatient; Standing  -     Place AVANI hose; Standing  -     Diet NPO    Pre-op exam  -     CBC Auto Differential; Future  -     Basic Metabolic Panel; Future  -     Urine culture; Future  -     Urinalysis; Future    Other orders  -     0.9%  NaCl infusion  -     IP VTE LOW RISK PATIENT; Standing  -     ciprofloxacin (CIPRO)400mg/200ml D5W IVPB 400 mg    Schedule patient for cysto with bladder hydrodistension by Dr. Lua for IC flare-up on 5/23/2024.  Cardiac clearance needed. Echo scheduled for 5/10/2024. F/U with Cardiology at [facility] on 5/17/2024.  Pre-op lab needed (CBC, BMP, U/A, and urine cx)  Continue to avoid the use of blood thinners for 7-10 days prior to surgery to reduce risk of bleeding.   Surgery packet provided to patient.     Follow-up post-op.    Francie Cazares, DNP

## 2024-04-19 ENCOUNTER — TELEPHONE (OUTPATIENT)
Dept: SURGERY | Facility: CLINIC | Age: 54
End: 2024-04-19
Payer: MEDICAID

## 2024-04-19 RX ORDER — SODIUM CHLORIDE 9 MG/ML
INJECTION, SOLUTION INTRAVENOUS CONTINUOUS
Status: CANCELLED | OUTPATIENT
Start: 2024-04-19

## 2024-04-19 RX ORDER — CIPROFLOXACIN 2 MG/ML
400 INJECTION, SOLUTION INTRAVENOUS
Status: CANCELLED | OUTPATIENT
Start: 2024-04-19

## 2024-04-22 ENCOUNTER — OFFICE VISIT (OUTPATIENT)
Dept: SURGERY | Facility: CLINIC | Age: 54
End: 2024-04-22
Payer: MEDICAID

## 2024-04-22 VITALS
WEIGHT: 182.31 LBS | SYSTOLIC BLOOD PRESSURE: 144 MMHG | HEART RATE: 63 BPM | RESPIRATION RATE: 19 BRPM | HEIGHT: 67 IN | DIASTOLIC BLOOD PRESSURE: 82 MMHG | BODY MASS INDEX: 28.61 KG/M2 | OXYGEN SATURATION: 100 %

## 2024-04-22 DIAGNOSIS — Z12.11 COLON CANCER SCREENING: Primary | ICD-10-CM

## 2024-04-22 PROCEDURE — 3079F DIAST BP 80-89 MM HG: CPT | Mod: CPTII,,, | Performed by: SURGERY

## 2024-04-22 PROCEDURE — 4010F ACE/ARB THERAPY RXD/TAKEN: CPT | Mod: CPTII,,, | Performed by: SURGERY

## 2024-04-22 PROCEDURE — 99213 OFFICE O/P EST LOW 20 MIN: CPT | Mod: PBBFAC | Performed by: SURGERY

## 2024-04-22 PROCEDURE — 3077F SYST BP >= 140 MM HG: CPT | Mod: CPTII,,, | Performed by: SURGERY

## 2024-04-22 PROCEDURE — 1159F MED LIST DOCD IN RCRD: CPT | Mod: CPTII,,, | Performed by: SURGERY

## 2024-04-22 PROCEDURE — 3008F BODY MASS INDEX DOCD: CPT | Mod: CPTII,,, | Performed by: SURGERY

## 2024-04-22 PROCEDURE — 99999 PR PBB SHADOW E&M-EST. PATIENT-LVL III: CPT | Mod: PBBFAC,,, | Performed by: SURGERY

## 2024-04-22 PROCEDURE — 99213 OFFICE O/P EST LOW 20 MIN: CPT | Mod: S$PBB,,, | Performed by: SURGERY

## 2024-04-22 RX ORDER — SODIUM, POTASSIUM,MAG SULFATES 17.5-3.13G
1 SOLUTION, RECONSTITUTED, ORAL ORAL DAILY
Qty: 1 KIT | Refills: 0 | Status: SHIPPED | OUTPATIENT
Start: 2024-04-22 | End: 2024-04-24

## 2024-04-22 NOTE — PROGRESS NOTES
Colon [facility] Rectal [facility] Clinic Follow Up    HPI:   Arpita Bradshaw is a 53 y.o. female who presents for follow up of hemorrhoids     10/18/23 excisional hemorrhoidectomy     1. 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   - Negative for high-grade dysplasia and malignancy.    - See comment.     2. Hemorrhoid, resection:   - Benign squamous mucosa with underlying dilated vessels with thrombosis consistent with hemorrhoid.     COMMENT:  The anterior midline biopsy (specimen 1) show squamous mucosa with koilocytic changes.  Immunostain for p16 is negative for strong full-thickness staining.  This is consistent with low-grade squamous intraepithelial lesion with HPV effect   (AIN-1).  Part 1 of this case is reviewed by Dr. JANET Soria who agrees with the above diagnosis.  Appropriate positive controls are examined.       3/11/2024  Having hard, painful bowel movements every 3-4 days, does have some soft bowel movements in between these episodes.  Taking linzess every 3-4 days.  Still having rectal bleeding      Interval history:   Bowel movements improved with increasing linzess.  Pain improved, decreased blood per rectum    Objective:   Vitals:    04/22/24 0905   BP: (!) 144/82   Pulse: 63   Resp: 19        Physical Exam  Vitals reviewed.   Constitutional:       Appearance: Normal appearance.   HENT:      Head: Normocephalic and atraumatic.      Nose: Nose normal.      Mouth/Throat:      Mouth: Mucous membranes are moist.   Eyes:      Extraocular Movements: Extraocular movements intact.      Pupils: Pupils are equal, round, and reactive to light.   Cardiovascular:      Rate and Rhythm: Normal rate and regular rhythm.   Pulmonary:      Effort: Pulmonary effort is normal.   Abdominal:      General: Abdomen is flat.      Palpations: Abdomen is soft.   Musculoskeletal:         General: Normal range of motion.   Skin:     General: Skin is warm.      Capillary Refill:  Capillary refill takes less than 2 seconds.   Neurological:      General: No focal deficit present.      Mental Status: She is alert and oriented to person, place, and time.   Psychiatric:         Mood and Affect: Mood normal.         Behavior: Behavior normal.        Anorectal: no external masses or lesions, small external hemorrhoid skin tags, RAMA with focal scar in right lateral position, no blood       Assessment and Plan:   Arpita Bradshaw  is a 53 y.o. female who presents for follow up of hemorrhoids and AIN I     - Constipation and anal pain improving with changes to linzess.   - Patient wishes to proceed with colonoscopy, scheduled for June, su [facility]  - follow up in office in 6 months for surveillance of AIN     Joelle Parks MD  Staff Surgeon   Colon & Rectal Surgery

## 2024-05-13 ENCOUNTER — TELEPHONE (OUTPATIENT)
Dept: UROLOGY | Facility: CLINIC | Age: 54
End: 2024-05-13
Payer: MEDICAID

## 2024-05-13 NOTE — TELEPHONE ENCOUNTER
----- Message from Francie Cazares NP sent at 5/13/2024  3:58 PM CDT -----  Please inform patient via telephone that her urine cx showed some bacteria but none in predominance to diagnose a UTI. Urine sample was contaminated. Repeat urine cx only if urinary symptoms are present and re-educate patient on clean catch mid-stream urine specimen collection. Thanks.

## 2024-05-17 ENCOUNTER — LAB VISIT (OUTPATIENT)
Dept: LAB | Facility: HOSPITAL | Age: 54
End: 2024-05-17
Attending: UROLOGY
Payer: MEDICAID

## 2024-05-17 ENCOUNTER — TELEPHONE (OUTPATIENT)
Dept: UROLOGY | Facility: CLINIC | Age: 54
End: 2024-05-17
Payer: MEDICAID

## 2024-05-17 DIAGNOSIS — N30.10 INTERSTITIAL CYSTITIS: ICD-10-CM

## 2024-05-17 DIAGNOSIS — N30.10 INTERSTITIAL CYSTITIS: Primary | ICD-10-CM

## 2024-05-17 PROCEDURE — 87088 URINE BACTERIA CULTURE: CPT | Performed by: UROLOGY

## 2024-05-17 PROCEDURE — 87086 URINE CULTURE/COLONY COUNT: CPT | Performed by: UROLOGY

## 2024-05-17 NOTE — TELEPHONE ENCOUNTER
I attempted to call patient [phone] she states she has discomfort, still urgency, no burning or itching present. Patient states that she feels UTI has resolved and is back to normal symptoms. Just to be on the safe side patient states (and not have to cancel procedure again) she would like to repeat urine culture. I gave her specific instructions to obtain the cleanest sample possible (mid stream, not to let the jar touch her skin, moist towelette, etc). Orders are in place, she states she will go today.

## 2024-05-20 ENCOUNTER — TELEPHONE (OUTPATIENT)
Dept: UROLOGY | Facility: CLINIC | Age: 54
End: 2024-05-20
Payer: MEDICAID

## 2024-05-20 LAB — BACTERIA UR CULT: ABNORMAL

## 2024-05-20 NOTE — TELEPHONE ENCOUNTER
I called Dr. Lua to notify him of coag-negative staph species growing in urine. He states this is likely contaminant. He states he will put the patient on Bactrim and plan to proceed with cysto w/hydrodistension on 5/23. Case is also pending cardiac clearance, pt states her f/u appt with cardiology is tomorrow 5/21. I have faxed a letter to cardiology to address her clearance. Surgery team updated.

## 2024-05-21 ENCOUNTER — PATIENT MESSAGE (OUTPATIENT)
Dept: UROLOGY | Facility: CLINIC | Age: 54
End: 2024-05-21
Payer: MEDICAID

## 2024-05-23 PROBLEM — R39.82 CHRONIC BLADDER PAIN: Status: ACTIVE | Noted: 2024-05-23

## 2024-05-30 ENCOUNTER — PATIENT MESSAGE (OUTPATIENT)
Dept: UROLOGY | Facility: CLINIC | Age: 54
End: 2024-05-30
Payer: MEDICAID

## 2024-06-06 ENCOUNTER — TELEPHONE (OUTPATIENT)
Dept: SURGERY | Facility: CLINIC | Age: 54
End: 2024-06-06
Payer: MEDICAID

## 2024-06-06 NOTE — TELEPHONE ENCOUNTER
Spoke with pt regarding 1030 arrival time and location for surgery. Pt denies questions regarding prep and location of her colonoscopy. Verbally confirmed 1030 arrival time and location.

## 2024-06-07 ENCOUNTER — ANESTHESIA (OUTPATIENT)
Dept: ENDOSCOPY | Facility: OTHER | Age: 54
End: 2024-06-07
Payer: MEDICAID

## 2024-06-07 ENCOUNTER — ANESTHESIA EVENT (OUTPATIENT)
Dept: ENDOSCOPY | Facility: OTHER | Age: 54
End: 2024-06-07
Payer: MEDICAID

## 2024-06-07 ENCOUNTER — HOSPITAL ENCOUNTER (OUTPATIENT)
Facility: OTHER | Age: 54
Discharge: HOME | End: 2024-06-07
Attending: SURGERY | Admitting: SURGERY
Payer: MEDICAID

## 2024-06-07 VITALS
RESPIRATION RATE: 18 BRPM | HEART RATE: 70 BPM | DIASTOLIC BLOOD PRESSURE: 78 MMHG | TEMPERATURE: 98 F | OXYGEN SATURATION: 100 % | SYSTOLIC BLOOD PRESSURE: 139 MMHG

## 2024-06-07 DIAGNOSIS — Z12.11 ENCOUNTER FOR SCREENING COLONOSCOPY: ICD-10-CM

## 2024-06-07 PROCEDURE — 37000008 HC ANESTHESIA 1ST 15 MINUTES: Performed by: SURGERY

## 2024-06-07 PROCEDURE — 45378 DIAGNOSTIC COLONOSCOPY: CPT | Performed by: SURGERY

## 2024-06-07 PROCEDURE — D9220A PRA ANESTHESIA: Mod: CRNA,,, | Performed by: STUDENT IN AN ORGANIZED HEALTH CARE EDUCATION/TRAINING PROGRAM

## 2024-06-07 PROCEDURE — 25000003 PHARM REV CODE 250: Performed by: STUDENT IN AN ORGANIZED HEALTH CARE EDUCATION/TRAINING PROGRAM

## 2024-06-07 PROCEDURE — D9220A PRA ANESTHESIA: Mod: ANES,,, | Performed by: ANESTHESIOLOGY

## 2024-06-07 PROCEDURE — 63600175 PHARM REV CODE 636 W HCPCS: Performed by: STUDENT IN AN ORGANIZED HEALTH CARE EDUCATION/TRAINING PROGRAM

## 2024-06-07 PROCEDURE — 37000009 HC ANESTHESIA EA ADD 15 MINS: Performed by: SURGERY

## 2024-06-07 PROCEDURE — 45378 DIAGNOSTIC COLONOSCOPY: CPT | Mod: ,,, | Performed by: SURGERY

## 2024-06-07 RX ORDER — PROPOFOL 10 MG/ML
VIAL (ML) INTRAVENOUS CONTINUOUS PRN
Status: DISCONTINUED | OUTPATIENT
Start: 2024-06-07 | End: 2024-06-07

## 2024-06-07 RX ORDER — FENTANYL CITRATE 50 UG/ML
INJECTION, SOLUTION INTRAMUSCULAR; INTRAVENOUS
Status: DISCONTINUED | OUTPATIENT
Start: 2024-06-07 | End: 2024-06-07

## 2024-06-07 RX ORDER — LIDOCAINE HYDROCHLORIDE 20 MG/ML
INJECTION INTRAVENOUS
Status: DISCONTINUED | OUTPATIENT
Start: 2024-06-07 | End: 2024-06-07

## 2024-06-07 RX ORDER — PROPOFOL 10 MG/ML
VIAL (ML) INTRAVENOUS
Status: DISCONTINUED | OUTPATIENT
Start: 2024-06-07 | End: 2024-06-07

## 2024-06-07 RX ADMIN — LIDOCAINE HYDROCHLORIDE 100 MG: 20 INJECTION, SOLUTION INTRAVENOUS at 01:06

## 2024-06-07 RX ADMIN — PROPOFOL 50 MG: 10 INJECTION, EMULSION INTRAVENOUS at 01:06

## 2024-06-07 RX ADMIN — SODIUM CHLORIDE, SODIUM LACTATE, POTASSIUM CHLORIDE, AND CALCIUM CHLORIDE: .6; .31; .03; .02 INJECTION, SOLUTION INTRAVENOUS at 12:06

## 2024-06-07 RX ADMIN — PROPOFOL 150 MCG/KG/MIN: 10 INJECTION, EMULSION INTRAVENOUS at 01:06

## 2024-06-07 RX ADMIN — FENTANYL CITRATE 100 MCG: 50 INJECTION, SOLUTION INTRAMUSCULAR; INTRAVENOUS at 01:06

## 2024-06-07 RX ADMIN — PROPOFOL 80 MG: 10 INJECTION, EMULSION INTRAVENOUS at 01:06

## 2024-06-07 NOTE — H&P
[facility] - Endoscopy  Colorectal Surgery  History & Physical    Patient Name: Arpita Bradshaw  MRN: [id number]  Admission Date: 6/7/2024  Attending Physician: Joelle Parks MD   Primary Care Provider: Kirk Servin III, MD    Subjective:     Chief Complaint/Reason for Admission: colonoscopy    History of Present Illness:        Colon & Rectal Surgery Clinic Follow Up     HPI:   Arpita Bradshaw is a 53 y.o. female who presents for follow up of hemorrhoids     10/18/23 excisional hemorrhoidectomy     1. 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   - Negative for high-grade dysplasia and malignancy.    - See comment.     2. Hemorrhoid, resection:   - Benign squamous mucosa with underlying dilated vessels with thrombosis consistent with hemorrhoid.     COMMENT:  The anterior midline biopsy (specimen 1) show squamous mucosa with koilocytic changes.  Immunostain for p16 is negative for strong full-thickness staining.  This is consistent with low-grade squamous intraepithelial lesion with HPV effect   (AIN-1).  Part 1 of this case is reviewed by Dr. JANET Soria who agrees with the above diagnosis.  Appropriate positive controls are examined.       3/11/2024  Having hard, painful bowel movements every 3-4 days, does have some soft bowel movements in between these episodes.  Taking linzess every 3-4 days.  Still having rectal bleeding      Interval history:   Bowel movements improved with increasing linzess.  Pain improved, decreased blood per rectum        Interval history: prep completed, no changes since last visit.       PTA Medications   Medication Sig    belimumab (BENLYSTA IV) Inject 1 Bag into the vein every 30 days.    butalbital-acetaminophen-caffeine -40 mg (FIORICET, ESGIC) -40 mg per tablet Take 1 tablet by mouth every 8 (eight) hours as needed for Headaches.    carvediloL (COREG) 3.125 MG tablet TAKE 1 TABLET (3.125 MG) BY MOUTH TWICE DAILY     cetirizine (ZYRTEC) 10 MG tablet Take 10 mg by mouth once daily.    cyclobenzaprine (FLEXERIL) 5 MG tablet Take 5 mg by mouth daily as needed.    docusate sodium (COLACE) 100 MG capsule Take 1 capsule (100 mg total) by mouth 2 (two) times daily.    estradioL (ESTRACE) 1 MG tablet Take 2 tablets (2 mg total) by mouth once daily.    famotidine (PEPCID) 20 MG tablet Take 20 mg by mouth 2 (two) times daily as needed.    hydrOXYchloroQUINE (PLAQUENIL) 200 mg tablet Take 1 tablet by mouth 2 (two) times daily.    imipramine (TOFRANIL) 25 MG tablet Take 1 tablet (25 mg total) by mouth every evening.    linaCLOtide (LINZESS) 72 mcg Cap capsule Take 1 tablet by mouth.     RESTASIS 0.05 % ophthalmic emulsion INT 1 GTT IN OU BID    valsartan (DIOVAN) 80 MG tablet TAKE 1 TABLET (80MG) BY MOUTH ONCE DAILY FOR HIGH BLOOD PRESSURE    VITAMIN D2 1,250 mcg (50,000 unit) capsule Take 50,000 Units by mouth every 7 days.    acetaminophen (TYLENOL) 500 MG tablet Take 2 tablets (1,000 mg total) by mouth every 6 (six) hours as needed for Pain (As needed for pain and fever).    azelastine (ASTELIN) 137 mcg (0.1 %) nasal spray 2 sprays 2 (two) times daily.    fluticasone propionate (FLONASE) 50 mcg/actuation nasal spray SHAKE LQ AND U 2 SPRAYS IEN QD    gabapentin (NEURONTIN) 300 MG capsule Take 300 mg by mouth nightly as needed.    hydrocortisone (ANUSOL-HC) 2.5 % rectal cream Place rectally 2 (two) times daily.    ibuprofen (ADVIL,MOTRIN) 600 MG tablet Take 1 tablet (600 mg total) by mouth every 6 (six) hours as needed for Pain (Take with food as needed for mild-to-moderate pain).    levocetirizine (XYZAL) 5 MG tablet TAKE 1 TABLET (5MG) BY MOUTH ONCE DAILY AT BEDTIME    montelukast (SINGULAIR) 10 mg tablet TAKE 1 TABLET (10MG) BY MOUTH ONCE DAILY AT BEDTIME FOR ALLERGIC RHINITIS    nystatin (MYCOSTATIN) cream Apply topically 2 (two) times daily. for 7 days    polyethylene glycol (GLYCOLAX) 17 gram PwPk Take 17 g by mouth once daily.     tamsulosin (FLOMAX) 0.4 mg Cap Take 1 capsule (0.4 mg total) by mouth every evening. for 14 days    valACYclovir (VALTREX) 1000 MG tablet TAKE 1 TABLET (1,000MG) BY MOUTH ONCE DAILY       Review of patient's allergies indicates:   Allergen Reactions    Sulfamethoxazole-trimethoprim Rash and Hives       Past Medical History:   Diagnosis Date    Disorder of kidney and ureter     Fibromyalgia     Hypertension     no meds since 2023    IC (interstitial cystitis)     Lupus     Status post insertion of sacral nerve stimulator     STD (sexually transmitted disease)     Urinary tract infection     Vaginal infection      Past Surgical History:   Procedure Laterality Date    BLADDER FULGURATION N/A 2019    Procedure: FULGURATION, BLADDER;  Surgeon: Harris Lua MD;  Location: [facility] OR;  Service: Urology;  Laterality: N/A;    BLADDER FULGURATION N/A 2019    Procedure: FULGURATION, BLADDER;  Surgeon: Harris Lua MD;  Location: [facility] OR;  Service: Urology;  Laterality: N/A;    BLADDER FULGURATION N/A 3/1/2021    Procedure: FULGURATION, BLADDER;  Surgeon: Harris Lua MD;  Location: [facility] OR;  Service: Urology;  Laterality: N/A;    BREAST REDUCTION       SECTION      COLONOSCOPY N/A 2018    Procedure: COLONOSCOPY;  Surgeon: Catrachita Kamara MD;  Location: [facility];  Service: Endoscopy;  Laterality: N/A;    CYSTOSCOPY N/A 2018    Procedure: CYSTOSCOPY;  Surgeon: Harris Lua MD;  Location: [facility] OR;  Service: Urology;  Laterality: N/A;  200 of botox    CYSTOSCOPY W/ RETROGRADES Bilateral 2019    Procedure: CYSTOSCOPY, WITH RETROGRADE PYELOGRAM;  Surgeon: Harris Lua MD;  Location: [facility] OR;  Service: Urology;  Laterality: Bilateral;    CYSTOSCOPY WITH HYDRODISTENSION OF BLADDER N/A 10/12/2020    Procedure: CYSTOSCOPY, WITH BLADDER HYDRODISTENSION;  Surgeon: Harris Lua MD;  Location: [facility] OR;  Service: Urology;  Laterality: N/A;    CYSTOSCOPY WITH HYDRODISTENSION OF  BLADDER N/A 12/15/2021    Procedure: CYSTOSCOPY, WITH BLADDER HYDRODISTENSION;  Surgeon: Harris Lua MD;  Location: [facility] OR;  Service: Urology;  Laterality: N/A;    CYSTOSCOPY WITH HYDRODISTENSION OF BLADDER N/A 7/25/2022    Procedure: CYSTOSCOPY, WITH BLADDER HYDRODISTENSION;  Surgeon: Harris Lua MD;  Location: [facility] OR;  Service: Urology;  Laterality: N/A;    CYSTOSCOPY WITH HYDRODISTENSION OF BLADDER N/A 5/23/2024    Procedure: CYSTOSCOPY, WITH BLADDER HYDRODISTENSION;  Surgeon: Harris Lua MD;  Location: [facility] OR;  Service: Urology;  Laterality: N/A;    CYSTOURETHROSCOPY  12/4/2019    Procedure: CYSTOURETHROSCOPY;  Surgeon: Harris Lua MD;  Location: [facility] OR;  Service: Urology;;    CYSTOURETHROSCOPY N/A 3/1/2021    Procedure: CYSTOURETHROSCOPY;  Surgeon: Harris Lua MD;  Location: [facility] OR;  Service: Urology;  Laterality: N/A;    EXCISIONAL HEMORRHOIDECTOMY N/A 10/18/2023    Procedure: HEMORRHOIDECTOMY;  Surgeon: Joelle Parks MD;  Location: [facility] OR;  Service: Colon and Rectal;  Laterality: N/A;    hemmorroidectomy      HYSTERECTOMY  2008    [facility] for endometriosis    IMPLANTATION OF PERMANENT SACRAL NERVE STIMULATOR Right 4/28/2021    Procedure: INSERTION, NEUROSTIMULATOR, PERMANENT, SACRAL;  Surgeon: Harris Lua MD;  Location: [facility] OR;  Service: Urology;  Laterality: Right;    INJECTION OF BOTULINUM TOXIN TYPE A N/A 7/23/2018    Procedure: INJECTION, BOTULINUM TOXIN, TYPE A;  Surgeon: Harris Lua MD;  Location: [facility] OR;  Service: Urology;  Laterality: N/A;    neurostimulator for bladder      REMOVAL OF SACRAL NEUROSTIMULATOR DEVICE Left 4/28/2021    Procedure: REMOVAL, NEUROSTIMULATOR, SACRAL;  Surgeon: Harris Lua MD;  Location: [facility] OR;  Service: Urology;  Laterality: Left;    TOTAL REDUCTION MAMMOPLASTY      URETHRAL CATHETERIZATION N/A 12/4/2019    Procedure: CATHETERIZATION, URETHRA;  Surgeon: Harris Lua MD;  Location: [facility];  Service: Urology;  Laterality:  N/A;     Family History       Problem Relation (Age of Onset)    Arthritis Mother    Breast cancer Mother    Cancer Mother    Hypertension Mother          Tobacco Use    Smoking status: Never    Smokeless tobacco: Never   Substance and Sexual Activity    Alcohol use: Never    Drug use: Never    Sexual activity: Yes     Partners: Male     Birth control/protection: Condom     Review of Systems   All other systems reviewed and are negative.    Objective:     Vital Signs (Most Recent):  Temp: 97.6 °F (36.4 °C) (06/07/24 1103)  Pulse: 68 (06/07/24 1103)  Resp: 16 (06/07/24 1103)  BP: 134/88 (06/07/24 1103)  SpO2: 100 % (06/07/24 1103) Vital Signs (24h Range):  Temp:  [97.6 °F (36.4 °C)] 97.6 °F (36.4 °C)  Pulse:  [68] 68  Resp:  [16] 16  SpO2:  [100 %] 100 %  BP: (134)/(88) 134/88        There is no height or weight on file to calculate BMI.    Physical Exam  Vitals reviewed.   Constitutional:       Appearance: Normal appearance.   HENT:      Head: Normocephalic and atraumatic.      Mouth/Throat:      Mouth: Mucous membranes are moist.   Eyes:      Extraocular Movements: Extraocular movements intact.      Pupils: Pupils are equal, round, and reactive to light.   Cardiovascular:      Rate and Rhythm: Normal rate and regular rhythm.   Pulmonary:      Effort: Pulmonary effort is normal.   Abdominal:      General: Abdomen is flat.      Palpations: Abdomen is soft.   Musculoskeletal:         General: Normal range of motion.   Skin:     General: Skin is warm.      Capillary Refill: Capillary refill takes less than 2 seconds.   Neurological:      General: No focal deficit present.      Mental Status: She is alert.   Psychiatric:         Mood and Affect: Mood normal.         Behavior: Behavior normal.           Significant Diagnostics:  None    Assessment/Plan:     There are no hospital problems to display for this patient.      Ms. Bradshaw presents for colonoscopy    - proceed     Joelle Parks MD  Colorectal Surgery  [facility] -  Endoscopy

## 2024-06-07 NOTE — ANESTHESIA POSTPROCEDURE EVALUATION
Anesthesia Post Evaluation    Patient: Arpita Bradshaw    Procedure(s) Performed: Procedure(s) (LRB):  COLONOSCOPY (N/A)    Final Anesthesia Type: MAC      Patient location during evaluation: [facility]  Patient participation: Yes- Able to Participate  Level of consciousness: awake and alert  Post-procedure vital signs: reviewed and stable  Pain management: adequate  Airway patency: patent    PONV status at discharge: No PONV  Anesthetic complications: no      Cardiovascular status: blood pressure returned to baseline and hemodynamically stable  Respiratory status: unassisted, spontaneous ventilation and room air  Hydration status: euvolemic  Follow-up not needed.              Vitals Value Taken Time   /63 06/07/24 1349   Temp 36 06/07/24 1349   Pulse 74 06/07/24 1349   Resp 16 06/07/24 1349   SpO2 100 06/07/24 1349         No case tracking events are documented in the log.      Pain/Karyn Score: No data recorded

## 2024-06-07 NOTE — ANESTHESIA PREPROCEDURE EVALUATION
06/07/2024  Arpita Bradshaw is a 54 y.o., female.      Pre-op Assessment    I have reviewed the Patient Summary Reports.     I have reviewed the Nursing Notes.    I have reviewed the Medications.     Review of Systems  Anesthesia Hx:  No problems with previous Anesthesia             Denies Family Hx of Anesthesia complications.    Denies Personal Hx of Anesthesia complications.                    Social:  Non-Smoker       Hematology/Oncology:  Hematology Normal   Oncology Normal                                   EENT/Dental:  EENT/Dental Normal           Cardiovascular:     Hypertension                                        Pulmonary:  Pulmonary Normal                       Renal/:  Renal/ Normal                 Hepatic/GI:  Hepatic/GI Normal                 Musculoskeletal:  Musculoskeletal Normal                Neurological:  Neurology Normal                                      Endocrine:  Endocrine Normal            Dermatological:  Skin Normal    Psych:  Psychiatric Normal                    Physical Exam  General: Well nourished and Alert    Airway:  Mallampati: II   Mouth Opening: Normal  Tongue: Normal    Dental:  Intact        Anesthesia Plan  Type of Anesthesia, risks & benefits discussed:    Anesthesia Type: MAC  Intra-op Monitoring Plan: Standard ASA Monitors  Post Op Pain Control Plan: multimodal analgesia  Induction:  IV  Informed Consent: Informed consent signed with the Patient and all parties understand the risks and agree with anesthesia plan.  All questions answered.   ASA Score: 2    Ready For Surgery From Anesthesia Perspective.     .

## 2024-06-07 NOTE — [employer] PATIENT INSTRUCTIONS
Discharge Summary/Instructions after an Endoscopic Procedure  Patient Name: Arpita Bradshaw  Patient MRN: [id number]  Patient YOB: 1970 Friday, June 7, 2024  Joelle Parks MD  RESTRICTIONS:  During your procedure today, you received medications for sedation.  These   medications may affect your judgment, balance and coordination.  Therefore,   for 24 hours, you have the following restrictions:   - DO NOT drive a car, operate machinery, make legal/financial decisions,   sign important papers or drink alcohol.    ACTIVITY:  Today: no heavy lifting, straining or running due to procedural   sedation/anesthesia.  The following day: return to full activity including work.  DIET:  Eat and drink normally unless instructed otherwise.     TREATMENT FOR COMMON SIDE EFFECTS:  - Mild abdominal pain, nausea, belching, bloating or excessive gas:  rest,   eat lightly and use a heating pad.  - Sore Throat: treat with throat lozenges and/or gargle with warm salt   water.  - Because air was used during the procedure, expelling large amounts of air   from your rectum or belching is normal.  - If a bowel prep was taken, you may not have a bowel movement for 1-3 days.    This is normal.  SYMPTOMS TO WATCH FOR AND REPORT TO YOUR PHYSICIAN:  1. Abdominal pain or bloating, other than gas cramps.  2. Chest pain.  3. Back pain.  4. Signs of infection such as: chills or fever occurring within 24 hours   after the procedure.  5. Rectal bleeding, which would show as bright red, maroon, or black stools.   (A tablespoon of blood from the rectum is not serious, especially if   hemorrhoids are present.)  6. Vomiting.  7. Weakness or dizziness.  GO DIRECTLY TO THE NEAREST EMERGENCY ROOM IF YOU HAVE ANY OF THE FOLLOWING:      Difficulty breathing              Chills and/or fever over 101 F   Persistent vomiting and/or vomiting blood   Severe abdominal pain   Severe chest pain   Black, tarry stools   Bleeding- more than one tablespoon   Any  other symptom or condition that you feel may need urgent attention  Your doctor recommends these additional instructions:  If any biopsies were taken, your doctors clinic will contact you in 1 to 2   weeks with any results.  - Patient has a contact number available for emergencies.  The signs and   symptoms of potential delayed complications were discussed with the   patient.  Return to normal activities tomorrow.  Written discharge   instructions were provided to the patient.   - Resume previous diet.   - Continue present medications.   - Discharge patient to home (ambulatory).   - Repeat colonoscopy in 10 years for screening purposes.  For questions, problems or results please call your physician - Joelle Parks MD at Work:  [phone].  [facility], EMERGENCY ROOM PHONE NUMBER: [phone], [facility]   [phone].  IF A COMPLICATION OR EMERGENCY SITUATION ARISES AND YOU ARE UNABLE TO REACH   YOUR PHYSICIAN - GO DIRECTLY TO THE EMERGENCY ROOM.  MD Joelle Mason MD  6/7/2024 1:45:36 PM  This report has been verified and signed electronically.  Dear patient,  As a result of recent federal legislation (The Federal Cures Act), you may   receive lab or pathology results from your procedure in your [facility]   account before your physician is able to contact you. Your physician or   their representative will relay the results to you with their   recommendations at their soonest availability.  Thank you,  [employer]

## 2024-06-07 NOTE — ANESTHESIA POSTPROCEDURE EVALUATION
Anesthesia Post Evaluation    Patient: Arpita Bradshaw    Procedure(s) Performed: Procedure(s) (LRB):  COLONOSCOPY (N/A)    Final Anesthesia Type: MAC      Patient location during evaluation: PACU  Patient participation: Yes- Able to Participate  Level of consciousness: awake and alert  Post-procedure vital signs: reviewed and stable  Airway patency: patent    PONV status at discharge: No PONV  Anesthetic complications: no      Cardiovascular status: blood pressure returned to baseline  Respiratory status: unassisted and spontaneous ventilation  Hydration status: euvolemic  Follow-up not needed.              Vitals Value Taken Time   /78 06/07/24 1425   Temp 36.6 °C (97.9 °F) 06/07/24 1355   Pulse 70 06/07/24 1425   Resp 18 06/07/24 1425   SpO2 100 % 06/07/24 1425         No case tracking events are documented in the log.      Pain/Karyn Score: No data recorded

## 2024-06-07 NOTE — PLAN OF CARE
Arpita Bradshaw has met all discharge criteria from Phase II. Vital Signs are stable, ambulating  without difficulty. Discharge instructions given, patient verbalized understanding. Discharged from facility via wheelchair in stable condition.

## 2024-06-07 NOTE — DISCHARGE SUMMARY
[facility] - Endoscopy  Discharge Note  Short Stay    Procedure(s) (LRB):  COLONOSCOPY (N/A)      OUTCOME: Patient tolerated treatment/procedure well without complication and is now ready for discharge.    DISPOSITION: Home or Self Care    FINAL DIAGNOSIS:  colonoscopy    FOLLOWUP: In clinic    DISCHARGE INSTRUCTIONS:    - resume diet and activity as tolerated  - repeat colonoscopy in 10 years   - follow up in clinic for surveillance of RIKI Parks MD  Staff Surgeon   Colon & Rectal Surgery

## 2024-06-13 ENCOUNTER — OFFICE VISIT (OUTPATIENT)
Dept: UROLOGY | Facility: CLINIC | Age: 54
End: 2024-06-13
Payer: MEDICAID

## 2024-06-13 VITALS
HEIGHT: 67 IN | BODY MASS INDEX: 28.23 KG/M2 | WEIGHT: 179.88 LBS | DIASTOLIC BLOOD PRESSURE: 87 MMHG | HEART RATE: 74 BPM | SYSTOLIC BLOOD PRESSURE: 129 MMHG

## 2024-06-13 DIAGNOSIS — N30.10 INTERSTITIAL CYSTITIS: ICD-10-CM

## 2024-06-13 DIAGNOSIS — N31.9 NEUROGENIC URINARY BLADDER DISORDER: ICD-10-CM

## 2024-06-13 DIAGNOSIS — Z98.890 POST-OPERATIVE STATE: Primary | ICD-10-CM

## 2024-06-13 DIAGNOSIS — R35.0 URINARY FREQUENCY: ICD-10-CM

## 2024-06-13 DIAGNOSIS — R39.82 CHRONIC BLADDER PAIN: ICD-10-CM

## 2024-06-13 PROCEDURE — 3079F DIAST BP 80-89 MM HG: CPT | Mod: CPTII,,, | Performed by: NURSE PRACTITIONER

## 2024-06-13 PROCEDURE — 87086 URINE CULTURE/COLONY COUNT: CPT | Performed by: NURSE PRACTITIONER

## 2024-06-13 PROCEDURE — 1160F RVW MEDS BY RX/DR IN RCRD: CPT | Mod: CPTII,,, | Performed by: NURSE PRACTITIONER

## 2024-06-13 PROCEDURE — 99024 POSTOP FOLLOW-UP VISIT: CPT | Mod: ,,, | Performed by: NURSE PRACTITIONER

## 2024-06-13 PROCEDURE — 99999 PR PBB SHADOW E&M-EST. PATIENT-LVL V: CPT | Mod: PBBFAC,,, | Performed by: NURSE PRACTITIONER

## 2024-06-13 PROCEDURE — 4010F ACE/ARB THERAPY RXD/TAKEN: CPT | Mod: CPTII,,, | Performed by: NURSE PRACTITIONER

## 2024-06-13 PROCEDURE — 1159F MED LIST DOCD IN RCRD: CPT | Mod: CPTII,,, | Performed by: NURSE PRACTITIONER

## 2024-06-13 PROCEDURE — 3074F SYST BP LT 130 MM HG: CPT | Mod: CPTII,,, | Performed by: NURSE PRACTITIONER

## 2024-06-13 PROCEDURE — 99215 OFFICE O/P EST HI 40 MIN: CPT | Mod: PBBFAC,PO | Performed by: NURSE PRACTITIONER

## 2024-06-13 RX ORDER — PREDNISONE 20 MG/1
20 TABLET ORAL DAILY
Qty: 5 TABLET | Refills: 0 | Status: SHIPPED | OUTPATIENT
Start: 2024-06-13 | End: 2024-06-18

## 2024-06-13 RX ORDER — CIMETIDINE 400 MG/1
400 TABLET, FILM COATED ORAL 2 TIMES DAILY
Qty: 60 TABLET | Refills: 11 | Status: SHIPPED | OUTPATIENT
Start: 2024-06-13 | End: 2025-06-13

## 2024-06-13 RX ORDER — KETOROLAC TROMETHAMINE 10 MG/1
10 TABLET, FILM COATED ORAL EVERY 6 HOURS PRN
Qty: 20 TABLET | Refills: 0 | Status: SHIPPED | OUTPATIENT
Start: 2024-06-13 | End: 2024-06-18

## 2024-06-13 NOTE — PROGRESS NOTES
Subjective:       Patient ID: Arpita Bradshaw is a 54 y.o. female.    Chief Complaint: Post-op Evaluation    Patient is here today for her post-op evaluation. She is S/P cysto with bladder hydrodistension by Dr. Lua on 5/23/2024. Patient does not think her procedure was effective. She is still experiencing bladder pain when bladder is full. She does not hold her urine in her bladder because of this. She still has c/o urinary urgency and nocturia.     Follow-up  Chronicity: S/P cysto with bladder hydrodistension. Episode onset: 5/23/2024. The problem occurs 2 to 4 times per day. The problem has been unchanged. Associated symptoms include urinary symptoms. Pertinent negatives include no abdominal pain, chills, fever, nausea, swollen glands, vomiting or weakness. Nothing aggravates the symptoms. Treatments tried: bladder hydrodistension. The treatment provided no relief.     Review of Systems   Constitutional:  Negative for chills and fever.   Gastrointestinal:  Positive for constipation (taking Linzess). Negative for abdominal pain, nausea and vomiting.   Genitourinary:  Positive for nocturia (x3) and urgency. Negative for decreased urine volume, difficulty urinating, dysuria, flank pain and hematuria.        Bladder pain 5/10   Neurological:  Negative for weakness.   Psychiatric/Behavioral: Negative.           Objective:      Physical Exam  Vitals and nursing note reviewed.   Constitutional:       General: She is not in acute distress.     Appearance: She is well-developed. She is not ill-appearing.   HENT:      Head: Normocephalic and atraumatic.   Eyes:      Pupils: Pupils are equal, round, and reactive to light.   Cardiovascular:      Rate and Rhythm: Normal rate.   Pulmonary:      Effort: Pulmonary effort is normal. No respiratory distress.   Abdominal:      Palpations: Abdomen is soft.      Tenderness: There is no abdominal tenderness.   Musculoskeletal:         General: Normal range of motion.       Cervical back: Normal range of motion.   Skin:     General: Skin is warm and dry.   Neurological:      Mental Status: She is alert and oriented to person, place, and time.      Coordination: Coordination normal.   Psychiatric:         Mood and Affect: Mood normal.         Behavior: Behavior normal.         Thought Content: Thought content normal.         Judgment: Judgment normal.         Assessment:       Problem List Items Addressed This Visit          Renal/    Interstitial cystitis    Urinary frequency    Neurogenic urinary bladder disorder    Chronic bladder pain     Other Visit Diagnoses       Post-operative state    -  Primary            Plan:           Arpita was seen today for post-op evaluation.    Diagnoses and all orders for this visit:    Post-operative state  -     Urine culture    Interstitial cystitis  -     Urine culture  -     cimetidine (TAGAMET) 400 MG tablet; Take 1 tablet (400 mg total) by mouth 2 (two) times daily.  -     predniSONE (DELTASONE) 20 MG tablet; Take 1 tablet (20 mg total) by mouth once daily. for 5 days  -     ketorolac (TORADOL) 10 mg tablet; Take 1 tablet (10 mg total) by mouth every 6 (six) hours as needed for Pain.  -     Ambulatory referral/consult to Physical/Occupational Therapy; Future    Neurogenic urinary bladder disorder  -     Urine culture    Chronic bladder pain  -     Urine culture  -     cimetidine (TAGAMET) 400 MG tablet; Take 1 tablet (400 mg total) by mouth 2 (two) times daily.  -     predniSONE (DELTASONE) 20 MG tablet; Take 1 tablet (20 mg total) by mouth once daily. for 5 days  -     ketorolac (TORADOL) 10 mg tablet; Take 1 tablet (10 mg total) by mouth every 6 (six) hours as needed for Pain.  -     Ambulatory referral/consult to Physical/Occupational Therapy; Future    Urinary frequency  -     Urine culture    Other orders  Continue taking imipramine 25 mg nightly for IC.   Continue taking tamsulosin (Flomax) 0.4 mg daily to promote bladder emptying.    Continue taking Uribel as prescribed for bladder pain.   Start trial of cimetidine (Tagamet) 400 mg twice daily for management of IC.   Start taking toradol and prednisone as prescribed for bladder inflammation.     NOTE: If medication regimen and PT is not effective will move forward with bladder botox.     Follow-up in 4 weeks.     Francie Cazares, DNP

## 2024-06-13 NOTE — PATIENT INSTRUCTIONS
Continue taking imipramine 25 mg nightly for IC.   Continue taking tamsulosin (Flomax) 0.4 mg daily to promote bladder emptying.   Continue taking Uribel as prescribed for bladder pain.   Start trial of cimetidine (Tagamet) 400 mg twice daily for management of IC.   Start taking toradol and prednisone as prescribed for bladder inflammation.   Amb Ref Physical Therapy for pelvic floor therapy.   Follow-up in 4 weeks.

## 2024-06-14 LAB — BACTERIA UR CULT: NO GROWTH

## 2024-06-17 ENCOUNTER — TELEPHONE (OUTPATIENT)
Dept: UROLOGY | Facility: CLINIC | Age: 54
End: 2024-06-17
Payer: MEDICAID

## 2024-06-17 NOTE — TELEPHONE ENCOUNTER
----- Message from Francie Cazares NP sent at 6/15/2024  6:42 PM CDT -----  Please inform patient via telephone that her urine cx was normal. She does not have a UTI. Continue with plan of care and keep follow-up appt.

## 2024-07-11 ENCOUNTER — OFFICE VISIT (OUTPATIENT)
Dept: UROLOGY | Facility: CLINIC | Age: 54
End: 2024-07-11
Payer: MEDICAID

## 2024-07-11 VITALS
WEIGHT: 180.31 LBS | BODY MASS INDEX: 28.3 KG/M2 | HEIGHT: 67 IN | DIASTOLIC BLOOD PRESSURE: 90 MMHG | SYSTOLIC BLOOD PRESSURE: 143 MMHG | HEART RATE: 70 BPM

## 2024-07-11 DIAGNOSIS — R35.1 NOCTURIA: ICD-10-CM

## 2024-07-11 DIAGNOSIS — Z09 FOLLOW-UP EXAM AFTER TREATMENT: ICD-10-CM

## 2024-07-11 DIAGNOSIS — N31.9 NEUROGENIC URINARY BLADDER DISORDER: ICD-10-CM

## 2024-07-11 DIAGNOSIS — R39.82 CHRONIC BLADDER PAIN: ICD-10-CM

## 2024-07-11 DIAGNOSIS — R39.15 URINARY URGENCY: ICD-10-CM

## 2024-07-11 DIAGNOSIS — N30.10 INTERSTITIAL CYSTITIS: Primary | ICD-10-CM

## 2024-07-11 LAB
BILIRUB UR QL STRIP: NEGATIVE
CLARITY UR REFRACT.AUTO: CLEAR
COLOR UR AUTO: YELLOW
GLUCOSE UR QL STRIP: NEGATIVE
HGB UR QL STRIP: ABNORMAL
KETONES UR QL STRIP: NEGATIVE
LEUKOCYTE ESTERASE UR QL STRIP: ABNORMAL
MICROSCOPIC COMMENT: ABNORMAL
NITRITE UR QL STRIP: NEGATIVE
PH UR STRIP: 7 [PH] (ref 5–8)
PROT UR QL STRIP: ABNORMAL
RBC #/AREA URNS AUTO: 24 /HPF (ref 0–4)
SP GR UR STRIP: 1.02 (ref 1–1.03)
SQUAMOUS #/AREA URNS AUTO: 1 /HPF
URN SPEC COLLECT METH UR: ABNORMAL
WBC #/AREA URNS AUTO: 23 /HPF (ref 0–5)

## 2024-07-11 PROCEDURE — 87086 URINE CULTURE/COLONY COUNT: CPT | Performed by: NURSE PRACTITIONER

## 2024-07-11 PROCEDURE — 99214 OFFICE O/P EST MOD 30 MIN: CPT | Mod: PBBFAC,PO | Performed by: NURSE PRACTITIONER

## 2024-07-11 PROCEDURE — 1159F MED LIST DOCD IN RCRD: CPT | Mod: CPTII,,, | Performed by: NURSE PRACTITIONER

## 2024-07-11 PROCEDURE — 81001 URINALYSIS AUTO W/SCOPE: CPT | Performed by: NURSE PRACTITIONER

## 2024-07-11 PROCEDURE — 3080F DIAST BP >= 90 MM HG: CPT | Mod: CPTII,,, | Performed by: NURSE PRACTITIONER

## 2024-07-11 PROCEDURE — 99214 OFFICE O/P EST MOD 30 MIN: CPT | Mod: S$PBB,,, | Performed by: NURSE PRACTITIONER

## 2024-07-11 PROCEDURE — 3008F BODY MASS INDEX DOCD: CPT | Mod: CPTII,,, | Performed by: NURSE PRACTITIONER

## 2024-07-11 PROCEDURE — 4010F ACE/ARB THERAPY RXD/TAKEN: CPT | Mod: CPTII,,, | Performed by: NURSE PRACTITIONER

## 2024-07-11 PROCEDURE — 3077F SYST BP >= 140 MM HG: CPT | Mod: CPTII,,, | Performed by: NURSE PRACTITIONER

## 2024-07-11 PROCEDURE — 1160F RVW MEDS BY RX/DR IN RCRD: CPT | Mod: CPTII,,, | Performed by: NURSE PRACTITIONER

## 2024-07-11 PROCEDURE — 99999 PR PBB SHADOW E&M-EST. PATIENT-LVL IV: CPT | Mod: PBBFAC,,, | Performed by: NURSE PRACTITIONER

## 2024-07-11 NOTE — PROGRESS NOTES
Subjective:       Patient ID: Arpita Bradshaw is a 54 y.o. female.    Chief Complaint: Follow-up    Patient is here today for a 4 week follow-up for IC. At last visit she was prescribed tagamet in addition to continuing her current medication regimen (imipramine, tamsulosin, and uribel). Patient reports not being able to get Tagamet because it was not covered by the insurance. Referral was also placed at that time for PT for her IC. Patient canceled her PT appt that was scheduled for 6/25/2024 because her pain resolved after taking prednisone and toradol. However, bladder pain restarted 1 week after completing medications. She is still experiencing bladder pain when bladder is full. She does not hold her urine because of this. She still has c/o urinary urgency and nocturia.    Follow-up  This is a recurrent (IC) problem. The current episode started 1 to 4 weeks ago. The problem occurs daily. The problem has been waxing and waning. Associated symptoms include urinary symptoms. Pertinent negatives include no abdominal pain, anorexia, change in bowel habit, chills, fatigue, fever, headaches, nausea, swollen glands, vomiting or weakness. Nothing aggravates the symptoms. Treatments tried: toradol and prednisone. The treatment provided significant (but symptoms eventually restarted.) relief.     Review of Systems   Constitutional:  Negative for chills, fatigue and fever.   Gastrointestinal:  Negative for abdominal pain, anorexia, change in bowel habit, nausea and vomiting.   Genitourinary:  Positive for frequency, nocturia, pelvic pain and urgency. Negative for decreased urine volume, difficulty urinating, dysuria, flank pain and hematuria.   Neurological:  Negative for dizziness, weakness and headaches.         Objective:      Physical Exam  Vitals and nursing note reviewed.   Constitutional:       General: She is not in acute distress.     Appearance: She is well-developed. She is not ill-appearing.   HENT:       Head: Normocephalic and atraumatic.   Eyes:      Pupils: Pupils are equal, round, and reactive to light.   Cardiovascular:      Rate and Rhythm: Normal rate.   Pulmonary:      Effort: Pulmonary effort is normal. No respiratory distress.   Abdominal:      Palpations: Abdomen is soft.      Tenderness: There is no abdominal tenderness.   Musculoskeletal:         General: Normal range of motion.      Cervical back: Normal range of motion.   Skin:     General: Skin is warm and dry.   Neurological:      Mental Status: She is alert and oriented to person, place, and time.      Coordination: Coordination normal.   Psychiatric:         Mood and Affect: Mood normal.         Behavior: Behavior normal.         Thought Content: Thought content normal.         Judgment: Judgment normal.         Assessment:       Problem List Items Addressed This Visit          Renal/    Interstitial cystitis - Primary    Relevant Medications    methen-m.blue-s.phos-phsal-hyo (URIBEL) 118-10-40.8-36 mg Cap    Other Relevant Orders    Urine culture    Urinalysis    Nocturia    Relevant Orders    Urine culture    Urinalysis    Neurogenic urinary bladder disorder    Relevant Orders    Urine culture    Urinalysis    Urinary urgency    Relevant Orders    Urine culture    Urinalysis    Chronic bladder pain    Relevant Medications    methen-m.blue-s.phos-phsal-hyo (URIBEL) 118-10-40.8-36 mg Cap    Other Relevant Orders    Urine culture    Urinalysis     Other Visit Diagnoses       Follow-up exam after treatment        Relevant Orders    Urine culture    Urinalysis            Plan:           Arpita was seen today for follow-up.    Diagnoses and all orders for this visit:    Interstitial cystitis  -     Urine culture  -     Urinalysis  -     methen-m.blue-s.phos-phsal-hyo (URIBEL) 118-10-40.8-36 mg Cap; Take 1 capsule by mouth every 6 (six) hours as needed (bladder pain).    Neurogenic urinary bladder disorder  -     Urine culture  -      Urinalysis    Chronic bladder pain  -     Urine culture  -     Urinalysis  -     methen-m.blue-s.phos-phsal-hyo (URIBEL) 118-10-40.8-36 mg Cap; Take 1 capsule by mouth every 6 (six) hours as needed (bladder pain).    Urinary urgency  -     Urine culture  -     Urinalysis    Nocturia  -     Urine culture  -     Urinalysis    Follow-up exam after treatment  -     Urine culture  -     Urinalysis    Other orders  Continue taking imipramine 25 mg nightly for IC.   Continue taking tamsulosin (Flomax) 0.4 mg daily to promote bladder emptying.   Start back taking uribel as prescribed for bladder pain. REFILLED.    NOTE: After urine specimen was collected, patient reports taking Cipro that she had on-hand at home in hopes of managing her urinary symptoms. Patient informed that this could mess up urine test. She verbalized understanding.     Follow-up pending urine cx results.     Francie Cazares, DNP

## 2024-07-11 NOTE — PATIENT INSTRUCTIONS
U/A and urine cx today  Continue taking imipramine 25 mg nightly for IC.   Continue taking tamsulosin (Flomax) 0.4 mg daily to promote bladder emptying.   Start back taking uribel as prescribed for bladder pain. REFILLED.  Follow-up pending urine cx results.

## 2024-07-15 ENCOUNTER — TELEPHONE (OUTPATIENT)
Dept: UROLOGY | Facility: CLINIC | Age: 54
End: 2024-07-15
Payer: MEDICAID

## 2024-07-15 DIAGNOSIS — N30.10 INTERSTITIAL CYSTITIS: Primary | ICD-10-CM

## 2024-07-15 NOTE — TELEPHONE ENCOUNTER
----- Message from Francie Cazares NP sent at 7/14/2024  5:49 PM CDT -----  Please inform patient via telephone that her urine cx showed some bacteria but none in predominance to diagnose a UTI. Urine sample was contaminated. Repeat urine cx and re-educate patient on clean catch mid-stream urine specimen collection. Thanks.

## 2024-07-18 ENCOUNTER — LAB VISIT (OUTPATIENT)
Dept: LAB | Facility: HOSPITAL | Age: 54
End: 2024-07-18
Payer: MEDICAID

## 2024-07-18 DIAGNOSIS — N30.10 INTERSTITIAL CYSTITIS: ICD-10-CM

## 2024-07-18 PROCEDURE — 87088 URINE BACTERIA CULTURE: CPT | Performed by: NURSE PRACTITIONER

## 2024-07-18 PROCEDURE — 87086 URINE CULTURE/COLONY COUNT: CPT | Performed by: NURSE PRACTITIONER

## 2024-07-19 LAB — BACTERIA UR CULT: ABNORMAL

## 2024-07-20 DIAGNOSIS — N30.01 ACUTE CYSTITIS WITH HEMATURIA: Primary | ICD-10-CM

## 2024-07-20 RX ORDER — AMOXICILLIN AND CLAVULANATE POTASSIUM 500; 125 MG/1; MG/1
1 TABLET, FILM COATED ORAL 3 TIMES DAILY
Qty: 30 TABLET | Refills: 0 | Status: SHIPPED | OUTPATIENT
Start: 2024-07-20 | End: 2024-07-30

## 2024-07-21 NOTE — PROGRESS NOTES
Diagnoses and all orders for this visit:    Acute cystitis with hematuria  -     amoxicillin-clavulanate 500-125mg (AUGMENTIN) 500-125 mg Tab; Take 1 tablet (500 mg total) by mouth 3 (three) times daily. for 10 days  -     Urine culture; Future after completion of antibiotic therapy.    NOTE: If urinary symptoms are present once UTI has resolved, we will consider moving forward with cysto with bladder botox.     Francie Cazares, DNP

## 2024-07-24 ENCOUNTER — TELEPHONE (OUTPATIENT)
Dept: UROLOGY | Facility: CLINIC | Age: 54
End: 2024-07-24
Payer: MEDICAID

## 2024-09-17 ENCOUNTER — PATIENT MESSAGE (OUTPATIENT)
Dept: UROLOGY | Facility: CLINIC | Age: 54
End: 2024-09-17
Payer: MEDICAID

## 2024-09-17 DIAGNOSIS — N31.9 NEUROGENIC URINARY BLADDER DISORDER: Primary | ICD-10-CM

## 2024-09-18 ENCOUNTER — LAB VISIT (OUTPATIENT)
Dept: LAB | Facility: HOSPITAL | Age: 54
End: 2024-09-18
Attending: NURSE PRACTITIONER
Payer: MEDICAID

## 2024-09-18 DIAGNOSIS — N31.9 NEUROGENIC URINARY BLADDER DISORDER: ICD-10-CM

## 2024-09-18 LAB
BACTERIA #/AREA URNS AUTO: ABNORMAL /HPF
BILIRUB UR QL STRIP: NEGATIVE
CLARITY UR REFRACT.AUTO: ABNORMAL
COLOR UR AUTO: ABNORMAL
GLUCOSE UR QL STRIP: NEGATIVE
HGB UR QL STRIP: ABNORMAL
HYALINE CASTS UR QL AUTO: 0 /LPF
KETONES UR QL STRIP: NEGATIVE
LEUKOCYTE ESTERASE UR QL STRIP: ABNORMAL
MICROSCOPIC COMMENT: ABNORMAL
NITRITE UR QL STRIP: POSITIVE
PH UR STRIP: 6 [PH] (ref 5–8)
PROT UR QL STRIP: ABNORMAL
RBC #/AREA URNS AUTO: >100 /HPF (ref 0–4)
SP GR UR STRIP: 1.02 (ref 1–1.03)
SQUAMOUS #/AREA URNS AUTO: 7 /HPF
URN SPEC COLLECT METH UR: ABNORMAL
WBC #/AREA URNS AUTO: >100 /HPF (ref 0–5)
WBC CLUMPS UR QL AUTO: ABNORMAL

## 2024-09-18 PROCEDURE — 81001 URINALYSIS AUTO W/SCOPE: CPT | Performed by: NURSE PRACTITIONER

## 2024-09-18 PROCEDURE — 87088 URINE BACTERIA CULTURE: CPT | Performed by: NURSE PRACTITIONER

## 2024-09-18 PROCEDURE — 87186 SC STD MICRODIL/AGAR DIL: CPT | Performed by: NURSE PRACTITIONER

## 2024-09-18 PROCEDURE — 87086 URINE CULTURE/COLONY COUNT: CPT | Performed by: NURSE PRACTITIONER

## 2024-09-19 ENCOUNTER — HOSPITAL ENCOUNTER (EMERGENCY)
Facility: HOSPITAL | Age: 54
Discharge: HOME | End: 2024-09-19
Attending: STUDENT IN AN ORGANIZED HEALTH CARE EDUCATION/TRAINING PROGRAM
Payer: MEDICAID

## 2024-09-19 ENCOUNTER — PATIENT MESSAGE (OUTPATIENT)
Dept: UROLOGY | Facility: CLINIC | Age: 54
End: 2024-09-19
Payer: MEDICAID

## 2024-09-19 VITALS
OXYGEN SATURATION: 96 % | WEIGHT: 171 LBS | RESPIRATION RATE: 14 BRPM | DIASTOLIC BLOOD PRESSURE: 76 MMHG | HEART RATE: 65 BPM | SYSTOLIC BLOOD PRESSURE: 148 MMHG | HEIGHT: 68 IN | TEMPERATURE: 99 F | BODY MASS INDEX: 25.91 KG/M2

## 2024-09-19 DIAGNOSIS — R10.9 RIGHT FLANK PAIN: ICD-10-CM

## 2024-09-19 DIAGNOSIS — N39.0 URINARY TRACT INFECTION WITH HEMATURIA, SITE UNSPECIFIED: Primary | ICD-10-CM

## 2024-09-19 DIAGNOSIS — R31.9 URINARY TRACT INFECTION WITH HEMATURIA, SITE UNSPECIFIED: Primary | ICD-10-CM

## 2024-09-19 LAB
ALBUMIN SERPL-MCNC: 4 G/DL (ref 3.3–5.5)
ALP SERPL-CCNC: 55 U/L (ref 42–141)
BILIRUB SERPL-MCNC: 0.7 MG/DL (ref 0.2–1.6)
BILIRUBIN, POC UA: NEGATIVE
BLOOD, POC UA: ABNORMAL
BUN SERPL-MCNC: 13 MG/DL (ref 7–22)
CALCIUM SERPL-MCNC: 9.4 MG/DL (ref 8–10.3)
CHLORIDE SERPL-SCNC: 106 MMOL/L (ref 98–108)
CLARITY, UA: CLEAR
COLOR, UA: YELLOW
CREAT SERPL-MCNC: 0.9 MG/DL (ref 0.6–1.2)
GLUCOSE SERPL-MCNC: 96 MG/DL (ref 73–118)
GLUCOSE, POC UA: NEGATIVE
HCT, POC: NORMAL
HGB, POC: NORMAL (ref 14–18)
KETONES, POC UA: NEGATIVE
LEUKOCYTE EST, POC UA: ABNORMAL
MCH, POC: NORMAL
MCHC, POC: NORMAL
MCV, POC: NORMAL
MPV, POC: NORMAL
NITRITE, POC UA: POSITIVE
PH UR STRIP: 5.5 [PH] (ref 5–8)
POC ALT (SGPT): 13 U/L (ref 10–47)
POC AST (SGOT): 20 U/L (ref 11–38)
POC PLATELET COUNT: NORMAL
POC TCO2: 32 MMOL/L (ref 18–33)
POTASSIUM BLD-SCNC: 4.4 MMOL/L (ref 3.6–5.1)
PROTEIN, POC UA: ABNORMAL
PROTEIN, POC: 7.1 G/DL (ref 6.4–8.1)
RBC, POC: NORMAL
RDW, POC: NORMAL
SODIUM BLD-SCNC: 140 MMOL/L (ref 128–145)
SPECIFIC GRAVITY, POC UA: >=1.03 (ref 1–1.03)
UROBILINOGEN, POC UA: 0.2 E.U./DL
WBC, POC: NORMAL

## 2024-09-19 PROCEDURE — 96365 THER/PROPH/DIAG IV INF INIT: CPT | Mod: ER

## 2024-09-19 PROCEDURE — 80053 COMPREHEN METABOLIC PANEL: CPT | Mod: ER

## 2024-09-19 PROCEDURE — 25000003 PHARM REV CODE 250: Mod: ER | Performed by: NURSE PRACTITIONER

## 2024-09-19 PROCEDURE — 99285 EMERGENCY DEPT VISIT HI MDM: CPT | Mod: 25,ER

## 2024-09-19 PROCEDURE — 87088 URINE BACTERIA CULTURE: CPT | Performed by: NURSE PRACTITIONER

## 2024-09-19 PROCEDURE — 96375 TX/PRO/DX INJ NEW DRUG ADDON: CPT | Mod: ER

## 2024-09-19 PROCEDURE — 85025 COMPLETE CBC W/AUTO DIFF WBC: CPT | Mod: ER

## 2024-09-19 PROCEDURE — 63600175 PHARM REV CODE 636 W HCPCS: Mod: ER | Performed by: NURSE PRACTITIONER

## 2024-09-19 PROCEDURE — 87186 SC STD MICRODIL/AGAR DIL: CPT | Performed by: NURSE PRACTITIONER

## 2024-09-19 PROCEDURE — 87086 URINE CULTURE/COLONY COUNT: CPT | Performed by: NURSE PRACTITIONER

## 2024-09-19 RX ORDER — KETOROLAC TROMETHAMINE 30 MG/ML
15 INJECTION, SOLUTION INTRAMUSCULAR; INTRAVENOUS
Status: COMPLETED | OUTPATIENT
Start: 2024-09-19 | End: 2024-09-19

## 2024-09-19 RX ORDER — CEPHALEXIN 500 MG/1
500 CAPSULE ORAL EVERY 12 HOURS
Qty: 20 CAPSULE | Refills: 0 | Status: SHIPPED | OUTPATIENT
Start: 2024-09-19 | End: 2024-09-29

## 2024-09-19 RX ADMIN — KETOROLAC TROMETHAMINE 15 MG: 30 INJECTION, SOLUTION INTRAMUSCULAR; INTRAVENOUS at 07:09

## 2024-09-19 RX ADMIN — CEFTRIAXONE 2 G: 2 INJECTION, POWDER, FOR SOLUTION INTRAMUSCULAR; INTRAVENOUS at 06:09

## 2024-09-19 RX ADMIN — SODIUM CHLORIDE 1000 ML: 9 INJECTION, SOLUTION INTRAVENOUS at 06:09

## 2024-09-19 NOTE — ED PROVIDER NOTES
Encounter Date: 9/19/2024    SCRIBE #1 NOTE: I, Missy Beasley, am scribing for, and in the presence of,  Jose Carlos Davidson NP. I have scribed the following portions of the note - Other sections scribed: HPI,ROS,.       History     Chief Complaint   Patient presents with    Back Pain     Pt reports concern for UTI with symptoms of lower back and pelvic pressure and dysuria x3 days. Nausea.      Arpita Bradshaw is a 54 y.o. female, with a PMHx of interstitial cystitis, HTN, lupus, UTI, who presents to the ED with complaint of dysuria with associated urinary frequency, malodorous urine, right flank pain radiating to right back, and nausea that began 2 days ago. Patient suspects she has a UTI. States she sees a urologist due to her IC but was unable to schedule an early appointment. Notes the last time she was on antibiotics was a month ago. No attempt at treatment. No other exacerbating or alleviating factors. Denies fever, vomiting, or other associated symptoms.       The history is provided by the patient. No  was used.     Review of patient's allergies indicates:   Allergen Reactions    Sulfamethoxazole-trimethoprim Rash and Hives     Past Medical History:   Diagnosis Date    Disorder of kidney and ureter     Fibromyalgia     Hypertension     no meds since July 2023    IC (interstitial cystitis)     Lupus     Status post insertion of sacral nerve stimulator     STD (sexually transmitted disease)     Urinary tract infection     Vaginal infection      Past Surgical History:   Procedure Laterality Date    BLADDER FULGURATION N/A 6/6/2019    Procedure: FULGURATION, BLADDER;  Surgeon: Harris Lua MD;  Location: [facility] OR;  Service: Urology;  Laterality: N/A;    BLADDER FULGURATION N/A 12/4/2019    Procedure: FULGURATION, BLADDER;  Surgeon: Harris Lua MD;  Location: [facility] OR;  Service: Urology;  Laterality: N/A;    BLADDER FULGURATION N/A 3/1/2021    Procedure: FULGURATION, BLADDER;  Surgeon:  Harris Lua MD;  Location: [facility] OR;  Service: Urology;  Laterality: N/A;    BREAST REDUCTION       SECTION      COLONOSCOPY N/A 2018    Procedure: COLONOSCOPY;  Surgeon: Catrachita Kamara MD;  Location: [facility];  Service: Endoscopy;  Laterality: N/A;    COLONOSCOPY N/A 2024    Procedure: COLONOSCOPY;  Surgeon: Joelle Parks MD;  Location: [facility] ENDO;  Service: Colon and Rectal;  Laterality: N/A;    CYSTOSCOPY N/A 2018    Procedure: CYSTOSCOPY;  Surgeon: Harris Lua MD;  Location: [facility] OR;  Service: Urology;  Laterality: N/A;  200 of botox    CYSTOSCOPY W/ RETROGRADES Bilateral 2019    Procedure: CYSTOSCOPY, WITH RETROGRADE PYELOGRAM;  Surgeon: Harris Lua MD;  Location: [facility] OR;  Service: Urology;  Laterality: Bilateral;    CYSTOSCOPY WITH HYDRODISTENSION OF BLADDER N/A 10/12/2020    Procedure: CYSTOSCOPY, WITH BLADDER HYDRODISTENSION;  Surgeon: aHrris Lua MD;  Location: [facility];  Service: Urology;  Laterality: N/A;    CYSTOSCOPY WITH HYDRODISTENSION OF BLADDER N/A 12/15/2021    Procedure: CYSTOSCOPY, WITH BLADDER HYDRODISTENSION;  Surgeon: Harris Lua MD;  Location: [facility] OR;  Service: Urology;  Laterality: N/A;    CYSTOSCOPY WITH HYDRODISTENSION OF BLADDER N/A 2022    Procedure: CYSTOSCOPY, WITH BLADDER HYDRODISTENSION;  Surgeon: Harris Lua MD;  Location: [facility] OR;  Service: Urology;  Laterality: N/A;    CYSTOSCOPY WITH HYDRODISTENSION OF BLADDER N/A 2024    Procedure: CYSTOSCOPY, WITH BLADDER HYDRODISTENSION;  Surgeon: Harris Lua MD;  Location: [facility] OR;  Service: Urology;  Laterality: N/A;    CYSTOURETHROSCOPY  2019    Procedure: CYSTOURETHROSCOPY;  Surgeon: Harris Lua MD;  Location: [facility] OR;  Service: Urology;;    CYSTOURETHROSCOPY N/A 3/1/2021    Procedure: CYSTOURETHROSCOPY;  Surgeon: Harris Lua MD;  Location: [facility];  Service: Urology;  Laterality: N/A;    EXCISIONAL HEMORRHOIDECTOMY N/A 10/18/2023    Procedure:  HEMORRHOIDECTOMY;  Surgeon: Joelle Parks MD;  Location: [facility];  Service: Colon and Rectal;  Laterality: N/A;    hemmorroidectomy      HYSTERECTOMY  2008    [facility]IAM for endometriosis    IMPLANTATION OF PERMANENT SACRAL NERVE STIMULATOR Right 4/28/2021    Procedure: INSERTION, NEUROSTIMULATOR, PERMANENT, SACRAL;  Surgeon: Harris Lua MD;  Location: [facility] OR;  Service: Urology;  Laterality: Right;    INJECTION OF BOTULINUM TOXIN TYPE A N/A 7/23/2018    Procedure: INJECTION, BOTULINUM TOXIN, TYPE A;  Surgeon: Harris Lua MD;  Location: [facility] OR;  Service: Urology;  Laterality: N/A;    neurostimulator for bladder      REMOVAL OF SACRAL NEUROSTIMULATOR DEVICE Left 4/28/2021    Procedure: REMOVAL, NEUROSTIMULATOR, SACRAL;  Surgeon: Harris Lua MD;  Location: [facility] OR;  Service: Urology;  Laterality: Left;    TOTAL REDUCTION MAMMOPLASTY      URETHRAL CATHETERIZATION N/A 12/4/2019    Procedure: CATHETERIZATION, URETHRA;  Surgeon: Harris Lua MD;  Location: [facility] OR;  Service: Urology;  Laterality: N/A;     Family History   Problem Relation Name Age of Onset    Hypertension Mother      Cancer Mother          KIDNEY    Arthritis Mother      Breast cancer Mother      Kidney disease Neg Hx      Ovarian cancer Neg Hx      Colon cancer Neg Hx       Social History     Tobacco Use    Smoking status: Never    Smokeless tobacco: Never   Substance Use Topics    Alcohol use: Never    Drug use: Never     Review of Systems   Constitutional:  Negative for chills and diaphoresis.   HENT:  Negative for congestion.    Eyes:  Negative for discharge.   Respiratory:  Negative for shortness of breath.    Cardiovascular:  Negative for chest pain.   Gastrointestinal:  Positive for nausea. Negative for abdominal pain, diarrhea and vomiting.   Genitourinary:  Positive for dysuria, flank pain (right sided) and frequency.        + malodorous urine    Musculoskeletal:  Positive for back pain (right sided).   Neurological:  Negative for  dizziness.   Hematological:  Does not bruise/bleed easily.       Physical Exam     Initial Vitals [09/19/24 1749]   BP Pulse Resp Temp SpO2   131/88 73 18 98.6 °F (37 °C) 100 %      MAP       --         Physical Exam    Nursing note and vitals reviewed.  Constitutional: She appears well-developed and well-nourished. She is not diaphoretic. No distress.   HENT:   Head: Normocephalic and atraumatic.   Right Ear: External ear normal.   Left Ear: External ear normal.   Nose: Nose normal.   Eyes: Conjunctivae and EOM are normal. Right eye exhibits no discharge. Left eye exhibits no discharge.   Neck: Neck supple. No tracheal deviation present.   Normal range of motion.  Cardiovascular:  Normal rate.           Pulmonary/Chest: No stridor. No respiratory distress.   Abdominal: Abdomen is soft. She exhibits no distension. There is no abdominal tenderness.   Musculoskeletal:         General: No tenderness. Normal range of motion.      Cervical back: Normal range of motion and neck supple.     Neurological: She is alert and oriented to person, place, and time. She has normal strength. No cranial nerve deficit or sensory deficit.   Skin: Skin is warm and dry.   Psychiatric: She has a normal mood and affect. Her behavior is normal. Judgment and thought content normal.         ED Course   Procedures  Labs Reviewed   CULTURE, URINE - Abnormal       Result Value    Urine Culture, Routine   (*)     Value: ESCHERICHIA COLI  >100,000 cfu/ml      Narrative:     Indicated criteria for high risk culture:->Other  Other (specify):->UTI   POCT URINALYSIS W/O SCOPE - Abnormal    Glucose, UA Negative      Bilirubin, UA Negative      Ketones, UA Negative      Spec Grav UA >=1.030 (*)     Blood, UA 2+ (*)     PH, UA 5.5      Protein, UA 2+ (*)     Urobilinogen, UA 0.2      Nitrite, UA Positive (*)     Leukocytes, UA 1+ (*)     Color, UA POC Yellow      Clarity, UA, POC Clear     POCT URINALYSIS W/O SCOPE   POCT CBC    Hematocrit         Hemoglobin        RBC        WBC        MCV        MCH, POC        MCHC        RDW-CV        Platelet Count, POC        MPV       POCT CMP   POCT CMP    Albumin, POC 4.0      Alkaline Phosphatase, POC 55      ALT (SGPT), POC 13      AST (SGOT), POC 20      POC BUN 13      Calcium, POC 9.4      POC Chloride 106      POC Creatinine 0.9      POC Glucose 96      POC Potassium 4.4      POC Sodium 140      Bilirubin, POC 0.7      POC TCO2 32      Protein, POC 7.1            Imaging Results              CT Abdomen Pelvis  Without Contrast (Final result)  Result time 09/19/24 22:15:23      Final result by Mariangel Jarrell MD (09/19/24 22:15:23)                   Impression:      No acute intra-abdominal abnormalities identified.  No evidence of stones or obstructive uropathy.      Electronically signed by: Mariangel Jarrell MD  Date:    09/19/2024  Time:    22:15               Narrative:    EXAMINATION:  CT ABDOMEN PELVIS WITHOUT CONTRAST    CLINICAL HISTORY:  Flank pain, kidney stone suspected;    TECHNIQUE:  Low dose axial images, sagittal and coronal reformations were obtained from the lung bases to the pubic symphysis.  Oral contrast was not administered.    COMPARISON:  CT abdomen and pelvis from 02/17/2024.    FINDINGS:  The visualized portion of the heart is unremarkable.  The lung bases are clear.    No significant hepatic abnormality seen on this noncontrast exam.  There is no intra-or extrahepatic biliary ductal dilatation.  The gallbladder is unremarkable.  The stomach, pancreas, spleen, and adrenal glands are unremarkable.    Kidneys show no evidence of stones or hydronephrosis. Ureters are difficult to track.  Urinary bladder is nondistended.  Uterus has been removed.    Appendix is visualized and is unremarkable.  The visualized loops of small and large bowel show no evidence of obstruction or inflammation.  No free air or free fluid.    Aorta tapers normally.    No acute osseous abnormality identified.  Subcutaneous soft tissues show no significant abnormalities.  Sacral stimulator device is seen within the soft tissues of the right lower back/gluteal region.                                       Medications   sodium chloride 0.9% bolus 1,000 mL 1,000 mL (0 mLs Intravenous Stopped 9/19/24 1925)   cefTRIAXone (ROCEPHIN) 2 g in D5W 100 mL IVPB (MB+) (0 g Intravenous Stopped 9/19/24 1923)   ketorolac injection 15 mg (15 mg Intravenous Given 9/19/24 1906)     Medical Decision Making  Patient with UTI.  No evidence of ureterolithiasis, sepsis, MARKUS, or other associated acute abnormality.  Treated with Rocephin in the ED.  Discharge on Keflex.  Urine cultures in process.  Advised her to follow up with Urology.  ED return precautions given.  Shared decision-making with the patient.    Amount and/or Complexity of Data Reviewed  External Data Reviewed: labs and notes.  Labs: ordered. Decision-making details documented in ED Course.     Details: See HPI   Radiology: ordered. Decision-making details documented in ED Course.    Risk  Prescription drug management.            Scribe Attestation:   Scribe #1: I performed the above scribed service and the documentation accurately describes the services I performed. I attest to the accuracy of the note.                             I, Jose Carlos Davidson NP, personally performed the services described in this documentation. All medical record entries made by the scribe were at my direction and in my presence. I have reviewed the chart and agree that the record reflects my personal performance and is accurate and complete.      DISCLAIMER: This note was prepared with [employer] voice recognition transcription software. Garbled syntax, mangled pronouns, and other bizarre constructions may be attributed to that software system.    Clinical Impression:  Final diagnoses:  [N39.0, R31.9] Urinary tract infection with hematuria, site unspecified (Primary)  [R10.9] Right flank pain          ED  Disposition Condition    Discharge Stable          ED Prescriptions       Medication Sig Dispense Start Date End Date Auth. Provider    cephALEXin (KEFLEX) 500 MG capsule Take 1 capsule (500 mg total) by mouth every 12 (twelve) hours. for 10 days 20 capsule 9/19/2024 9/29/2024 Jose Carlos Davidson, JERRY          Follow-up Information       Follow up With Specialties Details Why Contact Info    Francie Cazares, МАРИЯ Urology Schedule an appointment as soon as possible for a visit in 1 week For further evaluation [id number] [place] [address]  [address]  [facility] [address] [phone]      [facility] ED Emergency Medicine Go to  If symptoms worsen, As needed [address] [facility] [address] [phone]             Jose Carlos Davidson, NP  09/22/24 3558

## 2024-09-20 LAB — BACTERIA UR CULT: ABNORMAL

## 2024-09-20 NOTE — DISCHARGE INSTRUCTIONS
Take antibiotics as prescribed until they are gone.  You should not have any left over even if you feel better.      Follow-up with your urologist.  Return to the emergency department for any new or worsening symptoms.    Thank you for coming to our Emergency Department today. It is important to remember that some problems are difficult to diagnose and may not be found during your first visit. Be sure to follow up with your primary care doctor.  If you do not have one, you may contact the one listed on your discharge paperwork or you may also call the [facility] Appointment Desk at [phone] to schedule an appointment with one.     Return to the ER with any questions/concerns, new/concerning symptoms, worsening or failure to improve. Do not drive or make any important decisions for 24 hours if you have received any pain medications, sedatives or mood altering drugs during your ER visit.

## 2024-09-21 LAB — BACTERIA UR CULT: ABNORMAL

## 2024-09-23 ENCOUNTER — TELEPHONE (OUTPATIENT)
Dept: UROLOGY | Facility: CLINIC | Age: 54
End: 2024-09-23
Payer: MEDICAID

## 2024-09-23 DIAGNOSIS — R39.15 URINARY URGENCY: Primary | ICD-10-CM

## 2024-09-23 DIAGNOSIS — N89.8 VAGINAL IRRITATION: Primary | ICD-10-CM

## 2024-09-23 RX ORDER — FLUCONAZOLE 150 MG/1
TABLET ORAL
Qty: 2 TABLET | Refills: 0 | Status: SHIPPED | OUTPATIENT
Start: 2024-09-23

## 2024-09-23 NOTE — TELEPHONE ENCOUNTER
----- Message from Francie Cazares DNP sent at 9/23/2024 12:04 PM CDT -----  Please inform patient via telephone that her urine cx came back positive for a UTI. Patient can continue to take cephalexin 500 mg twice daily for 10 days as prescribed by the ER because medication is sensitive to bacteria found on urine cx. Repeat urine cx after completion of antibiotic therapy. Please arrange. Thanks.

## 2024-09-24 NOTE — TELEPHONE ENCOUNTER
----- Message from Francie Cazares NP sent at 9/21/2018  8:23 AM CDT -----  Please inform patient her UTI has resolved.  
She is aware of her results  
General

## 2024-10-16 ENCOUNTER — LAB VISIT (OUTPATIENT)
Dept: LAB | Facility: HOSPITAL | Age: 54
End: 2024-10-16
Attending: NURSE PRACTITIONER
Payer: MEDICAID

## 2024-10-16 ENCOUNTER — PATIENT MESSAGE (OUTPATIENT)
Dept: UROLOGY | Facility: CLINIC | Age: 54
End: 2024-10-16
Payer: MEDICAID

## 2024-10-16 ENCOUNTER — TELEPHONE (OUTPATIENT)
Dept: UROLOGY | Facility: CLINIC | Age: 54
End: 2024-10-16
Payer: MEDICAID

## 2024-10-16 DIAGNOSIS — R39.15 URINARY URGENCY: ICD-10-CM

## 2024-10-16 PROCEDURE — 87088 URINE BACTERIA CULTURE: CPT | Performed by: NURSE PRACTITIONER

## 2024-10-16 PROCEDURE — 87186 SC STD MICRODIL/AGAR DIL: CPT | Performed by: NURSE PRACTITIONER

## 2024-10-16 PROCEDURE — 87086 URINE CULTURE/COLONY COUNT: CPT | Performed by: NURSE PRACTITIONER

## 2024-10-16 NOTE — TELEPHONE ENCOUNTER
Spoke to patient and notified her to complete repeat urine culture and once provider reviews results then we will follow up with her with a plan of care

## 2024-10-18 LAB — BACTERIA UR CULT: ABNORMAL

## 2024-10-21 ENCOUNTER — PATIENT MESSAGE (OUTPATIENT)
Dept: UROLOGY | Facility: CLINIC | Age: 54
End: 2024-10-21
Payer: MEDICAID

## 2024-10-21 ENCOUNTER — TELEPHONE (OUTPATIENT)
Dept: UROLOGY | Facility: CLINIC | Age: 54
End: 2024-10-21
Payer: MEDICAID

## 2024-10-21 DIAGNOSIS — N30.01 ACUTE CYSTITIS WITH HEMATURIA: Primary | ICD-10-CM

## 2024-10-21 RX ORDER — LEVOFLOXACIN 750 MG/1
750 TABLET ORAL DAILY
Qty: 10 TABLET | Refills: 0 | Status: SHIPPED | OUTPATIENT
Start: 2024-10-21 | End: 2024-10-31

## 2024-10-21 RX ORDER — FLUCONAZOLE 150 MG/1
150 TABLET ORAL DAILY
Qty: 1 TABLET | Refills: 0 | Status: SHIPPED | OUTPATIENT
Start: 2024-10-21 | End: 2024-10-22

## 2024-10-21 NOTE — TELEPHONE ENCOUNTER
----- Message from Francie Cazares DNP sent at 10/21/2024  4:23 PM CDT -----  Please inform patient via telephone that her urine cx came back positive for a UTI. Script for levofloxacin sent to [facility]. I also sent a script for diflucan to take after completion of antibiotic if yeast infection develops. Repeat u/a and urine cx after completion of antibiotic. Orders placed.

## 2024-12-04 ENCOUNTER — LAB VISIT (OUTPATIENT)
Dept: LAB | Facility: HOSPITAL | Age: 54
End: 2024-12-04
Attending: NURSE PRACTITIONER
Payer: MEDICAID

## 2024-12-04 DIAGNOSIS — N30.01 ACUTE CYSTITIS WITH HEMATURIA: ICD-10-CM

## 2024-12-04 LAB
BACTERIA #/AREA URNS AUTO: ABNORMAL /HPF
BILIRUB UR QL STRIP: NEGATIVE
CLARITY UR REFRACT.AUTO: CLEAR
COLOR UR AUTO: YELLOW
GLUCOSE UR QL STRIP: NEGATIVE
HGB UR QL STRIP: ABNORMAL
KETONES UR QL STRIP: NEGATIVE
LEUKOCYTE ESTERASE UR QL STRIP: ABNORMAL
MICROSCOPIC COMMENT: ABNORMAL
NITRITE UR QL STRIP: NEGATIVE
PH UR STRIP: 6 [PH] (ref 5–8)
PROT UR QL STRIP: ABNORMAL
RBC #/AREA URNS AUTO: 20 /HPF (ref 0–4)
SP GR UR STRIP: 1.02 (ref 1–1.03)
SQUAMOUS #/AREA URNS AUTO: 1 /HPF
URN SPEC COLLECT METH UR: ABNORMAL
WBC #/AREA URNS AUTO: 57 /HPF (ref 0–5)

## 2024-12-04 PROCEDURE — 81001 URINALYSIS AUTO W/SCOPE: CPT | Performed by: NURSE PRACTITIONER

## 2024-12-04 PROCEDURE — 87086 URINE CULTURE/COLONY COUNT: CPT | Performed by: NURSE PRACTITIONER

## 2024-12-05 LAB — BACTERIA UR CULT: NO GROWTH

## 2024-12-06 ENCOUNTER — TELEPHONE (OUTPATIENT)
Dept: UROLOGY | Facility: CLINIC | Age: 54
End: 2024-12-06
Payer: MEDICAID

## 2024-12-06 NOTE — TELEPHONE ENCOUNTER
----- Message from Francie Cazares DNP sent at 12/5/2024 10:47 PM CST -----  Please inform patient via telephone that her urine cx was normal.

## 2024-12-12 ENCOUNTER — OFFICE VISIT (OUTPATIENT)
Dept: UROLOGY | Facility: CLINIC | Age: 54
End: 2024-12-12
Payer: MEDICAID

## 2024-12-12 VITALS
BODY MASS INDEX: 27.6 KG/M2 | HEART RATE: 67 BPM | SYSTOLIC BLOOD PRESSURE: 155 MMHG | WEIGHT: 182.13 LBS | DIASTOLIC BLOOD PRESSURE: 92 MMHG | HEIGHT: 68 IN

## 2024-12-12 DIAGNOSIS — N31.9 NEUROGENIC URINARY BLADDER DISORDER: ICD-10-CM

## 2024-12-12 DIAGNOSIS — R39.15 URINARY URGENCY: ICD-10-CM

## 2024-12-12 DIAGNOSIS — Z87.440 HISTORY OF RECURRENT UTIS: ICD-10-CM

## 2024-12-12 DIAGNOSIS — R35.1 NOCTURIA: ICD-10-CM

## 2024-12-12 DIAGNOSIS — R39.82 CHRONIC BLADDER PAIN: ICD-10-CM

## 2024-12-12 DIAGNOSIS — N30.10 INTERSTITIAL CYSTITIS: Primary | ICD-10-CM

## 2024-12-12 DIAGNOSIS — R35.0 URINARY FREQUENCY: ICD-10-CM

## 2024-12-12 DIAGNOSIS — Z01.818 PRE-OP TESTING: ICD-10-CM

## 2024-12-12 PROCEDURE — 87798 DETECT AGENT NOS DNA AMP: CPT | Mod: 59 | Performed by: NURSE PRACTITIONER

## 2024-12-12 PROCEDURE — 99215 OFFICE O/P EST HI 40 MIN: CPT | Mod: PBBFAC,PO | Performed by: NURSE PRACTITIONER

## 2024-12-12 PROCEDURE — 99999 PR PBB SHADOW E&M-EST. PATIENT-LVL V: CPT | Mod: PBBFAC,,, | Performed by: NURSE PRACTITIONER

## 2024-12-12 PROCEDURE — 87798 DETECT AGENT NOS DNA AMP: CPT | Performed by: NURSE PRACTITIONER

## 2024-12-12 RX ORDER — CIPROFLOXACIN 2 MG/ML
400 INJECTION, SOLUTION INTRAVENOUS
OUTPATIENT
Start: 2024-12-12

## 2024-12-12 RX ORDER — KETOROLAC TROMETHAMINE 10 MG/1
10 TABLET, FILM COATED ORAL EVERY 6 HOURS PRN
Qty: 20 TABLET | Refills: 0 | Status: SHIPPED | OUTPATIENT
Start: 2024-12-12 | End: 2024-12-17

## 2024-12-12 RX ORDER — SODIUM CHLORIDE 9 MG/ML
INJECTION, SOLUTION INTRAVENOUS CONTINUOUS
OUTPATIENT
Start: 2024-12-12

## 2024-12-12 RX ORDER — CEPHALEXIN 250 MG/1
250 CAPSULE ORAL NIGHTLY
Qty: 30 CAPSULE | Refills: 2 | Status: SHIPPED | OUTPATIENT
Start: 2024-12-12 | End: 2025-03-12

## 2024-12-12 NOTE — PROGRESS NOTES
Subjective:       Patient ID: Arpita Bradshaw is a 54 y.o. female.    Chief Complaint: Urinary Tract Infection and Discuss Surgery    Patient is here today for evaluation of recurrent UTIs and IC flare-up. Patient recently seen Infectious Disease (Dr. Sb Bonner) for her recurrent UTIs. She recommended not repeating urine cx after treatment with antibiotic if patient is asymptomatic. Patient has c/o bladder pain, urinary frequency, urgency, and nocturia. She thinks she may be experiencing an IC flare-up. She is currently taking imipramine 25 mg nightly for her IC, which provides mild to moderate relief (night time mainly). Patient would like to move forward with scheduling cysto with bladder hydrodistension.     Urinary Tract Infection   This is a recurrent problem. The problem has been gradually improving. The quality of the pain is described as aching. The pain is moderate. There has been no fever. She is Sexually active. Associated symptoms include frequency, hematuria (microscopic) and urgency. Pertinent negatives include no behavior changes, chills, flank pain, hesitancy, nausea, possible pregnancy, vomiting, bubble bath use, rash or withholding. She has tried antibiotics for the symptoms. The treatment provided moderate relief. Her past medical history is significant for recurrent UTIs and a urological procedure. There is no history of diabetes mellitus or a single kidney.   Pelvic Pain  The patient's primary symptoms include pelvic pain. The patient's pertinent negatives include no genital itching, genital lesions, genital odor, genital rash, missed menses, vaginal bleeding or vaginal discharge. This is a recurrent problem. The current episode started 1 to 4 weeks ago. The problem occurs daily. The problem has been unchanged. The pain is moderate. Associated symptoms include frequency, hematuria (microscopic) and urgency. Pertinent negatives include no abdominal pain, chills, discolored urine, dysuria,  fever, flank pain, nausea, rash or vomiting. The symptoms are aggravated by urinating. She is sexually active. She uses hysterectomy for contraception. Her past medical history is significant for a gynecological surgery and herpes simplex.     Review of Systems   Constitutional:  Negative for chills and fever.   Gastrointestinal:  Negative for abdominal pain, change in bowel habit, nausea and vomiting.   Genitourinary:  Positive for frequency, hematuria (microscopic), hot flashes, nocturia (x6), pelvic pain and urgency. Negative for decreased urine volume, difficulty urinating, dysuria, flank pain, hesitancy, missed menses and vaginal discharge.        Bladder pain   Integumentary:  Negative for rash.   Psychiatric/Behavioral:  Positive for sleep disturbance.          Objective:      Physical Exam  Vitals and nursing note reviewed.   Constitutional:       General: She is not in acute distress.     Appearance: She is well-developed. She is not ill-appearing.   HENT:      Head: Normocephalic and atraumatic.   Eyes:      Pupils: Pupils are equal, round, and reactive to light.   Cardiovascular:      Rate and Rhythm: Normal rate.   Pulmonary:      Effort: Pulmonary effort is normal. No respiratory distress.   Abdominal:      Palpations: Abdomen is soft.      Tenderness: There is no abdominal tenderness.   Musculoskeletal:         General: Normal range of motion.      Cervical back: Normal range of motion.   Skin:     General: Skin is warm and dry.   Neurological:      Mental Status: She is alert and oriented to person, place, and time.      Coordination: Coordination normal.   Psychiatric:         Mood and Affect: Mood normal.         Behavior: Behavior normal.         Thought Content: Thought content normal.         Judgment: Judgment normal.         Assessment:       Problem List Items Addressed This Visit       Interstitial cystitis - Primary    Relevant Medications    ketorolac (TORADOL) 10 mg tablet    Nocturia     Urinary frequency    Neurogenic urinary bladder disorder    Urinary urgency    History of recurrent UTIs    Relevant Medications    d-mannose 500 mg Cap    cephALEXin (KEFLEX) 250 MG capsule    Other Relevant Orders    Mycoplasma hominus Molecular detection PCR Urine    Ureaplasma PCR Urine    Chronic bladder pain    Relevant Medications    ketorolac (TORADOL) 10 mg tablet     Other Visit Diagnoses       Pre-op testing        Relevant Orders    CBC Auto Differential    Basic Metabolic Panel            Plan:           Arpita was seen today for urinary tract infection and discuss surgery.    Diagnoses and all orders for this visit:    Interstitial cystitis  -     ketorolac (TORADOL) 10 mg tablet; Take 1 tablet (10 mg total) by mouth every 6 (six) hours as needed for Pain.  -     Case Request Operating Room: CYSTOSCOPY, WITH BLADDER HYDRODISTENSION  -     Vital Signs ; Standing  -     Notify physician ; Standing  -     Diet NPO; Standing  -     Place in Outpatient; Standing  -     Place AVANI hose; Standing  -     Diet NPO    Chronic bladder pain  -     ketorolac (TORADOL) 10 mg tablet; Take 1 tablet (10 mg total) by mouth every 6 (six) hours as needed for Pain.  -     Case Request Operating Room: CYSTOSCOPY, WITH BLADDER HYDRODISTENSION  -     Vital Signs ; Standing  -     Notify physician ; Standing  -     Diet NPO; Standing  -     Place in Outpatient; Standing  -     Place AVANI hose; Standing  -     Diet NPO    Neurogenic urinary bladder disorder  -     Case Request Operating Room: CYSTOSCOPY, WITH BLADDER HYDRODISTENSION  -     Vital Signs ; Standing  -     Notify physician ; Standing  -     Diet NPO; Standing  -     Place in Outpatient; Standing  -     Place AVANI hose; Standing  -     Diet NPO    History of recurrent UTIs  -     Mycoplasma hominus Molecular detection PCR Urine; Future  -     Ureaplasma PCR Urine; Future  -     d-mannose 500 mg Cap; Take 1 capsule by mouth Daily.  -     cephALEXin (KEFLEX) 250 MG  capsule; Take 1 capsule (250 mg total) by mouth nightly.  -     Ureaplasma PCR Urine  -     Mycoplasma hominus Molecular detection PCR Urine    Urinary urgency  -     Case Request Operating Room: CYSTOSCOPY, WITH BLADDER HYDRODISTENSION  -     Vital Signs ; Standing  -     Notify physician ; Standing  -     Diet NPO; Standing  -     Place in Outpatient; Standing  -     Place AVANI hose; Standing  -     Diet NPO    Urinary frequency  -     Case Request Operating Room: CYSTOSCOPY, WITH BLADDER HYDRODISTENSION  -     Vital Signs ; Standing  -     Notify physician ; Standing  -     Diet NPO; Standing  -     Place in Outpatient; Standing  -     Place AVANI hose; Standing  -     Diet NPO    Nocturia  -     Case Request Operating Room: CYSTOSCOPY, WITH BLADDER HYDRODISTENSION  -     Vital Signs ; Standing  -     Notify physician ; Standing  -     Diet NPO; Standing  -     Place in Outpatient; Standing  -     Place AVANI hose; Standing  -     Diet NPO    Pre-op testing  -     CBC Auto Differential; Future  -     Basic Metabolic Panel; Future    Other orders  -     0.9% NaCl infusion  -     IP VTE LOW RISK PATIENT; Standing  -     ciprofloxacin (CIPRO)400mg/200ml D5W IVPB 400 mg    Schedule patient for cystoscopy with bladder hydrodistension by Dr. Lua.   Surgery team will call you to select surgery date and discuss pre-op instructions.   Pre-op labs needed (CBC, BMP)  Continue to avoid the use of aspirin and aspirin containing products for 7 days prior to surgery to reduce risk of bleeding.   Surgery packet provided to patient.   Start trial of d-mannose and cranberry pill daily for UTI prevention.   Start trial of cephalexin (Keflex) 250 mg nightly for UTI prevention.   Mycoplasma hominus and ureaplasma PCR urine today.  Continue taking imipramine 25 mg nightly for IC.     NOTE: Will collaborate with Infectious Disease (Dr. Sb Bonner) in the future if needed at [facility] ([phone]).    Follow-up  post-op.     Y'Laura Cazares, DNP

## 2024-12-12 NOTE — PATIENT INSTRUCTIONS
Schedule patient for cystoscopy with bladder hydrodistension by Dr. Lua.   Surgery team will call you to select surgery date and discuss pre-op instructions.   Pre-op labs needed (CBC, BMP)  Continue to avoid the use of aspirin and aspirin containing products for 7 days prior to surgery to reduce risk of bleeding.   Surgery packet provided to patient.   Follow-up post-op.   Start trial of d-mannose and cranberry pill daily for UTI prevention.   Start trial of cephalexin (Keflex) 250 mg nightly for UTI prevention.   Mycoplasma hominus and ureaplasma PCR urine today.  Continue taking imipramine 25 mg nightly for IC.

## 2024-12-14 LAB
M HOMINIS DNA SPEC QL NAA+PROBE: NEGATIVE
SPECIMEN SOURCE: NORMAL
SPECIMEN SOURCE: NORMAL
U PARVUM DNA SPEC QL NAA+PROBE: NEGATIVE
U UREALYTICUM DNA SPEC QL NAA+PROBE: NEGATIVE

## 2024-12-16 ENCOUNTER — TELEPHONE (OUTPATIENT)
Dept: UROLOGY | Facility: CLINIC | Age: 54
End: 2024-12-16
Payer: MEDICAID

## 2024-12-16 NOTE — TELEPHONE ENCOUNTER
----- Message from Francie Cazares DNP sent at 12/16/2024  9:07 AM CST -----  Please inform patient via telephone that her ureaplasma and mycoplasma urine tests were normal.

## 2024-12-17 ENCOUNTER — HOSPITAL ENCOUNTER (OUTPATIENT)
Dept: CARDIOLOGY | Facility: HOSPITAL | Age: 54
Discharge: HOME | End: 2024-12-17
Attending: NURSE PRACTITIONER
Payer: MEDICAID

## 2024-12-17 DIAGNOSIS — Z01.818 PREOP TESTING: ICD-10-CM

## 2024-12-17 LAB
OHS QRS DURATION: 96 MS
OHS QTC CALCULATION: 429 MS

## 2024-12-17 PROCEDURE — 93010 ELECTROCARDIOGRAM REPORT: CPT | Mod: ,,, | Performed by: INTERNAL MEDICINE

## 2024-12-17 PROCEDURE — 93005 ELECTROCARDIOGRAM TRACING: CPT

## 2025-01-13 ENCOUNTER — PATIENT MESSAGE (OUTPATIENT)
Dept: UROLOGY | Facility: CLINIC | Age: 55
End: 2025-01-13
Payer: MEDICAID

## 2025-01-13 ENCOUNTER — LAB VISIT (OUTPATIENT)
Dept: LAB | Facility: HOSPITAL | Age: 55
End: 2025-01-13
Attending: NURSE PRACTITIONER
Payer: MEDICAID

## 2025-01-13 DIAGNOSIS — N30.01 ACUTE CYSTITIS WITH HEMATURIA: ICD-10-CM

## 2025-01-13 PROCEDURE — 87086 URINE CULTURE/COLONY COUNT: CPT | Performed by: NURSE PRACTITIONER

## 2025-01-13 PROCEDURE — 87088 URINE BACTERIA CULTURE: CPT | Performed by: NURSE PRACTITIONER

## 2025-01-13 PROCEDURE — 87186 SC STD MICRODIL/AGAR DIL: CPT | Performed by: NURSE PRACTITIONER

## 2025-01-15 DIAGNOSIS — N30.00 ACUTE CYSTITIS WITHOUT HEMATURIA: Primary | ICD-10-CM

## 2025-01-15 RX ORDER — FLUCONAZOLE 150 MG/1
150 TABLET ORAL DAILY
Qty: 1 TABLET | Refills: 0 | Status: SHIPPED | OUTPATIENT
Start: 2025-01-15 | End: 2025-01-16

## 2025-01-15 RX ORDER — CIPROFLOXACIN 500 MG/1
500 TABLET ORAL EVERY 12 HOURS
Qty: 28 TABLET | Refills: 0 | Status: SHIPPED | OUTPATIENT
Start: 2025-01-15 | End: 2025-01-29

## 2025-01-16 ENCOUNTER — TELEPHONE (OUTPATIENT)
Dept: UROLOGY | Facility: CLINIC | Age: 55
End: 2025-01-16
Payer: MEDICAID

## 2025-01-16 LAB — BACTERIA UR CULT: ABNORMAL

## 2025-01-16 NOTE — TELEPHONE ENCOUNTER
----- Message from Francie Cazares DNP sent at 1/16/2025 12:48 PM CST -----  Please inform patient via telephone that her urine cx came back positive for a UTI. Cipro provides intermediate coverage. She can continue antibiotic at this time. However, if symptoms worsen or fail to improve after completing Cipro patient should let me know because I may have to retreat her with a different antibiotic.

## 2025-03-03 ENCOUNTER — PATIENT MESSAGE (OUTPATIENT)
Dept: UROLOGY | Facility: CLINIC | Age: 55
End: 2025-03-03
Payer: MEDICAID

## 2025-03-03 DIAGNOSIS — R35.0 URINARY FREQUENCY: Primary | ICD-10-CM

## 2025-04-15 DIAGNOSIS — R39.89 BLADDER PAIN: ICD-10-CM

## 2025-04-15 DIAGNOSIS — N30.10 INTERSTITIAL CYSTITIS: ICD-10-CM

## 2025-04-15 RX ORDER — IMIPRAMINE HYDROCHLORIDE 25 MG/1
25 TABLET, FILM COATED ORAL NIGHTLY
Qty: 30 TABLET | Refills: 5 | Status: SHIPPED | OUTPATIENT
Start: 2025-04-15 | End: 2025-10-12

## 2025-05-12 ENCOUNTER — PATIENT MESSAGE (OUTPATIENT)
Dept: UROLOGY | Facility: CLINIC | Age: 55
End: 2025-05-12
Payer: MEDICAID

## 2025-05-12 ENCOUNTER — TELEPHONE (OUTPATIENT)
Dept: UROLOGY | Facility: CLINIC | Age: 55
End: 2025-05-12
Payer: MEDICAID

## 2025-05-12 DIAGNOSIS — R35.0 URINARY FREQUENCY: Primary | ICD-10-CM

## 2025-05-12 DIAGNOSIS — R35.1 NOCTURIA: ICD-10-CM

## 2025-05-12 DIAGNOSIS — R30.0 DYSURIA: ICD-10-CM

## 2025-05-12 NOTE — TELEPHONE ENCOUNTER
Called patient to schedule appt, pt asked if she could done urine sample prior to appt, let let her know that I would forward message to JERRY Cazares and let her know.

## 2025-05-12 NOTE — TELEPHONE ENCOUNTER
Called patient to let her know that I was ordering u/a and cx and she could go at her convenience. I was able to also get pt symptoms for diagnoses (urinary frequency, burning and nocturia).

## 2025-05-13 ENCOUNTER — LAB VISIT (OUTPATIENT)
Dept: LAB | Facility: HOSPITAL | Age: 55
End: 2025-05-13
Attending: NURSE PRACTITIONER
Payer: MEDICAID

## 2025-05-13 DIAGNOSIS — R35.1 NOCTURIA: ICD-10-CM

## 2025-05-13 DIAGNOSIS — R35.0 URINARY FREQUENCY: ICD-10-CM

## 2025-05-13 DIAGNOSIS — R30.0 DYSURIA: ICD-10-CM

## 2025-05-13 LAB
BACTERIA #/AREA URNS AUTO: ABNORMAL /HPF
BILIRUB UR QL STRIP.AUTO: NEGATIVE
CLARITY UR: CLEAR
COLOR UR AUTO: YELLOW
GLUCOSE UR QL STRIP: NEGATIVE
HGB UR QL STRIP: ABNORMAL
KETONES UR QL STRIP: NEGATIVE
LEUKOCYTE ESTERASE UR QL STRIP: ABNORMAL
MICROSCOPIC COMMENT: ABNORMAL
NITRITE UR QL STRIP: NEGATIVE
PH UR STRIP: 7 [PH]
PROT UR QL STRIP: ABNORMAL
RBC #/AREA URNS AUTO: >100 /HPF (ref 0–4)
SP GR UR STRIP: 1.02
SQUAMOUS #/AREA URNS AUTO: 1 /HPF
UROBILINOGEN UR STRIP-ACNC: NEGATIVE EU/DL
WBC #/AREA URNS AUTO: 53 /HPF (ref 0–5)

## 2025-05-13 PROCEDURE — 87086 URINE CULTURE/COLONY COUNT: CPT

## 2025-05-13 PROCEDURE — 81003 URINALYSIS AUTO W/O SCOPE: CPT

## 2025-05-14 ENCOUNTER — OFFICE VISIT (OUTPATIENT)
Dept: UROLOGY | Facility: CLINIC | Age: 55
End: 2025-05-14
Payer: MEDICAID

## 2025-05-14 VITALS
WEIGHT: 194.88 LBS | SYSTOLIC BLOOD PRESSURE: 131 MMHG | HEIGHT: 68 IN | BODY MASS INDEX: 29.54 KG/M2 | HEART RATE: 67 BPM | DIASTOLIC BLOOD PRESSURE: 79 MMHG

## 2025-05-14 DIAGNOSIS — N30.00 ACUTE CYSTITIS WITHOUT HEMATURIA: ICD-10-CM

## 2025-05-14 DIAGNOSIS — R39.82 CHRONIC BLADDER PAIN: ICD-10-CM

## 2025-05-14 DIAGNOSIS — N30.10 INTERSTITIAL CYSTITIS: ICD-10-CM

## 2025-05-14 DIAGNOSIS — M54.50 ACUTE RIGHT-SIDED LOW BACK PAIN WITHOUT SCIATICA: Primary | ICD-10-CM

## 2025-05-14 DIAGNOSIS — N31.9 NEUROGENIC URINARY BLADDER DISORDER: ICD-10-CM

## 2025-05-14 DIAGNOSIS — Z87.440 HISTORY OF RECURRENT UTIS: ICD-10-CM

## 2025-05-14 DIAGNOSIS — N89.8 VAGINAL ITCHING: ICD-10-CM

## 2025-05-14 DIAGNOSIS — R35.1 NOCTURIA: ICD-10-CM

## 2025-05-14 DIAGNOSIS — R35.0 URINARY FREQUENCY: ICD-10-CM

## 2025-05-14 DIAGNOSIS — R30.0 DYSURIA: ICD-10-CM

## 2025-05-14 DIAGNOSIS — R39.15 URINARY URGENCY: ICD-10-CM

## 2025-05-14 DIAGNOSIS — R10.2 SUPRAPUBIC PAIN: ICD-10-CM

## 2025-05-14 PROCEDURE — 99999 PR PBB SHADOW E&M-EST. PATIENT-LVL IV: CPT | Mod: PBBFAC,,, | Performed by: NURSE PRACTITIONER

## 2025-05-14 PROCEDURE — 99214 OFFICE O/P EST MOD 30 MIN: CPT | Mod: PBBFAC,PO | Performed by: NURSE PRACTITIONER

## 2025-05-14 PROCEDURE — 99214 OFFICE O/P EST MOD 30 MIN: CPT | Mod: S$PBB,,, | Performed by: NURSE PRACTITIONER

## 2025-05-14 PROCEDURE — 1160F RVW MEDS BY RX/DR IN RCRD: CPT | Mod: CPTII,,, | Performed by: NURSE PRACTITIONER

## 2025-05-14 PROCEDURE — 3078F DIAST BP <80 MM HG: CPT | Mod: CPTII,,, | Performed by: NURSE PRACTITIONER

## 2025-05-14 PROCEDURE — 1159F MED LIST DOCD IN RCRD: CPT | Mod: CPTII,,, | Performed by: NURSE PRACTITIONER

## 2025-05-14 PROCEDURE — 3008F BODY MASS INDEX DOCD: CPT | Mod: CPTII,,, | Performed by: NURSE PRACTITIONER

## 2025-05-14 PROCEDURE — 3075F SYST BP GE 130 - 139MM HG: CPT | Mod: CPTII,,, | Performed by: NURSE PRACTITIONER

## 2025-05-14 PROCEDURE — 4010F ACE/ARB THERAPY RXD/TAKEN: CPT | Mod: CPTII,,, | Performed by: NURSE PRACTITIONER

## 2025-05-14 RX ORDER — METHENAMINE HIPPURATE 1000 MG/1
1 TABLET ORAL 2 TIMES DAILY
Qty: 60 TABLET | Refills: 11 | Status: SHIPPED | OUTPATIENT
Start: 2025-05-14 | End: 2026-05-14

## 2025-05-14 RX ORDER — LEVOFLOXACIN 750 MG/1
750 TABLET, FILM COATED ORAL DAILY
Qty: 7 TABLET | Refills: 0 | Status: SHIPPED | OUTPATIENT
Start: 2025-05-14 | End: 2025-05-15

## 2025-05-14 RX ORDER — FLUCONAZOLE 100 MG/1
100 TABLET ORAL DAILY
Qty: 3 TABLET | Refills: 0 | Status: SHIPPED | OUTPATIENT
Start: 2025-05-14 | End: 2025-05-17

## 2025-05-14 RX ORDER — CONJUGATED ESTROGENS 0.62 MG/G
CREAM VAGINAL DAILY
COMMUNITY
Start: 2024-11-25 | End: 2025-05-14

## 2025-05-14 RX ORDER — LIDOCAINE 50 MG/G
1 PATCH TOPICAL
COMMUNITY
Start: 2025-04-08

## 2025-05-14 RX ORDER — IBUPROFEN 800 MG/1
800 TABLET, FILM COATED ORAL EVERY 6 HOURS PRN
COMMUNITY
Start: 2025-02-11

## 2025-05-14 RX ORDER — AMITRIPTYLINE HYDROCHLORIDE 10 MG/1
20 TABLET, FILM COATED ORAL NIGHTLY PRN
COMMUNITY
Start: 2025-02-06 | End: 2025-05-14

## 2025-05-14 NOTE — PROGRESS NOTES
Subjective:       Patient ID: Arpita Bradshaw is a 55 y.o. female.    Chief Complaint: Urinary Tract Infection and Itching    History of Present Illness    CHIEF COMPLAINT:  Patient presents today with urinary tract infection symptoms.    HISTORY OF PRESENT ILLNESS:  She reports urinary symptoms that started 4 days ago including urgency with frequent urination (up to 10 times nightly), dysuria with small volume output, lower abdominal pain, and right-sided back pain. She has been using a heating pad for pain management. She experienced mild nausea yesterday. She reports concurrent vaginal itching and irritation. She attempted treatment with OTC Monistat 7-day course without symptom relief. She has been taking Pyridium morning and night without relief.    MEDICAL HISTORY:  She has a history of interstitial cystitis with recurrent UTIs. Her last bladder stretching procedure was performed on 12/23/2024. She developed another UTI approximately one month after starting prophylactic Keflex in 12/2024. Reviewed recent urinalysis results showing leukocytes and blood, but no nitrites. Urine cx still in process.     CURRENT MEDICATIONS:  She continues D-mannose daily and imipramine 25 mg nightly. She is not currently using Premarin vaginal cream or amitriptyline.    LIFESTYLE:  She maintains good water intake, wears cotton underwear, and has discontinued use of panty liners as previously advised.      ROS:  General: -fever, -chills, -fatigue, -weight gain, -weight loss  Eyes: -vision changes, -redness, -discharge  ENT: -ear pain, -nasal congestion, -sore throat  Cardiovascular: -chest pain, -palpitations, -lower extremity edema  Respiratory: -cough, -shortness of breath  Gastrointestinal: +abdominal pain, +nausea, -vomiting, -diarrhea, -constipation, -blood in stool  Genitourinary: -dysuria, -hematuria, +frequency, +nocturia, +urgency, +painful urination  Musculoskeletal: -joint pain, -muscle pain, +back pain  Skin: -rash,  -lesion  Neurological: -headache, -dizziness, -numbness, -tingling  Psychiatric: -anxiety, -depression, -sleep difficulty  Female Genitourinary: +vaginal itching or burning          Objective:      Physical Exam  Vitals and nursing note reviewed.   Constitutional:       General: She is not in acute distress.     Appearance: She is well-developed. She is not ill-appearing.   HENT:      Head: Normocephalic and atraumatic.   Eyes:      Pupils: Pupils are equal, round, and reactive to light.   Cardiovascular:      Rate and Rhythm: Normal rate.   Pulmonary:      Effort: Pulmonary effort is normal. No respiratory distress.   Abdominal:      Palpations: Abdomen is soft.      Tenderness: There is no abdominal tenderness.   Musculoskeletal:         General: Normal range of motion.      Cervical back: Normal range of motion.   Skin:     General: Skin is warm and dry.   Neurological:      Mental Status: She is alert and oriented to person, place, and time.      Coordination: Coordination normal.   Psychiatric:         Mood and Affect: Mood normal.         Behavior: Behavior normal.         Thought Content: Thought content normal.         Judgment: Judgment normal.         Assessment:       Problem List Items Addressed This Visit       Dysuria    Interstitial cystitis    Relevant Medications    methenamine (HIPREX) 1 gram Tab    Other Relevant Orders    G6PD,Quantitative    Nocturia    Urinary frequency    Neurogenic urinary bladder disorder    Urinary tract infection    Relevant Medications    levoFLOXacin (LEVAQUIN) 750 MG tablet    Urinary urgency    History of recurrent UTIs    Chronic bladder pain    Relevant Orders    G6PD,Quantitative     Other Visit Diagnoses         Acute right-sided low back pain without sciatica    -  Primary      Suprapubic pain          Vaginal itching        Relevant Medications    fluconazole (DIFLUCAN) 100 MG tablet            Plan:           Arpita was seen today for urinary tract infection  and itching.    Diagnoses and all orders for this visit:    Acute right-sided low back pain without sciatica    Suprapubic pain    Urinary frequency    Urinary urgency    Nocturia    Dysuria    Interstitial cystitis  -     G6PD,Quantitative; Future  -     methenamine (HIPREX) 1 gram Tab; Take 1 tablet (1 g total) by mouth 2 (two) times daily.    Neurogenic urinary bladder disorder    Chronic bladder pain  -     G6PD,Quantitative; Future    History of recurrent UTIs    Acute cystitis without hematuria  -     levoFLOXacin (LEVAQUIN) 750 MG tablet; Take 1 tablet (750 mg total) by mouth once daily. for 7 days    Vaginal itching  -     fluconazole (DIFLUCAN) 100 MG tablet; Take 1 tablet (100 mg total) by mouth once daily. for 3 days    Other orders  - Started methenamine (bladder antiseptic): Hold until current UTI treatment is completed, then begin as prescribed for UTI prevention.  - Patient to continue wearing cotton underwear.  - Patient to maintain high water intake.  - Patient to avoid using panty liners.    NOTE: Ordered G6PD testing prior to potential future use of Uribel due to risk of hemolytic anemia in [id number] patients.    Follow-up pending urine cx results.     This note was generated with the assistance of ambient listening technology. Verbal consent was obtained by the patient and accompanying visitor(s) for the recording of patient appointment to facilitate this note. I attest to having reviewed and edited the generated note for accuracy, though some syntax or spelling errors may persist. Please contact the author of this note for any clarification.      Francie Cazares, МАРИЯ

## 2025-05-14 NOTE — PATIENT INSTRUCTIONS
G6PD today  Start trial of methenamine (bladder antiseptic) for UTI prevention after completion of antibiotic therapy for current UTI.   Follow-up pending urine cx results.

## 2025-05-15 ENCOUNTER — TELEPHONE (OUTPATIENT)
Dept: UROLOGY | Facility: CLINIC | Age: 55
End: 2025-05-15
Payer: MEDICAID

## 2025-05-15 ENCOUNTER — RESULTS FOLLOW-UP (OUTPATIENT)
Dept: UROLOGY | Facility: CLINIC | Age: 55
End: 2025-05-15

## 2025-05-15 DIAGNOSIS — N30.10 INTERSTITIAL CYSTITIS: Primary | ICD-10-CM

## 2025-05-15 DIAGNOSIS — R35.1 NOCTURIA: ICD-10-CM

## 2025-05-15 DIAGNOSIS — R39.82 CHRONIC BLADDER PAIN: ICD-10-CM

## 2025-05-15 DIAGNOSIS — R39.15 URINARY URGENCY: ICD-10-CM

## 2025-05-15 DIAGNOSIS — R35.0 URINARY FREQUENCY: ICD-10-CM

## 2025-05-15 DIAGNOSIS — N30.10 CHRONIC INTERSTITIAL CYSTITIS: ICD-10-CM

## 2025-05-15 LAB — BACTERIA UR CULT: NO GROWTH

## 2025-05-15 RX ORDER — SODIUM CHLORIDE 9 MG/ML
INJECTION, SOLUTION INTRAVENOUS CONTINUOUS
OUTPATIENT
Start: 2025-05-15

## 2025-05-15 NOTE — TELEPHONE ENCOUNTER
Spoke with patient about urine cx and next steps for plan of care. Pt verbally understood and would like to move forward with procedure due to urinary symptoms still being present.

## 2025-05-15 NOTE — TELEPHONE ENCOUNTER
----- Message from Francie Cazares DNP sent at 5/15/2025  8:09 AM CDT -----  Please inform patient via telephone that her urine cx was normal. She does not have a UTI and can discontinue antibiotic. It appears she is experiencing an IC flare-up. If she is still experiencing   bladder pain and urinary symptoms, she may want to consider moving forward with bladder hydrodistension for relief. Let me know what patient decides. Thanks.   ----- Message -----  From: Lab, Background User  Sent: 5/13/2025   4:44 PM CDT  To: Francie Cazares DNP

## 2025-05-16 ENCOUNTER — RESULTS FOLLOW-UP (OUTPATIENT)
Dept: UROLOGY | Facility: CLINIC | Age: 55
End: 2025-05-16

## 2025-05-16 ENCOUNTER — TELEPHONE (OUTPATIENT)
Dept: UROLOGY | Facility: CLINIC | Age: 55
End: 2025-05-16
Payer: MEDICAID

## 2025-05-16 DIAGNOSIS — D72.819 LEUKOPENIA: ICD-10-CM

## 2025-05-16 DIAGNOSIS — D72.819 LEUKOPENIA, UNSPECIFIED TYPE: Primary | ICD-10-CM

## 2025-05-16 DIAGNOSIS — D50.9 IRON DEFICIENCY ANEMIA, UNSPECIFIED IRON DEFICIENCY ANEMIA TYPE: Primary | ICD-10-CM

## 2025-05-16 NOTE — TELEPHONE ENCOUNTER
----- Message from Francie Cazares DNP sent at 5/16/2025  1:57 PM CDT -----  Please inform patient via telephone that her WBCs have been lower than normal. Most recent CBC shows another decline in WBCs level. Referral placed for patient to see hematology for further   evaluation of that issue. Referral coordinator will call patient to help schedule appt.   ----- Message -----  From: Lab, Background User  Sent: 5/16/2025   1:15 PM CDT  To: Francie Cazares DNP

## 2025-05-16 NOTE — TELEPHONE ENCOUNTER
----- Message from Francie Cazares DNP sent at 5/16/2025  1:47 PM CDT -----  Please inform patient via telephone that her G6PD enzyme activity was within normal range at 9.3. She would be able to take uribel in the future if needed.   ----- Message -----  From: Lab, Background User  Sent: 5/16/2025  12:57 PM CDT  To: Francie Cazares DNP

## 2025-05-17 ENCOUNTER — PATIENT MESSAGE (OUTPATIENT)
Dept: UROLOGY | Facility: CLINIC | Age: 55
End: 2025-05-17
Payer: MEDICAID

## 2025-05-20 ENCOUNTER — TELEPHONE (OUTPATIENT)
Dept: UROLOGY | Facility: CLINIC | Age: 55
End: 2025-05-20
Payer: MEDICAID

## 2025-05-20 PROBLEM — N30.10 CHRONIC INTERSTITIAL CYSTITIS: Status: ACTIVE | Noted: 2025-05-20

## 2025-05-20 NOTE — TELEPHONE ENCOUNTER
Received call from Dr. Lua asking me to arrange meeting with the pateint and the [employer] rep Kishore. I called Kishore and got some available times from him. I called and left GUILLAUME and Jenise message for patient about arranging a meeting time with Kishore.

## 2025-05-21 ENCOUNTER — TELEPHONE (OUTPATIENT)
Dept: UROLOGY | Facility: CLINIC | Age: 55
End: 2025-05-21
Payer: MEDICAID

## 2025-05-21 NOTE — TELEPHONE ENCOUNTER
----- Message from Med Assistant Meera sent at 5/21/2025 11:36 AM CDT -----  Regarding: FW: bloody urine    ----- Message -----  From: Lora Navas RN  Sent: 5/21/2025  10:45 AM CDT  To: Stoney HERNANDEZ Staff  Subject: bloody urine                                     Good morning, During pts post op call she is complaining of bloody urine. Please advise. Pt contact # [phone]. Thanks in advance, HARRISON Paulino

## 2025-05-21 NOTE — TELEPHONE ENCOUNTER
I attempted to reach patient regarding hematuria as well as meeting with the [facility] rep however there was no answer and no voicemail was set up.

## 2025-06-02 ENCOUNTER — LAB VISIT (OUTPATIENT)
Dept: LAB | Facility: HOSPITAL | Age: 55
End: 2025-06-02
Attending: INTERNAL MEDICINE
Payer: MEDICAID

## 2025-06-02 DIAGNOSIS — D72.819 LEUKOPENIA: ICD-10-CM

## 2025-06-02 DIAGNOSIS — D50.9 IRON DEFICIENCY ANEMIA, UNSPECIFIED IRON DEFICIENCY ANEMIA TYPE: ICD-10-CM

## 2025-06-02 LAB
ABSOLUTE EOSINOPHIL (OHS): 0.01 K/UL
ABSOLUTE MONOCYTE (OHS): 0.18 K/UL (ref 0.3–1)
ABSOLUTE NEUTROPHIL COUNT (OHS): 1.38 K/UL (ref 1.8–7.7)
ALBUMIN SERPL BCP-MCNC: 4 G/DL (ref 3.5–5.2)
ALP SERPL-CCNC: 64 UNIT/L (ref 40–150)
ALT SERPL W/O P-5'-P-CCNC: 12 UNIT/L (ref 10–44)
ANION GAP (OHS): 7 MMOL/L (ref 8–16)
AST SERPL-CCNC: 13 UNIT/L (ref 11–45)
BASOPHILS # BLD AUTO: 0.01 K/UL
BASOPHILS NFR BLD AUTO: 0.5 %
BILIRUB SERPL-MCNC: 0.4 MG/DL (ref 0.1–1)
BUN SERPL-MCNC: 13 MG/DL (ref 6–20)
CALCIUM SERPL-MCNC: 9 MG/DL (ref 8.7–10.5)
CHLORIDE SERPL-SCNC: 107 MMOL/L (ref 95–110)
CO2 SERPL-SCNC: 27 MMOL/L (ref 23–29)
CREAT SERPL-MCNC: 0.8 MG/DL (ref 0.5–1.4)
ERYTHROCYTE [DISTWIDTH] IN BLOOD BY AUTOMATED COUNT: 12.4 % (ref 11.5–14.5)
ERYTHROCYTE [SEDIMENTATION RATE] IN BLOOD BY PHOTOMETRIC METHOD: 7 MM/HR
FERRITIN SERPL-MCNC: 152.2 NG/ML (ref 20–300)
FOLATE SERPL-MCNC: 10.2 NG/ML (ref 4–24)
GFR SERPLBLD CREATININE-BSD FMLA CKD-EPI: >60 ML/MIN/1.73/M2
GLUCOSE SERPL-MCNC: 98 MG/DL (ref 70–110)
HAV IGM SERPL QL IA: NORMAL
HBV CORE IGM SERPL QL IA: NORMAL
HBV SURFACE AG SERPL QL IA: NORMAL
HCT VFR BLD AUTO: 39 % (ref 37–48.5)
HCV AB SERPL QL IA: NORMAL
HGB BLD-MCNC: 12.1 GM/DL (ref 12–16)
HIV 1+2 AB+HIV1 P24 AG SERPL QL IA: NORMAL
IMM GRANULOCYTES # BLD AUTO: 0.01 K/UL (ref 0–0.04)
IMM GRANULOCYTES NFR BLD AUTO: 0.5 % (ref 0–0.5)
IRON SATN MFR SERPL: 21 % (ref 20–50)
IRON SERPL-MCNC: 74 UG/DL (ref 30–160)
IRON SERPL-MCNC: 76 UG/DL (ref 30–160)
LYMPHOCYTES # BLD AUTO: 0.57 K/UL (ref 1–4.8)
MCH RBC QN AUTO: 28.2 PG (ref 27–31)
MCHC RBC AUTO-ENTMCNC: 31 G/DL (ref 32–36)
MCV RBC AUTO: 91 FL (ref 82–98)
NUCLEATED RBC (/100WBC) (OHS): 0 /100 WBC
PLATELET # BLD AUTO: 220 K/UL (ref 150–450)
PMV BLD AUTO: 10.1 FL (ref 9.2–12.9)
POTASSIUM SERPL-SCNC: 4.3 MMOL/L (ref 3.5–5.1)
PROT SERPL-MCNC: 7.5 GM/DL (ref 6–8.4)
RBC # BLD AUTO: 4.29 M/UL (ref 4–5.4)
RELATIVE EOSINOPHIL (OHS): 0.5 %
RELATIVE LYMPHOCYTE (OHS): 26.4 % (ref 18–48)
RELATIVE MONOCYTE (OHS): 8.3 % (ref 4–15)
RELATIVE NEUTROPHIL (OHS): 63.8 % (ref 38–73)
RETICS/RBC NFR AUTO: 0.4 % (ref 0.5–2.5)
SODIUM SERPL-SCNC: 141 MMOL/L (ref 136–145)
TIBC SERPL-MCNC: 345 UG/DL (ref 250–450)
TRANSFERRIN SERPL-MCNC: 233 MG/DL (ref 200–375)
VIT B12 SERPL-MCNC: 412 PG/ML (ref 210–950)
WBC # BLD AUTO: 2.16 K/UL (ref 3.9–12.7)

## 2025-06-02 PROCEDURE — 85045 AUTOMATED RETICULOCYTE COUNT: CPT

## 2025-06-02 PROCEDURE — 80074 ACUTE HEPATITIS PANEL: CPT

## 2025-06-02 PROCEDURE — 85025 COMPLETE CBC W/AUTO DIFF WBC: CPT

## 2025-06-02 PROCEDURE — 82728 ASSAY OF FERRITIN: CPT

## 2025-06-02 PROCEDURE — 82746 ASSAY OF FOLIC ACID SERUM: CPT

## 2025-06-02 PROCEDURE — 87389 HIV-1 AG W/HIV-1&-2 AB AG IA: CPT

## 2025-06-02 PROCEDURE — 85652 RBC SED RATE AUTOMATED: CPT

## 2025-06-02 PROCEDURE — 36415 COLL VENOUS BLD VENIPUNCTURE: CPT | Mod: PO

## 2025-06-02 PROCEDURE — 83540 ASSAY OF IRON: CPT | Mod: 59

## 2025-06-02 PROCEDURE — 84466 ASSAY OF TRANSFERRIN: CPT

## 2025-06-02 PROCEDURE — 82607 VITAMIN B-12: CPT

## 2025-06-02 PROCEDURE — 83921 ORGANIC ACID SINGLE QUANT: CPT

## 2025-06-02 PROCEDURE — 82247 BILIRUBIN TOTAL: CPT

## 2025-06-05 ENCOUNTER — OFFICE VISIT (OUTPATIENT)
Dept: HEMATOLOGY/ONCOLOGY | Facility: CLINIC | Age: 55
End: 2025-06-05
Payer: MEDICAID

## 2025-06-05 ENCOUNTER — LAB VISIT (OUTPATIENT)
Dept: LAB | Facility: HOSPITAL | Age: 55
End: 2025-06-05
Attending: INTERNAL MEDICINE
Payer: MEDICAID

## 2025-06-05 VITALS
HEIGHT: 68 IN | WEIGHT: 190.25 LBS | TEMPERATURE: 98 F | HEART RATE: 74 BPM | BODY MASS INDEX: 28.83 KG/M2 | SYSTOLIC BLOOD PRESSURE: 113 MMHG | DIASTOLIC BLOOD PRESSURE: 78 MMHG | OXYGEN SATURATION: 99 %

## 2025-06-05 DIAGNOSIS — D72.819 LEUKOPENIA, UNSPECIFIED TYPE: Primary | ICD-10-CM

## 2025-06-05 DIAGNOSIS — M32.9 SLE (SYSTEMIC LUPUS ERYTHEMATOSUS RELATED SYNDROME): ICD-10-CM

## 2025-06-05 DIAGNOSIS — D72.819 LEUKOPENIA, UNSPECIFIED TYPE: ICD-10-CM

## 2025-06-05 DIAGNOSIS — N30.10 INTERSTITIAL CYSTITIS: ICD-10-CM

## 2025-06-05 DIAGNOSIS — M79.7 FIBROMYALGIA: ICD-10-CM

## 2025-06-05 LAB — PATHOLOGIST REVIEW - CBC/DIFF (OHS): NORMAL

## 2025-06-05 PROCEDURE — 4010F ACE/ARB THERAPY RXD/TAKEN: CPT | Mod: CPTII,,, | Performed by: INTERNAL MEDICINE

## 2025-06-05 PROCEDURE — 3008F BODY MASS INDEX DOCD: CPT | Mod: CPTII,,, | Performed by: INTERNAL MEDICINE

## 2025-06-05 PROCEDURE — 36415 COLL VENOUS BLD VENIPUNCTURE: CPT

## 2025-06-05 PROCEDURE — 99213 OFFICE O/P EST LOW 20 MIN: CPT | Mod: PBBFAC | Performed by: INTERNAL MEDICINE

## 2025-06-05 PROCEDURE — 84630 ASSAY OF ZINC: CPT

## 2025-06-05 PROCEDURE — 3074F SYST BP LT 130 MM HG: CPT | Mod: CPTII,,, | Performed by: INTERNAL MEDICINE

## 2025-06-05 PROCEDURE — 3078F DIAST BP <80 MM HG: CPT | Mod: CPTII,,, | Performed by: INTERNAL MEDICINE

## 2025-06-05 PROCEDURE — 99204 OFFICE O/P NEW MOD 45 MIN: CPT | Mod: S$PBB,,, | Performed by: INTERNAL MEDICINE

## 2025-06-05 PROCEDURE — 85060 BLOOD SMEAR INTERPRETATION: CPT | Mod: ,,, | Performed by: PATHOLOGY

## 2025-06-05 PROCEDURE — 82525 ASSAY OF COPPER: CPT

## 2025-06-05 PROCEDURE — 99999 PR PBB SHADOW E&M-EST. PATIENT-LVL III: CPT | Mod: PBBFAC,,, | Performed by: INTERNAL MEDICINE

## 2025-06-05 NOTE — Clinical Note
Path review, Zinc and copper and pathology review today  Follow up end of July with cbc prior to f/u

## 2025-06-05 NOTE — PROGRESS NOTES
PATIENT: Arpita Bradshaw  MRN: [id number]  DATE: 6/29/2025      Diagnosis:   1. Leukopenia, unspecified type    2. SLE (systemic lupus erythematosus related syndrome)    3. Fibromyalgia    4. Interstitial cystitis        Chief Complaint: New Patent         Subjective:    Initial History: Ms. Bradshaw is a 55 y.o. female with leukopenia     History of Present Illness    CHIEF COMPLAINT:  Patient presents today for follow-up on low white blood cell count.    REFERRING PROVIDER:  Dr. Cazares (urologist)    INTERVAL HISTORY: 56 y/o with medical diagnoses as listed seen today in consultation for leukopenia. Diagnosed with SLE in 2007 and Fibromyalgia. Also has IC, has had bladder procedures, and h/o UTIs.      SLE manifestations: arthritis, leukopenia, sicca, hair loss, photosensitivity, fatigue, +MARY 1:1280, +dsdna 16, smrnp > 8, SSA >8.  Patient with a history of lupus diagnosed in 2007 and fibromyalgia reports stable lupus but a current exacerbation of fibromyalgia. She has been on Benlysta for over a year, initially monthly and now bimonthly.She reports a suboptimal response to Benlysta and continues on Plaquenil for lupus management.    She reports arthralgia, arthritis, joint stiffness, fatigue, dyspnea, and lightheadedness. Sleep disturbances are present, partially attributed to interstitial cystitis causing nocturia. A history of recurrent UTIs is noted.    Recently underwent bladder distention procedure with Dr. Patel for interstitial cystitis management.    Labs reveal leukopenia with a WBC count of 2.16. She has a history of consistently low WBC dating back to at least 2018. Vitamin D deficiency is also noted.    She denies new vision changes, current rashes, family history of blood or bleeding disorders, history of blood transfusions, B12 deficiency, or iron deficiency.    She is here for further evaluation.     LABS:  - White blood cell count:  - 2.98 (2018)  - 3.4 (2020)  - 3.1 (2021)  - 2.6 (2022)  - 2.3  (2023)  - 2.16 (2025)  - Absolute neutrophil count (ANC):  - 1522 (Previous test)  - ~1400 (2025)        ROS:  General: -fever, -chills, +fatigue, -weight gain, -weight loss, +sleep disturbances, +night sweats  Eyes: -vision changes, -redness, -discharge  ENT: -ear pain, -nasal congestion, -sore throat  Cardiovascular: -chest pain, -palpitations, -lower extremity edema  Respiratory: -cough, +shortness of breath  Gastrointestinal: -abdominal pain, -nausea, -vomiting, -diarrhea, -constipation, -blood in stool  Genitourinary: -dysuria, -hematuria, -frequency  Musculoskeletal: +joint pain, -muscle pain, +joint stiffness, +joint swelling, +back pain  Skin: -rash, -lesion  Neurological: -headache, -dizziness, -numbness, -tingling, +lightheadedness  Psychiatric: -anxiety, -depression, -sleep difficulty          Past Medical History:   Past Medical History:   Diagnosis Date    Back pain     right side    Bilateral bunions     Bradycardia     Dizziness     having cardiac MRI 6/20 at [facility] for sob associated w/dizziness    Fibromyalgia     Frequent urination     gwyn. at night    Hypertension     IC (interstitial cystitis)     Lower abdominal pain     Lupus     NICM (nonischemic cardiomyopathy)     Nocturia     Pruritic disorder     h/o allergies    SOB (shortness of breath)     associated w/dizziness    Status post insertion of sacral nerve stimulator     Systolic dysfunction     Urinary tract infection        Past Surgical HIstory:   Past Surgical History:   Procedure Laterality Date    BILATERAL TUBAL LIGATION      BLADDER FULGURATION N/A 06/06/2019    Procedure: FULGURATION, BLADDER;  Surgeon: Harris Lua MD;  Location: [facility] OR;  Service: Urology;  Laterality: N/A;    BLADDER FULGURATION N/A 12/04/2019    Procedure: FULGURATION, BLADDER;  Surgeon: Harris Lua MD;  Location: [facility] OR;  Service: Urology;  Laterality: N/A;    BLADDER FULGURATION N/A 03/01/2021    Procedure: FULGURATION, BLADDER;  Surgeon: Harris HATHAWAY  MD Stoney;  Location: [facility];  Service: Urology;  Laterality: N/A;    BREAST REDUCTION Bilateral      SECTION      x2    COLONOSCOPY N/A 2018    Procedure: COLONOSCOPY;  Surgeon: Catrachita Kamara MD;  Location: [facility];  Service: Endoscopy;  Laterality: N/A;    COLONOSCOPY N/A 2024    Procedure: COLONOSCOPY;  Surgeon: Joelle Parks MD;  Location: [facility] ENDO;  Service: Colon and Rectal;  Laterality: N/A;    CYSTOSCOPY N/A 2018    Procedure: CYSTOSCOPY;  Surgeon: Harris Lua MD;  Location: [facility] OR;  Service: Urology;  Laterality: N/A;  200 of botox    CYSTOSCOPY W/ RETROGRADES Bilateral 2019    Procedure: CYSTOSCOPY, WITH RETROGRADE PYELOGRAM;  Surgeon: Harris Lua MD;  Location: [facility];  Service: Urology;  Laterality: Bilateral;    CYSTOSCOPY WITH HYDRODISTENSION OF BLADDER N/A 10/12/2020    Procedure: CYSTOSCOPY, WITH BLADDER HYDRODISTENSION;  Surgeon: Harris Lua MD;  Location: [facility];  Service: Urology;  Laterality: N/A;    CYSTOSCOPY WITH HYDRODISTENSION OF BLADDER N/A 12/15/2021    Procedure: CYSTOSCOPY, WITH BLADDER HYDRODISTENSION;  Surgeon: Harris Lua MD;  Location: [facility] OR;  Service: Urology;  Laterality: N/A;    CYSTOSCOPY WITH HYDRODISTENSION OF BLADDER N/A 2022    Procedure: CYSTOSCOPY, WITH BLADDER HYDRODISTENSION;  Surgeon: Harris Lua MD;  Location: [facility];  Service: Urology;  Laterality: N/A;    CYSTOSCOPY WITH HYDRODISTENSION OF BLADDER N/A 2024    Procedure: CYSTOSCOPY, WITH BLADDER HYDRODISTENSION;  Surgeon: Harris Lua MD;  Location: [facility] OR;  Service: Urology;  Laterality: N/A;    CYSTOSCOPY WITH HYDRODISTENSION OF BLADDER N/A 2024    Procedure: CYSTOSCOPY, WITH BLADDER HYDRODISTENSION;  Surgeon: Harris Lua MD;  Location: [facility] OR;  Service: Urology;  Laterality: N/A;    CYSTOSCOPY WITH HYDRODISTENSION OF BLADDER N/A 2025    Procedure: CYSTOSCOPY, WITH BLADDER HYDRODISTENSION;  Surgeon: Stoney  Harris HATHAWAY MD;  Location: [facility] OR;  Service: Urology;  Laterality: N/A;    CYSTOURETHROSCOPY  12/04/2019    Procedure: CYSTOURETHROSCOPY;  Surgeon: Harris Lua MD;  Location: [facility] OR;  Service: Urology;;    CYSTOURETHROSCOPY N/A 03/01/2021    Procedure: CYSTOURETHROSCOPY;  Surgeon: Harris Lua MD;  Location: [facility] OR;  Service: Urology;  Laterality: N/A;    EXCISIONAL HEMORRHOIDECTOMY N/A 10/18/2023    Procedure: HEMORRHOIDECTOMY;  Surgeon: Joelle Parks MD;  Location: [facility] OR;  Service: Colon and Rectal;  Laterality: N/A;    hemmorroidectomy      x1 other than 10/2023    HYSTERECTOMY  2008    [facility] for endometriosis    IMPLANTATION OF PERMANENT SACRAL NERVE STIMULATOR Right 04/28/2021    Procedure: INSERTION, NEUROSTIMULATOR, PERMANENT, SACRAL;  Surgeon: Harris Lua MD;  Location: [facility] OR;  Service: Urology;  Laterality: Right;    INJECTION OF BOTULINUM TOXIN TYPE A N/A 07/23/2018    Procedure: INJECTION, BOTULINUM TOXIN, TYPE A;  Surgeon: Harris Lua MD;  Location: [facility] OR;  Service: Urology;  Laterality: N/A;    neurostimulator for bladder      REMOVAL OF SACRAL NEUROSTIMULATOR DEVICE Left 04/28/2021    Procedure: REMOVAL, NEUROSTIMULATOR, SACRAL;  Surgeon: Harris Lua MD;  Location: [facility] OR;  Service: Urology;  Laterality: Left;    URETHRAL CATHETERIZATION N/A 12/04/2019    Procedure: CATHETERIZATION, URETHRA;  Surgeon: Harris Lua MD;  Location: [facility] OR;  Service: Urology;  Laterality: N/A;    WISDOM TOOTH EXTRACTION         Family History:   Family History   Problem Relation Name Age of Onset    Hypertension Mother      Cancer Mother          KIDNEY    Arthritis Mother      Breast cancer Mother      Kidney disease Neg Hx      Ovarian cancer Neg Hx      Colon cancer Neg Hx         Social History:  reports that she has never smoked. She has never been exposed to tobacco smoke. She has never used smokeless tobacco. She reports that she does not currently use alcohol. She reports that  "she does not use drugs.    Allergies:  Review of patient's allergies indicates:   Allergen Reactions    Sulfamethoxazole-trimethoprim Rash and Hives         ECOG Performance Status: 0   Objective:      Vitals:   Vitals:    06/05/25 1303   BP: 113/78   Pulse: 74   Temp: 97.9 °F (36.6 °C)   SpO2: 99%   Weight: 86.3 kg (190 lb 4.1 oz)   Height: 5' 8" (1.727 m)       Physical Exam    General: No acute distress. Well-developed. Well-nourished.  Eyes: EOMI. Sclerae anicteric.  HENT: Normocephalic. Atraumatic. Nares patent. Moist oral mucosa.  Cardiovascular: Regular rate. Regular rhythm. No murmurs. No rubs. No gallops. Normal S1, S2.  Respiratory: Normal respiratory effort. Clear to auscultation bilaterally. No rales. No rhonchi. No wheezing.  Abdomen: Soft. Non-tender. Non-distended. Normoactive bowel sounds.  Musculoskeletal: No  obvious deformity.  Extremities: No lower extremity edema.  Neurological: Alert & oriented x3. No slurred speech. Normal gait.  Psychiatric: Normal mood. Normal affect. Good insight. Good judgment.  Skin: Warm. Dry. No rash.              Laboratory Data:  Lab Visit on 06/05/2025   Component Date Value Ref Range Status    Zinc 06/05/2025 72  60 - 130 ug/dL Final    Elevated results may be due to sample collected in a   non-certified trace element-free tube.      This test was developed and the performance   characteristics determined by [facility].   It has not been cleared or approved by the FDA.   The laboratory is regulated under CLIA as qualified to   perform high-complexity testing. This test is used for   patient testing purposes. It should not be regarded   as investigational or for research.     Test performed at [facility],  [address], [place]  [address]     [phone]  Tawnya Verma MD, PhD - Medical Director    PATHOLOGIST REVIEW - CBC/DIFF ([facility]) 06/05/2025 Review of the peripheral smear reveals a neutropenia, lymphocytopenia, and " monocytopenia.  Overall leukocyte morphology is unremarkable.  Erythrocytes and platelets are within normal limits.      Interpreting Pathologist: Larry Joseph MD       Final    Copper 06/05/2025 1011  810 - 1990 ug/L Final    Copper values may be elevated to twice the normal   levels in pregnancy.        Elevated results may be due to sample collected in a   non-certified trace element-free tube.      This test was developed and the performance   characteristics determined by [facility].   It has not been cleared or approved by the FDA.   The laboratory is regulated under CLIA as qualified to   perform high-complexity testing. This test is used for   patient testing purposes. It should not be regarded   as investigational or for research.     Test performed at [facility],  [address] [place]  [address]     [phone]  Tawnya Verma MD, PhD - Medical Director   Lab Visit on 06/02/2025   Component Date Value Ref Range Status    Vitamin B12 06/02/2025 412  210 - 950 pg/mL Final    Folate Level 06/02/2025 10.2  4.0 - 24.0 ng/mL Final    Methylmalonic Acid 06/02/2025 0.20  <0.40 umol/L Final    If applicable, any drug confirmation testing reported  here was developed and the performance characteristics  determined by [facility]. This   confirmation testing has not been cleared or approved  by the FDA. The laboratory is regulated under CLIA as  qualified to perform high-complexity testing. This test  is used for patient testing purposes. It should not be  regarded as investigational or for research.     Test performed at [facility],  [address] , [place]  [address]     [phone]  Tawnya Verma MD, PhD - Medical Director    Sodium 06/02/2025 141  136 - 145 mmol/L Final    Potassium 06/02/2025 4.3  3.5 - 5.1 mmol/L Final    Chloride 06/02/2025 107  95 - 110 mmol/L Final    CO2 06/02/2025 27  23 - 29 mmol/L Final    Glucose 06/02/2025  98  70 - 110 mg/dL Final    BUN 06/02/2025 13  6 - 20 mg/dL Final    Creatinine 06/02/2025 0.8  0.5 - 1.4 mg/dL Final    Calcium 06/02/2025 9.0  8.7 - 10.5 mg/dL Final    Protein Total 06/02/2025 7.5  6.0 - 8.4 gm/dL Final    Albumin 06/02/2025 4.0  3.5 - 5.2 g/dL Final    Bilirubin Total 06/02/2025 0.4  0.1 - 1.0 mg/dL Final    For infants and newborns, interpretation of results should be based   on gestational age, weight and in agreement with clinical   observations.    Premature Infant recommended reference ranges:   0-24 hours:  <8.0 mg/dL   24-48 hours: <12.0 mg/dL   3-5 days:    <15.0 mg/dL   6-29 days:   <15.0 mg/dL    ALP 06/02/2025 64  40 - 150 unit/L Final    AST 06/02/2025 13  11 - 45 unit/L Final    ALT 06/02/2025 12  10 - 44 unit/L Final    Anion Gap 06/02/2025 7 (L)  8 - 16 mmol/L Final    UNABLE TO CALCULATE    eGFR 06/02/2025 >60  >60 mL/min/1.73/m2 Final    Estimated GFR calculated using the CKD-EPI creatinine (2021) equation.    Ferritin 06/02/2025 152.2  20.0 - 300.0 ng/mL Final    Iron Level 06/02/2025 76  30 - 160 ug/dL Final    Iron Level 06/02/2025 74  30 - 160 ug/dL Final    Transferrin 06/02/2025 233  200 - 375 mg/dL Final    Iron Binding Capacity Total 06/02/2025 345  250 - 450 ug/dL Final    Iron Saturation 06/02/2025 21  20 - 50 % Final    Retic Count, Automated 06/02/2025 0.4 (L)  0.5 - 2.5 % Final    Sed Rate 06/02/2025 7  <=36 mm/hr Final    HIV 1/2 Ag/Ab 06/02/2025 Non-Reactive  Non-Reactive Final    Hep BsAg Interp 06/02/2025 Non-Reactive  Non-Reactive Final    Hep B Core IgM Interp 06/02/2025 Non-Reactive  Non-Reactive Final    Hep A IgM Interp 06/02/2025 Non-Reactive  Non-Reactive Final    Hep C Ab Interp 06/02/2025 Non-Reactive  Non-Reactive Final    WBC 06/02/2025 2.16 (L)  3.90 - 12.70 K/uL Final    RBC 06/02/2025 4.29  4.00 - 5.40 M/uL Final    HGB 06/02/2025 12.1  12.0 - 16.0 gm/dL Final    HCT 06/02/2025 39.0  37.0 - 48.5 % Final    MCV 06/02/2025 91  82 - 98 fL Final    MCH  06/02/2025 28.2  27.0 - 31.0 pg Final    MCHC 06/02/2025 31.0 (L)  32.0 - 36.0 g/dL Final    RDW 06/02/2025 12.4  11.5 - 14.5 % Final    Platelet Count 06/02/2025 220  150 - 450 K/uL Final    MPV 06/02/2025 10.1  9.2 - 12.9 fL Final    Nucleated RBC 06/02/2025 0  <=0 /100 WBC Final    Neut % 06/02/2025 63.8  38 - 73 % Final    Lymph % 06/02/2025 26.4  18 - 48 % Final    Mono % 06/02/2025 8.3  4 - 15 % Final    Eos % 06/02/2025 0.5  <=8 % Final    Basophil % 06/02/2025 0.5  <=1.9 % Final    Imm Grans % 06/02/2025 0.5  0.0 - 0.5 % Final    Neut # 06/02/2025 1.38 (L)  1.8 - 7.7 K/uL Final    Lymph # 06/02/2025 0.57 (L)  1 - 4.8 K/uL Final    Mono # 06/02/2025 0.18 (L)  0.3 - 1 K/uL Final    Eos # 06/02/2025 0.01  <=0.5 K/uL Final    Baso # 06/02/2025 0.01  <=0.2 K/uL Final    Imm Grans # 06/02/2025 0.01  0.00 - 0.04 K/uL Final    Mild elevation in immature granulocytes is non specific and can be seen in a variety of conditions including stress response, acute inflammation, trauma and pregnancy. Correlation with other laboratory and clinical findings is essential.         Imaging:    X-Ray Chest PA And Lateral  Narrative: EXAMINATION:  XR CHEST PA AND LATERAL    CLINICAL HISTORY:  Chest Pain;    TECHNIQUE:  PA and lateral views of the chest were performed.    COMPARISON:  None.    FINDINGS:  Cardiomediastinal silhouette unchanged.  No confluent airspace opacity or lobar consolidation no pleural effusion or gross pneumothorax.  No acute osseous abnormality.  Impression: No acute cardiopulmonary abnormality    Electronically signed by: Mark Caba  Date:    06/21/2025  Time:    14:49       Assessment:       1. Leukopenia, unspecified type  Zinc    Peripheral Smear Review for Hemolysis or Low Platelets    Copper, Serum    CBC Auto Differential      2. SLE (systemic lupus erythematosus related syndrome)        3. Fibromyalgia        4. Interstitial cystitis              Leukopenia, unspecified type (Primary)  Chronic  leukopenia attributed to combination of lupus and medications (e.g., Plaquenil). Current WBC 2.16, lower than previous values but not critically low.  - Absolute neutrophil count (ANC) 1400, slightly below preferred level of 1500 but not at level that significantly increases infection risk.  Chronic leukopenia likely manifestation of lupus   Plan appropriate lab testing   - Zinc; Future  - Peripheral Smear Review for Hemolysis or Low Platelets; Future  - Copper, Serum; Future  - CBC Auto Differential; Future    SLE (systemic lupus erythematosus related syndrome)   History of lupus (SLE) diagnosed in 2007 and fibromyalgia, followed by rheumatologist Dr. Alvares.  - Chronic leukopenia attributed to combination of lupus and medications (e.g., Plaquenil). Current WBC 2.16, lower than previous values but not critically low.  - Absolute neutrophil count (ANC) 1400, slightly below preferred level of 1500 but not at level that significantly increases infection risk.    FIBROMYALGIA  - History of lupus (SLE) diagnosed in 2007 and fibromyalgia, followed by rheumatologist Dr. Alvares.    INTERSTITIAL CYSTITIS (IC)  - Frequent urinary tract infections, possibly related to interstitial cystitis (IC).  - Recent bladder distention procedure for IC with Dr. Patel (urologist).          Follow up 1month    Mara Chan MD  [facility]-  Hematology and Oncology  [address] [facility] [address] , [place] [address]  [place] [address]  O: [phone] F: [phone]    This note was generated with the assistance of ambient listening technology. Verbal consent was obtained by the patient and accompanying visitor(s) for the recording of patient appointment to facilitate this note. I attest to having reviewed and edited the generated note for accuracy, though some syntax or spelling errors may persist. Please contact the author of this note for any clarification.         Path review, Zinc and copper today    Follow up end of July with cbc prior  to f/u

## 2025-06-06 LAB — W METHYLMALONIC ACID: 0.2 UMOL/L

## 2025-06-09 LAB — W ZINC: 72 UG/DL

## 2025-06-11 ENCOUNTER — PATIENT MESSAGE (OUTPATIENT)
Dept: UROLOGY | Facility: CLINIC | Age: 55
End: 2025-06-11
Payer: MEDICAID

## 2025-06-11 LAB — W COPPER: 1011 UG/L

## 2025-06-21 ENCOUNTER — HOSPITAL ENCOUNTER (EMERGENCY)
Facility: HOSPITAL | Age: 55
Discharge: HOME | End: 2025-06-21
Attending: EMERGENCY MEDICINE
Payer: MEDICAID

## 2025-06-21 VITALS
WEIGHT: 186 LBS | BODY MASS INDEX: 28.19 KG/M2 | HEIGHT: 68 IN | HEART RATE: 61 BPM | OXYGEN SATURATION: 99 % | DIASTOLIC BLOOD PRESSURE: 84 MMHG | SYSTOLIC BLOOD PRESSURE: 158 MMHG | TEMPERATURE: 98 F | RESPIRATION RATE: 19 BRPM

## 2025-06-21 DIAGNOSIS — J40 BRONCHITIS: ICD-10-CM

## 2025-06-21 DIAGNOSIS — R07.9 CHEST PAIN: Primary | ICD-10-CM

## 2025-06-21 LAB
ALBUMIN SERPL-MCNC: 3.8 G/DL (ref 3.3–5.5)
ALBUMIN SERPL-MCNC: 3.8 G/DL (ref 3.3–5.5)
ALP SERPL-CCNC: 43 U/L (ref 42–141)
ALP SERPL-CCNC: 49 U/L (ref 42–141)
BILIRUB SERPL-MCNC: 0.4 MG/DL (ref 0.2–1.6)
BILIRUB SERPL-MCNC: 0.6 MG/DL (ref 0.2–1.6)
BUN SERPL-MCNC: 14 MG/DL (ref 7–22)
BUN SERPL-MCNC: 15 MG/DL (ref 7–22)
CALCIUM SERPL-MCNC: 9 MG/DL (ref 8–10.3)
CALCIUM SERPL-MCNC: 9 MG/DL (ref 8–10.3)
CHLORIDE SERPL-SCNC: 109 MMOL/L (ref 98–108)
CHLORIDE SERPL-SCNC: 110 MMOL/L (ref 98–108)
CREAT SERPL-MCNC: 0.8 MG/DL (ref 0.6–1.2)
CREAT SERPL-MCNC: 0.8 MG/DL (ref 0.6–1.2)
GLUCOSE SERPL-MCNC: 69 MG/DL (ref 73–118)
GLUCOSE SERPL-MCNC: 85 MG/DL (ref 73–118)
HCT, POC: NORMAL
HGB, POC: NORMAL (ref 14–18)
MCH, POC: NORMAL
MCHC, POC: NORMAL
MCV, POC: NORMAL
MPV, POC: NORMAL
POC ALT (SGPT): 15 U/L (ref 10–47)
POC ALT (SGPT): 20 U/L (ref 10–47)
POC AST (SGOT): 17 U/L (ref 11–38)
POC AST (SGOT): 27 U/L (ref 11–38)
POC B-TYPE NATRIURETIC PEPTIDE: 72.7 PG/ML (ref 0–100)
POC CARDIAC TROPONIN I: 0.01 NG/ML (ref 0–0.08)
POC PLATELET COUNT: NORMAL
POC PTINR: 0.8 (ref 0.9–1.2)
POC PTWBT: 8.2 SEC (ref 9.7–14.3)
POC TCO2: 30 MMOL/L (ref 18–33)
POC TCO2: 32 MMOL/L (ref 18–33)
POCT GLUCOSE: 111 MG/DL (ref 70–110)
POTASSIUM BLD-SCNC: 4.2 MMOL/L (ref 3.6–5.1)
POTASSIUM BLD-SCNC: 5.7 MMOL/L (ref 3.6–5.1)
PROTEIN, POC: 6.4 G/DL (ref 6.4–8.1)
PROTEIN, POC: 6.6 G/DL (ref 6.4–8.1)
RBC, POC: NORMAL
RDW, POC: NORMAL
SAMPLE: ABNORMAL
SAMPLE: NORMAL
SODIUM BLD-SCNC: 139 MMOL/L (ref 128–145)
SODIUM BLD-SCNC: 139 MMOL/L (ref 128–145)
WBC, POC: NORMAL

## 2025-06-21 PROCEDURE — 80053 COMPREHEN METABOLIC PANEL: CPT | Mod: ER

## 2025-06-21 PROCEDURE — 93005 ELECTROCARDIOGRAM TRACING: CPT | Mod: ER

## 2025-06-21 PROCEDURE — 82962 GLUCOSE BLOOD TEST: CPT | Mod: ER

## 2025-06-21 PROCEDURE — 83880 ASSAY OF NATRIURETIC PEPTIDE: CPT | Mod: ER

## 2025-06-21 PROCEDURE — 63600175 PHARM REV CODE 636 W HCPCS: Mod: ER | Performed by: EMERGENCY MEDICINE

## 2025-06-21 PROCEDURE — 84484 ASSAY OF TROPONIN QUANT: CPT | Mod: ER

## 2025-06-21 PROCEDURE — 96374 THER/PROPH/DIAG INJ IV PUSH: CPT | Mod: ER

## 2025-06-21 PROCEDURE — 25000242 PHARM REV CODE 250 ALT 637 W/ HCPCS: Mod: ER | Performed by: EMERGENCY MEDICINE

## 2025-06-21 PROCEDURE — 85025 COMPLETE CBC W/AUTO DIFF WBC: CPT | Mod: ER

## 2025-06-21 PROCEDURE — 94640 AIRWAY INHALATION TREATMENT: CPT | Mod: ER

## 2025-06-21 PROCEDURE — 25000003 PHARM REV CODE 250: Mod: ER | Performed by: EMERGENCY MEDICINE

## 2025-06-21 PROCEDURE — 99284 EMERGENCY DEPT VISIT MOD MDM: CPT | Mod: 25,ER

## 2025-06-21 PROCEDURE — 93010 ELECTROCARDIOGRAM REPORT: CPT | Mod: ,,, | Performed by: INTERNAL MEDICINE

## 2025-06-21 PROCEDURE — 96375 TX/PRO/DX INJ NEW DRUG ADDON: CPT | Mod: ER

## 2025-06-21 RX ORDER — ORPHENADRINE CITRATE 100 MG/1
100 TABLET, EXTENDED RELEASE ORAL 2 TIMES DAILY PRN
Qty: 20 TABLET | Refills: 0 | Status: SHIPPED | OUTPATIENT
Start: 2025-06-21 | End: 2025-07-01

## 2025-06-21 RX ORDER — IPRATROPIUM BROMIDE AND ALBUTEROL SULFATE 2.5; .5 MG/3ML; MG/3ML
3 SOLUTION RESPIRATORY (INHALATION)
Status: COMPLETED | OUTPATIENT
Start: 2025-06-21 | End: 2025-06-21

## 2025-06-21 RX ORDER — KETOROLAC TROMETHAMINE 10 MG/1
10 TABLET, FILM COATED ORAL EVERY 6 HOURS PRN
Qty: 110 TABLET | Refills: 0 | Status: SHIPPED | OUTPATIENT
Start: 2025-06-21

## 2025-06-21 RX ORDER — ALBUTEROL SULFATE 90 UG/1
2 INHALANT RESPIRATORY (INHALATION) EVERY 6 HOURS PRN
Qty: 18 G | Refills: 0 | Status: SHIPPED | OUTPATIENT
Start: 2025-06-21 | End: 2026-06-21

## 2025-06-21 RX ORDER — ACETAMINOPHEN 500 MG
500 TABLET ORAL EVERY 6 HOURS PRN
Qty: 30 TABLET | Refills: 0 | Status: SHIPPED | OUTPATIENT
Start: 2025-06-21

## 2025-06-21 RX ORDER — BENZONATATE 200 MG/1
200 CAPSULE ORAL 3 TIMES DAILY PRN
Qty: 20 CAPSULE | Refills: 0 | Status: SHIPPED | OUTPATIENT
Start: 2025-06-21 | End: 2025-07-01

## 2025-06-21 RX ORDER — DEXAMETHASONE SODIUM PHOSPHATE 4 MG/ML
8 INJECTION, SOLUTION INTRA-ARTICULAR; INTRALESIONAL; INTRAMUSCULAR; INTRAVENOUS; SOFT TISSUE
Status: COMPLETED | OUTPATIENT
Start: 2025-06-21 | End: 2025-06-21

## 2025-06-21 RX ORDER — AZELASTINE 1 MG/ML
2 SPRAY, METERED NASAL 2 TIMES DAILY
Qty: 30 ML | Refills: 0 | Status: SHIPPED | OUTPATIENT
Start: 2025-06-21

## 2025-06-21 RX ORDER — ASPIRIN 325 MG
325 TABLET ORAL
Status: COMPLETED | OUTPATIENT
Start: 2025-06-21 | End: 2025-06-21

## 2025-06-21 RX ORDER — PROMETHAZINE HYDROCHLORIDE AND DEXTROMETHORPHAN HYDROBROMIDE 6.25; 15 MG/5ML; MG/5ML
7.5 SYRUP ORAL NIGHTLY PRN
Qty: 100 ML | Refills: 0 | Status: SHIPPED | OUTPATIENT
Start: 2025-06-21 | End: 2025-07-04

## 2025-06-21 RX ORDER — KETOROLAC TROMETHAMINE 30 MG/ML
15 INJECTION, SOLUTION INTRAMUSCULAR; INTRAVENOUS
Status: COMPLETED | OUTPATIENT
Start: 2025-06-21 | End: 2025-06-21

## 2025-06-21 RX ADMIN — ASPIRIN 325 MG ORAL TABLET 325 MG: 325 PILL ORAL at 01:06

## 2025-06-21 RX ADMIN — IPRATROPIUM BROMIDE AND ALBUTEROL SULFATE 3 ML: .5; 3 SOLUTION RESPIRATORY (INHALATION) at 02:06

## 2025-06-21 RX ADMIN — DEXAMETHASONE SODIUM PHOSPHATE 8 MG: 4 INJECTION INTRA-ARTICULAR; INTRALESIONAL; INTRAMUSCULAR; INTRAVENOUS; SOFT TISSUE at 02:06

## 2025-06-21 RX ADMIN — KETOROLAC TROMETHAMINE 15 MG: 30 INJECTION, SOLUTION INTRAMUSCULAR; INTRAVENOUS at 02:06

## 2025-06-21 NOTE — DISCHARGE INSTRUCTIONS
Please sign up for and monitor all results on [facility] patient portal.    Please return to the ED for any new, concerning symptoms, or worsening symptoms.    Please follow-up with your primary care provider within 1 day.  An ER visit can not replace the evaluation by your primary care physician.    In the emergency department it is our goal to rule out life-threatening conditions and make sure that you are safe to be discharged home.    Many medical conditions are difficult to diagnose and may be impossible to diagnosed during a single ER visit.  Many health conditions start with nonspecific symptoms and can only be diagnosed on follow-up visits with your primary care physician or specialist.    Please be aware that all medical conditions can change and we can not predict how you will be feeling tomorrow or the next day.   If you have any worsening or new symptoms you should not hesitate to return to the emergency department for re-evaluation.  Be sure to follow up with your primary care physician for recheck and review any labs or imaging that were performed today.  It is very common for us to identify non emergent incidental findings on labs and imaging which must be followed up with your primary care physician.   If you do not have a primary care physician, you may contact the 1 listed on your discharge paperwork or you can also call the [facility] clinic appointment desk at [phone] to schedule an appointment.

## 2025-06-21 NOTE — Clinical Note
"Arpita Fontaineilyangela Bradshaw was seen and treated in our emergency department on 6/21/2025.  She may return to work on 06/23/2025.       If you have any questions or concerns, please don't hesitate to call.      Yajaira Kapadia, DO"

## 2025-06-21 NOTE — ED PROVIDER NOTES
"Encounter Date: 6/21/2025    SCRIBE #1 NOTE: IGilda, am scribing for, and in the presence of,  Yajaira Kapadia DO.       History     Chief Complaint   Patient presents with    Pleurisy     C/O CHEST DISCOMFORT WITH COUGH X 1 WEEK, ALSO C/O BODY PAIN,"FIBROMYALGIA FLARE"     56 yo F with PMHx of fibromyalgia, presenting to the ED for chest pain and cough. Patient reports around 1 week ago she started experiencing productive cough.  Patient states she was seen at the urgent care 1 week ago they started her on Augmentin.  Patient states she is still coughing.  Patient reports she has had a chest heaviness for the last 3 days there is no radiation of pain.  Patient states she does feel nauseated.  Patient denies fevers and chills.  Patient feels that her cough is making her fibromyalgia worse.    The history is provided by the patient.     Review of patient's allergies indicates:   Allergen Reactions    Sulfamethoxazole-trimethoprim Rash and Hives     Past Medical History:   Diagnosis Date    Back pain     right side    Bilateral bunions     Bradycardia     Dizziness     having cardiac MRI 6/20 at [facility] for sob associated w/dizziness    Fibromyalgia     Frequent urination     gwyn. at night    Hypertension     IC (interstitial cystitis)     Lower abdominal pain     Lupus     NICM (nonischemic cardiomyopathy)     Nocturia     Pruritic disorder     h/o allergies    SOB (shortness of breath)     associated w/dizziness    Status post insertion of sacral nerve stimulator     Systolic dysfunction     Urinary tract infection      Past Surgical History:   Procedure Laterality Date    BILATERAL TUBAL LIGATION      BLADDER FULGURATION N/A 06/06/2019    Procedure: FULGURATION, BLADDER;  Surgeon: Harris Lua MD;  Location: [facility] OR;  Service: Urology;  Laterality: N/A;    BLADDER FULGURATION N/A 12/04/2019    Procedure: FULGURATION, BLADDER;  Surgeon: Harris Lua MD;  Location: [facility] OR;  Service: Urology;  Laterality: " N/A;    BLADDER FULGURATION N/A 2021    Procedure: FULGURATION, BLADDER;  Surgeon: Harris Lua MD;  Location: [facility] OR;  Service: Urology;  Laterality: N/A;    BREAST REDUCTION Bilateral      SECTION      x2    COLONOSCOPY N/A 2018    Procedure: COLONOSCOPY;  Surgeon: Catrachita Kamara MD;  Location: [facility] ENDO;  Service: Endoscopy;  Laterality: N/A;    COLONOSCOPY N/A 2024    Procedure: COLONOSCOPY;  Surgeon: Joelle Parks MD;  Location: [facility] ENDO;  Service: Colon and Rectal;  Laterality: N/A;    CYSTOSCOPY N/A 2018    Procedure: CYSTOSCOPY;  Surgeon: Harris Lua MD;  Location: [facility] OR;  Service: Urology;  Laterality: N/A;  200 of botox    CYSTOSCOPY W/ RETROGRADES Bilateral 2019    Procedure: CYSTOSCOPY, WITH RETROGRADE PYELOGRAM;  Surgeon: Harris Lua MD;  Location: [facility] OR;  Service: Urology;  Laterality: Bilateral;    CYSTOSCOPY WITH HYDRODISTENSION OF BLADDER N/A 10/12/2020    Procedure: CYSTOSCOPY, WITH BLADDER HYDRODISTENSION;  Surgeon: Harris Lua MD;  Location: [facility] OR;  Service: Urology;  Laterality: N/A;    CYSTOSCOPY WITH HYDRODISTENSION OF BLADDER N/A 12/15/2021    Procedure: CYSTOSCOPY, WITH BLADDER HYDRODISTENSION;  Surgeon: Harris Lua MD;  Location: [facility] OR;  Service: Urology;  Laterality: N/A;    CYSTOSCOPY WITH HYDRODISTENSION OF BLADDER N/A 2022    Procedure: CYSTOSCOPY, WITH BLADDER HYDRODISTENSION;  Surgeon: Harris Lua MD;  Location: [facility] OR;  Service: Urology;  Laterality: N/A;    CYSTOSCOPY WITH HYDRODISTENSION OF BLADDER N/A 2024    Procedure: CYSTOSCOPY, WITH BLADDER HYDRODISTENSION;  Surgeon: Harris Lua MD;  Location: [facility] OR;  Service: Urology;  Laterality: N/A;    CYSTOSCOPY WITH HYDRODISTENSION OF BLADDER N/A 2024    Procedure: CYSTOSCOPY, WITH BLADDER HYDRODISTENSION;  Surgeon: Harris Lua MD;  Location: [facility];  Service: Urology;  Laterality: N/A;    CYSTOSCOPY WITH HYDRODISTENSION  OF BLADDER N/A 5/20/2025    Procedure: CYSTOSCOPY, WITH BLADDER HYDRODISTENSION;  Surgeon: Harris Lua MD;  Location: [facility] OR;  Service: Urology;  Laterality: N/A;    CYSTOURETHROSCOPY  12/04/2019    Procedure: CYSTOURETHROSCOPY;  Surgeon: Harris Lua MD;  Location: [facility] OR;  Service: Urology;;    CYSTOURETHROSCOPY N/A 03/01/2021    Procedure: CYSTOURETHROSCOPY;  Surgeon: Harris Lua MD;  Location: [facility] OR;  Service: Urology;  Laterality: N/A;    EXCISIONAL HEMORRHOIDECTOMY N/A 10/18/2023    Procedure: HEMORRHOIDECTOMY;  Surgeon: Joelle Parks MD;  Location: [facility];  Service: Colon and Rectal;  Laterality: N/A;    hemmorroidectomy      x1 other than 10/2023    HYSTERECTOMY  2008    [facility] for endometriosis    IMPLANTATION OF PERMANENT SACRAL NERVE STIMULATOR Right 04/28/2021    Procedure: INSERTION, NEUROSTIMULATOR, PERMANENT, SACRAL;  Surgeon: Harris Lua MD;  Location: [facility] OR;  Service: Urology;  Laterality: Right;    INJECTION OF BOTULINUM TOXIN TYPE A N/A 07/23/2018    Procedure: INJECTION, BOTULINUM TOXIN, TYPE A;  Surgeon: Harris Lua MD;  Location: [facility] OR;  Service: Urology;  Laterality: N/A;    neurostimulator for bladder      REMOVAL OF SACRAL NEUROSTIMULATOR DEVICE Left 04/28/2021    Procedure: REMOVAL, NEUROSTIMULATOR, SACRAL;  Surgeon: Harris Lua MD;  Location: [facility] OR;  Service: Urology;  Laterality: Left;    URETHRAL CATHETERIZATION N/A 12/04/2019    Procedure: CATHETERIZATION, URETHRA;  Surgeon: Harris Lua MD;  Location: [facility] OR;  Service: Urology;  Laterality: N/A;    WISDOM TOOTH EXTRACTION       Family History   Problem Relation Name Age of Onset    Hypertension Mother      Cancer Mother          KIDNEY    Arthritis Mother      Breast cancer Mother      Kidney disease Neg Hx      Ovarian cancer Neg Hx      Colon cancer Neg Hx       Social History[1]  Review of Systems   Constitutional:  Negative for chills and fever.   HENT:  Negative for sore throat.     Respiratory:  Positive for cough and wheezing. Negative for shortness of breath.    Cardiovascular:  Negative for chest pain.   Gastrointestinal:  Positive for nausea. Negative for vomiting.   Genitourinary:  Negative for dysuria.   Musculoskeletal:  Negative for back pain.   Skin:  Negative for rash.   Neurological:  Negative for weakness.   Hematological:  Does not bruise/bleed easily.   All other systems reviewed and are negative.      Physical Exam     Initial Vitals [06/21/25 1308]   BP Pulse Resp Temp SpO2   135/84 80 18 98 °F (36.7 °C) 100 %      MAP       --         Physical Exam    Nursing note and vitals reviewed.  Constitutional: She appears well-developed and well-nourished.   HENT:   Head: Normocephalic and atraumatic.   Right Ear: Tympanic membrane and external ear normal.   Left Ear: Tympanic membrane and external ear normal.   Nose: Mucosal edema and rhinorrhea present. Right sinus exhibits no maxillary sinus tenderness and no frontal sinus tenderness. Left sinus exhibits no maxillary sinus tenderness and no frontal sinus tenderness. Mouth/Throat: Uvula is midline. Posterior oropharyngeal erythema present. No oropharyngeal exudate or posterior oropharyngeal edema.   Eyes: Conjunctivae and EOM are normal. Pupils are equal, round, and reactive to light. Right eye exhibits no discharge. Left eye exhibits no discharge.   Neck: Neck supple.   Normal range of motion.  Cardiovascular:  Normal rate, regular rhythm, normal heart sounds and intact distal pulses.     Exam reveals no gallop and no friction rub.       No murmur heard.  Pulmonary/Chest: No respiratory distress. She has wheezes. She has no rhonchi. She has no rales. She exhibits no tenderness.   Abdominal: Abdomen is soft. Bowel sounds are normal. She exhibits no distension and no mass. There is no abdominal tenderness.   No CVA tenderness There is no rebound and no guarding.   Musculoskeletal:         General: No tenderness or edema. Normal  range of motion.      Cervical back: Normal range of motion and neck supple.     Neurological: She is alert and oriented to person, place, and time. She has normal strength and normal reflexes. No cranial nerve deficit or sensory deficit.   Skin: Skin is warm and dry. Capillary refill takes less than 2 seconds. No rash noted. No pallor.   Psychiatric: She has a normal mood and affect.         ED Course   Procedures  Labs Reviewed   ISTAT PROCEDURE - Abnormal       Result Value    POC PTWBT 8.2 (*)     POC PTINR 0.8 (*)     Sample unknown     POCT CMP - Abnormal    Albumin, POC 3.8      Alkaline Phosphatase, POC 49      ALT (SGPT), POC 20      AST (SGOT), POC 27      POC BUN 14      Calcium, POC 9.0      POC Chloride 109 (*)     POC Creatinine 0.8      POC Glucose 85      POC Potassium 5.7 (*)     POC Sodium 139      Bilirubin, POC 0.4      POC TCO2 32      Protein, POC 6.6     POCT CMP - Abnormal    Albumin, POC 3.8      Alkaline Phosphatase, POC 43      ALT (SGPT), POC 15      AST (SGOT), POC 17      POC BUN 15      Calcium, POC 9.0      POC Chloride 110 (*)     POC Creatinine 0.8      POC Glucose 69 (*)     POC Potassium 4.2      POC Sodium 139      Bilirubin, POC 0.6      POC TCO2 30      Protein, POC 6.4     TROPONIN ISTAT    POC Cardiac Troponin I 0.01      Sample unknown     POCT CBC    Hematocrit        Hemoglobin        RBC        WBC        MCV        MCH, POC        MCHC        RDW-CV        Platelet Count, POC        MPV       POCT CMP   POCT PROTIME-INR   POCT TROPONIN   POCT B-TYPE NATRIURETIC PEPTIDE (BNP)   POCT B-TYPE NATRIURETIC PEPTIDE (BNP)    POC B-Type Natriuretic Peptide 72.7            Imaging Results              X-Ray Chest PA And Lateral (Final result)  Result time 06/21/25 14:49:45      Final result by Mark Caba MD (06/21/25 14:49:45)                   Impression:      No acute cardiopulmonary abnormality      Electronically signed by: Mark  "Rosales  Date:    06/21/2025  Time:    14:49               Narrative:    EXAMINATION:  XR CHEST PA AND LATERAL    CLINICAL HISTORY:  Chest Pain;    TECHNIQUE:  PA and lateral views of the chest were performed.    COMPARISON:  None.    FINDINGS:  Cardiomediastinal silhouette unchanged.  No confluent airspace opacity or lobar consolidation no pleural effusion or gross pneumothorax.  No acute osseous abnormality.                                       Medications   aspirin tablet 325 mg (325 mg Oral Given 6/21/25 1353)   dexAMETHasone injection 8 mg (8 mg Intravenous Given 6/21/25 1404)   albuterol-ipratropium 2.5 mg-0.5 mg/3 mL nebulizer solution 3 mL (3 mLs Nebulization Given 6/21/25 1404)   ketorolac injection 15 mg (15 mg Intravenous Given 6/21/25 1443)     Medical Decision Making  Amount and/or Complexity of Data Reviewed  Labs: ordered. Decision-making details documented in ED Course.  Radiology: ordered. Decision-making details documented in ED Course.  ECG/medicine tests: ordered and independent interpretation performed. Decision-making details documented in ED Course.    Risk  OTC drugs.  Prescription drug management.               ED Course as of 06/21/25 1510   Sat Jun 21, 2025   1440 EKG 12-lead  No STEMI, NSR 82 bpm, Rightward axis, abnormal EKG, Qtc of 483, when compared to previous EKG done on 12/17/24 rate has increased by 18 bpm today. [JL]      ED Course User Index  [JL] Gilda Worthy          Medical Decision Making  Patient: 55 y.o.-year-old female  Chief Complaint:   Chief Complaint   Patient presents with    Pleurisy     C/O CHEST DISCOMFORT WITH COUGH X 1 WEEK, ALSO C/O BODY PAIN,"FIBROMYALGIA FLARE"       Refer to Physical exam as documented above.     Vital Signs: Reviewed   Nursing Notes: Reviewed.    Differential Diagnosis Considered:  STEMI, NSTEMI, cardiac arrhythmia, bronchitis, pneumonia, pneumothorax.      Disposition Planning  Hospitalization Considered For: Chest pain  Patient Agreement: " Patient agreeable to transfer and admission to [facility] if medically necessary.    Emergency Department Course  Treatment Administered: Physical examination and medications administered:   Medications   aspirin tablet 325 mg (325 mg Oral Given 6/21/25 1353)   dexAMETHasone injection 8 mg (8 mg Intravenous Given 6/21/25 1404)   albuterol-ipratropium 2.5 mg-0.5 mg/3 mL nebulizer solution 3 mL (3 mLs Nebulization Given 6/21/25 1404)   ketorolac injection 15 mg (15 mg Intravenous Given 6/21/25 1443)     Response to Treatment: Patient tolerated PO; reports improvement post-treatment.  External Data Reviewed:  Prior medical records.    Laboratory Studies: Ordered and reviewed.  Troponin within normal limits at 0.01.  Radiologic Studies: Ordered and reviewed as indicated.  No pneumothorax.    Patient presents with chest pain for greater than 24 hours.  Troponin is negative.  No STEMI on EKG and no acute issues on chest x-ray. Chest pain unlikely to be cardiac in origin.  I recommend follow up with Cardiology in 1 day for further evaluation of chest pain. Discussed need to return to the ED if chest pain worsens or does not resolve.      ECG/Other Diagnostic Testing:  Ordered and independently interpreted by Dr. Yajaira Kapadia DO.    Cardiac Monitoring:  Monitor placed for chest heaviness, monitor shows normal sinus rhythm ventricular rate of 74.  Interpretation by Dr. Yajaira Kapadia DO.    Risk Assessment  Social determinants of health impacting care: Body mass index is 28.28 kg/m².     Shared Decision-Making  Discussed the following with the patient:  Treatment plan  Medication instructions  Laboratory and imaging results   Recommended Outpatient follow up after emergency department visit  Referral for ongoing care    All questions answered. Patient actively  participated in care decisions.     Discharge Instructions  Prescriptions:   ED Prescriptions       Medication Sig Dispense Start Date End Date Auth.  Provider    albuterol (VENTOLIN HFA) 90 mcg/actuation inhaler Inhale 2 puffs into the lungs every 6 (six) hours as needed for Wheezing (As needed for cough and wheezing). Rescue 18 g 6/21/2025 6/21/2026 Yajaira Kapadia DO    azelastine (ASTELIN) 137 mcg (0.1 %) nasal spray 2 sprays (274 mcg total) by Nasal route 2 (two) times daily. 30 mL 6/21/2025 -- Yajaira Kapadia DO    acetaminophen (TYLENOL) 500 MG tablet Take 1 tablet (500 mg total) by mouth every 6 (six) hours as needed for Pain (As needed for mild-to-moderate pain). 30 tablet 6/21/2025 -- Yajaira Kapadia DO    orphenadrine (NORFLEX) 100 mg tablet Take 1 tablet (100 mg total) by mouth 2 (two) times daily as needed. 20 tablet 6/21/2025 7/1/2025 Yajaira Kapadia DO    benzonatate (TESSALON) 200 MG capsule Take 1 capsule (200 mg total) by mouth 3 (three) times daily as needed. 20 capsule 6/21/2025 7/1/2025 Yajaira Kapadia DO    ketorolac (TORADOL) 10 mg tablet Take 1 tablet (10 mg total) by mouth every 6 (six) hours as needed for Pain (As needed for pain). 110 tablet 6/21/2025 -- Yajaira Kapadia DO    promethazine-dextromethorphan (PROMETHAZINE-DM) 6.25-15 mg/5 mL Syrp Take 7.5 mLs by mouth nightly as needed (cougn and congestion). 100 mL 6/21/2025 7/4/2025 Yajaira Kapadia DO          Instructions:  Fill and take medications as directed.  Return to ED if symptoms worsen or fail to improve.  Follow-Up: PCP or specialist follow-up within 1 day.  Patient Status at Discharge:  Patient reports improvement in symptoms.       Patient Condition at  Discharge  Awake, alert, oriented x4.  Speaking clearly in full sentences.  Moving all four extremities spontaneously.    Studies Reviewed            I, Dr. Yajaira Kapadia, personally performed the services described in this documentation. This document was produced by a scribe under my direction and in my presence. All medical record entries made by the scribe were at my direction and in my presence.  I have reviewed the chart and agree  that the record reflects my personal performance and is accurate and complete. Yajaira Kapadia DO.     06/21/2025 3:08 PM  DISCLAIMER: This note was prepared with [employer] voice recognition transcription software. Garbled syntax, mangled pronouns, and other bizarre constructions may be attributed to that software system.                    Clinical Impression:  Final diagnoses:  [R07.9] Chest pain  [J40] Bronchitis (Primary)          ED Disposition Condition    Discharge Stable          ED Prescriptions       Medication Sig Dispense Start Date End Date Auth. Provider    albuterol (VENTOLIN HFA) 90 mcg/actuation inhaler Inhale 2 puffs into the lungs every 6 (six) hours as needed for Wheezing (As needed for cough and wheezing). Rescue 18 g 6/21/2025 6/21/2026 Yajaira Kapadia DO    azelastine (ASTELIN) 137 mcg (0.1 %) nasal spray 2 sprays (274 mcg total) by Nasal route 2 (two) times daily. 30 mL 6/21/2025 -- Yajaira Kapadia DO    acetaminophen (TYLENOL) 500 MG tablet Take 1 tablet (500 mg total) by mouth every 6 (six) hours as needed for Pain (As needed for mild-to-moderate pain). 30 tablet 6/21/2025 -- Yajaira Kapadia DO    orphenadrine (NORFLEX) 100 mg tablet Take 1 tablet (100 mg total) by mouth 2 (two) times daily as needed. 20 tablet 6/21/2025 7/1/2025 Yajaira Kapadia DO    benzonatate (TESSALON) 200 MG capsule Take 1 capsule (200 mg total) by mouth 3 (three) times daily as needed. 20 capsule 6/21/2025 7/1/2025 Yajaira Kapadia DO    ketorolac (TORADOL) 10 mg tablet Take 1 tablet (10 mg total) by mouth every 6 (six) hours as needed for Pain (As needed for pain). 110 tablet 6/21/2025 -- Yajaira Kapadia DO    promethazine-dextromethorphan (PROMETHAZINE-DM) 6.25-15 mg/5 mL Syrp Take 7.5 mLs by mouth nightly as needed (cougn and congestion). 100 mL 6/21/2025 7/4/2025 Yajaira Kapadia DO          Follow-up Information    None                [1]   Social History  Tobacco Use    Smoking status: Never     Passive exposure: Never     Smokeless tobacco: Never   Substance Use Topics    Alcohol use: Not Currently    Drug use: Never        Yajaira Kapadia DO  06/21/25 0989

## 2025-06-22 LAB
OHS QRS DURATION: 94 MS
OHS QTC CALCULATION: 483 MS

## 2025-06-23 ENCOUNTER — PATIENT MESSAGE (OUTPATIENT)
Dept: UROLOGY | Facility: CLINIC | Age: 55
End: 2025-06-23
Payer: MEDICAID

## 2025-06-23 DIAGNOSIS — R10.2 PELVIC PAIN IN FEMALE: ICD-10-CM

## 2025-06-23 DIAGNOSIS — R30.0 DYSURIA: Primary | ICD-10-CM

## 2025-06-23 RX ORDER — PHENAZOPYRIDINE HYDROCHLORIDE 200 MG/1
200 TABLET, FILM COATED ORAL 3 TIMES DAILY PRN
Qty: 9 TABLET | Refills: 2 | Status: SHIPPED | OUTPATIENT
Start: 2025-06-23 | End: 2025-07-02

## 2025-06-23 RX ORDER — PHENAZOPYRIDINE HYDROCHLORIDE 200 MG/1
TABLET, FILM COATED ORAL
COMMUNITY
Start: 2025-05-20 | End: 2025-06-23 | Stop reason: SDUPTHER

## 2025-07-10 ENCOUNTER — PATIENT MESSAGE (OUTPATIENT)
Dept: UROLOGY | Facility: CLINIC | Age: 55
End: 2025-07-10
Payer: MEDICAID

## 2025-07-10 RX ORDER — VALACYCLOVIR HYDROCHLORIDE 1 G/1
TABLET, FILM COATED ORAL
Qty: 90 TABLET | Refills: 0 | OUTPATIENT
Start: 2025-07-10

## 2025-07-10 NOTE — TELEPHONE ENCOUNTER
Provider Staff:  Please note Refusal of medication.     Action required for this patient.      Requested Prescriptions     Refused Prescriptions Disp Refills    valACYclovir (VALTREX) 1000 MG tablet [Pharmacy Med Name: valacyclovir 1 gram tablet] 90 tablet 0     Sig: TAKE 1 TABLET (1,000MG) BY MOUTH ONCE DAILY     Refused By: CHANDNI RUBI     Reason for Refusal: Patient needs an appointment      Thanks!  [facility]   Note composed: 07/10/2025 11:17 AM

## 2025-07-11 DIAGNOSIS — N30.00 ACUTE CYSTITIS WITHOUT HEMATURIA: Primary | ICD-10-CM

## 2025-07-11 RX ORDER — FLUCONAZOLE 150 MG/1
150 TABLET ORAL DAILY
Qty: 1 TABLET | Refills: 0 | Status: SHIPPED | OUTPATIENT
Start: 2025-07-11 | End: 2025-07-12

## 2025-07-11 RX ORDER — AMOXICILLIN AND CLAVULANATE POTASSIUM 500; 125 MG/1; MG/1
1 TABLET, FILM COATED ORAL 3 TIMES DAILY
Qty: 21 TABLET | Refills: 0 | Status: SHIPPED | OUTPATIENT
Start: 2025-07-11 | End: 2025-07-18

## 2025-07-18 ENCOUNTER — PATIENT MESSAGE (OUTPATIENT)
Dept: UROLOGY | Facility: CLINIC | Age: 55
End: 2025-07-18
Payer: MEDICAID

## 2025-07-28 ENCOUNTER — LAB VISIT (OUTPATIENT)
Dept: LAB | Facility: HOSPITAL | Age: 55
End: 2025-07-28
Attending: INTERNAL MEDICINE
Payer: MEDICAID

## 2025-07-28 DIAGNOSIS — D72.819 LEUKOPENIA, UNSPECIFIED TYPE: ICD-10-CM

## 2025-07-28 LAB
ABSOLUTE EOSINOPHIL (OHS): 0.01 K/UL
ABSOLUTE MONOCYTE (OHS): 0.27 K/UL (ref 0.3–1)
ABSOLUTE NEUTROPHIL COUNT (OHS): 1.95 K/UL (ref 1.8–7.7)
BASOPHILS # BLD AUTO: 0.02 K/UL
BASOPHILS NFR BLD AUTO: 0.7 %
ERYTHROCYTE [DISTWIDTH] IN BLOOD BY AUTOMATED COUNT: 13.7 % (ref 11.5–14.5)
HCT VFR BLD AUTO: 36 % (ref 37–48.5)
HGB BLD-MCNC: 11.1 GM/DL (ref 12–16)
IMM GRANULOCYTES # BLD AUTO: 0 K/UL (ref 0–0.04)
IMM GRANULOCYTES NFR BLD AUTO: 0 % (ref 0–0.5)
LYMPHOCYTES # BLD AUTO: 0.67 K/UL (ref 1–4.8)
MCH RBC QN AUTO: 28.5 PG (ref 27–31)
MCHC RBC AUTO-ENTMCNC: 30.8 G/DL (ref 32–36)
MCV RBC AUTO: 92 FL (ref 82–98)
NUCLEATED RBC (/100WBC) (OHS): 0 /100 WBC
PLATELET # BLD AUTO: 240 K/UL (ref 150–450)
PMV BLD AUTO: 10.1 FL (ref 9.2–12.9)
RBC # BLD AUTO: 3.9 M/UL (ref 4–5.4)
RELATIVE EOSINOPHIL (OHS): 0.3 %
RELATIVE LYMPHOCYTE (OHS): 22.9 % (ref 18–48)
RELATIVE MONOCYTE (OHS): 9.2 % (ref 4–15)
RELATIVE NEUTROPHIL (OHS): 66.9 % (ref 38–73)
WBC # BLD AUTO: 2.92 K/UL (ref 3.9–12.7)

## 2025-07-28 PROCEDURE — 85025 COMPLETE CBC W/AUTO DIFF WBC: CPT

## 2025-07-28 PROCEDURE — 36415 COLL VENOUS BLD VENIPUNCTURE: CPT | Mod: PO

## 2025-08-14 ENCOUNTER — PATIENT MESSAGE (OUTPATIENT)
Dept: UROLOGY | Facility: CLINIC | Age: 55
End: 2025-08-14
Payer: MEDICAID

## 2025-08-14 DIAGNOSIS — N30.10 INTERSTITIAL CYSTITIS: Primary | ICD-10-CM

## 2025-08-14 DIAGNOSIS — R39.82 CHRONIC BLADDER PAIN: ICD-10-CM

## 2025-08-14 RX ORDER — PHENAZOPYRIDINE HYDROCHLORIDE 200 MG/1
200 TABLET, FILM COATED ORAL 3 TIMES DAILY PRN
COMMUNITY
End: 2025-08-14 | Stop reason: SDUPTHER

## 2025-08-14 RX ORDER — PHENAZOPYRIDINE HYDROCHLORIDE 200 MG/1
200 TABLET, FILM COATED ORAL
COMMUNITY
Start: 2025-07-18 | End: 2025-08-15 | Stop reason: ALTCHOICE

## 2025-08-15 RX ORDER — PHENAZOPYRIDINE HYDROCHLORIDE 200 MG/1
200 TABLET, FILM COATED ORAL 3 TIMES DAILY PRN
Qty: 9 TABLET | Refills: 1 | Status: SHIPPED | OUTPATIENT
Start: 2025-08-15 | End: 2025-08-21

## 2025-08-18 ENCOUNTER — OFFICE VISIT (OUTPATIENT)
Dept: HEMATOLOGY/ONCOLOGY | Facility: CLINIC | Age: 55
End: 2025-08-18
Payer: MEDICAID

## 2025-08-18 VITALS
BODY MASS INDEX: 29.8 KG/M2 | OXYGEN SATURATION: 100 % | DIASTOLIC BLOOD PRESSURE: 81 MMHG | SYSTOLIC BLOOD PRESSURE: 148 MMHG | HEART RATE: 67 BPM | WEIGHT: 196.63 LBS | HEIGHT: 68 IN

## 2025-08-18 DIAGNOSIS — D72.819 LEUKOPENIA, UNSPECIFIED TYPE: Primary | ICD-10-CM

## 2025-08-18 DIAGNOSIS — D64.9 ANEMIA, UNSPECIFIED TYPE: ICD-10-CM

## 2025-08-18 DIAGNOSIS — M32.9 SLE (SYSTEMIC LUPUS ERYTHEMATOSUS RELATED SYNDROME): ICD-10-CM

## 2025-08-18 DIAGNOSIS — M79.7 FIBROMYALGIA: ICD-10-CM

## 2025-08-18 PROCEDURE — 99213 OFFICE O/P EST LOW 20 MIN: CPT | Mod: PBBFAC | Performed by: INTERNAL MEDICINE

## 2025-08-18 PROCEDURE — 4010F ACE/ARB THERAPY RXD/TAKEN: CPT | Mod: CPTII,,, | Performed by: INTERNAL MEDICINE

## 2025-08-18 PROCEDURE — 99214 OFFICE O/P EST MOD 30 MIN: CPT | Mod: S$PBB,,, | Performed by: INTERNAL MEDICINE

## 2025-08-18 PROCEDURE — 3008F BODY MASS INDEX DOCD: CPT | Mod: CPTII,,, | Performed by: INTERNAL MEDICINE

## 2025-08-18 PROCEDURE — 3079F DIAST BP 80-89 MM HG: CPT | Mod: CPTII,,, | Performed by: INTERNAL MEDICINE

## 2025-08-18 PROCEDURE — 3077F SYST BP >= 140 MM HG: CPT | Mod: CPTII,,, | Performed by: INTERNAL MEDICINE

## 2025-08-18 PROCEDURE — 99999 PR PBB SHADOW E&M-EST. PATIENT-LVL III: CPT | Mod: PBBFAC,,, | Performed by: INTERNAL MEDICINE

## 2025-08-21 ENCOUNTER — PATIENT MESSAGE (OUTPATIENT)
Dept: UROLOGY | Facility: CLINIC | Age: 55
End: 2025-08-21
Payer: MEDICAID

## 2025-08-21 DIAGNOSIS — Z87.440 HISTORY OF RECURRENT UTIS: Primary | ICD-10-CM

## 2025-08-26 ENCOUNTER — OFFICE VISIT (OUTPATIENT)
Dept: UROLOGY | Facility: CLINIC | Age: 55
End: 2025-08-26
Payer: MEDICAID

## 2025-08-26 ENCOUNTER — TELEPHONE (OUTPATIENT)
Dept: UROLOGY | Facility: CLINIC | Age: 55
End: 2025-08-26

## 2025-08-26 VITALS
DIASTOLIC BLOOD PRESSURE: 93 MMHG | WEIGHT: 193.56 LBS | SYSTOLIC BLOOD PRESSURE: 148 MMHG | BODY MASS INDEX: 29.43 KG/M2 | OXYGEN SATURATION: 98 % | HEART RATE: 72 BPM

## 2025-08-26 DIAGNOSIS — N30.10 INTERSTITIAL CYSTITIS: ICD-10-CM

## 2025-08-26 DIAGNOSIS — R39.15 URINARY URGENCY: ICD-10-CM

## 2025-08-26 DIAGNOSIS — N39.41 URGE INCONTINENCE: ICD-10-CM

## 2025-08-26 DIAGNOSIS — R39.82 CHRONIC BLADDER PAIN: ICD-10-CM

## 2025-08-26 DIAGNOSIS — N31.9 NEUROGENIC BLADDER: Primary | ICD-10-CM

## 2025-08-26 DIAGNOSIS — R39.89 BLADDER PAIN: ICD-10-CM

## 2025-08-26 DIAGNOSIS — R30.0 DYSURIA: ICD-10-CM

## 2025-08-26 DIAGNOSIS — Z87.440 HISTORY OF RECURRENT UTIS: ICD-10-CM

## 2025-08-26 DIAGNOSIS — N30.11 INTERSTITIAL CYSTITIS (CHRONIC) WITH HEMATURIA: ICD-10-CM

## 2025-08-26 DIAGNOSIS — R35.0 URINARY FREQUENCY: ICD-10-CM

## 2025-08-26 DIAGNOSIS — N30.10 CHRONIC INTERSTITIAL CYSTITIS: ICD-10-CM

## 2025-08-26 LAB
BACTERIA #/AREA URNS AUTO: ABNORMAL /HPF
BILIRUB UR QL STRIP.AUTO: NEGATIVE
CLARITY UR: ABNORMAL
COLOR UR AUTO: YELLOW
GLUCOSE UR QL STRIP: NEGATIVE
HGB UR QL STRIP: ABNORMAL
KETONES UR QL STRIP: NEGATIVE
LEUKOCYTE ESTERASE UR QL STRIP: ABNORMAL
MICROSCOPIC COMMENT: ABNORMAL
NITRITE UR QL STRIP: NEGATIVE
PH UR STRIP: 6 [PH]
PROT UR QL STRIP: ABNORMAL
RBC #/AREA URNS AUTO: >100 /HPF (ref 0–4)
SP GR UR STRIP: 1.02
SQUAMOUS #/AREA URNS AUTO: 6 /HPF
UROBILINOGEN UR STRIP-ACNC: NEGATIVE EU/DL
WBC #/AREA URNS AUTO: 27 /HPF (ref 0–5)

## 2025-08-26 PROCEDURE — 4010F ACE/ARB THERAPY RXD/TAKEN: CPT | Mod: CPTII,,, | Performed by: UROLOGY

## 2025-08-26 PROCEDURE — 99999 PR PBB SHADOW E&M-EST. PATIENT-LVL V: CPT | Mod: PBBFAC,,, | Performed by: UROLOGY

## 2025-08-26 PROCEDURE — 3077F SYST BP >= 140 MM HG: CPT | Mod: CPTII,,, | Performed by: UROLOGY

## 2025-08-26 PROCEDURE — 3008F BODY MASS INDEX DOCD: CPT | Mod: CPTII,,, | Performed by: UROLOGY

## 2025-08-26 PROCEDURE — 1159F MED LIST DOCD IN RCRD: CPT | Mod: CPTII,,, | Performed by: UROLOGY

## 2025-08-26 PROCEDURE — 99215 OFFICE O/P EST HI 40 MIN: CPT | Mod: PBBFAC,PO | Performed by: UROLOGY

## 2025-08-26 PROCEDURE — 3080F DIAST BP >= 90 MM HG: CPT | Mod: CPTII,,, | Performed by: UROLOGY

## 2025-08-26 PROCEDURE — 81003 URINALYSIS AUTO W/O SCOPE: CPT | Performed by: UROLOGY

## 2025-08-26 PROCEDURE — 1160F RVW MEDS BY RX/DR IN RCRD: CPT | Mod: CPTII,,, | Performed by: UROLOGY

## 2025-08-26 PROCEDURE — 99215 OFFICE O/P EST HI 40 MIN: CPT | Mod: S$PBB,,, | Performed by: UROLOGY

## 2025-08-26 PROCEDURE — 87086 URINE CULTURE/COLONY COUNT: CPT | Performed by: UROLOGY

## 2025-08-26 RX ORDER — KETOROLAC TROMETHAMINE 10 MG/1
10 TABLET, FILM COATED ORAL EVERY 6 HOURS PRN
Qty: 110 TABLET | Refills: 0 | Status: SHIPPED | OUTPATIENT
Start: 2025-08-26

## 2025-08-26 RX ORDER — PHENAZOPYRIDINE HYDROCHLORIDE 200 MG/1
200 TABLET, FILM COATED ORAL 3 TIMES DAILY PRN
Qty: 90 TABLET | Refills: 1 | Status: SHIPPED | OUTPATIENT
Start: 2025-08-26 | End: 2025-09-25

## 2025-08-26 RX ORDER — SODIUM CHLORIDE 9 MG/ML
INJECTION, SOLUTION INTRAVENOUS CONTINUOUS
OUTPATIENT
Start: 2025-08-26

## 2025-08-28 LAB — BACTERIA UR CULT: NORMAL

## 2025-09-03 RX ORDER — NITROFURANTOIN (MACROCRYSTALS) 100 MG/1
100 CAPSULE ORAL DAILY PRN
Qty: 28 CAPSULE | Refills: 11 | Status: SHIPPED | OUTPATIENT
Start: 2025-09-03 | End: 2026-09-03

## (undated) DEVICE — GLOVE SENSICARE PI GRN 6.5

## (undated) DEVICE — GLOVE SENSICARE PI SURG 6

## (undated) DEVICE — TIP YANKAUERS BULB NO VENT

## (undated) DEVICE — DRAPE THREE-QTR REINF 53X77IN

## (undated) DEVICE — Device

## (undated) DEVICE — PAD PREP CUFFED NS 24X48IN

## (undated) DEVICE — DISSECTOR LIGASURE EXACT 21CM

## (undated) DEVICE — SPONGE COTTON TRAY 4X4IN

## (undated) DEVICE — SOL POVIDONE PREP IODINE 4 OZ

## (undated) DEVICE — DRAPE LEGGINGS CUFF 33X51IN

## (undated) DEVICE — SUT VICRYL PLUS 3-0 SH 18IN

## (undated) DEVICE — SPONGE BULKEE II ABSRB 6X6.75

## (undated) DEVICE — ELECTRODE REM PLYHSV RETURN 9

## (undated) DEVICE — JELLY SURGILUBE LUBE TUBE 2OZ

## (undated) DEVICE — DRAPE UND BUTT W/POUCH 40X44IN